# Patient Record
Sex: FEMALE | Race: WHITE | NOT HISPANIC OR LATINO | Employment: OTHER | URBAN - METROPOLITAN AREA
[De-identification: names, ages, dates, MRNs, and addresses within clinical notes are randomized per-mention and may not be internally consistent; named-entity substitution may affect disease eponyms.]

---

## 2018-01-31 ENCOUNTER — OFFICE VISIT CONVERTED (OUTPATIENT)
Dept: FAMILY MEDICINE CLINIC | Facility: CLINIC | Age: 83
End: 2018-01-31
Attending: NURSE PRACTITIONER

## 2018-02-20 ENCOUNTER — OFFICE VISIT CONVERTED (OUTPATIENT)
Dept: FAMILY MEDICINE CLINIC | Facility: CLINIC | Age: 83
End: 2018-02-20
Attending: FAMILY MEDICINE

## 2018-03-16 ENCOUNTER — OFFICE VISIT CONVERTED (OUTPATIENT)
Dept: ORTHOPEDIC SURGERY | Facility: CLINIC | Age: 83
End: 2018-03-16
Attending: ORTHOPAEDIC SURGERY

## 2018-04-23 ENCOUNTER — OFFICE VISIT CONVERTED (OUTPATIENT)
Dept: FAMILY MEDICINE CLINIC | Facility: CLINIC | Age: 83
End: 2018-04-23
Attending: FAMILY MEDICINE

## 2018-04-23 ENCOUNTER — CONVERSION ENCOUNTER (OUTPATIENT)
Dept: FAMILY MEDICINE CLINIC | Facility: CLINIC | Age: 83
End: 2018-04-23

## 2018-04-24 ENCOUNTER — OFFICE VISIT CONVERTED (OUTPATIENT)
Dept: ORTHOPEDIC SURGERY | Facility: CLINIC | Age: 83
End: 2018-04-24
Attending: PHYSICIAN ASSISTANT

## 2018-05-22 ENCOUNTER — CONVERSION ENCOUNTER (OUTPATIENT)
Dept: ORTHOPEDIC SURGERY | Facility: CLINIC | Age: 83
End: 2018-05-22

## 2018-05-22 ENCOUNTER — OFFICE VISIT CONVERTED (OUTPATIENT)
Dept: ORTHOPEDIC SURGERY | Facility: CLINIC | Age: 83
End: 2018-05-22
Attending: PHYSICIAN ASSISTANT

## 2018-05-24 ENCOUNTER — OFFICE VISIT CONVERTED (OUTPATIENT)
Dept: FAMILY MEDICINE CLINIC | Facility: CLINIC | Age: 83
End: 2018-05-24
Attending: FAMILY MEDICINE

## 2018-06-18 ENCOUNTER — OFFICE VISIT CONVERTED (OUTPATIENT)
Dept: FAMILY MEDICINE CLINIC | Facility: CLINIC | Age: 83
End: 2018-06-18
Attending: FAMILY MEDICINE

## 2018-08-31 ENCOUNTER — CONVERSION ENCOUNTER (OUTPATIENT)
Dept: FAMILY MEDICINE CLINIC | Facility: CLINIC | Age: 83
End: 2018-08-31

## 2018-08-31 ENCOUNTER — OFFICE VISIT CONVERTED (OUTPATIENT)
Dept: FAMILY MEDICINE CLINIC | Facility: CLINIC | Age: 83
End: 2018-08-31
Attending: FAMILY MEDICINE

## 2018-09-14 ENCOUNTER — OFFICE VISIT CONVERTED (OUTPATIENT)
Dept: FAMILY MEDICINE CLINIC | Facility: CLINIC | Age: 83
End: 2018-09-14
Attending: FAMILY MEDICINE

## 2018-11-12 ENCOUNTER — OFFICE VISIT CONVERTED (OUTPATIENT)
Dept: FAMILY MEDICINE CLINIC | Facility: CLINIC | Age: 83
End: 2018-11-12
Attending: FAMILY MEDICINE

## 2018-11-12 ENCOUNTER — CONVERSION ENCOUNTER (OUTPATIENT)
Dept: FAMILY MEDICINE CLINIC | Facility: CLINIC | Age: 83
End: 2018-11-12

## 2019-01-15 ENCOUNTER — OFFICE VISIT CONVERTED (OUTPATIENT)
Dept: FAMILY MEDICINE CLINIC | Facility: CLINIC | Age: 84
End: 2019-01-15
Attending: FAMILY MEDICINE

## 2019-02-12 ENCOUNTER — OFFICE VISIT CONVERTED (OUTPATIENT)
Dept: FAMILY MEDICINE CLINIC | Facility: CLINIC | Age: 84
End: 2019-02-12
Attending: FAMILY MEDICINE

## 2019-02-26 ENCOUNTER — HOSPITAL ENCOUNTER (OUTPATIENT)
Dept: GENERAL RADIOLOGY | Facility: HOSPITAL | Age: 84
Discharge: HOME | End: 2019-02-26
Attending: FAMILY MEDICINE

## 2019-04-12 ENCOUNTER — HOSPITAL ENCOUNTER (OUTPATIENT)
Dept: FAMILY MEDICINE CLINIC | Facility: CLINIC | Age: 84
Discharge: HOME | End: 2019-04-12
Attending: FAMILY MEDICINE

## 2019-04-12 ENCOUNTER — OFFICE VISIT CONVERTED (OUTPATIENT)
Dept: FAMILY MEDICINE CLINIC | Facility: CLINIC | Age: 84
End: 2019-04-12
Attending: FAMILY MEDICINE

## 2019-04-12 ENCOUNTER — CONVERSION ENCOUNTER (OUTPATIENT)
Dept: FAMILY MEDICINE CLINIC | Facility: CLINIC | Age: 84
End: 2019-04-12

## 2019-04-12 LAB
ALBUMIN SERPL-MCNC: 4.4 G/DL (ref 3.5–5)
ALBUMIN/GLOB SERPL: 1.5 {RATIO} (ref 1.4–2.6)
ALP SERPL-CCNC: 78 U/L (ref 43–160)
ALT SERPL-CCNC: 10 U/L (ref 10–40)
ANION GAP SERPL CALC-SCNC: 17 MMOL/L (ref 8–19)
AST SERPL-CCNC: 16 U/L (ref 15–50)
BILIRUB SERPL-MCNC: 0.45 MG/DL (ref 0.2–1.3)
BUN SERPL-MCNC: 15 MG/DL (ref 5–25)
BUN/CREAT SERPL: 12 {RATIO} (ref 6–20)
CALCIUM SERPL-MCNC: 9.5 MG/DL (ref 8.7–10.4)
CHLORIDE SERPL-SCNC: 94 MMOL/L (ref 99–111)
CHOLEST SERPL-MCNC: 240 MG/DL (ref 107–200)
CHOLEST/HDLC SERPL: 4.3 {RATIO} (ref 3–6)
CONV CO2: 26 MMOL/L (ref 22–32)
CONV TOTAL PROTEIN: 7.4 G/DL (ref 6.3–8.2)
CREAT UR-MCNC: 1.23 MG/DL (ref 0.5–0.9)
GFR SERPLBLD BASED ON 1.73 SQ M-ARVRAT: 40 ML/MIN/{1.73_M2}
GLOBULIN UR ELPH-MCNC: 3 G/DL (ref 2–3.5)
GLUCOSE SERPL-MCNC: 128 MG/DL (ref 65–99)
HDLC SERPL-MCNC: 56 MG/DL (ref 40–60)
LDLC SERPL CALC-MCNC: 144 MG/DL (ref 70–100)
OSMOLALITY SERPL CALC.SUM OF ELEC: 278 MOSM/KG (ref 273–304)
POTASSIUM SERPL-SCNC: 4 MMOL/L (ref 3.5–5.3)
SODIUM SERPL-SCNC: 133 MMOL/L (ref 135–147)
T4 FREE SERPL-MCNC: 1.6 NG/DL (ref 0.9–1.8)
TRIGL SERPL-MCNC: 199 MG/DL (ref 40–150)
TSH SERPL-ACNC: 1.17 M[IU]/L (ref 0.27–4.2)
VLDLC SERPL-MCNC: 40 MG/DL (ref 5–37)

## 2019-04-26 ENCOUNTER — OFFICE VISIT CONVERTED (OUTPATIENT)
Dept: FAMILY MEDICINE CLINIC | Facility: CLINIC | Age: 84
End: 2019-04-26
Attending: FAMILY MEDICINE

## 2019-04-26 ENCOUNTER — HOSPITAL ENCOUNTER (OUTPATIENT)
Dept: GENERAL RADIOLOGY | Facility: HOSPITAL | Age: 84
Discharge: HOME | End: 2019-04-26
Attending: FAMILY MEDICINE

## 2019-05-16 ENCOUNTER — OFFICE VISIT CONVERTED (OUTPATIENT)
Dept: FAMILY MEDICINE CLINIC | Facility: CLINIC | Age: 84
End: 2019-05-16
Attending: FAMILY MEDICINE

## 2019-05-31 ENCOUNTER — OFFICE VISIT CONVERTED (OUTPATIENT)
Dept: FAMILY MEDICINE CLINIC | Facility: CLINIC | Age: 84
End: 2019-05-31
Attending: FAMILY MEDICINE

## 2019-07-02 ENCOUNTER — HOSPITAL ENCOUNTER (OUTPATIENT)
Dept: FAMILY MEDICINE CLINIC | Facility: CLINIC | Age: 84
Discharge: HOME | End: 2019-07-02
Attending: FAMILY MEDICINE

## 2019-07-02 ENCOUNTER — OFFICE VISIT CONVERTED (OUTPATIENT)
Dept: FAMILY MEDICINE CLINIC | Facility: CLINIC | Age: 84
End: 2019-07-02
Attending: FAMILY MEDICINE

## 2019-07-02 LAB
ANION GAP SERPL CALC-SCNC: 22 MMOL/L (ref 8–19)
BNP SERPL-MCNC: 295 PG/ML (ref 0–1800)
BUN SERPL-MCNC: 13 MG/DL (ref 5–25)
BUN/CREAT SERPL: 11 {RATIO} (ref 6–20)
CALCIUM SERPL-MCNC: 9.1 MG/DL (ref 8.7–10.4)
CHLORIDE SERPL-SCNC: 98 MMOL/L (ref 99–111)
CONV CO2: 23 MMOL/L (ref 22–32)
CREAT UR-MCNC: 1.17 MG/DL (ref 0.5–0.9)
GFR SERPLBLD BASED ON 1.73 SQ M-ARVRAT: 43 ML/MIN/{1.73_M2}
GLUCOSE SERPL-MCNC: 113 MG/DL (ref 65–99)
OSMOLALITY SERPL CALC.SUM OF ELEC: 289 MOSM/KG (ref 273–304)
POTASSIUM SERPL-SCNC: 3.7 MMOL/L (ref 3.5–5.3)
SODIUM SERPL-SCNC: 139 MMOL/L (ref 135–147)

## 2019-07-11 ENCOUNTER — HOSPITAL ENCOUNTER (OUTPATIENT)
Dept: URGENT CARE | Facility: CLINIC | Age: 84
Discharge: HOME | End: 2019-07-11

## 2019-07-15 ENCOUNTER — HOSPITAL ENCOUNTER (OUTPATIENT)
Dept: OTHER | Facility: HOSPITAL | Age: 84
Discharge: HOME | End: 2019-07-15
Attending: FAMILY MEDICINE

## 2019-08-06 ENCOUNTER — CONVERSION ENCOUNTER (OUTPATIENT)
Dept: FAMILY MEDICINE CLINIC | Facility: CLINIC | Age: 84
End: 2019-08-06

## 2019-08-06 ENCOUNTER — OFFICE VISIT CONVERTED (OUTPATIENT)
Dept: FAMILY MEDICINE CLINIC | Facility: CLINIC | Age: 84
End: 2019-08-06
Attending: FAMILY MEDICINE

## 2019-09-14 ENCOUNTER — HOSPITAL ENCOUNTER (OUTPATIENT)
Dept: URGENT CARE | Facility: CLINIC | Age: 84
Discharge: HOME | End: 2019-09-14
Attending: FAMILY MEDICINE

## 2019-09-17 LAB — BACTERIA UR CULT: NORMAL

## 2019-10-14 ENCOUNTER — HOSPITAL ENCOUNTER (OUTPATIENT)
Dept: FAMILY MEDICINE CLINIC | Facility: CLINIC | Age: 84
Discharge: HOME | End: 2019-10-14
Attending: FAMILY MEDICINE

## 2019-10-14 ENCOUNTER — OFFICE VISIT CONVERTED (OUTPATIENT)
Dept: FAMILY MEDICINE CLINIC | Facility: CLINIC | Age: 84
End: 2019-10-14
Attending: FAMILY MEDICINE

## 2019-10-14 LAB
ALBUMIN SERPL-MCNC: 4.3 G/DL (ref 3.5–5)
ALBUMIN/GLOB SERPL: 1.4 {RATIO} (ref 1.4–2.6)
ALP SERPL-CCNC: 71 U/L (ref 43–160)
ALT SERPL-CCNC: 7 U/L (ref 10–40)
ANION GAP SERPL CALC-SCNC: 19 MMOL/L (ref 8–19)
AST SERPL-CCNC: 17 U/L (ref 15–50)
BASOPHILS # BLD AUTO: 0.06 10*3/UL (ref 0–0.2)
BASOPHILS NFR BLD AUTO: 1.1 % (ref 0–3)
BILIRUB SERPL-MCNC: 0.46 MG/DL (ref 0.2–1.3)
BUN SERPL-MCNC: 12 MG/DL (ref 5–25)
BUN/CREAT SERPL: 11 {RATIO} (ref 6–20)
CALCIUM SERPL-MCNC: 9.5 MG/DL (ref 8.7–10.4)
CHLORIDE SERPL-SCNC: 101 MMOL/L (ref 99–111)
CHOLEST SERPL-MCNC: 205 MG/DL (ref 107–200)
CHOLEST/HDLC SERPL: 4.6 {RATIO} (ref 3–6)
CONV ABS IMM GRAN: 0.02 10*3/UL (ref 0–0.2)
CONV CO2: 24 MMOL/L (ref 22–32)
CONV IMMATURE GRAN: 0.4 % (ref 0–1.8)
CONV TOTAL PROTEIN: 7.4 G/DL (ref 6.3–8.2)
CREAT UR-MCNC: 1.07 MG/DL (ref 0.5–0.9)
DEPRECATED RDW RBC AUTO: 50.2 FL (ref 36.4–46.3)
EOSINOPHIL # BLD AUTO: 0.36 10*3/UL (ref 0–0.7)
EOSINOPHIL # BLD AUTO: 6.3 % (ref 0–7)
ERYTHROCYTE [DISTWIDTH] IN BLOOD BY AUTOMATED COUNT: 15.9 % (ref 11.7–14.4)
GFR SERPLBLD BASED ON 1.73 SQ M-ARVRAT: 48 ML/MIN/{1.73_M2}
GLOBULIN UR ELPH-MCNC: 3.1 G/DL (ref 2–3.5)
GLUCOSE SERPL-MCNC: 187 MG/DL (ref 65–99)
HCT VFR BLD AUTO: 35.5 % (ref 37–47)
HDLC SERPL-MCNC: 45 MG/DL (ref 40–60)
HGB BLD-MCNC: 10.9 G/DL (ref 12–16)
LDLC SERPL CALC-MCNC: 130 MG/DL (ref 70–100)
LYMPHOCYTES # BLD AUTO: 1.87 10*3/UL (ref 1–5)
LYMPHOCYTES NFR BLD AUTO: 32.7 % (ref 20–45)
MCH RBC QN AUTO: 26.7 PG (ref 27–31)
MCHC RBC AUTO-ENTMCNC: 30.7 G/DL (ref 33–37)
MCV RBC AUTO: 87 FL (ref 81–99)
MONOCYTES # BLD AUTO: 0.46 10*3/UL (ref 0.2–1.2)
MONOCYTES NFR BLD AUTO: 8.1 % (ref 3–10)
NEUTROPHILS # BLD AUTO: 2.94 10*3/UL (ref 2–8)
NEUTROPHILS NFR BLD AUTO: 51.4 % (ref 30–85)
NRBC CBCN: 0 % (ref 0–0.7)
OSMOLALITY SERPL CALC.SUM OF ELEC: 295 MOSM/KG (ref 273–304)
PLATELET # BLD AUTO: 224 10*3/UL (ref 130–400)
PMV BLD AUTO: 10.2 FL (ref 9.4–12.3)
POTASSIUM SERPL-SCNC: 4.2 MMOL/L (ref 3.5–5.3)
RBC # BLD AUTO: 4.08 10*6/UL (ref 4.2–5.4)
SODIUM SERPL-SCNC: 140 MMOL/L (ref 135–147)
TRIGL SERPL-MCNC: 151 MG/DL (ref 40–150)
VLDLC SERPL-MCNC: 30 MG/DL (ref 5–37)
WBC # BLD AUTO: 5.71 10*3/UL (ref 4.8–10.8)

## 2019-10-15 LAB
25(OH)D3 SERPL-MCNC: 66 NG/ML (ref 30–100)
IRON SATN MFR SERPL: 11 % (ref 20–55)
IRON SERPL-MCNC: 23 UG/DL (ref 60–170)
TIBC SERPL-MCNC: 204 UG/DL (ref 245–450)
TRANSFERRIN SERPL-MCNC: 143 MG/DL (ref 250–380)

## 2019-10-28 ENCOUNTER — HOSPITAL ENCOUNTER (OUTPATIENT)
Dept: CARDIOLOGY | Facility: HOSPITAL | Age: 84
Discharge: HOME | End: 2019-10-28
Attending: FAMILY MEDICINE

## 2019-11-09 ENCOUNTER — HOSPITAL ENCOUNTER (OUTPATIENT)
Dept: URGENT CARE | Facility: CLINIC | Age: 84
Discharge: HOME | End: 2019-11-09

## 2019-11-18 ENCOUNTER — HOSPITAL ENCOUNTER (OUTPATIENT)
Dept: FAMILY MEDICINE CLINIC | Facility: CLINIC | Age: 84
Discharge: HOME | End: 2019-11-18
Attending: FAMILY MEDICINE

## 2019-11-18 ENCOUNTER — OFFICE VISIT CONVERTED (OUTPATIENT)
Dept: FAMILY MEDICINE CLINIC | Facility: CLINIC | Age: 84
End: 2019-11-18
Attending: FAMILY MEDICINE

## 2019-11-18 ENCOUNTER — HOSPITAL ENCOUNTER (OUTPATIENT)
Dept: GENERAL RADIOLOGY | Facility: HOSPITAL | Age: 84
Discharge: HOME | End: 2019-11-18
Attending: FAMILY MEDICINE

## 2019-11-18 LAB
BASOPHILS # BLD AUTO: 0.05 10*3/UL (ref 0–0.2)
BASOPHILS NFR BLD AUTO: 0.6 % (ref 0–3)
CONV ABS IMM GRAN: 0.06 10*3/UL (ref 0–0.2)
CONV IMMATURE GRAN: 0.7 % (ref 0–1.8)
DEPRECATED RDW RBC AUTO: 54.9 FL (ref 36.4–46.3)
EOSINOPHIL # BLD AUTO: 0.38 10*3/UL (ref 0–0.7)
EOSINOPHIL # BLD AUTO: 4.6 % (ref 0–7)
ERYTHROCYTE [DISTWIDTH] IN BLOOD BY AUTOMATED COUNT: 17.2 % (ref 11.7–14.4)
HCT VFR BLD AUTO: 35.6 % (ref 37–47)
HGB BLD-MCNC: 11.3 G/DL (ref 12–16)
LYMPHOCYTES # BLD AUTO: 2.25 10*3/UL (ref 1–5)
LYMPHOCYTES NFR BLD AUTO: 27.1 % (ref 20–45)
MCH RBC QN AUTO: 27.9 PG (ref 27–31)
MCHC RBC AUTO-ENTMCNC: 31.7 G/DL (ref 33–37)
MCV RBC AUTO: 87.9 FL (ref 81–99)
MONOCYTES # BLD AUTO: 0.6 10*3/UL (ref 0.2–1.2)
MONOCYTES NFR BLD AUTO: 7.2 % (ref 3–10)
NEUTROPHILS # BLD AUTO: 4.97 10*3/UL (ref 2–8)
NEUTROPHILS NFR BLD AUTO: 59.8 % (ref 30–85)
NRBC CBCN: 0 % (ref 0–0.7)
PLATELET # BLD AUTO: 263 10*3/UL (ref 130–400)
PMV BLD AUTO: 10.1 FL (ref 9.4–12.3)
RBC # BLD AUTO: 4.05 10*6/UL (ref 4.2–5.4)
WBC # BLD AUTO: 8.31 10*3/UL (ref 4.8–10.8)

## 2019-11-25 ENCOUNTER — HOSPITAL ENCOUNTER (OUTPATIENT)
Dept: URGENT CARE | Facility: CLINIC | Age: 84
Discharge: HOME | End: 2019-11-25
Attending: NURSE PRACTITIONER

## 2020-01-09 ENCOUNTER — OFFICE VISIT CONVERTED (OUTPATIENT)
Dept: FAMILY MEDICINE CLINIC | Facility: CLINIC | Age: 85
End: 2020-01-09
Attending: FAMILY MEDICINE

## 2020-01-09 ENCOUNTER — HOSPITAL ENCOUNTER (OUTPATIENT)
Dept: FAMILY MEDICINE CLINIC | Facility: CLINIC | Age: 85
Discharge: HOME | End: 2020-01-09
Attending: FAMILY MEDICINE

## 2020-01-09 LAB
ANION GAP SERPL CALC-SCNC: 17 MMOL/L (ref 8–19)
BNP SERPL-MCNC: 352 PG/ML (ref 0–1800)
BUN SERPL-MCNC: 11 MG/DL (ref 5–25)
BUN/CREAT SERPL: 10 {RATIO} (ref 6–20)
CALCIUM SERPL-MCNC: 9.4 MG/DL (ref 8.7–10.4)
CHLORIDE SERPL-SCNC: 101 MMOL/L (ref 99–111)
CONV CO2: 25 MMOL/L (ref 22–32)
CREAT UR-MCNC: 1.1 MG/DL (ref 0.5–0.9)
GFR SERPLBLD BASED ON 1.73 SQ M-ARVRAT: 46 ML/MIN/{1.73_M2}
GLUCOSE SERPL-MCNC: 146 MG/DL (ref 65–99)
IRON SATN MFR SERPL: 32 % (ref 20–55)
IRON SERPL-MCNC: 135 UG/DL (ref 60–170)
OSMOLALITY SERPL CALC.SUM OF ELEC: 290 MOSM/KG (ref 273–304)
POTASSIUM SERPL-SCNC: 3.9 MMOL/L (ref 3.5–5.3)
SODIUM SERPL-SCNC: 139 MMOL/L (ref 135–147)
TIBC SERPL-MCNC: 426 UG/DL (ref 245–450)
TRANSFERRIN SERPL-MCNC: 298 MG/DL (ref 250–380)

## 2020-02-20 ENCOUNTER — OFFICE VISIT CONVERTED (OUTPATIENT)
Dept: FAMILY MEDICINE CLINIC | Facility: CLINIC | Age: 85
End: 2020-02-20
Attending: FAMILY MEDICINE

## 2020-02-20 ENCOUNTER — CONVERSION ENCOUNTER (OUTPATIENT)
Dept: FAMILY MEDICINE CLINIC | Facility: CLINIC | Age: 85
End: 2020-02-20

## 2020-02-20 ENCOUNTER — HOSPITAL ENCOUNTER (OUTPATIENT)
Dept: GENERAL RADIOLOGY | Facility: HOSPITAL | Age: 85
Discharge: HOME | End: 2020-02-20
Attending: FAMILY MEDICINE

## 2020-02-20 ENCOUNTER — HOSPITAL ENCOUNTER (OUTPATIENT)
Dept: FAMILY MEDICINE CLINIC | Facility: CLINIC | Age: 85
Discharge: HOME | End: 2020-02-20
Attending: FAMILY MEDICINE

## 2020-02-20 LAB
ALBUMIN SERPL-MCNC: 4.6 G/DL (ref 3.5–5)
ALBUMIN/GLOB SERPL: 1.5 {RATIO} (ref 1.4–2.6)
ALP SERPL-CCNC: 70 U/L (ref 43–160)
ALT SERPL-CCNC: 10 U/L (ref 10–40)
ANION GAP SERPL CALC-SCNC: 24 MMOL/L (ref 8–19)
AST SERPL-CCNC: 19 U/L (ref 15–50)
BILIRUB SERPL-MCNC: 0.36 MG/DL (ref 0.2–1.3)
BUN SERPL-MCNC: 20 MG/DL (ref 5–25)
BUN/CREAT SERPL: 15 {RATIO} (ref 6–20)
CALCIUM SERPL-MCNC: 10.3 MG/DL (ref 8.7–10.4)
CHLORIDE SERPL-SCNC: 98 MMOL/L (ref 99–111)
CHOLEST SERPL-MCNC: 231 MG/DL (ref 107–200)
CHOLEST/HDLC SERPL: 4.4 {RATIO} (ref 3–6)
CONV CO2: 21 MMOL/L (ref 22–32)
CONV TOTAL PROTEIN: 7.6 G/DL (ref 6.3–8.2)
CREAT UR-MCNC: 1.31 MG/DL (ref 0.5–0.9)
GFR SERPLBLD BASED ON 1.73 SQ M-ARVRAT: 37 ML/MIN/{1.73_M2}
GLOBULIN UR ELPH-MCNC: 3 G/DL (ref 2–3.5)
GLUCOSE SERPL-MCNC: 159 MG/DL (ref 65–99)
HDLC SERPL-MCNC: 52 MG/DL (ref 40–60)
LDLC SERPL CALC-MCNC: 142 MG/DL (ref 70–100)
OSMOLALITY SERPL CALC.SUM OF ELEC: 294 MOSM/KG (ref 273–304)
POTASSIUM SERPL-SCNC: 4.1 MMOL/L (ref 3.5–5.3)
SODIUM SERPL-SCNC: 139 MMOL/L (ref 135–147)
TRIGL SERPL-MCNC: 186 MG/DL (ref 40–150)
VLDLC SERPL-MCNC: 37 MG/DL (ref 5–37)

## 2020-08-20 ENCOUNTER — TELEPHONE CONVERTED (OUTPATIENT)
Dept: FAMILY MEDICINE CLINIC | Facility: CLINIC | Age: 85
End: 2020-08-20
Attending: FAMILY MEDICINE

## 2020-09-01 ENCOUNTER — HOSPITAL ENCOUNTER (OUTPATIENT)
Dept: LAB | Facility: HOSPITAL | Age: 85
Discharge: HOME | End: 2020-09-01
Attending: FAMILY MEDICINE

## 2020-09-01 LAB
ANION GAP SERPL CALC-SCNC: 16 MMOL/L (ref 8–19)
BUN SERPL-MCNC: 19 MG/DL (ref 5–25)
BUN/CREAT SERPL: 14 {RATIO} (ref 6–20)
CALCIUM SERPL-MCNC: 9.7 MG/DL (ref 8.7–10.4)
CHLORIDE SERPL-SCNC: 101 MMOL/L (ref 99–111)
CONV CO2: 25 MMOL/L (ref 22–32)
CREAT UR-MCNC: 1.36 MG/DL (ref 0.5–0.9)
GFR SERPLBLD BASED ON 1.73 SQ M-ARVRAT: 35 ML/MIN/{1.73_M2}
GLUCOSE SERPL-MCNC: 136 MG/DL (ref 65–99)
OSMOLALITY SERPL CALC.SUM OF ELEC: 290 MOSM/KG (ref 273–304)
POTASSIUM SERPL-SCNC: 4.1 MMOL/L (ref 3.5–5.3)
SODIUM SERPL-SCNC: 138 MMOL/L (ref 135–147)

## 2020-09-02 LAB — 25(OH)D3 SERPL-MCNC: 64.2 NG/ML (ref 30–100)

## 2020-11-04 ENCOUNTER — HOSPITAL ENCOUNTER (OUTPATIENT)
Dept: URGENT CARE | Facility: CLINIC | Age: 85
Discharge: HOME | End: 2020-11-04
Attending: NURSE PRACTITIONER

## 2020-11-07 LAB — SARS-COV-2 RNA SPEC QL NAA+PROBE: NOT DETECTED

## 2021-01-19 ENCOUNTER — HOSPITAL ENCOUNTER (OUTPATIENT)
Dept: URGENT CARE | Facility: CLINIC | Age: 86
Discharge: HOME | End: 2021-01-19
Attending: FAMILY MEDICINE

## 2021-02-22 ENCOUNTER — OFFICE VISIT CONVERTED (OUTPATIENT)
Dept: FAMILY MEDICINE CLINIC | Facility: CLINIC | Age: 86
End: 2021-02-22
Attending: FAMILY MEDICINE

## 2021-02-22 ENCOUNTER — HOSPITAL ENCOUNTER (OUTPATIENT)
Dept: FAMILY MEDICINE CLINIC | Facility: CLINIC | Age: 86
Discharge: HOME | End: 2021-02-22
Attending: FAMILY MEDICINE

## 2021-05-06 ENCOUNTER — CONVERSION ENCOUNTER (OUTPATIENT)
Dept: FAMILY MEDICINE CLINIC | Facility: CLINIC | Age: 86
End: 2021-05-06

## 2021-05-06 ENCOUNTER — OFFICE VISIT CONVERTED (OUTPATIENT)
Dept: FAMILY MEDICINE CLINIC | Facility: CLINIC | Age: 86
End: 2021-05-06
Attending: FAMILY MEDICINE

## 2021-05-13 NOTE — PROGRESS NOTES
Progress Note      Patient Name: Shayy Wasserman   Patient ID: [id number]   Sex: Female   YOB: 1935    Primary Care Provider: Pee Mon DO    Visit Date: August 20, 2020    Provider: Pee Mon DO   Location: [facility]   Location Address: [address] [place] [address]  [id number]   Location Phone: [phone]          Chief Complaint  · 6 month follow up - HTN, HLD, CKD(stage), edema  · medication refills  · pt took vitals at home today for visit.       History Of Present Illness  TELEHEALTH TELEPHONE VISIT  Shayy Wasserman is a 84 year old /White female who is presenting for evaluation via telehealth telephone visit. Verbal consent obtained before beginning visit.   Provider spent 15 minutes with patient during telehealth visit.   The following staff were present during this visit: Any Rodriguez CMA   Past Medical History/Overview of Patient Symptoms     f/u HTN, HLD and CKD. She states that she as been checking her BP and it has been good. She is taking all her meds as prescribed. Her swelling is improved.       Past Medical History  Disease Name Date Onset Notes   Anxiety disorder 11/12/2018 --    Arthritis --  --    Bladder Disorder --  --    CKD (chronic kidney disease) stage 3, GFR 30-59 ml/min --  --    Dependent edema 11/28/2017 --    Gout --  --    High cholesterol --  --    Hypertension --  --    Limb Swelling --  --    Osteoporosis --  --    Primary osteoarthritis of left knee 05/24/2018 --    Primary osteoarthritis of right hip 12/12/2017 --          Past Surgical History  Procedure Name Date Notes   Artificial Joints/Limbs --  --    Bladder Repair --  --    Colonoscopy 2016 --    Eye Implant --  yes   Hysterectomy --  --    Joint Surgery --  --    Knee repair --  right knee   Knee replacement, right --  --          Medication List  Name Date Started Instructions   albuterol sulfate 2.5 mg /3 mL (0.083 %) inhalation solution for nebulization  10/14/2019 inhale 3 milliliters (2.5 mg) by nebulization route 3 times per day for 30 days   alendronate 70 mg oral tablet 09/10/2019 TAKE 1 TABLET BY MOUTH WEEKLY (DO NOT LIE DOWN, EAT, DRINK, OR TAKE OTHER MEDICATION FOR AT LEAST 30 MINUTES)   allopurinol 300 mg oral tablet 01/27/2020 TAKE 1 TABLET BY MOUTH EVERY DAY   amlodipine 10 mg oral tablet 10/14/2019 take 1 tablet (10 mg) by oral route once daily for 90 days   atenolol 50 mg oral tablet 08/03/2020 take 1 tablet (50 mg) by oral route 2 times per day for 90 days   DeVilbiss PulmoNeb LT Comp-Neb miscellaneous device 04/26/2019 use with Albuterol soln three times daily   pantoprazole 40 mg oral tablet,delayed release (DR/EC) 08/06/2019 take 1 tablet (40 mg) by oral route once daily for 90 days         Allergy List  Allergen Name Date Reaction Notes   tramadol --  --  --          Family Medical History  Disease Name Relative/Age Notes   Colon Cancer Father/   --          Social History  Finding Status Start/Stop Quantity Notes   Alcohol Never --/-- --  02/12/2019 -    Alcohol Use --  --/-- --  01/15/2019 - does not drink   lives with children --  --/-- --  --    Recreational Drug Use Never --/-- --  no   Retired. --  --/-- --  --    Tobacco Former 16/35 --  former smoker    --  --/-- --  --          Immunizations  NameDate Admin Mfg Trade Name Lot Number Route Inj VIS Given VIS Publication   Cdsoxugub64/14/2019 [facility] Fluzone Quadrivalent [id number]  LD 10/14/2019    Comments: patient tolerated well with no complaints   Hyufcwnuu91/28/2017 [facility] Fluzone Quadrivalent [id number] IM RD 11/28/2017 08/07/2015   Comments: pt tolerated well   Prevnar 1311/28/2017 [facility] PREVNAR 13 [id number] IM LD 11/28/2017 04/15/2015   Comments:          Review of Systems  · Constitutional  o Denies  o : fatigue  · Eyes  o Denies  o : blurred vision, changes in vision  · HENT  o Denies  o : headaches  · Cardiovascular  o Denies  o : chest pain, irregular heart beats, rapid heart  "rate  · Respiratory  o Denies  o : shortness of breath, wheezing, cough      Vitals  Date Time BP Position Site L\R Cuff Size HR RR TEMP (F) WT  HT  BMI kg/m2 BSA m2 O2 Sat HC       01/09/2020 03:41 /71 Sitting    81 - R   188lbs 0oz 5'  5\" 31.28 1.98 97 %    02/20/2020 03:51 /81 Sitting    88 - R   189lbs 2oz 5'  5\" 31.47 1.98 97 %    02/20/2020 04:36 /86 Sitting               08/20/2020 03:48 /77 Sitting    68 - R    5'  5\"                 Assessment  · Osteoporosis     733.00/M81.0  will check her Vit D  · Dependent edema     782.3/R60.9  · High cholesterol     272.0/E78.0  stable  · Hypertension     401.9/I10  good control  · CKD (chronic kidney disease) stage 3, GFR 30-59 ml/min     585.3/N18.3  will update her labs      Plan  · Orders  o BMP [facility] (70879) - 401.9/I10, 585.3/N18.3 - 08/20/2020  o Vitamin D (25-Hydroxy) Level (97052) - 733.00/M81.0 - 08/20/2020  o ACO-39: Current medications updated and reviewed () - - 08/20/2020  · Medications  o alendronate 70 mg oral tablet   SIG: take 1 tablet (70 mg) once weekly in the morning, at least 30 min before first food, beverage, or medication of day   DISP: (4) Tablet with 11 refills  Adjusted on 08/20/2020     o pantoprazole 40 mg oral tablet,delayed release (DR/EC)   SIG: take 1 tablet (40 mg) by oral route once daily for 90 days   DISP: (90) tablets with 3 refills  Adjusted on 08/20/2020     o Medications have been Reconciled  o Transition of Care or Provider Policy  · Instructions  o Plan Of Care:   o Chronic conditions reviewed and taken into consideration for today's treatment plan.  o Patient instructed to seek medical attention urgently for new or worsening symptoms.  o Patient was educated/instructed on their diagnosis, treatment and medications prior to discharge from the clinic today.  o Patient is taking medications as prescribed and doing well.   o Take all medications as prescribed/directed.  o Patient instructed/educated " on their diet and exercise program.  · Disposition  o f/u as needed or worsening symptoms  o Return Visit Request in/on 6 months +/- 2 days ([id number]).            Electronically Signed by: Pee Mon DO -Author on August 20, 2020 04:13:21 PM

## 2021-05-14 VITALS
HEART RATE: 63 BPM | BODY MASS INDEX: 28.51 KG/M2 | OXYGEN SATURATION: 96 % | DIASTOLIC BLOOD PRESSURE: 67 MMHG | WEIGHT: 171.12 LBS | SYSTOLIC BLOOD PRESSURE: 131 MMHG | HEIGHT: 65 IN | TEMPERATURE: 96.7 F | RESPIRATION RATE: 22 BRPM

## 2021-05-14 NOTE — PROGRESS NOTES
Progress Note      Patient Name: Shayy Wasserman   Patient ID: [id number]   Sex: Female   YOB: 1935    Primary Care Provider: Pee Mon DO    Visit Date: February 22, 2021    Provider: Pee Mon DO   Location: [facility]   Location Address: [address] [place] [address]  [id number]   Location Phone: [phone]          Chief Complaint  · f/u for osteoporosis, dependent edema, HLD, HTN, CKD stage 3  · refills      History Of Present Illness  Shayy Wasserman is a 85 year old /White female who presents for evaluation and treatment of: HTN, HLD, and CKD. She states that she checks her BP at home and it has been good. She takes her BP meds daily      HLD- she takes her med every evening        CKD- she states that she drinking of fluids. NO NSAIDs       Past Medical History  Disease Name Date Onset Notes   Anxiety disorder 11/12/2018 --    Arthritis --  --    Bladder Disorder --  --    CKD (chronic kidney disease) stage 3, GFR 30-59 ml/min --  --    Dependent edema 11/28/2017 --    Gout --  --    High cholesterol --  --    Hypertension --  --    Limb Swelling --  --    Osteoporosis --  --    Primary osteoarthritis of left knee 05/24/2018 --    Primary osteoarthritis of right hip 12/12/2017 --          Past Surgical History  Procedure Name Date Notes   Artificial Joints/Limbs --  --    Bladder Repair --  --    Colonoscopy 2016 --    Eye Implant --  yes   Hysterectomy --  --    Joint Surgery --  --    Knee repair --  right knee   Knee replacement, right --  --          Medication List  Name Date Started Instructions   alendronate 70 mg oral tablet 08/20/2020 take 1 tablet (70 mg) once weekly in the morning, at least 30 min before first food, beverage, or medication of day   ALLOPURINOL 300 MG TABS 300 Tablet 01/19/2021 TAKE 1 TABLET BY MOUTH EVERY DAY   AMLODIPINE BESYLATE 10 MG T 10 Tablet 10/26/2020 TAKE 1 TABLET BY MOUTH DAILY   atenolol 50  "mg oral tablet 08/03/2020 take 1 tablet (50 mg) by oral route 2 times per day for 90 days   DeVilbiss PulmoNeb LT Comp-Neb miscellaneous device 04/26/2019 use with Albuterol soln three times daily   pantoprazole 40 mg oral tablet,delayed release (DR/EC) 08/20/2020 take 1 tablet (40 mg) by oral route once daily for 90 days         Allergy List  Allergen Name Date Reaction Notes   tramadol --  --  --        Allergies Reconciled  Family Medical History  Disease Name Relative/Age Notes   Colon Cancer Father/   --          Social History  Finding Status Start/Stop Quantity Notes   Alcohol Never --/-- --  02/12/2019 -    Alcohol Use --  --/-- --  01/15/2019 - does not drink   lives with children --  --/-- --  --    Recreational Drug Use Never --/-- --  no   Retired. --  --/-- --  --    Tobacco Former 16/35 --  former smoker    --  --/-- --  --          Immunizations  NameDate Admin Mfg Trade Name Lot Number Route Inj VIS Given VIS Publication   Yczdxysja57/06/2020 [facility] Fluzone Quadrivalent [id number] IM RD 10/06/2020 08/15/2019   Comments: Injection given. Pt tolerated well. NDC 86213-523-71   Prevnar 1311/28/2017 [facility] PREVNAR 13 [id number] IM LD 11/28/2017 04/15/2015   Comments:          Review of Systems  · Constitutional  o Denies  o : fatigue  · Eyes  o Denies  o : changes in vision  · HENT  o Denies  o : headaches  · Cardiovascular  o Denies  o : chest pain, irregular heart beats, rapid heart rate, dyspnea on exertion  · Respiratory  o Denies  o : shortness of breath, wheezing, cough, dyspnea on exertion      Vitals  Date Time BP Position Site L\R Cuff Size HR RR TEMP (F) WT  HT  BMI kg/m2 BSA m2 O2 Sat FR L/min FiO2 HC       02/20/2020 03:51 /81 Sitting    88 - R   189lbs 2oz 5'  5\" 31.47 1.98 97 %  21%    08/20/2020 03:48 /77 Sitting    68 - R    5'  5\"         02/22/2021 03:33 /67 Sitting    63 - R 22 96.7 171lbs 2oz 5'  5\" 28.48 1.89 96 %  21%          Physical " Examination  · Constitutional  o Appearance  o : well-nourished, well developed, alert, in no acute distress, well-tended appearance  · Head and Face  o Head  o :   § Inspection  § : atraumatic, normocephalic  o Face  o :   § Inspection  § : no facial lesions  o HEENT  o : Unremarkable  · Eyes  o Conjunctivae  o : conjunctivae normal  o Sclerae  o : sclerae white  o Pupils and Irises  o : pupils equal and round, pupils reactive to light bilaterally  o Eyelids/Ocular Adnexae  o : eyelid appearance normal  · Ears, Nose, Mouth and Throat  o Ears  o :   § External Ears  § : appearance within normal limits, no lesions present  § Otoscopic Examination  § : tympanic membrane appearance within normal limits bilaterally without perforations, mobility normal  o Nose  o :   § External Nose  § : appearance normal  o Oral Cavity  o :   § Oral Mucosa  § : oral mucosa normal  § Lips  § : lip appearance normal  § Teeth  § : dentures in place  § Gums  § : gums pink, non-swollen, no bleeding present  § Tongue  § : tongue appearance normal  § Palate  § : hard palate normal, soft palate appearance normal  o Throat  o :   § Oropharynx  § : no inflammation or lesions present, tonsils within normal limits  · Neck  o Inspection/Palpation  o : normal appearance, no masses or tenderness, trachea midline  o Thyroid  o : gland size normal, nontender, no nodules or masses present on palpation  · Respiratory  o Respiratory Effort  o : breathing unlabored  o Auscultation of Lungs  o : normal breath sounds  · Cardiovascular  o Heart  o :   § Auscultation of Heart  § : regular rate, normal rhythm, no murmurs present  o Peripheral Vascular System  o :   § Extremities  § : no edema  · Lymphatic  o Neck  o : no lymphadenopathy           Assessment  · Screening for depression     V79.0/Z13.89  · Osteoporosis     733.00/M81.0  · High cholesterol     272.0/E78.0  will update   · Hypertension     401.9/I10  good control  · CKD (chronic kidney disease)  stage 3, GFR 30-59 ml/min     585.3/N18.30  will update       Plan  · Orders  o ACO-18: Negative screen for clinical depression using a standardized tool () - V79.0/Z13.89 - 02/22/2021  o Vitamin D Level (27201) - 733.00/M81.0 - 02/22/2021  o CMP [facility] (78908) - 272.0/E78.0, 401.9/I10, 585.3/N18.30 - 02/22/2021  o Lipid Panel [facility] (24597) - 272.0/E78.0, 401.9/I10 - 02/22/2021  o ACO-39: Current medications updated and reviewed (, 1159F) - - 02/22/2021  · Medications  o amlodipine 10 mg oral tablet   SIG: take 1 tablet (10 mg) by oral route once daily for 90 days   DISP: (90) Tablet with 1 refills  Prescribed on 02/22/2021     o AMLODIPINE BESYLATE 10 MG T 10 Tablet   SIG: TAKE 1 TABLET BY MOUTH DAILY   DISP: (90) Tablet with 0 refills  Discontinued on 02/22/2021     o Medications have been Reconciled  o Transition of Care or Provider Policy  · Instructions  o Depression Screen completed and scanned into the EMR under the designated folder within the patient's documents.  o Today's PHQ-9 result is 3.  o Patient is taking medications as prescribed and doing well.   o Take all medications as prescribed/directed.  o Patient instructed/educated on their diet and exercise program.  o Patient was educated/instructed on their diagnosis, treatment and medications prior to discharge from the clinic today.  o Patient instructed to seek medical attention urgently for new or worsening symptoms.  o Call the office with any concerns or questions.  o Bring all medicines with their bottles to each office visit.  · Disposition  o Call or Return if symptoms worsen or persist.  o Return Visit Request in/on 6 months +/- 2 days (68206).            Electronically Signed by: Pee Mon DO -Author on February 22, 2021 04:31:31 PM

## 2021-05-15 VITALS
BODY MASS INDEX: 31.84 KG/M2 | HEART RATE: 77 BPM | DIASTOLIC BLOOD PRESSURE: 79 MMHG | HEIGHT: 65 IN | WEIGHT: 191.12 LBS | TEMPERATURE: 97.8 F | OXYGEN SATURATION: 96 % | SYSTOLIC BLOOD PRESSURE: 129 MMHG

## 2021-05-15 VITALS
WEIGHT: 190.25 LBS | SYSTOLIC BLOOD PRESSURE: 107 MMHG | HEIGHT: 65 IN | DIASTOLIC BLOOD PRESSURE: 62 MMHG | OXYGEN SATURATION: 97 % | BODY MASS INDEX: 31.7 KG/M2 | HEART RATE: 72 BPM

## 2021-05-15 VITALS
BODY MASS INDEX: 31.82 KG/M2 | WEIGHT: 191 LBS | HEART RATE: 81 BPM | DIASTOLIC BLOOD PRESSURE: 69 MMHG | SYSTOLIC BLOOD PRESSURE: 142 MMHG | HEIGHT: 65 IN | TEMPERATURE: 98.2 F

## 2021-05-15 VITALS
SYSTOLIC BLOOD PRESSURE: 142 MMHG | HEART RATE: 85 BPM | HEIGHT: 65 IN | DIASTOLIC BLOOD PRESSURE: 73 MMHG | TEMPERATURE: 98 F | BODY MASS INDEX: 31.82 KG/M2 | WEIGHT: 191 LBS

## 2021-05-15 VITALS
HEART RATE: 74 BPM | TEMPERATURE: 97.4 F | BODY MASS INDEX: 32.55 KG/M2 | WEIGHT: 195.37 LBS | DIASTOLIC BLOOD PRESSURE: 64 MMHG | SYSTOLIC BLOOD PRESSURE: 133 MMHG | HEIGHT: 65 IN | OXYGEN SATURATION: 100 %

## 2021-05-15 VITALS
HEIGHT: 65 IN | DIASTOLIC BLOOD PRESSURE: 81 MMHG | SYSTOLIC BLOOD PRESSURE: 154 MMHG | BODY MASS INDEX: 32.32 KG/M2 | HEART RATE: 87 BPM | WEIGHT: 194 LBS

## 2021-05-15 VITALS
TEMPERATURE: 97.6 F | OXYGEN SATURATION: 95 % | HEART RATE: 69 BPM | HEIGHT: 65 IN | BODY MASS INDEX: 32.38 KG/M2 | WEIGHT: 194.37 LBS | SYSTOLIC BLOOD PRESSURE: 141 MMHG | DIASTOLIC BLOOD PRESSURE: 71 MMHG

## 2021-05-15 VITALS
TEMPERATURE: 97.8 F | OXYGEN SATURATION: 98 % | HEIGHT: 65 IN | DIASTOLIC BLOOD PRESSURE: 74 MMHG | HEART RATE: 79 BPM | BODY MASS INDEX: 32.36 KG/M2 | SYSTOLIC BLOOD PRESSURE: 148 MMHG | WEIGHT: 194.25 LBS

## 2021-05-15 VITALS
BODY MASS INDEX: 31.32 KG/M2 | DIASTOLIC BLOOD PRESSURE: 71 MMHG | SYSTOLIC BLOOD PRESSURE: 132 MMHG | HEART RATE: 81 BPM | WEIGHT: 188 LBS | OXYGEN SATURATION: 97 % | HEIGHT: 65 IN

## 2021-05-15 VITALS
SYSTOLIC BLOOD PRESSURE: 153 MMHG | HEART RATE: 88 BPM | DIASTOLIC BLOOD PRESSURE: 86 MMHG | DIASTOLIC BLOOD PRESSURE: 81 MMHG | HEIGHT: 65 IN | BODY MASS INDEX: 31.51 KG/M2 | OXYGEN SATURATION: 97 % | SYSTOLIC BLOOD PRESSURE: 136 MMHG | WEIGHT: 189.12 LBS

## 2021-05-15 VITALS — HEIGHT: 65 IN | DIASTOLIC BLOOD PRESSURE: 77 MMHG | HEART RATE: 68 BPM | SYSTOLIC BLOOD PRESSURE: 122 MMHG

## 2021-05-16 VITALS
HEIGHT: 65 IN | HEART RATE: 62 BPM | BODY MASS INDEX: 32.4 KG/M2 | WEIGHT: 194.5 LBS | DIASTOLIC BLOOD PRESSURE: 77 MMHG | OXYGEN SATURATION: 96 % | TEMPERATURE: 98.8 F | SYSTOLIC BLOOD PRESSURE: 161 MMHG

## 2021-05-16 VITALS — BODY MASS INDEX: 31.99 KG/M2 | HEART RATE: 77 BPM | WEIGHT: 192 LBS | HEIGHT: 65 IN | OXYGEN SATURATION: 98 %

## 2021-05-16 VITALS
WEIGHT: 192 LBS | HEIGHT: 65 IN | TEMPERATURE: 98.9 F | DIASTOLIC BLOOD PRESSURE: 67 MMHG | SYSTOLIC BLOOD PRESSURE: 149 MMHG | HEART RATE: 70 BPM | BODY MASS INDEX: 31.99 KG/M2

## 2021-05-16 VITALS
BODY MASS INDEX: 32.65 KG/M2 | HEART RATE: 62 BPM | WEIGHT: 196 LBS | OXYGEN SATURATION: 96 % | SYSTOLIC BLOOD PRESSURE: 153 MMHG | DIASTOLIC BLOOD PRESSURE: 59 MMHG | HEIGHT: 65 IN

## 2021-05-16 VITALS
BODY MASS INDEX: 34.82 KG/M2 | OXYGEN SATURATION: 95 % | WEIGHT: 209 LBS | DIASTOLIC BLOOD PRESSURE: 74 MMHG | HEIGHT: 65 IN | TEMPERATURE: 98.5 F | RESPIRATION RATE: 19 BRPM | HEART RATE: 73 BPM | SYSTOLIC BLOOD PRESSURE: 148 MMHG

## 2021-05-16 VITALS
TEMPERATURE: 98.3 F | WEIGHT: 192.5 LBS | BODY MASS INDEX: 32.07 KG/M2 | DIASTOLIC BLOOD PRESSURE: 57 MMHG | HEIGHT: 65 IN | HEART RATE: 62 BPM | OXYGEN SATURATION: 96 % | SYSTOLIC BLOOD PRESSURE: 109 MMHG

## 2021-05-16 VITALS
HEIGHT: 65 IN | BODY MASS INDEX: 33.32 KG/M2 | HEART RATE: 65 BPM | DIASTOLIC BLOOD PRESSURE: 46 MMHG | WEIGHT: 200 LBS | SYSTOLIC BLOOD PRESSURE: 124 MMHG

## 2021-05-16 VITALS
WEIGHT: 194.37 LBS | OXYGEN SATURATION: 98 % | TEMPERATURE: 97.2 F | DIASTOLIC BLOOD PRESSURE: 73 MMHG | HEART RATE: 73 BPM | BODY MASS INDEX: 32.38 KG/M2 | HEIGHT: 65 IN | SYSTOLIC BLOOD PRESSURE: 153 MMHG

## 2021-05-16 VITALS
BODY MASS INDEX: 33.66 KG/M2 | WEIGHT: 202 LBS | DIASTOLIC BLOOD PRESSURE: 58 MMHG | HEART RATE: 78 BPM | SYSTOLIC BLOOD PRESSURE: 166 MMHG | HEIGHT: 65 IN

## 2021-05-16 VITALS
HEIGHT: 65 IN | HEART RATE: 68 BPM | SYSTOLIC BLOOD PRESSURE: 146 MMHG | DIASTOLIC BLOOD PRESSURE: 73 MMHG | WEIGHT: 188 LBS | BODY MASS INDEX: 31.32 KG/M2

## 2021-05-16 VITALS
BODY MASS INDEX: 32.32 KG/M2 | HEART RATE: 78 BPM | DIASTOLIC BLOOD PRESSURE: 78 MMHG | SYSTOLIC BLOOD PRESSURE: 154 MMHG | HEIGHT: 65 IN | WEIGHT: 194 LBS

## 2021-05-16 VITALS — HEART RATE: 73 BPM | OXYGEN SATURATION: 96 % | HEIGHT: 65 IN | BODY MASS INDEX: 31.99 KG/M2 | WEIGHT: 192 LBS

## 2021-05-16 VITALS — OXYGEN SATURATION: 97 % | HEIGHT: 65 IN | HEART RATE: 66 BPM | BODY MASS INDEX: 33.99 KG/M2 | WEIGHT: 204 LBS

## 2021-06-04 ENCOUNTER — HOSPITAL ENCOUNTER (OUTPATIENT)
Dept: URGENT CARE | Facility: CLINIC | Age: 86
Discharge: HOME | End: 2021-06-04
Attending: FAMILY MEDICINE

## 2021-06-05 NOTE — PROGRESS NOTES
Progress Note      Patient Name: Shayy Wasserman   Patient ID: [id number]   Sex: Female   YOB: 1935    Primary Care Provider: Pee Mon DO    Visit Date: May 6, 2021    Provider: Pee Mon DO   Location: [facility]   Location Address: [address] [place] [address]  [id number]   Location Phone: [phone]          Chief Complaint  · Welcome to Medicare Visit      History Of Present Illness  The patient is a 85 year old /White female who has come to this office for her Welcome to Medicare Visit. Her Primary Care Provider is Pee Mon DO. Her comprehensive Care Team list, including suppliers, has been updated on the Facesheet. Her medical/surgical/family history, height, weight, BMI, and blood pressure have been reviewed and are in the chart.   Medications are listed in the medication list.   The active problem list includes: Anxiety disorder, CKD (chronic kidney disease) stage 3, GFR 30-59 ml/min, Dependent edema, Gout, High cholesterol, Hypertension, Osteoporosis, Primary osteoarthritis of left knee, and Primary osteoarthritis of right hip   The patient does not have a history of substance use.   Patient reports her diet is adequate.   Patient reports her physical activity is not adequate. The patient's physical activity is lacking due to pt does not exercise. Discussed daily physical activity guidelines, age appropriate.   A hearing loss screen was completed today and the result is negative.   Patient does not have any risk factors for depression. Patient completed the PHQ-9 today and it has been scanned in the chart. The total score is 1-4.   The Timed-Up-and-Go screen was administered today and the result is positive.   The [place] Index of [place] in ADLs indicated full function (score of 6).   A Falls Risk Assessment has been completed, including a review of home fall hazards and medication review.   Her level of safety  is noted to be within normal limits. Her balance/gait is abnormal. There have been no falls in the past year. Patient-specific home safety recommendations have been reviewed and a copy has been given to patient.   She denies issues with leaking urine.   There are no additional risk factors identified.   Living Will/Advanced Directive has not previously been completed.   Personalized health advice was given to the patient and a written health screening schedule was established; see Plan for details.       Past Medical History  Disease Name Date Onset Notes   Anxiety disorder 11/12/2018 --    Arthritis --  --    CKD (chronic kidney disease) stage 3, GFR 30-59 ml/min --  --    Dependent edema 11/28/2017 --    Gout --  --    High cholesterol --  --    Hypertension --  --    Osteoporosis --  --    Primary osteoarthritis of left knee 05/24/2018 --    Primary osteoarthritis of right hip 12/12/2017 --          Past Surgical History  Procedure Name Date Notes   Artificial Joints/Limbs --  --    Bladder Repair --  --    Colonoscopy 2016 --    Eye Implant --  yes   Hysterectomy --  --    Joint Surgery --  --    Knee repair --  right knee   Knee replacement, right --  --          Medication List  Name Date Started Instructions   albuterol sulfate 2.5 mg /3 mL (0.083 %) inhalation solution for nebulization  inhale 3 milliliters (2.5 mg) by nebulization route 3 times per day   alendronate 70 mg oral tablet 08/20/2020 take 1 tablet (70 mg) once weekly in the morning, at least 30 min before first food, beverage, or medication of day   ALLOPURINOL 300 MG TABS 300 Tablet 01/19/2021 TAKE 1 TABLET BY MOUTH EVERY DAY   amlodipine 10 mg oral tablet 02/22/2021 take 1 tablet (10 mg) by oral route once daily for 90 days   atenolol 50 mg oral tablet 08/03/2020 take 1 tablet (50 mg) by oral route 2 times per day for 90 days   Breo Ellipta 100-25 mcg/dose inhalation blister with device  inhale 1 puff by inhalation route once daily at the same  "time each day   DeVilbiss PulmoNeb LT Comp-Neb miscellaneous device 04/26/2019 use with Albuterol soln three times daily   pantoprazole 40 mg oral tablet,delayed release (/EC) 08/20/2020 take 1 tablet (40 mg) by oral route once daily for 90 days         Allergy List  Allergen Name Date Reaction Notes   tramadol --  --  --        Allergies Reconciled  Family Medical History  Disease Name Relative/Age Notes   Colon Cancer Father/   --          Social History  Finding Status Start/Stop Quantity Notes   Alcohol Never --/-- --  02/12/2019 -    Alcohol Use --  --/-- --  01/15/2019 - does not drink   lives with children --  --/-- --  --    Recreational Drug Use Never --/-- --  no   Retired. --  --/-- --  --    Tobacco Former 16/35 --  former smoker    --  --/-- --  --          Immunizations  NameDate Admin Mfg Trade Name Lot Number Route Inj VIS Given VIS Publication   Yfblgansm93/06/2020 [facility] Fluzone Quadrivalent [id number] IM RD 10/06/2020 08/15/2019   Comments: Injection given. Pt tolerated well. NDC 85589-880-68   Prevnar 1311/28/2017 [facility] PREVNAR 13 [id number] IM LD 11/28/2017 04/15/2015   Comments:          Review of Systems  · Constitutional  o Denies  o : fatigue  · Eyes  o Admits  o : decreasing vision  · HENT  o Admits  o : hearing loss  o Denies  o : headaches  · Cardiovascular  o Denies  o : chest pain, irregular heart beats, palpitations  · Respiratory  o Admits  o : cough  o Denies  o : shortness of breath, wheezing  · Gastrointestinal  o Denies  o : diarrhea, constipation  · Neurologic  o Admits  o : memory difficulties      Vitals  Date Time BP Position Site L\R Cuff Size HR RR TEMP (F) WT  HT  BMI kg/m2 BSA m2 O2 Sat FR L/min FiO2 HC       08/20/2020 03:48 /77 Sitting    68 - R    5'  5\"         02/22/2021 03:33 /67 Sitting    63 - R 22 96.7 171lbs 2oz 5'  5\" 28.48 1.89 96 %  21%    05/06/2021 03:07 /58 Sitting    64 - R  97.4 174lbs 16oz 5'  5\" 29.12 1.91 97 %  21%          Physical " Examination  · Head and Face  o Head  o :   § Inspection  § : atraumatic, normocephalic  o Face  o :   § Inspection  § : no facial lesions  o HEENT  o : Unremarkable  · Eyes  o Conjunctivae  o : conjunctivae normal  o Sclerae  o : sclerae white  o Pupils and Irises  o : pupils equal and round, pupils reactive to light bilaterally  o Eyelids/Ocular Adnexae  o : eyelid appearance normal  · Ears, Nose, Mouth and Throat  o Ears  o :   § External Ears  § : appearance within normal limits, no lesions present  § Otoscopic Examination  § : tympanic membrane appearance within normal limits bilaterally without perforations, mobility normal  o Nose  o :   § External Nose  § : appearance normal  o Oral Cavity  o :   § Oral Mucosa  § : oral mucosa normal  § Lips  § : lip appearance normal  § Teeth  § : dentures in place  § Gums  § : gums pink, non-swollen, no bleeding present  § Tongue  § : tongue appearance normal  § Palate  § : hard palate normal, soft palate appearance normal  o Throat  o :   § Oropharynx  § : no inflammation or lesions present, tonsils within normal limits  · Neck  o Inspection/Palpation  o : normal appearance, no masses or tenderness, trachea midline  o Thyroid  o : gland size normal, nontender, no nodules or masses present on palpation  · Respiratory  o Respiratory Effort  o : breathing unlabored  o Auscultation of Lungs  o : normal breath sounds  · Cardiovascular  o Heart  o :   § Auscultation of Heart  § : regular rate, normal rhythm, no murmurs present  o Peripheral Vascular System  o :   § Extremities  § : no edema  · Lymphatic  o Neck  o : no lymphadenopathy           Results  · In-Office Procedures  o Medical procedure  § IOP - Snellen Vision Test (04167)   § Wearing glasses or contacts?: No   § OS (Left): 20/70   § OD (Right): 20/70   § OU (Both): 20/70       Assessment  · Screening for alcoholism     V79.1/Z13.89  she is a non-drinker.   · Screening for  depression     V79.0/Z13.89  negative  · Welcome to Medicare preventive visit     V70.0/Z00.00  SHe is doing well. She probably has some mild dementia. She need routine eye exam and Advance directives      Plan  · Orders  o Negative alcohol screening () - V79.1/Z13.89 - 05/06/2021  o ACO-18: Negative screen for clinical depression using a standardized tool () - V79.0/Z13.89 - 05/06/2021  o Falls Risk Assessment Completed (3288F) - V70.0/Z00.00 - 05/06/2021  o Brief hearing screening (written) [facility] () - V70.0/Z00.00 - 05/06/2021  o Welcome to Medicare Exam () - V70.0/Z00.00 - 05/06/2021  o ACO-13: Fall Risk Screening with no falls in past year or only one fall without injury in the past year (1101F) - V70.0/Z00.00 - 05/06/2021  o ACO - Pt declines to or was not able to provide an Advance Care Plan or name a Surrogate Decision Maker (1124F) - - 05/06/2021   pt does not have advanced care plan   o ACO-39: Current medications updated and reviewed (, 1159F) - - 05/06/2021  o Medicare Initial - Annual Wellness Visit (AWV) inluding PPPS () - V70.0/Z00.00 - 05/06/2021  o OPTOMETRY CONSULTATION (OPTOM) - V70.0/Z00.00 - 05/06/2021   patients daughter will schedule  · Medications  o Medications have been Reconciled  o Transition of Care or Provider Policy  · Instructions  o Audit-C Questionnaire completed and scanned into the EMR under the designated folder within the patient's documents.  o Audit-C score of 0-4 - Negative Screen - Brief Discussion  o Depression Screen completed and scanned into the EMR under the designated folder within the patient's documents.  o Today's PHQ-9 result is 1  o Written health screening schedule for next 5-10 years was established with patient; information scanned in chart and given to patient.  o Fall prevention methods discussed and a copy of recommendations given to patient.  · Disposition  o Call or Return if symptoms worsen or persist.  o needs routine f/u  appointment for chronic health issues            Electronically Signed by: Pee oMn DO -Author on May 6, 2021 03:50:45 PM

## 2021-07-15 VITALS
WEIGHT: 175 LBS | BODY MASS INDEX: 29.16 KG/M2 | OXYGEN SATURATION: 97 % | TEMPERATURE: 97.4 F | HEIGHT: 65 IN | SYSTOLIC BLOOD PRESSURE: 119 MMHG | HEART RATE: 64 BPM | DIASTOLIC BLOOD PRESSURE: 58 MMHG

## 2021-07-19 DIAGNOSIS — I10 HYPERTENSION, UNSPECIFIED TYPE: Primary | ICD-10-CM

## 2021-07-19 RX ORDER — ATENOLOL 50 MG/1
50 TABLET ORAL 2 TIMES DAILY
COMMUNITY
End: 2021-07-19 | Stop reason: SDUPTHER

## 2021-07-19 RX ORDER — ATENOLOL 50 MG/1
TABLET ORAL
Qty: 180 TABLET | Refills: 3 | Status: SHIPPED | OUTPATIENT
Start: 2021-07-19 | End: 2022-07-11

## 2021-08-09 RX ORDER — ALENDRONATE SODIUM 70 MG/1
TABLET ORAL
Qty: 12 TABLET | Refills: 0 | Status: SHIPPED | OUTPATIENT
Start: 2021-08-09 | End: 2021-11-15

## 2021-08-27 RX ORDER — ALBUTEROL SULFATE 2.5 MG/3ML
SOLUTION RESPIRATORY (INHALATION)
Qty: 270 ML | Refills: 1 | Status: SHIPPED | OUTPATIENT
Start: 2021-08-27 | End: 2023-02-10 | Stop reason: SDUPTHER

## 2021-08-30 ENCOUNTER — OFFICE VISIT (OUTPATIENT)
Dept: FAMILY MEDICINE CLINIC | Facility: CLINIC | Age: 86
End: 2021-08-30

## 2021-08-30 VITALS
SYSTOLIC BLOOD PRESSURE: 125 MMHG | TEMPERATURE: 97.7 F | OXYGEN SATURATION: 96 % | DIASTOLIC BLOOD PRESSURE: 70 MMHG | HEART RATE: 77 BPM | HEIGHT: 65 IN | BODY MASS INDEX: 27.92 KG/M2 | WEIGHT: 167.6 LBS

## 2021-08-30 DIAGNOSIS — J40 BRONCHITIS: ICD-10-CM

## 2021-08-30 DIAGNOSIS — I10 HYPERTENSION, UNSPECIFIED TYPE: Primary | ICD-10-CM

## 2021-08-30 DIAGNOSIS — J43.8 OTHER EMPHYSEMA (HCC): Primary | ICD-10-CM

## 2021-08-30 PROBLEM — R82.998 DARK URINE: Status: ACTIVE | Noted: 2021-08-30

## 2021-08-30 LAB
BILIRUB BLD-MCNC: NEGATIVE MG/DL
CLARITY, POC: CLEAR
COLOR UR: ABNORMAL
GLUCOSE UR STRIP-MCNC: NEGATIVE MG/DL
KETONES UR QL: NEGATIVE
LEUKOCYTE EST, POC: ABNORMAL
NITRITE UR-MCNC: NEGATIVE MG/ML
PH UR: 7 [PH] (ref 5–8)
PROT UR STRIP-MCNC: ABNORMAL MG/DL
RBC # UR STRIP: NEGATIVE /UL
SP GR UR: 1.02 (ref 1–1.03)
UROBILINOGEN UR QL: NORMAL

## 2021-08-30 PROCEDURE — 81003 URINALYSIS AUTO W/O SCOPE: CPT | Performed by: FAMILY MEDICINE

## 2021-08-30 PROCEDURE — 99213 OFFICE O/P EST LOW 20 MIN: CPT | Performed by: FAMILY MEDICINE

## 2021-08-30 RX ORDER — AZITHROMYCIN 250 MG/1
TABLET, FILM COATED ORAL
Qty: 6 TABLET | Refills: 0 | OUTPATIENT
Start: 2021-08-30 | End: 2021-11-03

## 2021-08-30 RX ORDER — PREDNISONE 10 MG/1
TABLET ORAL
Qty: 30 TABLET | Refills: 0 | OUTPATIENT
Start: 2021-08-30 | End: 2021-11-03

## 2021-08-30 RX ORDER — AMLODIPINE BESYLATE 10 MG/1
10 TABLET ORAL DAILY
COMMUNITY
End: 2021-10-19

## 2021-08-30 RX ORDER — PANTOPRAZOLE SODIUM 40 MG/1
40 TABLET, DELAYED RELEASE ORAL DAILY
COMMUNITY
End: 2022-02-15 | Stop reason: SDUPTHER

## 2021-08-30 RX ORDER — ALLOPURINOL 300 MG/1
300 TABLET ORAL DAILY
COMMUNITY
End: 2022-01-13

## 2021-08-30 NOTE — PROGRESS NOTES
Chief Complaint   Patient presents with   • Follow-up     Cough, urine discoloration         Subjective     Shayy Wasserman  has a past medical history of Anxiety disorder (11/12/2018), Arthritis, CKD (chronic kidney disease) stage 3, GFR 30-59 ml/min (CMS/[facility]), Dependent edema (11/28/2017), Gout, High cholesterol, Hypertension, Osteoporosis, Primary osteoarthritis of left knee (05/24/2018), Primary osteoarthritis of one hip, right (12/12/2017), and Primary osteoarthritis of right hip (12/12/2017).    Cough-she has had a cough now for about 3 weeks.  The cough has become more productive with thicker yellow sputum.  She denies any shortness of breath fever chills but she does wheeze occasionally.  She has used her he has used her nebulizer with some benefit.      PHQ-2 Depression Screening  Little interest or pleasure in doing things?     Feeling down, depressed, or hopeless?     PHQ-2 Total Score     PHQ-9 Depression Screening  Little interest or pleasure in doing things?     Feeling down, depressed, or hopeless?     Trouble falling or staying asleep, or sleeping too much?     Feeling tired or having little energy?     Poor appetite or overeating?     Feeling bad about yourself - or that you are a failure or have let yourself or your family down?     Trouble concentrating on things, such as reading the newspaper or watching television?     Moving or speaking so slowly that other people could have noticed? Or the opposite - being so fidgety or restless that you have been moving around a lot more than usual?     Thoughts that you would be better off dead, or of hurting yourself in some way?     PHQ-9 Total Score     If you checked off any problems, how difficult have these problems made it for you to do your work, take care of things at home, or get along with other people?       Allergies   Allergen Reactions   • Bactrim [Sulfamethoxazole-Trimethoprim] Unknown - Low Severity   • Griseofulvin Ultramicrosize  [Griseofulvin] Unknown - Low Severity       Prior to Admission medications    Medication Sig Start Date End Date Taking? Authorizing Provider   albuterol (PROVENTIL) (2.5 MG/3ML) 0.083% nebulizer solution USE ONE VIAL IN NEBULIZER THREE TIMES A DAY FOR 30 DAYS 21  Yes Pee Mon DO   alendronate (FOSAMAX) 70 MG tablet TAKE 1 TABLET BY MOUTH IN THE MORNING ONCE A WEEK. TAKE ATLEAST 30 MINUTES BEFORE FIRST FOOD,BEVERAGE, OR MEDICATION FOR THE DAY 21  Yes Pee Mon DO   allopurinol (ZYLOPRIM) 300 MG tablet Take 300 mg by mouth Daily.   Yes Diane Driver MD   amLODIPine (NORVASC) 10 MG tablet Take 10 mg by mouth Daily.   Yes Diane Driver MD   atenolol (TENORMIN) 50 MG tablet TAKE 1 TABLET BY MOUTH TWO TIMES A DAY 21  Yes Pee Mon DO   brompheniramine-phenylephrine-DM 2.5-1-5 MG/5ML elixir elixir Take  by mouth.   Yes ProviderDiane MD   pantoprazole (PROTONIX) 40 MG EC tablet Take 40 mg by mouth Daily.   Yes Diane Driver MD        Patient Active Problem List   Diagnosis   • Other emphysema (CMS/[facility])   • Bronchitis        Past Surgical History:   Procedure Laterality Date   • BLADDER REPAIR     • COLONOSCOPY     • EYE SURGERY      implant- yes   • HYSTERECTOMY     • INTRAOCULAR LENS INSERTION      yes   • JOINT REPLACEMENT      Surgery   • KNEE SURGERY Right     knee repair   • KNEE SURGERY Right     repair   • OTHER SURGICAL HISTORY      Artificial joints/limbs   • OTHER SURGICAL HISTORY      Artificial Joints/Limbs   • OTHER SURGICAL HISTORY      Joint surgery   • TOTAL KNEE ARTHROPLASTY Right        Social History     Socioeconomic History   • Marital status:      Spouse name: Not on file   • Number of children: Not on file   • Years of education: Not on file   • Highest education level: Not on file   Tobacco Use   • Smoking status: Former Smoker     Start date:      Quit date: 1970     Years since quittin.6  "  • Smokeless tobacco: Never Used   • Tobacco comment: started at age 16- stopped at age 35   Substance and Sexual Activity   • Alcohol use: Never     Comment: 2/12/2019- 1/15/2019 does not drink    • Drug use: Never       Family History   Problem Relation Age of Onset   • Colon cancer Father        Family history, surgical history, past medical history, Allergies and med's reviewed with patient today and updated in [facility] EMR.     ROS:  Review of Systems   Constitutional: Negative for chills, fatigue and fever.   HENT: Negative for congestion, postnasal drip and rhinorrhea.    Respiratory: Positive for cough and wheezing. Negative for chest tightness and shortness of breath.        OBJECTIVE:  Vitals:    08/30/21 1609   BP: 125/70   BP Location: Right arm   Patient Position: Sitting   Cuff Size: Adult   Pulse: 77   Temp: 97.7 °F (36.5 °C)   TempSrc: Temporal   SpO2: 96%   Weight: 76 kg (167 lb 9.6 oz)   Height: 165.1 cm (65\")     No exam data present   Body mass index is 27.89 kg/m².  No LMP recorded.    Physical Exam  Vitals and nursing note reviewed.   Constitutional:       General: She is not in acute distress.     Appearance: Normal appearance. She is normal weight.   HENT:      Head: Normocephalic.      Right Ear: Tympanic membrane, ear canal and external ear normal.      Left Ear: Tympanic membrane, ear canal and external ear normal.      Nose: Nose normal.      Mouth/Throat:      Mouth: Mucous membranes are moist.      Pharynx: Oropharynx is clear.      Comments: Drainage in the posterior pharynx.  Eyes:      General: No scleral icterus.     Conjunctiva/sclera: Conjunctivae normal.      Pupils: Pupils are equal, round, and reactive to light.   Cardiovascular:      Rate and Rhythm: Normal rate and regular rhythm.      Pulses: Normal pulses.      Heart sounds: Normal heart sounds. No murmur heard.     Pulmonary:      Effort: Pulmonary effort is normal.      Breath sounds: Examination of the right-lower field " reveals rales. Examination of the left-lower field reveals rales. Rales present. No wheezing or rhonchi.   Musculoskeletal:      Cervical back: No rigidity or tenderness.   Lymphadenopathy:      Cervical: No cervical adenopathy.   Skin:     General: Skin is warm and dry.      Coloration: Skin is not jaundiced.      Findings: No rash.   Neurological:      General: No focal deficit present.      Mental Status: She is alert and oriented to person, place, and time.      Gait: Gait normal.   Psychiatric:         Mood and Affect: Mood normal.         Thought Content: Thought content normal.         Judgment: Judgment normal.         Procedures    No visits with results within 30 Day(s) from this visit.   Latest known visit with results is:   No results found for any previous visit.       ASSESSMENT/ PLAN:    Diagnoses and all orders for this visit:    1. Other emphysema (CMS/HCC) (Primary)  Assessment & Plan:    We will give her course of antibiotics steroids and encourage her to use her nebulizer 3 or 4 times daily.      2. Bronchitis  Assessment & Plan:  Give her a course of antibiotics steroids and use her nebulizer 3-4 times daily.      Other orders  -     azithromycin (Zithromax Z-Erick) 250 MG tablet; Take 2 tablets by mouth on day 1, then 1 tablet daily on days 2-5  Dispense: 6 tablet; Refill: 0  -     predniSONE (DELTASONE) 10 MG tablet; 4 tabs daily x3 days, 3 tabs daily x3 days, then 2 tabs daily x3 days then 1 tab daily x3 days, then stop  Dispense: 30 tablet; Refill: 0      Orders Placed Today:     New Medications Ordered This Visit   Medications   • azithromycin (Zithromax Z-Erick) 250 MG tablet     Sig: Take 2 tablets by mouth on day 1, then 1 tablet daily on days 2-5     Dispense:  6 tablet     Refill:  0   • predniSONE (DELTASONE) 10 MG tablet     Si tabs daily x3 days, 3 tabs daily x3 days, then 2 tabs daily x3 days then 1 tab daily x3 days, then stop     Dispense:  30 tablet     Refill:  0         Management Plan:     An After Visit Summary was printed and given to the patient at discharge.    Follow-up: Return if symptoms worsen or fail to improve.    Pee Mon DO 8/30/2021 16:48 EDT  This note was electronically signed.

## 2021-08-30 NOTE — ASSESSMENT & PLAN NOTE
We will give her course of antibiotics steroids and encourage her to use her nebulizer 3 or 4 times daily.

## 2021-10-19 RX ORDER — AMLODIPINE BESYLATE 10 MG/1
TABLET ORAL
Qty: 90 TABLET | Refills: 1 | Status: SHIPPED | OUTPATIENT
Start: 2021-10-19 | End: 2022-02-15 | Stop reason: SDUPTHER

## 2021-11-04 ENCOUNTER — TELEPHONE (OUTPATIENT)
Dept: FAMILY MEDICINE CLINIC | Facility: CLINIC | Age: 86
End: 2021-11-04

## 2021-11-04 NOTE — TELEPHONE ENCOUNTER
Caller: SARAI JOSUE    Relationship to patient: Emergency Contact    Best call back number: [phone]    Chief complaint: BRONCHIAL INFECTION       Requested date:  7 DAYS       Additional notes:    PATIENT'S DAUGHTER SAID PATIENT  WAS SEEN IN THE ACUTE CARE  11/3/21 AND THEY SAID SHE HAS A  BRONCHIAL INFECTION AND NEEDS TO BE SEEN BY PCP IN 7 DAY. PATIENT ONLY WANTS TO SEE DR NUNN. PATIENT PREFER AN AFTERNOON SCHEDULE.

## 2021-11-15 RX ORDER — ALENDRONATE SODIUM 70 MG/1
TABLET ORAL
Qty: 4 TABLET | Refills: 12 | Status: SHIPPED | OUTPATIENT
Start: 2021-11-15 | End: 2022-12-06

## 2021-12-07 ENCOUNTER — TELEPHONE (OUTPATIENT)
Dept: FAMILY MEDICINE CLINIC | Facility: CLINIC | Age: 86
End: 2021-12-07

## 2021-12-07 RX ORDER — CLOTRIMAZOLE 1 %
1 CREAM (GRAM) TOPICAL 2 TIMES DAILY
Qty: 60 G | Refills: 2 | Status: SHIPPED | OUTPATIENT
Start: 2021-12-07 | End: 2021-12-21

## 2021-12-09 ENCOUNTER — HOSPITAL ENCOUNTER (EMERGENCY)
Facility: HOSPITAL | Age: 86
Discharge: LEFT BEFORE DISCHARGE | End: 2021-12-09

## 2021-12-09 PROCEDURE — 99211 OFF/OP EST MAY X REQ PHY/QHP: CPT

## 2021-12-10 ENCOUNTER — OFFICE VISIT (OUTPATIENT)
Dept: FAMILY MEDICINE CLINIC | Facility: CLINIC | Age: 86
End: 2021-12-10

## 2021-12-10 VITALS
HEART RATE: 73 BPM | DIASTOLIC BLOOD PRESSURE: 56 MMHG | WEIGHT: 175.8 LBS | SYSTOLIC BLOOD PRESSURE: 131 MMHG | BODY MASS INDEX: 31.15 KG/M2 | RESPIRATION RATE: 24 BRPM | HEIGHT: 63 IN | OXYGEN SATURATION: 92 % | TEMPERATURE: 97 F

## 2021-12-10 DIAGNOSIS — J40 BRONCHITIS: ICD-10-CM

## 2021-12-10 DIAGNOSIS — J43.8 OTHER EMPHYSEMA (HCC): Primary | ICD-10-CM

## 2021-12-10 LAB
EXPIRATION DATE: NORMAL
FLUAV AG UPPER RESP QL IA.RAPID: NOT DETECTED
FLUBV AG UPPER RESP QL IA.RAPID: NOT DETECTED
INTERNAL CONTROL: NORMAL
Lab: NORMAL
SARS-COV-2 AG UPPER RESP QL IA.RAPID: NOT DETECTED

## 2021-12-10 PROCEDURE — 99213 OFFICE O/P EST LOW 20 MIN: CPT | Performed by: FAMILY MEDICINE

## 2021-12-10 PROCEDURE — 87428 SARSCOV & INF VIR A&B AG IA: CPT | Performed by: FAMILY MEDICINE

## 2021-12-10 RX ORDER — PREDNISONE 10 MG/1
TABLET ORAL
Qty: 30 TABLET | Refills: 0 | Status: SHIPPED | OUTPATIENT
Start: 2021-12-10 | End: 2022-02-15

## 2021-12-10 RX ORDER — AZITHROMYCIN 250 MG/1
TABLET, FILM COATED ORAL
Qty: 6 TABLET | Refills: 0 | Status: SHIPPED | OUTPATIENT
Start: 2021-12-10 | End: 2022-01-24

## 2021-12-10 NOTE — ASSESSMENT & PLAN NOTE
Her influenza and COVID-19 test are negative therefore we will treat her as an acute exacerbation of her COPD with bronchitis.

## 2021-12-10 NOTE — PROGRESS NOTES
Chief Complaint   Patient presents with   • COPD     with cough        Subjective     Shayy Wasserman  has a past medical history of Anxiety disorder (11/12/2018), Arthritis, CKD (chronic kidney disease) stage 3, GFR 30-59 ml/min ([facility]), COPD (chronic obstructive pulmonary disease) ([facility]), Dependent edema (11/28/2017), Gout, High cholesterol, Hypertension, Osteoporosis, Primary osteoarthritis of left knee (05/24/2018), Primary osteoarthritis of one hip, right (12/12/2017), and Primary osteoarthritis of right hip (12/12/2017).    Cough-she has had a cough with some clear sputum for the last 48 hours.  She denies any shortness of breath or wheezing.  No fever or chills.  She has been using her nebulizer with some benefit though.      PHQ-2 Depression Screening  Little interest or pleasure in doing things? 0   Feeling down, depressed, or hopeless? 0   PHQ-2 Total Score 0   PHQ-9 Depression Screening  Little interest or pleasure in doing things? 0   Feeling down, depressed, or hopeless? 0   Trouble falling or staying asleep, or sleeping too much?     Feeling tired or having little energy?     Poor appetite or overeating?     Feeling bad about yourself - or that you are a failure or have let yourself or your family down?     Trouble concentrating on things, such as reading the newspaper or watching television?     Moving or speaking so slowly that other people could have noticed? Or the opposite - being so fidgety or restless that you have been moving around a lot more than usual?     Thoughts that you would be better off dead, or of hurting yourself in some way?     PHQ-9 Total Score 0   If you checked off any problems, how difficult have these problems made it for you to do your work, take care of things at home, or get along with other people?       Allergies   Allergen Reactions   • Bactrim [Sulfamethoxazole-Trimethoprim] Unknown - Low Severity   • Griseofulvin Ultramicrosize [Griseofulvin] Unknown - Low Severity        Prior to Admission medications    Medication Sig Start Date End Date Taking? Authorizing Provider   albuterol (PROVENTIL) (2.5 MG/3ML) 0.083% nebulizer solution USE ONE VIAL IN NEBULIZER THREE TIMES A DAY FOR 30 DAYS 8/27/21  Yes Pee Mon DO   alendronate (FOSAMAX) 70 MG tablet TAKE 1 TABLET BY MOUTH IN THE MORNING ONCE A WEEK (TAKE AT LEAST 30 MINUTES BEFORE FIRST FOOD, BEVERAGE, OR MEDICATION FOR THE DAY) 11/15/21  Yes Pee Mon DO   allopurinol (ZYLOPRIM) 300 MG tablet Take 300 mg by mouth Daily.   Yes ProviderDiane MD   amLODIPine (NORVASC) 10 MG tablet TAKE 1 TABLET BY MOUTH EVERY DAY 10/19/21  Yes Pee Mon DO   atenolol (TENORMIN) 50 MG tablet TAKE 1 TABLET BY MOUTH TWO TIMES A DAY 7/19/21  Yes Pee Mon DO   pantoprazole (PROTONIX) 40 MG EC tablet Take 40 mg by mouth Daily.   Yes ProviderDiane MD   brompheniramine-phenylephrine-DM 2.5-1-5 MG/5ML elixir elixir Take  by mouth.    ProviderDiane MD   clotrimazole (LOTRIMIN) 1 % cream Apply 1 application topically to the appropriate area as directed 2 (Two) Times a Day for 14 days. 12/7/21 12/21/21  Pee Mon DO   azithromycin (Zithromax Z-Erick) 250 MG tablet Take 2 tablets by mouth on day 1, then 1 tablet daily on days 2-5 11/3/21 12/10/21  Alfredo Crooks MD        Patient Active Problem List   Diagnosis   • Other emphysema (HCC)   • Bronchitis   • Dark urine        Past Surgical History:   Procedure Laterality Date   • BLADDER REPAIR     • COLONOSCOPY  2016   • EYE SURGERY      implant- yes   • HYSTERECTOMY     • INTRAOCULAR LENS INSERTION      yes   • JOINT REPLACEMENT      Surgery   • KNEE SURGERY Right     knee repair   • KNEE SURGERY Right     repair   • OTHER SURGICAL HISTORY      Artificial joints/limbs   • OTHER SURGICAL HISTORY      Artificial Joints/Limbs   • OTHER SURGICAL HISTORY      Joint surgery   • TOTAL KNEE ARTHROPLASTY Right   "      Social History     Socioeconomic History   • Marital status:    Tobacco Use   • Smoking status: Former Smoker     Start date:      Quit date: 1970     Years since quittin.9   • Smokeless tobacco: Never Used   • Tobacco comment: started at age 16- stopped at age 35   Substance and Sexual Activity   • Alcohol use: Never     Comment: 2019- 1/15/2019 does not drink    • Drug use: Never       Family History   Problem Relation Age of Onset   • Colon cancer Father        Family history, surgical history, past medical history, Allergies and med's reviewed with patient today and updated in [facility] EMR.     ROS:  Review of Systems   Constitutional: Negative for chills and fever.   HENT: Negative for congestion, postnasal drip and rhinorrhea.    Respiratory: Positive for cough. Negative for chest tightness, shortness of breath and wheezing.    Gastrointestinal: Negative for diarrhea, nausea and vomiting.       OBJECTIVE:  Vitals:    12/10/21 1145   BP: 131/56   BP Location: Right arm   Patient Position: Sitting   Pulse: 73   Resp: 24   Temp: 97 °F (36.1 °C)   SpO2: 92%   Weight: 79.7 kg (175 lb 12.8 oz)   Height: 160 cm (63\")     No exam data present   Body mass index is 31.14 kg/m².  No LMP recorded. Patient is postmenopausal.    Physical Exam  Vitals and nursing note reviewed.   Constitutional:       General: She is not in acute distress.     Appearance: Normal appearance. She is obese. She is not ill-appearing.   HENT:      Head: Normocephalic.      Right Ear: Tympanic membrane, ear canal and external ear normal.      Left Ear: Tympanic membrane, ear canal and external ear normal.      Nose: Nose normal.      Mouth/Throat:      Mouth: Mucous membranes are moist.      Dentition: Has dentures.      Pharynx: Oropharynx is clear.   Eyes:      General: No scleral icterus.     Conjunctiva/sclera: Conjunctivae normal.      Pupils: Pupils are equal, round, and reactive to light.   Cardiovascular:      Rate " and Rhythm: Normal rate and regular rhythm.      Pulses: Normal pulses.      Heart sounds: Normal heart sounds. No murmur heard.      Pulmonary:      Effort: Pulmonary effort is normal.      Breath sounds: Wheezing and rhonchi present. No rales.   Musculoskeletal:      Cervical back: No rigidity or tenderness.   Lymphadenopathy:      Cervical: No cervical adenopathy.   Skin:     General: Skin is warm and dry.      Coloration: Skin is not jaundiced.      Findings: No rash.   Neurological:      General: No focal deficit present.      Mental Status: She is alert and oriented to person, place, and time.      Gait: Gait normal.   Psychiatric:         Mood and Affect: Mood normal.         Thought Content: Thought content normal.         Judgment: Judgment normal.         Procedures    Office Visit on 12/10/2021   Component Date Value Ref Range Status   • SARS Antigen 12/10/2021 Not Detected  Not Detected Final   • Influenza A Antigen BHARATI 12/10/2021 Not Detected   Final   • Influenza B Antigen BHARATI 12/10/2021 Not Detected   Final   • Internal Control 12/10/2021 Passed  Passed Final   • Lot Number 12/10/2021 [id number]   Final    [id number]   • Expiration Date 12/10/2021 02/14/2023   Final    03/02/2022       ASSESSMENT/ PLAN:    Diagnoses and all orders for this visit:    1. Other emphysema (HCC) (Primary)    2. Bronchitis  Assessment & Plan:  Her influenza and COVID-19 test are negative therefore we will treat her as an acute exacerbation of her COPD with bronchitis.    Orders:  -     POCT SARS-CoV-2 Antigen BHARATI + Flu    Other orders  -     predniSONE (DELTASONE) 10 MG tablet; 4 tabs daily for 3 days, 3 tabs daily for 3 days, 2 tabs daily for 3 days, 1 tab daily for 3 days, then stop.  Dispense: 30 tablet; Refill: 0  -     azithromycin (Zithromax Z-Erick) 250 MG tablet; Take 2 tablets by mouth on day 1, then 1 tablet daily on days 2-5  Dispense: 6 tablet; Refill: 0      Orders Placed Today:     New Medications Ordered This Visit    Medications   • predniSONE (DELTASONE) 10 MG tablet     Si tabs daily for 3 days, 3 tabs daily for 3 days, 2 tabs daily for 3 days, 1 tab daily for 3 days, then stop.     Dispense:  30 tablet     Refill:  0   • azithromycin (Zithromax Z-Erick) 250 MG tablet     Sig: Take 2 tablets by mouth on day 1, then 1 tablet daily on days 2-5     Dispense:  6 tablet     Refill:  0        Management Plan:     An After Visit Summary was printed and given to the patient at discharge.    Follow-up: Return if symptoms worsen or fail to improve.    Pee Mon,  12/10/2021 12:35 EST  This note was electronically signed.

## 2022-01-13 RX ORDER — ALLOPURINOL 300 MG/1
TABLET ORAL
Qty: 90 TABLET | Refills: 3 | Status: SHIPPED | OUTPATIENT
Start: 2022-01-13 | End: 2023-01-12

## 2022-01-24 ENCOUNTER — TELEPHONE (OUTPATIENT)
Dept: FAMILY MEDICINE CLINIC | Facility: CLINIC | Age: 87
End: 2022-01-24

## 2022-01-24 RX ORDER — BENZONATATE 100 MG/1
100 CAPSULE ORAL 3 TIMES DAILY PRN
Qty: 30 CAPSULE | Refills: 1 | Status: SHIPPED | OUTPATIENT
Start: 2022-01-24 | End: 2022-02-15

## 2022-01-24 RX ORDER — AZITHROMYCIN 250 MG/1
TABLET, FILM COATED ORAL
Qty: 6 TABLET | Refills: 0 | Status: SHIPPED | OUTPATIENT
Start: 2022-01-24 | End: 2022-02-15

## 2022-01-24 NOTE — TELEPHONE ENCOUNTER
Caller: SARAI JOSUE    Relationship: Emergency Contact    Best call back number: [phone] OKAY TO LEAVE MESSAGE ON PHONE    What medication are you requesting: ANTIBIOTICS AND SOMETHING FOR COUGH    What are your current symptoms: COUGH, FEVER, CONGESTION    How long have you been experiencing symptoms: A WEEK OR SO ON THE COUGH    Have you had these symptoms before:    [x] Yes  [] No    Have you been treated for these symptoms before:   [x] Yes  [] No    If a prescription is needed, what is your preferred pharmacy and phone number: [facility] - [place] - [address] - [phone]  - [phone]      Additional notes:DOES NOT WANT TO BRING MOTHER OUT TO OFFICE DUE TO COVID. SAID HER MOTHER GETS THIS FROM TIME TO TIME.

## 2022-01-24 NOTE — TELEPHONE ENCOUNTER
I did send something in for her cold.  If they are able I would like him to be able to do a at [facility] Covid test.  If she does not get better or seems to worsen by the end of the week she must be seen.

## 2022-02-15 ENCOUNTER — OFFICE VISIT (OUTPATIENT)
Dept: FAMILY MEDICINE CLINIC | Facility: CLINIC | Age: 87
End: 2022-02-15

## 2022-02-15 ENCOUNTER — HOSPITAL ENCOUNTER (OUTPATIENT)
Dept: GENERAL RADIOLOGY | Facility: HOSPITAL | Age: 87
Discharge: HOME | End: 2022-02-15
Admitting: FAMILY MEDICINE

## 2022-02-15 VITALS
DIASTOLIC BLOOD PRESSURE: 74 MMHG | HEIGHT: 63 IN | SYSTOLIC BLOOD PRESSURE: 153 MMHG | BODY MASS INDEX: 30.69 KG/M2 | TEMPERATURE: 97.4 F | OXYGEN SATURATION: 97 % | HEART RATE: 66 BPM | WEIGHT: 173.2 LBS

## 2022-02-15 DIAGNOSIS — I10 ESSENTIAL (PRIMARY) HYPERTENSION: Primary | ICD-10-CM

## 2022-02-15 DIAGNOSIS — M81.0 AGE-RELATED OSTEOPOROSIS WITHOUT CURRENT PATHOLOGICAL FRACTURE: ICD-10-CM

## 2022-02-15 DIAGNOSIS — J43.8 OTHER EMPHYSEMA: ICD-10-CM

## 2022-02-15 DIAGNOSIS — N18.31 STAGE 3A CHRONIC KIDNEY DISEASE: ICD-10-CM

## 2022-02-15 DIAGNOSIS — E78.5 HYPERLIPIDEMIA, UNSPECIFIED HYPERLIPIDEMIA TYPE: ICD-10-CM

## 2022-02-15 LAB
25(OH)D3 SERPL-MCNC: 38.7 NG/ML
ALBUMIN SERPL-MCNC: 4.4 G/DL (ref 3.5–5.2)
ALBUMIN/GLOB SERPL: 1.3 G/DL
ALP SERPL-CCNC: 70 U/L (ref 39–117)
ALT SERPL W P-5'-P-CCNC: 7 U/L (ref 1–33)
ANION GAP SERPL CALCULATED.3IONS-SCNC: 11.4 MMOL/L (ref 5–15)
AST SERPL-CCNC: 18 U/L (ref 1–32)
BILIRUB SERPL-MCNC: 0.5 MG/DL (ref 0–1.2)
BUN SERPL-MCNC: 16 MG/DL (ref 8–23)
BUN/CREAT SERPL: 15.7 (ref 7–25)
CALCIUM SPEC-SCNC: 10 MG/DL (ref 8.6–10.5)
CHLORIDE SERPL-SCNC: 97 MMOL/L (ref 98–107)
CHOLEST SERPL-MCNC: 211 MG/DL (ref 0–200)
CO2 SERPL-SCNC: 27.6 MMOL/L (ref 22–29)
CREAT SERPL-MCNC: 1.02 MG/DL (ref 0.57–1)
GFR SERPL CREATININE-BSD FRML MDRD: 51 ML/MIN/1.73
GLOBULIN UR ELPH-MCNC: 3.4 GM/DL
GLUCOSE SERPL-MCNC: 106 MG/DL (ref 65–99)
HDLC SERPL-MCNC: 50 MG/DL (ref 40–60)
LDLC SERPL CALC-MCNC: 142 MG/DL (ref 0–100)
LDLC/HDLC SERPL: 2.8 {RATIO}
POTASSIUM SERPL-SCNC: 4.4 MMOL/L (ref 3.5–5.2)
PROT SERPL-MCNC: 7.8 G/DL (ref 6–8.5)
SODIUM SERPL-SCNC: 136 MMOL/L (ref 136–145)
TRIGL SERPL-MCNC: 106 MG/DL (ref 0–150)
VLDLC SERPL-MCNC: 19 MG/DL (ref 5–40)

## 2022-02-15 PROCEDURE — 99214 OFFICE O/P EST MOD 30 MIN: CPT | Performed by: FAMILY MEDICINE

## 2022-02-15 PROCEDURE — 71046 X-RAY EXAM CHEST 2 VIEWS: CPT

## 2022-02-15 PROCEDURE — 82306 VITAMIN D 25 HYDROXY: CPT | Performed by: FAMILY MEDICINE

## 2022-02-15 PROCEDURE — 80053 COMPREHEN METABOLIC PANEL: CPT | Performed by: FAMILY MEDICINE

## 2022-02-15 PROCEDURE — 36415 COLL VENOUS BLD VENIPUNCTURE: CPT | Performed by: FAMILY MEDICINE

## 2022-02-15 PROCEDURE — 80061 LIPID PANEL: CPT | Performed by: FAMILY MEDICINE

## 2022-02-15 RX ORDER — BUDESONIDE, GLYCOPYRROLATE, AND FORMOTEROL FUMARATE 160; 9; 4.8 UG/1; UG/1; UG/1
2 AEROSOL, METERED RESPIRATORY (INHALATION) 2 TIMES DAILY
Qty: 10.7 G | Refills: 5 | Status: SHIPPED | OUTPATIENT
Start: 2022-02-15 | End: 2022-03-17

## 2022-02-15 RX ORDER — PANTOPRAZOLE SODIUM 40 MG/1
40 TABLET, DELAYED RELEASE ORAL DAILY
Qty: 90 TABLET | Refills: 3 | Status: SHIPPED | OUTPATIENT
Start: 2022-02-15 | End: 2022-05-16

## 2022-02-15 RX ORDER — AMLODIPINE BESYLATE 10 MG/1
10 TABLET ORAL DAILY
Qty: 90 TABLET | Refills: 3 | Status: SHIPPED | OUTPATIENT
Start: 2022-02-15 | End: 2022-11-08 | Stop reason: SDUPTHER

## 2022-02-15 NOTE — PROGRESS NOTES
Chief Complaint   Patient presents with   • Follow-up     6MO F/U, cough         Subjective     Shayy Wasserman  has a past medical history of Anxiety disorder (11/12/2018), Arthritis, Dependent edema (11/28/2017), Gout, Osteoporosis, Primary osteoarthritis of left knee (05/24/2018), Primary osteoarthritis of one hip, right (12/12/2017), and Primary osteoarthritis of right hip (12/12/2017).    Hypertension-her daughter states that she does check her blood pressure at home.  They do not recall her numbers though.  She is taking her medication on a daily basis.    Hyperlipidemia-currently she is not taking anything for her cholesterols.    Osteoporosis-she is taking her alendronate on a weekly basis as directed.  Her daughter states she is taking her calcium and vitamin D supplement on a regular basis.    Emphysema-she does have a chronic cough and some intermittent wheezing.  Her daughter states she has a cold about every other month.  Currently she is not on any inhalers.      PHQ-2 Depression Screening  Little interest or pleasure in doing things?     Feeling down, depressed, or hopeless?     PHQ-2 Total Score     PHQ-9 Depression Screening  Little interest or pleasure in doing things?     Feeling down, depressed, or hopeless?     Trouble falling or staying asleep, or sleeping too much?     Feeling tired or having little energy?     Poor appetite or overeating?     Feeling bad about yourself - or that you are a failure or have let yourself or your family down?     Trouble concentrating on things, such as reading the newspaper or watching television?     Moving or speaking so slowly that other people could have noticed? Or the opposite - being so fidgety or restless that you have been moving around a lot more than usual?     Thoughts that you would be better off dead, or of hurting yourself in some way?     PHQ-9 Total Score     If you checked off any problems, how difficult have these problems made it for you to  do your work, take care of things at home, or get along with other people?       Allergies   Allergen Reactions   • Bactrim [Sulfamethoxazole-Trimethoprim] Unknown - Low Severity   • Griseofulvin Ultramicrosize [Griseofulvin] Unknown - Low Severity       Prior to Admission medications    Medication Sig Start Date End Date Taking? Authorizing Provider   albuterol (PROVENTIL) (2.5 MG/3ML) 0.083% nebulizer solution USE ONE VIAL IN NEBULIZER THREE TIMES A DAY FOR 30 DAYS 8/27/21  Yes Pee Mon DO   alendronate (FOSAMAX) 70 MG tablet TAKE 1 TABLET BY MOUTH IN THE MORNING ONCE A WEEK (TAKE AT LEAST 30 MINUTES BEFORE FIRST FOOD, BEVERAGE, OR MEDICATION FOR THE DAY) 11/15/21  Yes Pee Mon DO   allopurinol (ZYLOPRIM) 300 MG tablet TAKE 1 TABLET BY MOUTH EVERY DAY 1/13/22  Yes Pee Mon DO   amLODIPine (NORVASC) 10 MG tablet TAKE 1 TABLET BY MOUTH EVERY DAY 10/19/21  Yes Pee Mon DO   atenolol (TENORMIN) 50 MG tablet TAKE 1 TABLET BY MOUTH TWO TIMES A DAY 7/19/21  Yes Pee Mon DO   Fluticasone Furoate-Vilanterol (Breo Ellipta) 100-25 MCG/INH inhaler Inhale 1 puff Daily.   Yes Diane Driver MD   pantoprazole (PROTONIX) 40 MG EC tablet Take 40 mg by mouth Daily.   Yes Diane Driver MD   azithromycin (Zithromax Z-Erick) 250 MG tablet Take 2 tablets by mouth on day 1, then 1 tablet daily on days 2-5 1/24/22 2/15/22  Pee Mon DO   benzonatate (Tessalon Perles) 100 MG capsule Take 1 capsule by mouth 3 (Three) Times a Day As Needed for Cough. 1/24/22 2/15/22  Pee Mon DO   predniSONE (DELTASONE) 10 MG tablet 4 tabs daily for 3 days, 3 tabs daily for 3 days, 2 tabs daily for 3 days, 1 tab daily for 3 days, then stop. 12/10/21 2/15/22  Pee Mon DO        Patient Active Problem List   Diagnosis   • Other emphysema (HCC)   • Bronchitis   • Essential (primary) hypertension   • Gout   • Hyperlipidemia    • Osteoarthritis   • Stage 3 chronic kidney disease (HCC)   • Osteoporosis        Past Surgical History:   Procedure Laterality Date   • BLADDER REPAIR     • COLONOSCOPY  2016   • EYE SURGERY      implant- yes   • HYSTERECTOMY     • INTRAOCULAR LENS INSERTION      yes   • JOINT REPLACEMENT      Surgery   • KNEE SURGERY Right     knee repair   • KNEE SURGERY Right     repair   • OTHER SURGICAL HISTORY      Artificial joints/limbs   • OTHER SURGICAL HISTORY      Artificial Joints/Limbs   • OTHER SURGICAL HISTORY      Joint surgery   • TOTAL KNEE ARTHROPLASTY Right        Social History     Socioeconomic History   • Marital status:    Tobacco Use   • Smoking status: Former Smoker     Start date:      Quit date: 1970     Years since quittin.1   • Smokeless tobacco: Never Used   • Tobacco comment: started at age 16- stopped at age 35   Substance and Sexual Activity   • Alcohol use: Never     Comment: 2019- 1/15/2019 does not drink    • Drug use: Never       Family History   Problem Relation Age of Onset   • Colon cancer Father        Family history, surgical history, past medical history, Allergies and med's reviewed with patient today and updated in [facility] EMR.     ROS:  Review of Systems   Constitutional: Negative for fatigue.   HENT: Negative for congestion, postnasal drip and rhinorrhea.    Eyes: Negative for blurred vision and visual disturbance.   Respiratory: Positive for cough and wheezing. Negative for chest tightness and shortness of breath.    Cardiovascular: Negative for chest pain and palpitations.   Gastrointestinal: Negative for abdominal pain, constipation and diarrhea.   Neurological: Negative for headache.   Psychiatric/Behavioral: Negative for depressed mood. The patient is not nervous/anxious.        OBJECTIVE:  Vitals:    02/15/22 1343   BP: 153/74   BP Location: Right arm   Patient Position: Sitting   Cuff Size: Adult   Pulse: 66   Temp: 97.4 °F (36.3 °C)   TempSrc: Temporal  "  SpO2: 97%   Weight: 78.6 kg (173 lb 3.2 oz)   Height: 160 cm (63\")     No exam data present   Body mass index is 30.68 kg/m².  No LMP recorded. Patient is postmenopausal.    Physical Exam  Vitals and nursing note reviewed.   Constitutional:       General: She is not in acute distress.     Appearance: Normal appearance. She is obese.   HENT:      Head: Normocephalic.      Right Ear: Tympanic membrane, ear canal and external ear normal.      Left Ear: Tympanic membrane, ear canal and external ear normal.      Nose: Nose normal.      Mouth/Throat:      Mouth: Mucous membranes are moist.      Dentition: Has dentures.      Pharynx: Oropharynx is clear.   Eyes:      General: No scleral icterus.     Conjunctiva/sclera: Conjunctivae normal.      Pupils: Pupils are equal, round, and reactive to light.   Cardiovascular:      Rate and Rhythm: Normal rate and regular rhythm.      Pulses: Normal pulses.      Heart sounds: Normal heart sounds. No murmur heard.      Pulmonary:      Effort: Pulmonary effort is normal.      Breath sounds: Examination of the right-lower field reveals rhonchi. Wheezing and rhonchi present. No rales.   Musculoskeletal:      Cervical back: No rigidity or tenderness.   Lymphadenopathy:      Cervical: No cervical adenopathy.   Skin:     General: Skin is warm and dry.      Coloration: Skin is not jaundiced.      Findings: No rash.   Neurological:      General: No focal deficit present.      Mental Status: She is alert and oriented to person, place, and time.   Psychiatric:         Mood and Affect: Mood normal.         Thought Content: Thought content normal.         Judgment: Judgment normal.         Procedures    No visits with results within 30 Day(s) from this visit.   Latest known visit with results is:   Office Visit on 12/10/2021   Component Date Value Ref Range Status   • SARS Antigen 12/10/2021 Not Detected  Not Detected Final   • Influenza A Antigen BHARATI 12/10/2021 Not Detected   Final   • " Influenza B Antigen BHARATI 12/10/2021 Not Detected   Final   • Internal Control 12/10/2021 Passed  Passed Final   • Lot Number 12/10/2021 [id number]   Final    [id number]   • Expiration Date 12/10/2021 02/14/2023   Final    03/02/2022       ASSESSMENT/ PLAN:    Diagnoses and all orders for this visit:    1. Essential (primary) hypertension (Primary)  Assessment & Plan:  Her blood pressure is little bit high here today but her daughter states her blood pressure at home has been consistently normal.  We will have him keep a log so they can bring it to each visit.    Orders:  -     Comprehensive Metabolic Panel  -     Lipid Panel    2. Hyperlipidemia, unspecified hyperlipidemia type  Assessment & Plan:  We will update her lipid profile with today's labs.    Orders:  -     Comprehensive Metabolic Panel  -     Lipid Panel    3. Stage 3a chronic kidney disease (HCC)  -     Comprehensive Metabolic Panel    4. Age-related osteoporosis without current pathological fracture  Assessment & Plan:  Is been a while since she has had a bone density test get an updated DEXA calcium and vitamin D levels.    Orders:  -     DEXA Bone Density Axial; Future  -     Vitamin D 25 Hydroxy    5. Other emphysema (HCC)  Assessment & Plan:  We will update her chest x-ray is add on maintenance inhaler to see if this will help with her frequent infections cough wheezing and shortness of breath      Orders:  -     XR Chest PA & Lateral; Future    Other orders  -     Budeson-Glycopyrrol-Formoterol (Breztri Aerosphere) 160-9-4.8 MCG/ACT aerosol inhaler; Inhale 2 puffs 2 (Two) Times a Day for 30 days.  Dispense: 10.7 g; Refill: 5  -     pantoprazole (PROTONIX) 40 MG EC tablet; Take 1 tablet by mouth Daily for 90 days.  Dispense: 90 tablet; Refill: 3  -     amLODIPine (NORVASC) 10 MG tablet; Take 1 tablet by mouth Daily.  Dispense: 90 tablet; Refill: 3      Orders Placed Today:     New Medications Ordered This Visit   Medications   •  Budeson-Glycopyrrol-Formoterol (Breztri Aerosphere) 160-9-4.8 MCG/ACT aerosol inhaler     Sig: Inhale 2 puffs 2 (Two) Times a Day for 30 days.     Dispense:  10.7 g     Refill:  5   • pantoprazole (PROTONIX) 40 MG EC tablet     Sig: Take 1 tablet by mouth Daily for 90 days.     Dispense:  90 tablet     Refill:  3   • amLODIPine (NORVASC) 10 MG tablet     Sig: Take 1 tablet by mouth Daily.     Dispense:  90 tablet     Refill:  3        Management Plan:     An After Visit Summary was printed and given to the patient at discharge.    Follow-up: Return in about 6 months (around 8/15/2022).    Pee Mon DO 2/15/2022 14:08 EST  This note was electronically signed.

## 2022-02-15 NOTE — ASSESSMENT & PLAN NOTE
Her blood pressure is little bit high here today but her daughter states her blood pressure at home has been consistently normal.  We will have him keep a log so they can bring it to each visit.

## 2022-02-15 NOTE — ASSESSMENT & PLAN NOTE
We will update her chest x-ray is add on maintenance inhaler to see if this will help with her frequent infections cough wheezing and shortness of breath

## 2022-02-15 NOTE — ASSESSMENT & PLAN NOTE
Is been a while since she has had a bone density test get an updated DEXA calcium and vitamin D levels.

## 2022-04-20 ENCOUNTER — APPOINTMENT (OUTPATIENT)
Dept: BONE DENSITY | Facility: HOSPITAL | Age: 87
End: 2022-04-20

## 2022-04-20 ENCOUNTER — HOSPITAL ENCOUNTER (OUTPATIENT)
Dept: BONE DENSITY | Facility: HOSPITAL | Age: 87
Discharge: HOME | End: 2022-04-20
Admitting: FAMILY MEDICINE

## 2022-04-20 DIAGNOSIS — M81.0 AGE-RELATED OSTEOPOROSIS WITHOUT CURRENT PATHOLOGICAL FRACTURE: ICD-10-CM

## 2022-04-20 PROCEDURE — 77080 DXA BONE DENSITY AXIAL: CPT

## 2022-04-21 ENCOUNTER — TELEPHONE (OUTPATIENT)
Dept: FAMILY MEDICINE CLINIC | Facility: CLINIC | Age: 87
End: 2022-04-21

## 2022-04-21 DIAGNOSIS — M81.0 AGE-RELATED OSTEOPOROSIS WITHOUT CURRENT PATHOLOGICAL FRACTURE: Primary | ICD-10-CM

## 2022-04-21 NOTE — TELEPHONE ENCOUNTER
Caller: SARAI JOSUE    Relationship to patient: Emergency Contact    Best call back number:[phone]    Patient is needing: PATIENT'S DAUGHTER CALLED IN AND WOULD LIKE TO HAVE A CALL BACK REGARDING HER MOTHER'S BONE DENSITY TEST RESULTS PLEASE.

## 2022-05-12 ENCOUNTER — HOSPITAL ENCOUNTER (OUTPATIENT)
Dept: INFUSION THERAPY | Facility: HOSPITAL | Age: 87
Discharge: HOME | End: 2022-05-12
Admitting: FAMILY MEDICINE

## 2022-05-12 VITALS
RESPIRATION RATE: 20 BRPM | SYSTOLIC BLOOD PRESSURE: 141 MMHG | HEIGHT: 65 IN | DIASTOLIC BLOOD PRESSURE: 70 MMHG | TEMPERATURE: 98.3 F | WEIGHT: 176.81 LBS | HEART RATE: 71 BPM | OXYGEN SATURATION: 96 % | BODY MASS INDEX: 29.46 KG/M2

## 2022-05-12 DIAGNOSIS — M81.0 AGE-RELATED OSTEOPOROSIS WITHOUT CURRENT PATHOLOGICAL FRACTURE: Primary | ICD-10-CM

## 2022-05-12 LAB
25(OH)D3 SERPL-MCNC: 36.5 NG/ML (ref 30–100)
ALBUMIN SERPL-MCNC: 4.6 G/DL (ref 3.5–5.2)
ALBUMIN/GLOB SERPL: 1.2 G/DL
ALP SERPL-CCNC: 82 U/L (ref 39–117)
ALT SERPL W P-5'-P-CCNC: 9 U/L (ref 1–33)
ANION GAP SERPL CALCULATED.3IONS-SCNC: 12.7 MMOL/L (ref 5–15)
AST SERPL-CCNC: 21 U/L (ref 1–32)
BILIRUB SERPL-MCNC: 0.5 MG/DL (ref 0–1.2)
BUN SERPL-MCNC: 18 MG/DL (ref 8–23)
BUN/CREAT SERPL: 17.5 (ref 7–25)
CALCIUM SPEC-SCNC: 10.1 MG/DL (ref 8.6–10.5)
CHLORIDE SERPL-SCNC: 100 MMOL/L (ref 98–107)
CO2 SERPL-SCNC: 26.3 MMOL/L (ref 22–29)
CREAT SERPL-MCNC: 1.03 MG/DL (ref 0.57–1)
EGFRCR SERPLBLD CKD-EPI 2021: 53.1 ML/MIN/1.73
GLOBULIN UR ELPH-MCNC: 3.8 GM/DL
GLUCOSE SERPL-MCNC: 129 MG/DL (ref 65–99)
POTASSIUM SERPL-SCNC: 4.2 MMOL/L (ref 3.5–5.2)
PROT SERPL-MCNC: 8.4 G/DL (ref 6–8.5)
SODIUM SERPL-SCNC: 139 MMOL/L (ref 136–145)

## 2022-05-12 PROCEDURE — 80053 COMPREHEN METABOLIC PANEL: CPT | Performed by: FAMILY MEDICINE

## 2022-05-12 PROCEDURE — 82306 VITAMIN D 25 HYDROXY: CPT | Performed by: FAMILY MEDICINE

## 2022-05-12 PROCEDURE — 25010000002 DENOSUMAB 60 MG/ML SOLUTION PREFILLED SYRINGE: Performed by: FAMILY MEDICINE

## 2022-05-12 PROCEDURE — 96372 THER/PROPH/DIAG INJ SC/IM: CPT

## 2022-05-12 RX ADMIN — DENOSUMAB 60 MG: 60 INJECTION SUBCUTANEOUS at 12:59

## 2022-05-12 RX ADMIN — DENOSUMAB 60 MG: 60 INJECTION SUBCUTANEOUS at 12:55

## 2022-07-11 ENCOUNTER — OFFICE VISIT (OUTPATIENT)
Dept: FAMILY MEDICINE CLINIC | Facility: CLINIC | Age: 87
End: 2022-07-11

## 2022-07-11 ENCOUNTER — HOSPITAL ENCOUNTER (OUTPATIENT)
Dept: GENERAL RADIOLOGY | Facility: HOSPITAL | Age: 87
Discharge: HOME | End: 2022-07-11
Admitting: FAMILY MEDICINE

## 2022-07-11 VITALS
HEIGHT: 65 IN | SYSTOLIC BLOOD PRESSURE: 126 MMHG | WEIGHT: 176.8 LBS | OXYGEN SATURATION: 95 % | HEART RATE: 75 BPM | BODY MASS INDEX: 29.46 KG/M2 | DIASTOLIC BLOOD PRESSURE: 57 MMHG | TEMPERATURE: 97.9 F

## 2022-07-11 DIAGNOSIS — J40 BRONCHITIS: ICD-10-CM

## 2022-07-11 DIAGNOSIS — J40 BRONCHITIS: Primary | ICD-10-CM

## 2022-07-11 DIAGNOSIS — I10 HYPERTENSION, UNSPECIFIED TYPE: ICD-10-CM

## 2022-07-11 LAB
EXPIRATION DATE: NORMAL
EXPIRATION DATE: NORMAL
FLUAV AG NPH QL: NEGATIVE
FLUBV AG NPH QL: NEGATIVE
INTERNAL CONTROL: NORMAL
INTERNAL CONTROL: NORMAL
Lab: NORMAL
Lab: NORMAL
SARS-COV-2 AG UPPER RESP QL IA.RAPID: NOT DETECTED

## 2022-07-11 PROCEDURE — 99213 OFFICE O/P EST LOW 20 MIN: CPT | Performed by: FAMILY MEDICINE

## 2022-07-11 PROCEDURE — 71046 X-RAY EXAM CHEST 2 VIEWS: CPT

## 2022-07-11 PROCEDURE — 87426 SARSCOV CORONAVIRUS AG IA: CPT | Performed by: FAMILY MEDICINE

## 2022-07-11 PROCEDURE — 87804 INFLUENZA ASSAY W/OPTIC: CPT | Performed by: FAMILY MEDICINE

## 2022-07-11 RX ORDER — ATENOLOL 50 MG/1
TABLET ORAL
Qty: 180 TABLET | Refills: 3 | Status: SHIPPED | OUTPATIENT
Start: 2022-07-11 | End: 2023-02-10 | Stop reason: SDUPTHER

## 2022-07-11 RX ORDER — DOXYCYCLINE HYCLATE 100 MG/1
100 CAPSULE ORAL 2 TIMES DAILY
Qty: 20 CAPSULE | Refills: 0 | Status: SHIPPED | OUTPATIENT
Start: 2022-07-11 | End: 2022-07-21

## 2022-07-11 RX ORDER — PREDNISONE 10 MG/1
TABLET ORAL
Qty: 30 TABLET | Refills: 0 | Status: SHIPPED | OUTPATIENT
Start: 2022-07-11 | End: 2022-08-18

## 2022-07-11 NOTE — ASSESSMENT & PLAN NOTE
Since it has been 7 weeks since her last evaluation.  We will repeat her COVID influenza and chest x-ray.  Her COVID and influenza test was negative.  We will retreat her with a course of antibiotics a burst and taper prednisone and an inhaler.

## 2022-07-11 NOTE — PROGRESS NOTES
Chief Complaint   Patient presents with   • Cough     Pt c/o cough as well as coughing up phlegm, pt also c/o being cold constantly        Subjective     Shayy Wasserman  has a past medical history of Anxiety disorder (11/12/2018), Arthritis, Dependent edema (11/28/2017), Gout, Osteoporosis, Primary osteoarthritis of left knee (05/24/2018), Primary osteoarthritis of one hip, right (12/12/2017), and Primary osteoarthritis of right hip (12/12/2017).    Cough- she was seen in the urgent care about 7 weeks ago.  Than her COVID and influenza was negative and her chest x-ray was clear.  She had a a course of a Medrol Dosepak and a Z-Erick and some Tessalon Perles.  She transiently was improved but then the cough came back.  Her cough continues to be productive.  She denies any shortness of breath but does have some occasional wheezing.  She denies any fevers or chills.      PHQ-2 Depression Screening  Little interest or pleasure in doing things?     Feeling down, depressed, or hopeless?     PHQ-2 Total Score     PHQ-9 Depression Screening  Little interest or pleasure in doing things?     Feeling down, depressed, or hopeless?     Trouble falling or staying asleep, or sleeping too much?     Feeling tired or having little energy?     Poor appetite or overeating?     Feeling bad about yourself - or that you are a failure or have let yourself or your family down?     Trouble concentrating on things, such as reading the newspaper or watching television?     Moving or speaking so slowly that other people could have noticed? Or the opposite - being so fidgety or restless that you have been moving around a lot more than usual?     Thoughts that you would be better off dead, or of hurting yourself in some way?     PHQ-9 Total Score     If you checked off any problems, how difficult have these problems made it for you to do your work, take care of things at home, or get along with other people?       Allergies   Allergen Reactions    • Bactrim [Sulfamethoxazole-Trimethoprim] Unknown - Low Severity   • Griseofulvin Ultramicrosize [Griseofulvin] Unknown - Low Severity       Prior to Admission medications    Medication Sig Start Date End Date Taking? Authorizing Provider   albuterol (PROVENTIL) (2.5 MG/3ML) 0.083% nebulizer solution USE ONE VIAL IN NEBULIZER THREE TIMES A DAY FOR 30 DAYS 8/27/21  Yes Pee Mon DO   alendronate (FOSAMAX) 70 MG tablet TAKE 1 TABLET BY MOUTH IN THE MORNING ONCE A WEEK (TAKE AT LEAST 30 MINUTES BEFORE FIRST FOOD, BEVERAGE, OR MEDICATION FOR THE DAY) 11/15/21  Yes Pee Mon DO   allopurinol (ZYLOPRIM) 300 MG tablet TAKE 1 TABLET BY MOUTH EVERY DAY 1/13/22  Yes Pee Mon DO   amLODIPine (NORVASC) 10 MG tablet Take 1 tablet by mouth Daily. 2/15/22  Yes Pee Mon DO   atenolol (TENORMIN) 50 MG tablet TAKE 1 TABLET BY MOUTH TWO TIMES A DAY 7/11/22  Yes Pee Mon DO   azithromycin (Zithromax Z-Erick) 250 MG tablet Take 2 tablets the first day, then 1 tablet daily for 4 days. 5/17/22 7/11/22 Yes Romario Trujillo PA-C   benzonatate (TESSALON) 100 MG capsule Take 1 capsule by mouth 3 (Three) Times a Day As Needed for Cough. 5/17/22 7/11/22 Yes Romario Trujillo PA-C   methylPREDNISolone (MEDROL) 4 MG dose pack Take as directed on package instructions. 5/17/22 7/11/22 Yes Romario Trujillo PA-C   atenolol (TENORMIN) 50 MG tablet TAKE 1 TABLET BY MOUTH TWO TIMES A DAY 7/19/21 7/11/22  Pee Mon DO        Patient Active Problem List   Diagnosis   • Other emphysema (HCC)   • Bronchitis   • Essential (primary) hypertension   • Gout   • Hyperlipidemia   • Osteoarthritis   • Stage 3 chronic kidney disease (HCC)   • Osteoporosis        Past Surgical History:   Procedure Laterality Date   • BLADDER REPAIR     • COLONOSCOPY  2016   • EYE SURGERY      implant- yes   • HYSTERECTOMY     • INTRAOCULAR LENS INSERTION      yes   • JOINT  "REPLACEMENT      Surgery   • KNEE SURGERY Right     knee repair   • KNEE SURGERY Right     repair   • OTHER SURGICAL HISTORY      Artificial joints/limbs   • OTHER SURGICAL HISTORY      Artificial Joints/Limbs   • OTHER SURGICAL HISTORY      Joint surgery   • TOTAL KNEE ARTHROPLASTY Right        Social History     Socioeconomic History   • Marital status:    Tobacco Use   • Smoking status: Former Smoker     Start date:      Quit date: 1970     Years since quittin.5   • Smokeless tobacco: Never Used   • Tobacco comment: started at age 16- stopped at age 35   Vaping Use   • Vaping Use: Never used   Substance and Sexual Activity   • Alcohol use: Never     Comment: 2019- 1/15/2019 does not drink    • Drug use: Never       Family History   Problem Relation Age of Onset   • Colon cancer Father        Family history, surgical history, past medical history, Allergies and meds reviewed with patient today and updated in [email or web address] EMR.     ROS:  Review of Systems   Constitutional: Negative for chills and fever.   HENT: Negative for congestion, postnasal drip and rhinorrhea.    Respiratory: Positive for cough and wheezing. Negative for chest tightness and shortness of breath.    Cardiovascular: Negative for chest pain.       OBJECTIVE:  Vitals:    22 1502   BP: 126/57   BP Location: Right arm   Patient Position: Sitting   Cuff Size: Large Adult   Pulse: 75   Temp: 97.9 °F (36.6 °C)   TempSrc: Temporal   SpO2: 95%   Weight: 80.2 kg (176 lb 12.8 oz)   Height: 165.1 cm (65\")     No exam data present   Body mass index is 29.42 kg/m².  No LMP recorded. Patient is postmenopausal.    Physical Exam  Vitals and nursing note reviewed.   Constitutional:       General: She is not in acute distress.     Appearance: Normal appearance. She is obese.   HENT:      Head: Normocephalic.      Right Ear: Ear canal and external ear normal. There is impacted cerumen.      Left Ear: Ear canal and external ear normal. There is " impacted cerumen.      Nose: Nose normal.      Mouth/Throat:      Mouth: Mucous membranes are moist.      Dentition: Has dentures.      Pharynx: Oropharynx is clear.   Eyes:      General: No scleral icterus.     Conjunctiva/sclera: Conjunctivae normal.      Pupils: Pupils are equal, round, and reactive to light.   Cardiovascular:      Rate and Rhythm: Normal rate and regular rhythm.      Pulses: Normal pulses.      Heart sounds: Normal heart sounds. No murmur heard.  Pulmonary:      Effort: Pulmonary effort is normal.      Breath sounds: Normal breath sounds. No wheezing, rhonchi or rales.   Musculoskeletal:      Cervical back: Neck supple. No rigidity or tenderness.   Lymphadenopathy:      Cervical: No cervical adenopathy.   Skin:     General: Skin is warm and dry.      Coloration: Skin is not jaundiced.      Findings: No rash.   Neurological:      General: No focal deficit present.      Mental Status: She is alert and oriented to person, place, and time.   Psychiatric:         Mood and Affect: Mood normal.         Thought Content: Thought content normal.         Judgment: Judgment normal.         Procedures    Office Visit on 07/11/2022   Component Date Value Ref Range Status   • SARS Antigen 07/11/2022 Not Detected  Not Detected, Presumptive Negative Final   • Internal Control 07/11/2022 Passed  Passed Final   • Lot Number 07/11/2022 [id number]   Final   • Expiration Date 07/11/2022 10/10/2023   Final   • Rapid Influenza A Ag 07/11/2022 Negative  Negative Final-Edited   • Rapid Influenza B Ag 07/11/2022 Negative  Negative Final-Edited   • Internal Control 07/11/2022 Passed  Passed Final-Edited   • Lot Number 07/11/2022 [id number]   Corrected   • Expiration Date 07/11/2022 05/08/2023   Corrected       ASSESSMENT/ PLAN:    Diagnoses and all orders for this visit:    1. Bronchitis (Primary)  Assessment & Plan:  Since it has been 7 weeks since her last evaluation.  We will repeat her COVID influenza and chest x-ray.  Her  COVID and influenza test was negative.  We will retreat her with a course of antibiotics a burst and taper prednisone and an inhaler.    Orders:  -     POCT SARS-CoV-2 Antigen BHARATI + Flu  -     XR Chest PA & Lateral; Future  -     POCT SHARLENE SARS-CoV-2 Antigen BHARATI  -     POC Influenza A / B    Other orders  -     doxycycline (VIBRAMYCIN) 100 MG capsule; Take 1 capsule by mouth 2 (Two) Times a Day for 10 days.  Dispense: 20 capsule; Refill: 0  -     predniSONE (DELTASONE) 10 MG tablet; 4 tabs daily for 3 days, then 3 tabs daily for 3 days, then 2 tabs daily for 3 days, then 1 tab daily for 3 days then STOP  Dispense: 30 tablet; Refill: 0      Orders Placed Today:     New Medications Ordered This Visit   Medications   • doxycycline (VIBRAMYCIN) 100 MG capsule     Sig: Take 1 capsule by mouth 2 (Two) Times a Day for 10 days.     Dispense:  20 capsule     Refill:  0   • predniSONE (DELTASONE) 10 MG tablet     Si tabs daily for 3 days, then 3 tabs daily for 3 days, then 2 tabs daily for 3 days, then 1 tab daily for 3 days then STOP     Dispense:  30 tablet     Refill:  0        Management Plan:     An After Visit Summary was printed and given to the patient at discharge.    Follow-up: Return if symptoms worsen or fail to improve.    Pee Mon DO 2022 15:55 EDT  This note was electronically signed.

## 2022-08-18 ENCOUNTER — OFFICE VISIT (OUTPATIENT)
Dept: FAMILY MEDICINE CLINIC | Facility: CLINIC | Age: 87
End: 2022-08-18

## 2022-08-18 VITALS
RESPIRATION RATE: 19 BRPM | DIASTOLIC BLOOD PRESSURE: 72 MMHG | SYSTOLIC BLOOD PRESSURE: 138 MMHG | HEIGHT: 65 IN | HEART RATE: 84 BPM | OXYGEN SATURATION: 92 % | BODY MASS INDEX: 31.06 KG/M2 | WEIGHT: 186.4 LBS | TEMPERATURE: 97.7 F

## 2022-08-18 DIAGNOSIS — N18.31 STAGE 3A CHRONIC KIDNEY DISEASE: ICD-10-CM

## 2022-08-18 DIAGNOSIS — I10 ESSENTIAL (PRIMARY) HYPERTENSION: Primary | ICD-10-CM

## 2022-08-18 DIAGNOSIS — I11.0 HYPERTENSIVE HEART DISEASE WITH HEART FAILURE: ICD-10-CM

## 2022-08-18 DIAGNOSIS — M81.0 AGE-RELATED OSTEOPOROSIS WITHOUT CURRENT PATHOLOGICAL FRACTURE: ICD-10-CM

## 2022-08-18 DIAGNOSIS — R60.0 LOCALIZED EDEMA: ICD-10-CM

## 2022-08-18 LAB
ANION GAP SERPL CALCULATED.3IONS-SCNC: 12 MMOL/L (ref 5–15)
BUN SERPL-MCNC: 15 MG/DL (ref 8–23)
BUN/CREAT SERPL: 15.8 (ref 7–25)
CALCIUM SPEC-SCNC: 9.1 MG/DL (ref 8.6–10.5)
CHLORIDE SERPL-SCNC: 103 MMOL/L (ref 98–107)
CO2 SERPL-SCNC: 24 MMOL/L (ref 22–29)
CREAT SERPL-MCNC: 0.95 MG/DL (ref 0.57–1)
EGFRCR SERPLBLD CKD-EPI 2021: 58.5 ML/MIN/1.73
GLUCOSE SERPL-MCNC: 156 MG/DL (ref 65–99)
NT-PROBNP SERPL-MCNC: 278 PG/ML (ref 0–1800)
POTASSIUM SERPL-SCNC: 4.6 MMOL/L (ref 3.5–5.2)
SODIUM SERPL-SCNC: 139 MMOL/L (ref 136–145)

## 2022-08-18 PROCEDURE — 83880 ASSAY OF NATRIURETIC PEPTIDE: CPT | Performed by: FAMILY MEDICINE

## 2022-08-18 PROCEDURE — 80048 BASIC METABOLIC PNL TOTAL CA: CPT | Performed by: FAMILY MEDICINE

## 2022-08-18 PROCEDURE — 36415 COLL VENOUS BLD VENIPUNCTURE: CPT | Performed by: FAMILY MEDICINE

## 2022-08-18 PROCEDURE — 99213 OFFICE O/P EST LOW 20 MIN: CPT | Performed by: FAMILY MEDICINE

## 2022-08-18 RX ORDER — BUDESONIDE, GLYCOPYRROLATE, AND FORMOTEROL FUMARATE 160; 9; 4.8 UG/1; UG/1; UG/1
AEROSOL, METERED RESPIRATORY (INHALATION)
COMMUNITY
Start: 2022-05-31 | End: 2023-02-10 | Stop reason: SDUPTHER

## 2022-08-18 NOTE — ASSESSMENT & PLAN NOTE
She has some swelling of her lower extremities.  This may be related to prolonged sitting in addition to her amlodipine 10 mg daily.  Offered to reduce her amlodipine back to 5 mg and add in all another medication but at this time she chose to stay the course.  She will work on being more physically active and elevating her lower extremities.  She may also attempt to wear some compression hose.  We will update her BMP and a BMP to make sure she does not have any underlying heart failure.

## 2022-08-22 DIAGNOSIS — R73.09 ELEVATED RANDOM BLOOD GLUCOSE LEVEL: Primary | ICD-10-CM

## 2022-08-25 ENCOUNTER — CLINICAL SUPPORT (OUTPATIENT)
Dept: FAMILY MEDICINE CLINIC | Facility: CLINIC | Age: 87
End: 2022-08-25

## 2022-08-25 DIAGNOSIS — R73.09 ELEVATED RANDOM BLOOD GLUCOSE LEVEL: ICD-10-CM

## 2022-08-25 LAB — HBA1C MFR BLD: 6.5 % (ref 4.8–5.6)

## 2022-08-25 PROCEDURE — 83036 HEMOGLOBIN GLYCOSYLATED A1C: CPT | Performed by: FAMILY MEDICINE

## 2022-08-25 PROCEDURE — 36415 COLL VENOUS BLD VENIPUNCTURE: CPT | Performed by: FAMILY MEDICINE

## 2022-11-08 ENCOUNTER — OFFICE VISIT (OUTPATIENT)
Dept: FAMILY MEDICINE CLINIC | Facility: CLINIC | Age: 87
End: 2022-11-08

## 2022-11-08 VITALS
OXYGEN SATURATION: 95 % | HEIGHT: 65 IN | TEMPERATURE: 97.5 F | DIASTOLIC BLOOD PRESSURE: 63 MMHG | HEART RATE: 76 BPM | WEIGHT: 181.6 LBS | BODY MASS INDEX: 30.26 KG/M2 | SYSTOLIC BLOOD PRESSURE: 132 MMHG

## 2022-11-08 DIAGNOSIS — I10 ESSENTIAL (PRIMARY) HYPERTENSION: Primary | ICD-10-CM

## 2022-11-08 DIAGNOSIS — E78.5 HYPERLIPIDEMIA, UNSPECIFIED HYPERLIPIDEMIA TYPE: ICD-10-CM

## 2022-11-08 DIAGNOSIS — R60.0 LOCALIZED EDEMA: ICD-10-CM

## 2022-11-08 DIAGNOSIS — E11.9 TYPE 2 DIABETES MELLITUS WITHOUT COMPLICATION, WITHOUT LONG-TERM CURRENT USE OF INSULIN: ICD-10-CM

## 2022-11-08 PROCEDURE — 99214 OFFICE O/P EST MOD 30 MIN: CPT | Performed by: FAMILY MEDICINE

## 2022-11-08 RX ORDER — AMLODIPINE BESYLATE 5 MG/1
5 TABLET ORAL DAILY
Qty: 90 TABLET | Refills: 1 | Status: SHIPPED | OUTPATIENT
Start: 2022-11-08 | End: 2023-02-10 | Stop reason: SDUPTHER

## 2022-11-08 NOTE — PROGRESS NOTES
Chief Complaint   Patient presents with   • Follow-up     1 month    • Hypertension   • Hyperlipidemia        Subjective     Shayy Wasserman  has a past medical history of Anxiety disorder (11/12/2018), Arthritis, Dependent edema (11/28/2017), Gout, Osteoporosis, Primary osteoarthritis of left knee (05/24/2018), Primary osteoarthritis of one hip, right (12/12/2017), and Primary osteoarthritis of right hip (12/12/2017).    Hypertension- she checks her blood pressure at home but does not recall her numbers.  Her blood pressure is good here today though at 132/63.  Last visit reduced her amlodipine from 10 to 5 mg daily due to increased edema.    Hyperlipidemia- currently she is not on any statin.    Edema- her swelling is much improved since reducing her amlodipine to 5 mg daily.      PHQ-2 Depression Screening  Little interest or pleasure in doing things?     Feeling down, depressed, or hopeless?     PHQ-2 Total Score     PHQ-9 Depression Screening  Little interest or pleasure in doing things?     Feeling down, depressed, or hopeless?     Trouble falling or staying asleep, or sleeping too much?     Feeling tired or having little energy?     Poor appetite or overeating?     Feeling bad about yourself - or that you are a failure or have let yourself or your family down?     Trouble concentrating on things, such as reading the newspaper or watching television?     Moving or speaking so slowly that other people could have noticed? Or the opposite - being so fidgety or restless that you have been moving around a lot more than usual?     Thoughts that you would be better off dead, or of hurting yourself in some way?     PHQ-9 Total Score     If you checked off any problems, how difficult have these problems made it for you to do your work, take care of things at home, or get along with other people?       Allergies   Allergen Reactions   • Bactrim [Sulfamethoxazole-Trimethoprim] Unknown - Low Severity   • Griseofulvin  Ultramicrosize [Griseofulvin] Unknown - Low Severity       Prior to Admission medications    Medication Sig Start Date End Date Taking? Authorizing Provider   albuterol (PROVENTIL) (2.5 MG/3ML) 0.083% nebulizer solution USE ONE VIAL IN NEBULIZER THREE TIMES A DAY FOR 30 DAYS 8/27/21  Yes Pee Mon DO   alendronate (FOSAMAX) 70 MG tablet TAKE 1 TABLET BY MOUTH IN THE MORNING ONCE A WEEK (TAKE AT LEAST 30 MINUTES BEFORE FIRST FOOD, BEVERAGE, OR MEDICATION FOR THE DAY) 11/15/21  Yes Pee Mon DO   allopurinol (ZYLOPRIM) 300 MG tablet TAKE 1 TABLET BY MOUTH EVERY DAY 1/13/22  Yes Pee Mon DO   amLODIPine (NORVASC) 10 MG tablet Take 1 tablet by mouth Daily. 2/15/22  Yes Pee Mon DO   atenolol (TENORMIN) 50 MG tablet TAKE 1 TABLET BY MOUTH TWO TIMES A DAY 7/11/22  Yes Pee Mon DO   Breztri Aerosphere 160-9-4.8 MCG/ACT aerosol inhaler INHALE 2 PUFFS TWO TIMES A DAY 5/31/22  Yes Provider, MD Diane        Patient Active Problem List   Diagnosis   • Other emphysema (HCC)   • Bronchitis   • Essential (primary) hypertension   • Gout   • Hyperlipidemia   • Osteoarthritis   • Stage 3 chronic kidney disease (HCC)   • Osteoporosis   • Localized edema   • Type 2 diabetes mellitus without complication (HCC)        Past Surgical History:   Procedure Laterality Date   • BLADDER REPAIR     • COLONOSCOPY  2016   • EYE SURGERY      implant- yes   • HYSTERECTOMY     • INTRAOCULAR LENS INSERTION      yes   • JOINT REPLACEMENT      Surgery   • KNEE SURGERY Right     knee repair   • KNEE SURGERY Right     repair   • OTHER SURGICAL HISTORY      Artificial joints/limbs   • OTHER SURGICAL HISTORY      Artificial Joints/Limbs   • OTHER SURGICAL HISTORY      Joint surgery   • TOTAL KNEE ARTHROPLASTY Right        Social History     Socioeconomic History   • Marital status:    Tobacco Use   • Smoking status: Former     Types: Cigarettes     Start date: 1951     " Quit date: 1970     Years since quittin.8   • Smokeless tobacco: Never   • Tobacco comments:     started at age 16- stopped at age 35   Vaping Use   • Vaping Use: Never used   Substance and Sexual Activity   • Alcohol use: Never     Comment: 2019- 1/15/2019 does not drink    • Drug use: Never       Family History   Problem Relation Age of Onset   • Colon cancer Father        Family history, surgical history, past medical history, Allergies and meds reviewed with patient today and updated in [facility] EMR.     ROS:  Review of Systems   Constitutional: Negative for fatigue.   Respiratory: Negative for cough, chest tightness, shortness of breath and wheezing.    Cardiovascular: Negative for chest pain, palpitations and leg swelling.   Neurological: Negative for headache.   Psychiatric/Behavioral: Negative for depressed mood. The patient is not nervous/anxious.        OBJECTIVE:  Vitals:    22 1602   BP: 132/63   BP Location: Left arm   Patient Position: Sitting   Pulse: 76   Temp: 97.5 °F (36.4 °C)   SpO2: 95%   Weight: 82.4 kg (181 lb 9.6 oz)   Height: 165.1 cm (65\")     No results found.   Body mass index is 30.22 kg/m².  No LMP recorded. Patient is postmenopausal.    Physical Exam  Vitals and nursing note reviewed.   Constitutional:       Appearance: Normal appearance. She is normal weight.   HENT:      Head: Normocephalic.   Cardiovascular:      Rate and Rhythm: Normal rate and regular rhythm.      Heart sounds: Normal heart sounds. No murmur heard.  Pulmonary:      Effort: Pulmonary effort is normal.      Breath sounds: Wheezing present. No rhonchi or rales.   Musculoskeletal:      Right lower leg: No edema.      Left lower leg: No edema.   Neurological:      Mental Status: She is alert.   Psychiatric:         Mood and Affect: Mood normal.         Behavior: Behavior normal.         Thought Content: Thought content normal.         Judgment: Judgment normal.         Procedures    No visits with results " within 30 Day(s) from this visit.   Latest known visit with results is:   Clinical Support on 08/25/2022   Component Date Value Ref Range Status   • Hemoglobin A1C 08/25/2022 6.50 (H)  4.80 - 5.60 % Final       ASSESSMENT/ PLAN:    Diagnoses and all orders for this visit:    1. Essential (primary) hypertension (Primary)  Assessment & Plan:  Her blood pressure remains good with the reduction of the amlodipine.      2. Hyperlipidemia, unspecified hyperlipidemia type  Assessment & Plan:  We will wait till her next lab visit to consider repeating her lipid profile.      3. Localized edema  Assessment & Plan:  Her edema is much improved with the lower dose of the amlodipine.      4. Type 2 diabetes mellitus without complication, without long-term current use of insulin ([facility])  Assessment & Plan:  Her A1c recently was 6.5.  Thus she is diabetic.  At her age I would just continue to monitor.  I would also encourage her to eat a carb moderated diet.      Other orders  -     amLODIPine (NORVASC) 5 MG tablet; Take 1 tablet by mouth Daily.  Dispense: 90 tablet; Refill: 1      Orders Placed Today:     New Medications Ordered This Visit   Medications   • amLODIPine (NORVASC) 5 MG tablet     Sig: Take 1 tablet by mouth Daily.     Dispense:  90 tablet     Refill:  1     New Dosage        Management Plan:     An After Visit Summary was printed and given to the patient at discharge.    Follow-up: Return in about 3 months (around 2/8/2023) for Recheck.    Pee Mon DO 11/8/2022 16:46 EST  This note was electronically signed.

## 2022-11-08 NOTE — ASSESSMENT & PLAN NOTE
Her A1c recently was 6.5.  Thus she is diabetic.  At her age I would just continue to monitor.  I would also encourage her to eat a carb moderated diet.

## 2022-11-11 ENCOUNTER — TELEPHONE (OUTPATIENT)
Dept: FAMILY MEDICINE CLINIC | Facility: CLINIC | Age: 87
End: 2022-11-11

## 2022-12-06 RX ORDER — ALENDRONATE SODIUM 70 MG/1
TABLET ORAL
Qty: 4 TABLET | Refills: 12 | Status: SHIPPED | OUTPATIENT
Start: 2022-12-06 | End: 2023-02-10 | Stop reason: SDUPTHER

## 2023-01-12 RX ORDER — ALLOPURINOL 300 MG/1
TABLET ORAL
Qty: 90 TABLET | Refills: 3 | Status: SHIPPED | OUTPATIENT
Start: 2023-01-12 | End: 2023-02-10 | Stop reason: SDUPTHER

## 2023-02-10 ENCOUNTER — OFFICE VISIT (OUTPATIENT)
Dept: FAMILY MEDICINE CLINIC | Facility: CLINIC | Age: 88
End: 2023-02-10
Payer: MEDICARE

## 2023-02-10 VITALS
BODY MASS INDEX: 29.59 KG/M2 | DIASTOLIC BLOOD PRESSURE: 62 MMHG | TEMPERATURE: 97.5 F | OXYGEN SATURATION: 96 % | HEIGHT: 65 IN | WEIGHT: 177.6 LBS | HEART RATE: 66 BPM | SYSTOLIC BLOOD PRESSURE: 129 MMHG

## 2023-02-10 DIAGNOSIS — Z13.29 THYROID DISORDER SCREENING: ICD-10-CM

## 2023-02-10 DIAGNOSIS — G89.29 CHRONIC RIGHT-SIDED THORACIC BACK PAIN: ICD-10-CM

## 2023-02-10 DIAGNOSIS — M1A.9XX0 CHRONIC GOUT INVOLVING TOE WITHOUT TOPHUS, UNSPECIFIED CAUSE, UNSPECIFIED LATERALITY: ICD-10-CM

## 2023-02-10 DIAGNOSIS — I10 HYPERTENSION, UNSPECIFIED TYPE: ICD-10-CM

## 2023-02-10 DIAGNOSIS — E11.9 TYPE 2 DIABETES MELLITUS WITHOUT COMPLICATION, WITHOUT LONG-TERM CURRENT USE OF INSULIN: ICD-10-CM

## 2023-02-10 DIAGNOSIS — E78.5 HYPERLIPIDEMIA, UNSPECIFIED HYPERLIPIDEMIA TYPE: ICD-10-CM

## 2023-02-10 DIAGNOSIS — J43.8 OTHER EMPHYSEMA: ICD-10-CM

## 2023-02-10 DIAGNOSIS — M81.0 AGE-RELATED OSTEOPOROSIS WITHOUT CURRENT PATHOLOGICAL FRACTURE: ICD-10-CM

## 2023-02-10 DIAGNOSIS — I10 ESSENTIAL (PRIMARY) HYPERTENSION: Primary | ICD-10-CM

## 2023-02-10 DIAGNOSIS — M54.6 CHRONIC RIGHT-SIDED THORACIC BACK PAIN: ICD-10-CM

## 2023-02-10 LAB
ALBUMIN SERPL-MCNC: 4.6 G/DL (ref 3.5–5.2)
ALBUMIN/GLOB SERPL: 1.3 G/DL
ALP SERPL-CCNC: 68 U/L (ref 39–117)
ALT SERPL W P-5'-P-CCNC: 10 U/L (ref 1–33)
ANION GAP SERPL CALCULATED.3IONS-SCNC: 9.1 MMOL/L (ref 5–15)
AST SERPL-CCNC: 16 U/L (ref 1–32)
BASOPHILS # BLD AUTO: 0.06 10*3/MM3 (ref 0–0.2)
BASOPHILS NFR BLD AUTO: 0.8 % (ref 0–1.5)
BILIRUB SERPL-MCNC: 0.5 MG/DL (ref 0–1.2)
BUN SERPL-MCNC: 18 MG/DL (ref 8–23)
BUN/CREAT SERPL: 14.3 (ref 7–25)
CALCIUM SPEC-SCNC: 10.3 MG/DL (ref 8.6–10.5)
CHLORIDE SERPL-SCNC: 98 MMOL/L (ref 98–107)
CHOLEST SERPL-MCNC: 222 MG/DL (ref 0–200)
CO2 SERPL-SCNC: 29.9 MMOL/L (ref 22–29)
CREAT SERPL-MCNC: 1.26 MG/DL (ref 0.57–1)
DEPRECATED RDW RBC AUTO: 43.7 FL (ref 37–54)
EGFRCR SERPLBLD CKD-EPI 2021: 41.4 ML/MIN/1.73
EOSINOPHIL # BLD AUTO: 0.41 10*3/MM3 (ref 0–0.4)
EOSINOPHIL NFR BLD AUTO: 5.4 % (ref 0.3–6.2)
ERYTHROCYTE [DISTWIDTH] IN BLOOD BY AUTOMATED COUNT: 13.4 % (ref 12.3–15.4)
GLOBULIN UR ELPH-MCNC: 3.6 GM/DL
GLUCOSE SERPL-MCNC: 116 MG/DL (ref 65–99)
HBA1C MFR BLD: 6 % (ref 4.8–5.6)
HCT VFR BLD AUTO: 40.2 % (ref 34–46.6)
HDLC SERPL QL: 4.35
HDLC SERPL-MCNC: 51 MG/DL (ref 40–60)
HGB BLD-MCNC: 13.8 G/DL (ref 12–15.9)
IMM GRANULOCYTES # BLD AUTO: 0.03 10*3/MM3 (ref 0–0.05)
IMM GRANULOCYTES NFR BLD AUTO: 0.4 % (ref 0–0.5)
LDLC SERPL CALC-MCNC: 150 MG/DL (ref 0–100)
LYMPHOCYTES # BLD AUTO: 2.46 10*3/MM3 (ref 0.7–3.1)
LYMPHOCYTES NFR BLD AUTO: 32.6 % (ref 19.6–45.3)
MCH RBC QN AUTO: 31.2 PG (ref 26.6–33)
MCHC RBC AUTO-ENTMCNC: 34.3 G/DL (ref 31.5–35.7)
MCV RBC AUTO: 91 FL (ref 79–97)
MONOCYTES # BLD AUTO: 0.52 10*3/MM3 (ref 0.1–0.9)
MONOCYTES NFR BLD AUTO: 6.9 % (ref 5–12)
NEUTROPHILS NFR BLD AUTO: 4.06 10*3/MM3 (ref 1.7–7)
NEUTROPHILS NFR BLD AUTO: 53.9 % (ref 42.7–76)
NRBC BLD AUTO-RTO: 0 /100 WBC (ref 0–0.2)
PHOSPHATE SERPL-MCNC: 4.1 MG/DL (ref 2.5–4.5)
PLATELET # BLD AUTO: 251 10*3/MM3 (ref 140–450)
PMV BLD AUTO: 9.9 FL (ref 6–12)
POTASSIUM SERPL-SCNC: 4.6 MMOL/L (ref 3.5–5.2)
PROT SERPL-MCNC: 8.2 G/DL (ref 6–8.5)
RBC # BLD AUTO: 4.42 10*6/MM3 (ref 3.77–5.28)
SODIUM SERPL-SCNC: 137 MMOL/L (ref 136–145)
T4 FREE SERPL-MCNC: 1.39 NG/DL (ref 0.93–1.7)
TRIGL SERPL-MCNC: 118 MG/DL (ref 0–150)
TSH SERPL DL<=0.05 MIU/L-ACNC: 1.37 UIU/ML (ref 0.27–4.2)
VLDLC SERPL-MCNC: 21 MG/DL (ref 5–40)
WBC NRBC COR # BLD: 7.54 10*3/MM3 (ref 3.4–10.8)

## 2023-02-10 PROCEDURE — 85025 COMPLETE CBC W/AUTO DIFF WBC: CPT | Performed by: NURSE PRACTITIONER

## 2023-02-10 PROCEDURE — 84100 ASSAY OF PHOSPHORUS: CPT | Performed by: FAMILY MEDICINE

## 2023-02-10 PROCEDURE — 84443 ASSAY THYROID STIM HORMONE: CPT | Performed by: NURSE PRACTITIONER

## 2023-02-10 PROCEDURE — 80053 COMPREHEN METABOLIC PANEL: CPT | Performed by: NURSE PRACTITIONER

## 2023-02-10 PROCEDURE — 99214 OFFICE O/P EST MOD 30 MIN: CPT | Performed by: NURSE PRACTITIONER

## 2023-02-10 PROCEDURE — 80061 LIPID PANEL: CPT | Performed by: NURSE PRACTITIONER

## 2023-02-10 PROCEDURE — 36415 COLL VENOUS BLD VENIPUNCTURE: CPT | Performed by: NURSE PRACTITIONER

## 2023-02-10 PROCEDURE — 83036 HEMOGLOBIN GLYCOSYLATED A1C: CPT | Performed by: NURSE PRACTITIONER

## 2023-02-10 PROCEDURE — 84439 ASSAY OF FREE THYROXINE: CPT | Performed by: NURSE PRACTITIONER

## 2023-02-10 RX ORDER — ALLOPURINOL 300 MG/1
300 TABLET ORAL DAILY
Qty: 90 TABLET | Refills: 3 | Status: SHIPPED | OUTPATIENT
Start: 2023-02-10

## 2023-02-10 RX ORDER — ATENOLOL 50 MG/1
50 TABLET ORAL 2 TIMES DAILY
Qty: 180 TABLET | Refills: 3 | Status: SHIPPED | OUTPATIENT
Start: 2023-02-10

## 2023-02-10 RX ORDER — BUDESONIDE, GLYCOPYRROLATE, AND FORMOTEROL FUMARATE 160; 9; 4.8 UG/1; UG/1; UG/1
2 AEROSOL, METERED RESPIRATORY (INHALATION) 2 TIMES DAILY
Qty: 10.7 G | Refills: 5 | Status: SHIPPED | OUTPATIENT
Start: 2023-02-10

## 2023-02-10 RX ORDER — AMLODIPINE BESYLATE 5 MG/1
5 TABLET ORAL DAILY
Qty: 90 TABLET | Refills: 1 | Status: SHIPPED | OUTPATIENT
Start: 2023-02-10

## 2023-02-10 RX ORDER — ALENDRONATE SODIUM 70 MG/1
70 TABLET ORAL
Qty: 4 TABLET | Refills: 12 | Status: SHIPPED | OUTPATIENT
Start: 2023-02-10

## 2023-02-10 RX ORDER — ALBUTEROL SULFATE 2.5 MG/3ML
2.5 SOLUTION RESPIRATORY (INHALATION) EVERY 4 HOURS PRN
Qty: 270 ML | Refills: 1 | Status: SHIPPED | OUTPATIENT
Start: 2023-02-10

## 2023-02-10 RX ORDER — AMLODIPINE BESYLATE 10 MG/1
TABLET ORAL
COMMUNITY
Start: 2023-01-12 | End: 2023-02-10 | Stop reason: ALTCHOICE

## 2023-02-10 NOTE — PROGRESS NOTES
Chief Complaint  Hypertension and Shoulder Pain (Right shoulder blade )    Subjective          Shayy Wasserman is a 87 y.o. female who presents to [facility]    History of Present Illness    HTN- managed well on Amlodipine and atenolol.  Complain of back pain at thoracic spine area.  Sits long time playing games on computer.    PHQ-2 Total Score:     PHQ-9 Total Score:          Review of Systems       Medical History: has a past medical history of Anxiety disorder (11/12/2018), Arthritis, Dependent edema (11/28/2017), Gout, Osteoporosis, Primary osteoarthritis of left knee (05/24/2018), Primary osteoarthritis of one hip, right (12/12/2017), and Primary osteoarthritis of right hip (12/12/2017).     Surgical History: has a past surgical history that includes Other surgical history; Eye surgery; Joint replacement; Knee surgery (Right); Other surgical history; Bladder repair; Colonoscopy (2016); Intraocular lens insertion; Hysterectomy; Other surgical history; Knee surgery (Right); and Total knee arthroplasty (Right).     Family History: family history includes Colon cancer in her father.     Social History: reports that she quit smoking about 53 years ago. Her smoking use included cigarettes. She started smoking about 72 years ago. She has never used smokeless tobacco. She reports that she does not drink alcohol and does not use drugs.    Allergies: Bactrim [sulfamethoxazole-trimethoprim] and Griseofulvin ultramicrosize [griseofulvin]      Health Maintenance Due   Topic Date Due   • URINE MICROALBUMIN  Never done   • COVID-19 Vaccine (1) Never done   • TDAP/TD VACCINES (1 - Tdap) Never done   • ZOSTER VACCINE (1 of 2) Never done   • Pneumococcal Vaccine 65+ (2 - PPSV23 if available, else PCV20) 11/28/2018   • ANNUAL WELLNESS VISIT  05/20/2021   • DIABETIC EYE EXAM  Never done            Current Outpatient Medications:   •  albuterol (PROVENTIL) (2.5 MG/3ML) 0.083% nebulizer  "solution, Take 2.5 mg by nebulization Every 4 (Four) Hours As Needed for Wheezing., Disp: 270 mL, Rfl: 1  •  alendronate (FOSAMAX) 70 MG tablet, Take 1 tablet by mouth Every 7 (Seven) Days., Disp: 4 tablet, Rfl: 12  •  allopurinol (ZYLOPRIM) 300 MG tablet, Take 1 tablet by mouth Daily., Disp: 90 tablet, Rfl: 3  •  amLODIPine (NORVASC) 5 MG tablet, Take 1 tablet by mouth Daily., Disp: 90 tablet, Rfl: 1  •  atenolol (TENORMIN) 50 MG tablet, Take 1 tablet by mouth 2 (Two) Times a Day., Disp: 180 tablet, Rfl: 3  •  Breztri Aerosphere 160-9-4.8 MCG/ACT aerosol inhaler, Inhale 2 puffs 2 (Two) Times a Day., Disp: 10.7 g, Rfl: 5  •  Diclofenac Sodium (VOLTAREN) 1 % gel gel, Apply 4 g topically to the appropriate area as directed 4 (Four) Times a Day As Needed (as needed)., Disp: 350 g, Rfl: 1      Immunization History   Administered Date(s) Administered   • Influenza Quad Vaccine (Inpatient) 12/12/2012, 11/19/2013, 10/22/2014, 09/14/2015   • Influenza, Unspecified 10/06/2020, 10/06/2020, 10/06/2020   • Pneumococcal Conjugate 13-Valent (PCV13) 11/28/2017, 11/28/2017, 11/28/2017         Objective       Vitals:    02/10/23 1057   BP: 129/62   BP Location: Left arm   Patient Position: Sitting   Cuff Size: Large Adult   Pulse: 66   Temp: 97.5 °F (36.4 °C)   TempSrc: Temporal   SpO2: 96%   Weight: 80.6 kg (177 lb 9.6 oz)   Height: 165.1 cm (65\")   PainSc:   6   PainLoc: Shoulder      Body mass index is 29.55 kg/m².   Wt Readings from Last 3 Encounters:   02/10/23 80.6 kg (177 lb 9.6 oz)   11/08/22 82.4 kg (181 lb 9.6 oz)   08/18/22 84.6 kg (186 lb 6.4 oz)      BP Readings from Last 3 Encounters:   02/10/23 129/62   11/08/22 132/63   08/18/22 138/72        Physical Exam  Vitals reviewed.   Constitutional:       Appearance: Normal appearance.   HENT:      Head: Normocephalic and atraumatic.   Eyes:      Conjunctiva/sclera: Conjunctivae normal.      Pupils: Pupils are equal, round, and reactive to light.   Cardiovascular:      Rate " and Rhythm: Normal rate and regular rhythm.      Pulses: Normal pulses.      Heart sounds: Normal heart sounds.   Pulmonary:      Effort: Pulmonary effort is normal.      Breath sounds: Normal breath sounds.   Musculoskeletal:      Cervical back: Normal.      Thoracic back: Spasms present.      Lumbar back: Normal.        Back:    Skin:     General: Skin is warm and dry.   Neurological:      Mental Status: She is alert and oriented to person, place, and time.   Psychiatric:         Mood and Affect: Mood normal.         Behavior: Behavior normal.         Thought Content: Thought content normal.         Judgment: Judgment normal.             Result Review :       Common labs    Common Labs 5/12/22 8/18/22 8/25/22   Glucose 129 (A) 156 (A)    BUN 18 15    Creatinine 1.03 (A) 0.95    Sodium 139 139    Potassium 4.2 4.6    Chloride 100 103    Calcium 10.1 9.1    Albumin 4.60     Total Bilirubin 0.5     Alkaline Phosphatase 82     AST (SGOT) 21     ALT (SGPT) 9     Hemoglobin A1C   6.50 (A)   (A) Abnormal value       Comments are available for some flowsheets but are not being displayed.                            Assessment and Plan        Diagnoses and all orders for this visit:    1. Essential (primary) hypertension (Primary)  -     CBC & Differential  -     amLODIPine (NORVASC) 5 MG tablet; Take 1 tablet by mouth Daily.  Dispense: 90 tablet; Refill: 1  -     atenolol (TENORMIN) 50 MG tablet; Take 1 tablet by mouth 2 (Two) Times a Day.  Dispense: 180 tablet; Refill: 3    2. Hyperlipidemia, unspecified hyperlipidemia type  -     Lipid Panel With / Chol / HDL Ratio    3. Type 2 diabetes mellitus without complication, without long-term current use of insulin (HCC)  -     Comprehensive Metabolic Panel  -     Hemoglobin A1c    4. Thyroid disorder screening  -     T4, Free  -     TSH    5. Hypertension, unspecified type  -     atenolol (TENORMIN) 50 MG tablet; Take 1 tablet by mouth 2 (Two) Times a Day.  Dispense: 180  tablet; Refill: 3    6. Chronic gout involving toe without tophus, unspecified cause, unspecified laterality  -     allopurinol (ZYLOPRIM) 300 MG tablet; Take 1 tablet by mouth Daily.  Dispense: 90 tablet; Refill: 3    7. Other emphysema (HCC)  -     albuterol (PROVENTIL) (2.5 MG/3ML) 0.083% nebulizer solution; Take 2.5 mg by nebulization Every 4 (Four) Hours As Needed for Wheezing.  Dispense: 270 mL; Refill: 1  -     Breztri Aerosphere 160-9-4.8 MCG/ACT aerosol inhaler; Inhale 2 puffs 2 (Two) Times a Day.  Dispense: 10.7 g; Refill: 5    8. Age-related osteoporosis without current pathological fracture  -     alendronate (FOSAMAX) 70 MG tablet; Take 1 tablet by mouth Every 7 (Seven) Days.  Dispense: 4 tablet; Refill: 12  -     Phosphorus    9. Chronic right-sided thoracic back pain  -     Diclofenac Sodium (VOLTAREN) 1 % gel gel; Apply 4 g topically to the appropriate area as directed 4 (Four) Times a Day As Needed (as needed).  Dispense: 350 g; Refill: 1          Follow Up     Return in about 6 months (around 8/10/2023).    Patient was given instructions and counseling regarding her condition or for health maintenance advice. Please see specific information pulled into the AVS if appropriate.     Teresa Maloney, APRN

## 2023-02-17 ENCOUNTER — HOSPITAL ENCOUNTER (EMERGENCY)
Facility: HOSPITAL | Age: 88
Discharge: HOME | End: 2023-02-17
Attending: STUDENT IN AN ORGANIZED HEALTH CARE EDUCATION/TRAINING PROGRAM | Admitting: STUDENT IN AN ORGANIZED HEALTH CARE EDUCATION/TRAINING PROGRAM
Payer: MEDICARE

## 2023-02-17 ENCOUNTER — APPOINTMENT (OUTPATIENT)
Dept: GENERAL RADIOLOGY | Facility: HOSPITAL | Age: 88
End: 2023-02-17
Payer: MEDICARE

## 2023-02-17 ENCOUNTER — APPOINTMENT (OUTPATIENT)
Dept: CT IMAGING | Facility: HOSPITAL | Age: 88
End: 2023-02-17
Payer: MEDICARE

## 2023-02-17 VITALS
HEIGHT: 62 IN | HEART RATE: 59 BPM | OXYGEN SATURATION: 92 % | WEIGHT: 177 LBS | BODY MASS INDEX: 32.57 KG/M2 | TEMPERATURE: 97.8 F | DIASTOLIC BLOOD PRESSURE: 65 MMHG | SYSTOLIC BLOOD PRESSURE: 124 MMHG | RESPIRATION RATE: 20 BRPM

## 2023-02-17 DIAGNOSIS — M54.9 ACUTE RIGHT-SIDED BACK PAIN, UNSPECIFIED BACK LOCATION: ICD-10-CM

## 2023-02-17 DIAGNOSIS — J18.9 PNEUMONIA OF BOTH LUNGS DUE TO INFECTIOUS ORGANISM, UNSPECIFIED PART OF LUNG: Primary | ICD-10-CM

## 2023-02-17 LAB
ALBUMIN SERPL-MCNC: 3.8 G/DL (ref 3.5–5.2)
ALBUMIN/GLOB SERPL: 1.2 G/DL
ALP SERPL-CCNC: 60 U/L (ref 39–117)
ALT SERPL W P-5'-P-CCNC: 5 U/L (ref 1–33)
ANION GAP SERPL CALCULATED.3IONS-SCNC: 11.1 MMOL/L (ref 5–15)
AST SERPL-CCNC: 14 U/L (ref 1–32)
BACTERIA UR QL AUTO: ABNORMAL /HPF
BASOPHILS # BLD AUTO: 0.07 10*3/MM3 (ref 0–0.2)
BASOPHILS NFR BLD AUTO: 1.4 % (ref 0–1.5)
BILIRUB SERPL-MCNC: 0.4 MG/DL (ref 0–1.2)
BILIRUB UR QL STRIP: NEGATIVE
BUN SERPL-MCNC: 17 MG/DL (ref 8–23)
BUN/CREAT SERPL: 16.5 (ref 7–25)
CALCIUM SPEC-SCNC: 9.2 MG/DL (ref 8.6–10.5)
CHLORIDE SERPL-SCNC: 105 MMOL/L (ref 98–107)
CLARITY UR: CLEAR
CO2 SERPL-SCNC: 24.9 MMOL/L (ref 22–29)
COLOR UR: YELLOW
CREAT SERPL-MCNC: 1.03 MG/DL (ref 0.57–1)
DEPRECATED RDW RBC AUTO: 46.6 FL (ref 37–54)
EGFRCR SERPLBLD CKD-EPI 2021: 52.7 ML/MIN/1.73
EOSINOPHIL # BLD AUTO: 0.3 10*3/MM3 (ref 0–0.4)
EOSINOPHIL NFR BLD AUTO: 6.2 % (ref 0.3–6.2)
ERYTHROCYTE [DISTWIDTH] IN BLOOD BY AUTOMATED COUNT: 13.9 % (ref 12.3–15.4)
GEN 5 2HR TROPONIN T REFLEX: 12 NG/L
GLOBULIN UR ELPH-MCNC: 3.1 GM/DL
GLUCOSE SERPL-MCNC: 130 MG/DL (ref 65–99)
GLUCOSE UR STRIP-MCNC: NEGATIVE MG/DL
HCT VFR BLD AUTO: 37.1 % (ref 34–46.6)
HGB BLD-MCNC: 12.5 G/DL (ref 12–15.9)
HGB UR QL STRIP.AUTO: NEGATIVE
HYALINE CASTS UR QL AUTO: ABNORMAL /LPF
IMM GRANULOCYTES # BLD AUTO: 0.01 10*3/MM3 (ref 0–0.05)
IMM GRANULOCYTES NFR BLD AUTO: 0.2 % (ref 0–0.5)
KETONES UR QL STRIP: NEGATIVE
LEUKOCYTE ESTERASE UR QL STRIP.AUTO: ABNORMAL
LYMPHOCYTES # BLD AUTO: 1.65 10*3/MM3 (ref 0.7–3.1)
LYMPHOCYTES NFR BLD AUTO: 34 % (ref 19.6–45.3)
MAGNESIUM SERPL-MCNC: 1.8 MG/DL (ref 1.6–2.4)
MCH RBC QN AUTO: 31.1 PG (ref 26.6–33)
MCHC RBC AUTO-ENTMCNC: 33.7 G/DL (ref 31.5–35.7)
MCV RBC AUTO: 92.3 FL (ref 79–97)
MONOCYTES # BLD AUTO: 0.28 10*3/MM3 (ref 0.1–0.9)
MONOCYTES NFR BLD AUTO: 5.8 % (ref 5–12)
NEUTROPHILS NFR BLD AUTO: 2.55 10*3/MM3 (ref 1.7–7)
NEUTROPHILS NFR BLD AUTO: 52.4 % (ref 42.7–76)
NITRITE UR QL STRIP: NEGATIVE
NRBC BLD AUTO-RTO: 0 /100 WBC (ref 0–0.2)
NT-PROBNP SERPL-MCNC: 357.2 PG/ML (ref 0–1800)
PH UR STRIP.AUTO: 7 [PH] (ref 5–8)
PLATELET # BLD AUTO: 189 10*3/MM3 (ref 140–450)
PMV BLD AUTO: 9.3 FL (ref 6–12)
POTASSIUM SERPL-SCNC: 4 MMOL/L (ref 3.5–5.2)
PROT SERPL-MCNC: 6.9 G/DL (ref 6–8.5)
PROT UR QL STRIP: NEGATIVE
RBC # BLD AUTO: 4.02 10*6/MM3 (ref 3.77–5.28)
RBC # UR STRIP: ABNORMAL /HPF
REF LAB TEST METHOD: ABNORMAL
SODIUM SERPL-SCNC: 141 MMOL/L (ref 136–145)
SP GR UR STRIP: 1.01 (ref 1–1.03)
SQUAMOUS #/AREA URNS HPF: ABNORMAL /HPF
TROPONIN T DELTA: 1 NG/L
TROPONIN T SERPL HS-MCNC: 11 NG/L
UROBILINOGEN UR QL STRIP: ABNORMAL
WBC # UR STRIP: ABNORMAL /HPF
WBC NRBC COR # BLD: 4.86 10*3/MM3 (ref 3.4–10.8)

## 2023-02-17 PROCEDURE — 36415 COLL VENOUS BLD VENIPUNCTURE: CPT

## 2023-02-17 PROCEDURE — 71045 X-RAY EXAM CHEST 1 VIEW: CPT

## 2023-02-17 PROCEDURE — 83735 ASSAY OF MAGNESIUM: CPT | Performed by: STUDENT IN AN ORGANIZED HEALTH CARE EDUCATION/TRAINING PROGRAM

## 2023-02-17 PROCEDURE — 74177 CT ABD & PELVIS W/CONTRAST: CPT

## 2023-02-17 PROCEDURE — 80053 COMPREHEN METABOLIC PANEL: CPT | Performed by: STUDENT IN AN ORGANIZED HEALTH CARE EDUCATION/TRAINING PROGRAM

## 2023-02-17 PROCEDURE — 83880 ASSAY OF NATRIURETIC PEPTIDE: CPT | Performed by: STUDENT IN AN ORGANIZED HEALTH CARE EDUCATION/TRAINING PROGRAM

## 2023-02-17 PROCEDURE — 99284 EMERGENCY DEPT VISIT MOD MDM: CPT

## 2023-02-17 PROCEDURE — 84484 ASSAY OF TROPONIN QUANT: CPT | Performed by: STUDENT IN AN ORGANIZED HEALTH CARE EDUCATION/TRAINING PROGRAM

## 2023-02-17 PROCEDURE — 93005 ELECTROCARDIOGRAM TRACING: CPT

## 2023-02-17 PROCEDURE — 85025 COMPLETE CBC W/AUTO DIFF WBC: CPT | Performed by: STUDENT IN AN ORGANIZED HEALTH CARE EDUCATION/TRAINING PROGRAM

## 2023-02-17 PROCEDURE — 0 IOPAMIDOL PER 1 ML: Performed by: STUDENT IN AN ORGANIZED HEALTH CARE EDUCATION/TRAINING PROGRAM

## 2023-02-17 PROCEDURE — 93005 ELECTROCARDIOGRAM TRACING: CPT | Performed by: STUDENT IN AN ORGANIZED HEALTH CARE EDUCATION/TRAINING PROGRAM

## 2023-02-17 PROCEDURE — 93010 ELECTROCARDIOGRAM REPORT: CPT | Performed by: SPECIALIST

## 2023-02-17 PROCEDURE — 81001 URINALYSIS AUTO W/SCOPE: CPT | Performed by: STUDENT IN AN ORGANIZED HEALTH CARE EDUCATION/TRAINING PROGRAM

## 2023-02-17 RX ORDER — LIDOCAINE HYDROCHLORIDE 20 MG/ML
JELLY TOPICAL ONCE
Status: DISCONTINUED | OUTPATIENT
Start: 2023-02-17 | End: 2023-02-17

## 2023-02-17 RX ORDER — DOXYCYCLINE 100 MG/1
100 CAPSULE ORAL 2 TIMES DAILY
Qty: 14 CAPSULE | Refills: 0 | Status: SHIPPED | OUTPATIENT
Start: 2023-02-17 | End: 2023-02-24

## 2023-02-17 RX ORDER — ACETAMINOPHEN 325 MG/1
975 TABLET ORAL ONCE
Status: COMPLETED | OUTPATIENT
Start: 2023-02-17 | End: 2023-02-17

## 2023-02-17 RX ADMIN — IOPAMIDOL 100 ML: 755 INJECTION, SOLUTION INTRAVENOUS at 10:32

## 2023-02-17 RX ADMIN — ACETAMINOPHEN 975 MG: 325 TABLET ORAL at 10:51

## 2023-02-17 NOTE — DISCHARGE INSTRUCTIONS
Follow-up with your primary care doctor.  Take antibiotics as prescribed.  Return to the emergency department if you have fever, vomiting, or worsening symptoms.

## 2023-02-17 NOTE — ED PROVIDER NOTES
Time: 9:24 AM EST  Date of encounter:  2/17/2023  Independent Historian/Clinical History and Information was obtained by:   Daughter and Patient  Chief Complaint: right flank pain    History is limited by: N/A    History of Present Illness:  Patient is a 87 y.o. year old female who presents to the emergency department for evaluation of right flank pain x2 weeks. Daughter states the pain was initially intermittent but now it's constant. Her PCP thought she may have pulled a muscle. Pt has COPD.  Patient also endorses a cough.        History provided by:  Patient and relative   used: No        Patient Care Team  Primary Care Provider: Pee Mon DO    Past Medical History:     Allergies   Allergen Reactions   • Bactrim [Sulfamethoxazole-Trimethoprim] Unknown - Low Severity   • Griseofulvin Ultramicrosize [Griseofulvin] Unknown - Low Severity     Past Medical History:   Diagnosis Date   • Anxiety disorder 11/12/2018   • Arthritis    • COPD (chronic obstructive pulmonary disease) (HCC)    • Dependent edema 11/28/2017   • Gout    • Osteoporosis    • Primary osteoarthritis of left knee 05/24/2018   • Primary osteoarthritis of one hip, right 12/12/2017   • Primary osteoarthritis of right hip 12/12/2017     Past Surgical History:   Procedure Laterality Date   • BLADDER REPAIR     • COLONOSCOPY  2016   • EYE SURGERY      implant- yes   • HYSTERECTOMY     • INTRAOCULAR LENS INSERTION      yes   • JOINT REPLACEMENT      Surgery   • KNEE SURGERY Right     knee repair   • KNEE SURGERY Right     repair   • OTHER SURGICAL HISTORY      Artificial joints/limbs   • OTHER SURGICAL HISTORY      Artificial Joints/Limbs   • OTHER SURGICAL HISTORY      Joint surgery   • TOTAL KNEE ARTHROPLASTY Right      Family History   Problem Relation Age of Onset   • Colon cancer Father        Home Medications:  Prior to Admission medications    Medication Sig Start Date End Date Taking? Authorizing Provider    albuterol (PROVENTIL) (2.5 MG/3ML) 0.083% nebulizer solution Take 2.5 mg by nebulization Every 4 (Four) Hours As Needed for Wheezing. 2/10/23   Teresa Maloney APRN   alendronate (FOSAMAX) 70 MG tablet Take 1 tablet by mouth Every 7 (Seven) Days. 2/10/23   Teresa Maloney APRN   allopurinol (ZYLOPRIM) 300 MG tablet Take 1 tablet by mouth Daily. 2/10/23   Teresa Maloney APRN   amLODIPine (NORVASC) 5 MG tablet Take 1 tablet by mouth Daily. 2/10/23   Teresa Maloney APRN   atenolol (TENORMIN) 50 MG tablet Take 1 tablet by mouth 2 (Two) Times a Day. 2/10/23   Teresa Maloney APRN   Breztri Aerosphere 160-9-4.8 MCG/ACT aerosol inhaler Inhale 2 puffs 2 (Two) Times a Day. 2/10/23   Teresa Maloney APRN   Diclofenac Sodium (VOLTAREN) 1 % gel gel Apply 4 g topically to the appropriate area as directed 4 (Four) Times a Day As Needed (as needed). 2/10/23   Teresa Maloney APRN        Social History:   Social History     Tobacco Use   • Smoking status: Former     Types: Cigarettes     Start date:      Quit date: 1970     Years since quittin.1   • Smokeless tobacco: Never   • Tobacco comments:     started at age 16- stopped at age 35   Vaping Use   • Vaping Use: Never used   Substance Use Topics   • Alcohol use: Never     Comment: 2019- 1/15/2019 does not drink    • Drug use: Never         Review of Systems:  Review of Systems   Constitutional: Negative for chills and fever.   HENT: Negative for congestion, rhinorrhea and sore throat.    Eyes: Negative for pain and visual disturbance.   Respiratory: Negative for apnea, cough, chest tightness and shortness of breath.    Cardiovascular: Negative for chest pain and palpitations.   Gastrointestinal: Negative for abdominal pain, diarrhea, nausea and vomiting.   Genitourinary: Positive for flank pain. Negative for difficulty urinating and dysuria.   Musculoskeletal: Negative for joint swelling and myalgias.   Skin: Negative for color  "change.   Neurological: Negative for seizures and headaches.   Psychiatric/Behavioral: Negative.    All other systems reviewed and are negative.       Physical Exam:  /65   Pulse 59   Temp 97.8 °F (36.6 °C) (Oral)   Resp 20   Ht 157.5 cm (62\")   Wt 80.3 kg (177 lb)   SpO2 92%   BMI 32.37 kg/m²     Physical Exam  Vitals and nursing note reviewed.   Constitutional:       General: She is not in acute distress.     Appearance: Normal appearance. She is not toxic-appearing.   HENT:      Head: Normocephalic and atraumatic.      Jaw: There is normal jaw occlusion.   Eyes:      General: Lids are normal.      Extraocular Movements: Extraocular movements intact.      Conjunctiva/sclera: Conjunctivae normal.      Pupils: Pupils are equal, round, and reactive to light.   Cardiovascular:      Rate and Rhythm: Normal rate and regular rhythm.      Pulses: Normal pulses.      Heart sounds: Normal heart sounds.   Pulmonary:      Effort: Pulmonary effort is normal. No respiratory distress.      Breath sounds: Normal breath sounds. No wheezing or rhonchi.   Abdominal:      General: Abdomen is flat.      Palpations: Abdomen is soft.      Tenderness: There is no abdominal tenderness. There is no right CVA tenderness, left CVA tenderness, guarding or rebound.   Musculoskeletal:         General: Normal range of motion.      Cervical back: Normal range of motion and neck supple.      Right lower leg: No edema.      Left lower leg: No edema.   Skin:     General: Skin is warm and dry.   Neurological:      Mental Status: She is alert and oriented to person, place, and time. Mental status is at baseline.   Psychiatric:         Mood and Affect: Mood normal.                  Procedures:  Procedures      Medical Decision Making:      Comorbidities that affect care:    COPD    External Notes reviewed:    None      The following orders were placed and all results were independently analyzed by me:  Orders Placed This Encounter "   Procedures   • XR Chest 1 View   • CT Abdomen Pelvis With Contrast   • BNP   • Magnesium   • High Sensitivity Troponin T   • Comprehensive Metabolic Panel   • CBC Auto Differential   • High Sensitivity Troponin T 2Hr   • Urinalysis With Microscopic If Indicated (No Culture) - Urine, Clean Catch   • Urinalysis, Microscopic Only - Urine, Clean Catch   • ECG 12 Lead Other; left arm pain   • CBC & Differential       Medications Given in the Emergency Department:  Medications   acetaminophen (TYLENOL) tablet 975 mg (975 mg Oral Given 2/17/23 1051)   iopamidol (ISOVUE-370) 76 % injection 100 mL (100 mL Intravenous Given 2/17/23 1032)        ED Course:         Labs:    Lab Results (last 24 hours)     Procedure Component Value Units Date/Time    BNP [[id number]]  (Normal) Collected: 02/17/23 0903    Specimen: Blood Updated: 02/17/23 0930     proBNP 357.2 pg/mL     Narrative:      Among patients with dyspnea, NT-proBNP is highly sensitive for the detection of acute congestive heart failure. In addition NT-proBNP of <300 pg/ml effectively rules out acute congestive heart failure with 99% negative predictive value.      Magnesium [[id number]]  (Normal) Collected: 02/17/23 0903    Specimen: Blood Updated: 02/17/23 0933     Magnesium 1.8 mg/dL     High Sensitivity Troponin T [[id number]]  (Abnormal) Collected: 02/17/23 0903    Specimen: Blood Updated: 02/17/23 0933     HS Troponin T 11 ng/L     Narrative:      High Sensitive Troponin T Reference Range:  <10.0 ng/L- Negative Female for AMI  <15.0 ng/L- Negative Male for AMI  >=10 - Abnormal Female indicating possible myocardial injury.  >=15 - Abnormal Male indicating possible myocardial injury.   Clinicians would have to utilize clinical acumen, EKG, Troponin, and serial changes to determine if it is an Acute Myocardial Infarction or myocardial injury due to an underlying chronic condition.         Comprehensive Metabolic Panel [[id number]]  (Abnormal) Collected: 02/17/23 0903     Specimen: Blood Updated: 02/17/23 0933     Glucose 130 mg/dL      BUN 17 mg/dL      Creatinine 1.03 mg/dL      Sodium 141 mmol/L      Potassium 4.0 mmol/L      Chloride 105 mmol/L      CO2 24.9 mmol/L      Calcium 9.2 mg/dL      Total Protein 6.9 g/dL      Albumin 3.8 g/dL      ALT (SGPT) 5 U/L      AST (SGOT) 14 U/L      Alkaline Phosphatase 60 U/L      Total Bilirubin 0.4 mg/dL      Globulin 3.1 gm/dL      A/G Ratio 1.2 g/dL      BUN/Creatinine Ratio 16.5     Anion Gap 11.1 mmol/L      eGFR 52.7 mL/min/1.73     Narrative:      GFR Normal >60  Chronic Kidney Disease <60  Kidney Failure <15    The GFR formula is only valid for adults with stable renal function between ages 18 and 70.    CBC & Differential [[id number]]  (Normal) Collected: 02/17/23 0903    Specimen: Blood Updated: 02/17/23 0911    Narrative:      The following orders were created for panel order CBC & Differential.  Procedure                               Abnormality         Status                     ---------                               -----------         ------                     CBC Auto Differential[[id number]]        Normal              Final result                 Please view results for these tests on the individual orders.    CBC Auto Differential [[id number]]  (Normal) Collected: 02/17/23 0903    Specimen: Blood Updated: 02/17/23 0911     WBC 4.86 10*3/mm3      RBC 4.02 10*6/mm3      Hemoglobin 12.5 g/dL      Hematocrit 37.1 %      MCV 92.3 fL      MCH 31.1 pg      MCHC 33.7 g/dL      RDW 13.9 %      RDW-SD 46.6 fl      MPV 9.3 fL      Platelets 189 10*3/mm3      Neutrophil % 52.4 %      Lymphocyte % 34.0 %      Monocyte % 5.8 %      Eosinophil % 6.2 %      Basophil % 1.4 %      Immature Grans % 0.2 %      Neutrophils, Absolute 2.55 10*3/mm3      Lymphocytes, Absolute 1.65 10*3/mm3      Monocytes, Absolute 0.28 10*3/mm3      Eosinophils, Absolute 0.30 10*3/mm3      Basophils, Absolute 0.07 10*3/mm3      Immature Grans, Absolute 0.01  10*3/mm3      nRBC 0.0 /100 WBC     Urinalysis With Microscopic If Indicated (No Culture) - Urine, Clean Catch [[id number]]  (Abnormal) Collected: 02/17/23 1053    Specimen: Urine, Clean Catch Updated: 02/17/23 1114     Color, UA Yellow     Appearance, UA Clear     pH, UA 7.0     Specific Gravity, UA 1.009     Glucose, UA Negative     Ketones, UA Negative     Bilirubin, UA Negative     Blood, UA Negative     Protein, UA Negative     Leuk Esterase, UA Large (3+)     Nitrite, UA Negative     Urobilinogen, UA 0.2 E.U./dL    Urinalysis, Microscopic Only - Urine, Clean Catch [[id number]]  (Abnormal) Collected: 02/17/23 1053    Specimen: Urine, Clean Catch Updated: 02/17/23 1122     RBC, UA None Seen /HPF      WBC, UA 3-5 /HPF      Bacteria, UA Trace /HPF      Squamous Epithelial Cells, UA 0-2 /HPF      Hyaline Casts, UA None Seen /LPF      Methodology Automated Microscopy    High Sensitivity Troponin T 2Hr [[id number]]  (Abnormal) Collected: 02/17/23 1217    Specimen: Blood Updated: 02/17/23 1245     HS Troponin T 12 ng/L      Troponin T Delta 1 ng/L     Narrative:      High Sensitive Troponin T Reference Range:  <10.0 ng/L- Negative Female for AMI  <15.0 ng/L- Negative Male for AMI  >=10 - Abnormal Female indicating possible myocardial injury.  >=15 - Abnormal Male indicating possible myocardial injury.   Clinicians would have to utilize clinical acumen, EKG, Troponin, and serial changes to determine if it is an Acute Myocardial Infarction or myocardial injury due to an underlying chronic condition.                Imaging:    CT Abdomen Pelvis With Contrast    Result Date: 2/17/2023  PROCEDURE: CT ABDOMEN PELVIS W CONTRAST  COMPARISON: [facility], CT, ABDOMEN/PELVIS WITH CONTRAST, 8/20/2018, 1:26.  INDICATIONS: right flank pain  TECHNIQUE: After obtaining the patient's consent, CT images were created with non-ionic intravenous contrast material.   PROTOCOL:   Standard imaging protocol performed    RADIATION:    DLP: 844.8 mGy*cm   Automated exposure control was utilized to minimize radiation dose. CONTRAST: 100 cc Isovue 370 I.V. LABS:   eGFR: <60 ml/min/1.73m2  FINDINGS:  Within the lung bases are chronic pulmonary changes and mild scattered atelectasis.  The liver, adrenal glands, kidneys, spleen, and pancreas are unremarkable.  There is a tiny stone in the gallbladder.  There is a large hiatal hernia with essentially the entire stomach in the lower mediastinum.  The small bowel appears normal in caliber and configuration.  There is sigmoid diverticulosis.  The appendix appears normal.  There is no ascites or loculated collection.  No abnormally enlarged lymph nodes are identified.  The pelvis is partially obscured by streak artifact from a right hip arthroplasty.  Within this constraint, the rectum and urinary bladder are unremarkable.  The uterus is surgically absent.  No aggressive osseous lesions are identified.        1. No acute process identified within abdomen/pelvis. 2. Cholelithiasis. 3. Large hiatal hernia. 4. Sigmoid diverticulosis.     JOSESITO MCALLISTER MD       Electronically Signed and Approved By: JOSESITO MCALLISTER MD on 2/17/2023 at 11:20             XR Chest 1 View    Result Date: 2/17/2023  PROCEDURE: XR CHEST 1 VW  COMPARISON: [facility], , XR CHEST PA AND LATERAL, 7/11/2022, 16:32.  INDICATIONS: chest pain  FINDINGS:  Study is limited by overlying support and monitoring apparatus.  Heart size is stable.  Moderate to large hiatal hernia noted posterior to the mediastinum.  Increased infrahilar opacity noted at the lung bases bilaterally.  Osseous structures demonstrate advanced degenerative changes at the shoulders bilaterally        1. Increased infrahilar opacities bilaterally which could represent atelectasis or pneumonia       INNA CARY MD       Electronically Signed and Approved By: INNA CARY MD on 2/17/2023 at 10:41                 Differential Diagnosis and  Discussion:    Back Pain: The patient presents with back pain. My differential diagnosis includes but is not limited to acute spinal epidural abscess, acute spinal epidural bleed, cauda equina syndrome, abdominal aortic aneurysm, aortic dissection, kidney stone, pyelonephritis, musculoskeletal back pain, spinal fracture, and osteoarthritis.     All labs were reviewed and interpreted by me.    MDM       Patient's family was concerned that the right-sided flank pain was from other etiology.  We obtained a CT which showed no concerning intra-abdominal pathology.  Patient has no leukocytosis.  No fever.  Only 3-5 whites in urine.  Only trace bacteria.  CT shows a hiatal hernia cholelithiasis no other etiology.  Troponins with a delta of 1 patient has no chest pain but did have intermittent left arm pain.  Chest x-ray was concerning for possible pneumonia patient started on antibiotics.  EKG sinus rhythm no sign of ST elevation.  Treated for pneumonia return precautions given.  Social Determinants of Health:    Patient has presented with family members who are responsible, reliable and will ensure follow up care.      Disposition and Care Coordination:    Discharged: I considered escalation of care by admitting this patient for observation, however the patient has improved and is suitable and  stable for discharge.    I have explained discharge medications and the need for follow up with the patient/caretakers. This was also printed in the discharge instructions. Patient was discharged with the following medications and follow up:      Medication List      New Prescriptions    doxycycline 100 MG capsule  Commonly known as: MONODOX  Take 1 capsule by mouth 2 (Two) Times a Day for 7 days.           Where to Get Your Medications      These medications were sent to [facility] - [place] - [address] - [phone]  - [phone]   [address], [place] [address]    Phone: [phone]    · doxycycline 100 MG capsule      Pee Mon, DO  [address] [place] [address]  [phone]             Final diagnoses:   Pneumonia of both lungs due to infectious organism, unspecified part of lung   Acute right-sided back pain, unspecified back location        ED Disposition     ED Disposition   Discharge    Condition   Stable    Comment   --             This medical record created using voice recognition software.    Documentation assistance provided by Karina Chambers acting as scribe for No att. providers found. Information recorded by the scribe was done at my direction and has been verified and validated by me.          Karina Chambers  02/17/23 0943       Phuong Nettles MD  02/17/23 1500

## 2023-02-18 LAB — QT INTERVAL: 400 MS

## 2023-06-12 DIAGNOSIS — M54.6 CHRONIC RIGHT-SIDED THORACIC BACK PAIN: ICD-10-CM

## 2023-06-12 DIAGNOSIS — G89.29 CHRONIC RIGHT-SIDED THORACIC BACK PAIN: ICD-10-CM

## 2023-08-10 ENCOUNTER — OFFICE VISIT (OUTPATIENT)
Dept: FAMILY MEDICINE CLINIC | Facility: CLINIC | Age: 88
End: 2023-08-10
Payer: MEDICARE

## 2023-08-10 VITALS
BODY MASS INDEX: 32.57 KG/M2 | TEMPERATURE: 97.2 F | HEIGHT: 62 IN | OXYGEN SATURATION: 92 % | HEART RATE: 70 BPM | WEIGHT: 177 LBS | DIASTOLIC BLOOD PRESSURE: 88 MMHG | SYSTOLIC BLOOD PRESSURE: 118 MMHG

## 2023-08-10 DIAGNOSIS — E78.5 HYPERLIPIDEMIA, UNSPECIFIED HYPERLIPIDEMIA TYPE: ICD-10-CM

## 2023-08-10 DIAGNOSIS — J43.8 OTHER EMPHYSEMA: ICD-10-CM

## 2023-08-10 DIAGNOSIS — I10 ESSENTIAL (PRIMARY) HYPERTENSION: Primary | ICD-10-CM

## 2023-08-10 DIAGNOSIS — R73.03 PREDIABETES: ICD-10-CM

## 2023-08-10 DIAGNOSIS — N18.31 STAGE 3A CHRONIC KIDNEY DISEASE: ICD-10-CM

## 2023-08-10 LAB
ALBUMIN SERPL-MCNC: 4.4 G/DL (ref 3.5–5.2)
ALBUMIN/GLOB SERPL: 1.4 G/DL
ALP SERPL-CCNC: 65 U/L (ref 39–117)
ALT SERPL W P-5'-P-CCNC: 9 U/L (ref 1–33)
ANION GAP SERPL CALCULATED.3IONS-SCNC: 10 MMOL/L (ref 5–15)
AST SERPL-CCNC: 20 U/L (ref 1–32)
BILIRUB SERPL-MCNC: 0.4 MG/DL (ref 0–1.2)
BUN SERPL-MCNC: 22 MG/DL (ref 8–23)
BUN/CREAT SERPL: 20.4 (ref 7–25)
CALCIUM SPEC-SCNC: 10.2 MG/DL (ref 8.6–10.5)
CHLORIDE SERPL-SCNC: 100 MMOL/L (ref 98–107)
CHOLEST SERPL-MCNC: 200 MG/DL (ref 0–200)
CO2 SERPL-SCNC: 27 MMOL/L (ref 22–29)
CREAT SERPL-MCNC: 1.08 MG/DL (ref 0.57–1)
DEPRECATED RDW RBC AUTO: 46.3 FL (ref 37–54)
EGFRCR SERPLBLD CKD-EPI 2021: 49.8 ML/MIN/1.73
ERYTHROCYTE [DISTWIDTH] IN BLOOD BY AUTOMATED COUNT: 13.6 % (ref 12.3–15.4)
GLOBULIN UR ELPH-MCNC: 3.2 GM/DL
GLUCOSE SERPL-MCNC: 127 MG/DL (ref 65–99)
HBA1C MFR BLD: 5.9 % (ref 4.8–5.6)
HCT VFR BLD AUTO: 38.6 % (ref 34–46.6)
HDLC SERPL-MCNC: 54 MG/DL (ref 40–60)
HGB BLD-MCNC: 13 G/DL (ref 12–15.9)
LDLC SERPL CALC-MCNC: 130 MG/DL (ref 0–100)
LDLC/HDLC SERPL: 2.37 {RATIO}
MCH RBC QN AUTO: 31.4 PG (ref 26.6–33)
MCHC RBC AUTO-ENTMCNC: 33.7 G/DL (ref 31.5–35.7)
MCV RBC AUTO: 93.2 FL (ref 79–97)
PLATELET # BLD AUTO: 219 10*3/MM3 (ref 140–450)
PMV BLD AUTO: 10 FL (ref 6–12)
POTASSIUM SERPL-SCNC: 5.1 MMOL/L (ref 3.5–5.2)
PROT SERPL-MCNC: 7.6 G/DL (ref 6–8.5)
RBC # BLD AUTO: 4.14 10*6/MM3 (ref 3.77–5.28)
SODIUM SERPL-SCNC: 137 MMOL/L (ref 136–145)
TRIGL SERPL-MCNC: 91 MG/DL (ref 0–150)
VLDLC SERPL-MCNC: 16 MG/DL (ref 5–40)
WBC NRBC COR # BLD: 7.2 10*3/MM3 (ref 3.4–10.8)

## 2023-08-10 PROCEDURE — 83036 HEMOGLOBIN GLYCOSYLATED A1C: CPT | Performed by: FAMILY MEDICINE

## 2023-08-10 PROCEDURE — 85027 COMPLETE CBC AUTOMATED: CPT | Performed by: FAMILY MEDICINE

## 2023-08-10 PROCEDURE — 80061 LIPID PANEL: CPT | Performed by: FAMILY MEDICINE

## 2023-08-10 PROCEDURE — 80053 COMPREHEN METABOLIC PANEL: CPT | Performed by: FAMILY MEDICINE

## 2023-08-10 RX ORDER — BLOOD-GLUCOSE METER
KIT MISCELLANEOUS
Qty: 1 EACH | Refills: 0 | Status: SHIPPED | OUTPATIENT
Start: 2023-08-10

## 2023-08-10 RX ORDER — LANCETS 33 GAUGE
33 EACH MISCELLANEOUS DAILY
Qty: 100 EACH | Refills: 3 | Status: SHIPPED | OUTPATIENT
Start: 2023-08-10 | End: 2023-11-18

## 2023-08-10 RX ORDER — BLOOD SUGAR DIAGNOSTIC
STRIP MISCELLANEOUS
Qty: 100 EACH | Refills: 3 | Status: SHIPPED | OUTPATIENT
Start: 2023-08-10

## 2023-08-10 NOTE — ASSESSMENT & PLAN NOTE
We will update her A1c.  Her last A1c 6 months ago was 6.0 still in the prediabetic range.  Will get her testing supplies so she can check a couple mornings a week.

## 2023-08-10 NOTE — PROGRESS NOTES
Chief Complaint   Patient presents with    Follow-up      6 month     Hypertension    Hyperlipidemia    Diabetes        Subjective     Shayy Wasserman  has a past medical history of Anxiety disorder (11/12/2018), Arthritis, COPD (chronic obstructive pulmonary disease), Dependent edema (11/28/2017), Gout, Osteoporosis, Primary osteoarthritis of left knee (05/24/2018), Primary osteoarthritis of one hip, right (12/12/2017), and Primary osteoarthritis of right hip (12/12/2017).    Prediabetes-she does not check her blood sugars at home as she does not have any testing device.  She does not have any blurred vision excessive thirst or excessive urination.  She does not eat a lot of carbs in her diet.    Hypertension-she does check her blood pressure outside the office.  Her daughter states it is good but does not know what her numbers have been.    Hyperlipidemia-currently she is not on any cholesterol medication.      PHQ-2 Depression Screening  Little interest or pleasure in doing things?     Feeling down, depressed, or hopeless?     PHQ-2 Total Score     PHQ-9 Depression Screening  Little interest or pleasure in doing things?     Feeling down, depressed, or hopeless?     Trouble falling or staying asleep, or sleeping too much?     Feeling tired or having little energy?     Poor appetite or overeating?     Feeling bad about yourself - or that you are a failure or have let yourself or your family down?     Trouble concentrating on things, such as reading the newspaper or watching television?     Moving or speaking so slowly that other people could have noticed? Or the opposite - being so fidgety or restless that you have been moving around a lot more than usual?     Thoughts that you would be better off dead, or of hurting yourself in some way?     PHQ-9 Total Score     If you checked off any problems, how difficult have these problems made it for you to do your work, take care of things at home, or get along with  other people?       Allergies   Allergen Reactions    Bactrim [Sulfamethoxazole-Trimethoprim] Unknown - Low Severity    Griseofulvin Ultramicrosize [Griseofulvin] Unknown - Low Severity       Prior to Admission medications    Medication Sig Start Date End Date Taking? Authorizing Provider   albuterol (PROVENTIL) (2.5 MG/3ML) 0.083% nebulizer solution Take 2.5 mg by nebulization Every 4 (Four) Hours As Needed for Wheezing. 6/26/23  Yes Camelia Gupta APRN   alendronate (FOSAMAX) 70 MG tablet Take 1 tablet by mouth Every 7 (Seven) Days. 2/10/23  Yes Teresa Maloney APRN   allopurinol (ZYLOPRIM) 300 MG tablet Take 1 tablet by mouth Daily. 2/10/23  Yes Teresa Maloney APRN   amLODIPine (NORVASC) 5 MG tablet Take 1 tablet by mouth Daily. 2/10/23  Yes Teresa Maloney APRN   atenolol (TENORMIN) 50 MG tablet Take 1 tablet by mouth 2 (Two) Times a Day. 2/10/23  Yes Teresa Maloney APRN   Breztri Aerosphere 160-9-4.8 MCG/ACT aerosol inhaler Inhale 2 puffs 2 (Two) Times a Day. 2/10/23  Yes Teresa Maloney APRN   Diclofenac Sodium (VOLTAREN) 1 % gel gel APPLY 4 GRAMS TO AFFECTED AREA FOUR TIMES A DAY AS NEEDED 6/12/23  Yes Teresa Maloney APRN   azithromycin (Zithromax) 250 MG tablet 2 tabs on day one and 1 tab day 2-5.  Patient not taking: Reported on 8/10/2023 6/26/23 8/10/23  Camelia Gupta APRN   Spacer/Aero-Holding Chambers (BreatheRite Luz Spacer Adult) misc 1 each As Needed (with albuterol).  Patient not taking: Reported on 8/10/2023 6/26/23 8/10/23  Camelia Gupta APRN        Patient Active Problem List   Diagnosis    Other emphysema    Bronchitis    Essential (primary) hypertension    Gout    Hyperlipidemia    Osteoarthritis    Stage 3 chronic kidney disease    Osteoporosis    Localized edema    Prediabetes        Past Surgical History:   Procedure Laterality Date    BLADDER REPAIR      COLONOSCOPY  2016    EYE SURGERY      implant- yes    HYSTERECTOMY      INTRAOCULAR LENS INSERTION       "yes    JOINT REPLACEMENT      Surgery    KNEE SURGERY Right     knee repair    KNEE SURGERY Right     repair    OTHER SURGICAL HISTORY      Artificial joints/limbs    OTHER SURGICAL HISTORY      Artificial Joints/Limbs    OTHER SURGICAL HISTORY      Joint surgery    TOTAL KNEE ARTHROPLASTY Right        Social History     Socioeconomic History    Marital status:    Tobacco Use    Smoking status: Former     Types: Cigarettes     Start date:      Quit date: 1970     Years since quittin.6    Smokeless tobacco: Never    Tobacco comments:     started at age 16- stopped at age 35   Vaping Use    Vaping Use: Never used   Substance and Sexual Activity    Alcohol use: Never     Comment: 2019- 1/15/2019 does not drink     Drug use: Never       Family History   Problem Relation Age of Onset    Colon cancer Father        Family history, surgical history, past medical history, Allergies and meds reviewed with patient today and updated in [facility] EMR.     ROS:  Review of Systems   Constitutional:  Negative for fatigue.   HENT:  Negative for congestion, postnasal drip and rhinorrhea.    Eyes:  Negative for blurred vision and visual disturbance.   Respiratory:  Negative for cough, chest tightness, shortness of breath and wheezing.    Cardiovascular:  Negative for chest pain and palpitations.   Gastrointestinal:  Negative for constipation and diarrhea.   Endocrine: Negative for polydipsia and polyuria.   Allergic/Immunologic: Negative for environmental allergies.   Neurological:  Negative for headache.   Psychiatric/Behavioral:  Negative for depressed mood. The patient is not nervous/anxious.      OBJECTIVE:  Vitals:    08/10/23 1430 08/10/23 1448   BP: 147/79 118/88   BP Location: Left arm Right arm   Patient Position: Sitting Sitting   Cuff Size:  Adult   Pulse: 70    Temp: 97.2 øF (36.2 øC)    SpO2: 92%    Weight: 80.3 kg (177 lb)    Height: 157.5 cm (62\")      No results found.   Body mass index is 32.37 " kg/mý.  No LMP recorded. Patient is postmenopausal.    Physical Exam  Vitals and nursing note reviewed.   Constitutional:       General: She is not in acute distress.     Appearance: Normal appearance. She is obese.   HENT:      Head: Normocephalic.      Right Ear: Tympanic membrane, ear canal and external ear normal.      Left Ear: Tympanic membrane, ear canal and external ear normal.      Nose: Nose normal.      Mouth/Throat:      Mouth: Mucous membranes are moist.      Dentition: Has dentures.      Pharynx: Oropharynx is clear.   Eyes:      General: No scleral icterus.     Conjunctiva/sclera: Conjunctivae normal.      Pupils: Pupils are equal, round, and reactive to light.   Cardiovascular:      Rate and Rhythm: Normal rate and regular rhythm.      Pulses: Normal pulses.      Heart sounds: Normal heart sounds. No murmur heard.  Pulmonary:      Effort: Pulmonary effort is normal.      Breath sounds: Normal breath sounds. No wheezing, rhonchi or rales.   Musculoskeletal:      Cervical back: Neck supple. No rigidity or tenderness.   Lymphadenopathy:      Cervical: No cervical adenopathy.   Skin:     General: Skin is warm and dry.      Coloration: Skin is not jaundiced.      Findings: No rash.   Neurological:      General: No focal deficit present.      Mental Status: She is alert and oriented to person, place, and time.   Psychiatric:         Mood and Affect: Mood normal.         Thought Content: Thought content normal.         Judgment: Judgment normal.       Procedures    No visits with results within 30 Day(s) from this visit.   Latest known visit with results is:   Admission on 02/17/2023, Discharged on 02/17/2023   Component Date Value Ref Range Status    QT Interval 02/17/2023 400  ms Final    proBNP 02/17/2023 357.2  0.0 - 1,800.0 pg/mL Final    Magnesium 02/17/2023 1.8  1.6 - 2.4 mg/dL Final    HS Troponin T 02/17/2023 11 (H)  <10 ng/L Final    Glucose 02/17/2023 130 (H)  65 - 99 mg/dL Final    BUN  02/17/2023 17  8 - 23 mg/dL Final    Creatinine 02/17/2023 1.03 (H)  0.57 - 1.00 mg/dL Final    Sodium 02/17/2023 141  136 - 145 mmol/L Final    Potassium 02/17/2023 4.0  3.5 - 5.2 mmol/L Final    Chloride 02/17/2023 105  98 - 107 mmol/L Final    CO2 02/17/2023 24.9  22.0 - 29.0 mmol/L Final    Calcium 02/17/2023 9.2  8.6 - 10.5 mg/dL Final    Total Protein 02/17/2023 6.9  6.0 - 8.5 g/dL Final    Albumin 02/17/2023 3.8  3.5 - 5.2 g/dL Final    ALT (SGPT) 02/17/2023 5  1 - 33 U/L Final    AST (SGOT) 02/17/2023 14  1 - 32 U/L Final    Alkaline Phosphatase 02/17/2023 60  39 - 117 U/L Final    Total Bilirubin 02/17/2023 0.4  0.0 - 1.2 mg/dL Final    Globulin 02/17/2023 3.1  gm/dL Final    A/G Ratio 02/17/2023 1.2  g/dL Final    BUN/Creatinine Ratio 02/17/2023 16.5  7.0 - 25.0 Final    Anion Gap 02/17/2023 11.1  5.0 - 15.0 mmol/L Final    eGFR 02/17/2023 52.7 (L)  >60.0 mL/min/1.73 Final    WBC 02/17/2023 4.86  3.40 - 10.80 10*3/mm3 Final    RBC 02/17/2023 4.02  3.77 - 5.28 10*6/mm3 Final    Hemoglobin 02/17/2023 12.5  12.0 - 15.9 g/dL Final    Hematocrit 02/17/2023 37.1  34.0 - 46.6 % Final    MCV 02/17/2023 92.3  79.0 - 97.0 fL Final    MCH 02/17/2023 31.1  26.6 - 33.0 pg Final    MCHC 02/17/2023 33.7  31.5 - 35.7 g/dL Final    RDW 02/17/2023 13.9  12.3 - 15.4 % Final    RDW-SD 02/17/2023 46.6  37.0 - 54.0 fl Final    MPV 02/17/2023 9.3  6.0 - 12.0 fL Final    Platelets 02/17/2023 189  140 - 450 10*3/mm3 Final    Neutrophil % 02/17/2023 52.4  42.7 - 76.0 % Final    Lymphocyte % 02/17/2023 34.0  19.6 - 45.3 % Final    Monocyte % 02/17/2023 5.8  5.0 - 12.0 % Final    Eosinophil % 02/17/2023 6.2  0.3 - 6.2 % Final    Basophil % 02/17/2023 1.4  0.0 - 1.5 % Final    Immature Grans % 02/17/2023 0.2  0.0 - 0.5 % Final    Neutrophils, Absolute 02/17/2023 2.55  1.70 - 7.00 10*3/mm3 Final    Lymphocytes, Absolute 02/17/2023 1.65  0.70 - 3.10 10*3/mm3 Final    Monocytes, Absolute 02/17/2023 0.28  0.10 - 0.90 10*3/mm3 Final     Eosinophils, Absolute 02/17/2023 0.30  0.00 - 0.40 10*3/mm3 Final    Basophils, Absolute 02/17/2023 0.07  0.00 - 0.20 10*3/mm3 Final    Immature Grans, Absolute 02/17/2023 0.01  0.00 - 0.05 10*3/mm3 Final    nRBC 02/17/2023 0.0  0.0 - 0.2 /100 WBC Final    HS Troponin T 02/17/2023 12 (H)  <10 ng/L Final    Troponin T Delta 02/17/2023 1  >=-4 - <+4 ng/L Final    Color, UA 02/17/2023 Yellow  Yellow, Straw Final    Appearance, UA 02/17/2023 Clear  Clear Final    pH, UA 02/17/2023 7.0  5.0 - 8.0 Final    Specific Gravity, UA 02/17/2023 1.009  1.005 - 1.030 Final    Glucose, UA 02/17/2023 Negative  Negative Final    Ketones, UA 02/17/2023 Negative  Negative Final    Bilirubin, UA 02/17/2023 Negative  Negative Final    Blood, UA 02/17/2023 Negative  Negative Final    Protein, UA 02/17/2023 Negative  Negative Final    Leuk Esterase, UA 02/17/2023 Large (3+) (A)  Negative Final    Nitrite, UA 02/17/2023 Negative  Negative Final    Urobilinogen, UA 02/17/2023 0.2 E.U./dL  0.2 - 1.0 E.U./dL Final    RBC, UA 02/17/2023 None Seen  None Seen /HPF Final    WBC, UA 02/17/2023 3-5 (A)  None Seen /HPF Final    Bacteria, UA 02/17/2023 Trace (A)  None Seen /HPF Final    Squamous Epithelial Cells, UA 02/17/2023 0-2  None Seen, 0-2 /HPF Final    Hyaline Casts, UA 02/17/2023 None Seen  None Seen /LPF Final    Methodology 02/17/2023 Automated Microscopy   Final       ASSESSMENT/ PLAN:    Diagnoses and all orders for this visit:    1. Essential (primary) hypertension (Primary)  Assessment & Plan:  Her blood pressure is mildly elevated here today at 147/79.  We will recheck it 1 more time.  Upon recheck her blood pressure was much improved.    Orders:  -     Comprehensive Metabolic Panel  -     Lipid Panel  -     CBC (No Diff)    2. Hyperlipidemia, unspecified hyperlipidemia type  Assessment & Plan:  We will update her lipid profile and then reconsider the need for medication    Orders:  -     Comprehensive Metabolic Panel  -     Lipid  Panel    3. Prediabetes  Assessment & Plan:  We will update her A1c.  Her last A1c 6 months ago was 6.0 still in the prediabetic range.  Will get her testing supplies so she can check a couple mornings a week.    Orders:  -     Hemoglobin A1c    4. Stage 3a chronic kidney disease  -     Comprehensive Metabolic Panel  -     CBC (No Diff)    5. Other emphysema  Assessment & Plan:  She is doing her Breztri inhaler on a daily basis.  She does state that her breathing is much improved.      Other orders  -     glucose monitor monitoring kit; check blood sugar every AM fasting and record. (Dx: E11.9)  Dispense: 1 each; Refill: 0  -     glucose blood (Glucose Meter Test) test strip; check blood sugar every AM fasting and record. (Dx: E11.9)  Dispense: 100 each; Refill: 3  -     Lancets 33G misc; 33 g Daily for 100 days.  Dispense: 100 each; Refill: 3        Orders Placed Today:     New Medications Ordered This Visit   Medications    glucose monitor monitoring kit     Sig: check blood sugar every AM fasting and record. (Dx: E11.9)     Dispense:  1 each     Refill:  0    glucose blood (Glucose Meter Test) test strip     Sig: check blood sugar every AM fasting and record. (Dx: E11.9)     Dispense:  100 each     Refill:  3    Lancets 33G misc     Si g Daily for 100 days.     Dispense:  100 each     Refill:  3        Management Plan:     An After Visit Summary was printed and given to the patient at discharge.    Follow-up: Return in about 6 months (around 2/10/2024) for Recheck.    Pee Mon,  8/10/2023 14:51 EDT  This note was electronically signed.

## 2023-08-10 NOTE — ASSESSMENT & PLAN NOTE
Her blood pressure is mildly elevated here today at 147/79.  We will recheck it 1 more time.  Upon recheck her blood pressure was much improved.

## 2023-08-10 NOTE — ASSESSMENT & PLAN NOTE
She is doing her Breztri inhaler on a daily basis.  She does state that her breathing is much improved.

## 2023-08-22 ENCOUNTER — APPOINTMENT (OUTPATIENT)
Dept: GENERAL RADIOLOGY | Facility: HOSPITAL | Age: 88
End: 2023-08-22
Payer: MEDICARE

## 2023-08-22 ENCOUNTER — HOSPITAL ENCOUNTER (EMERGENCY)
Facility: HOSPITAL | Age: 88
Discharge: HOME | End: 2023-08-22
Attending: EMERGENCY MEDICINE | Admitting: EMERGENCY MEDICINE
Payer: MEDICARE

## 2023-08-22 ENCOUNTER — APPOINTMENT (OUTPATIENT)
Dept: CT IMAGING | Facility: HOSPITAL | Age: 88
End: 2023-08-22
Payer: MEDICARE

## 2023-08-22 VITALS
BODY MASS INDEX: 32.21 KG/M2 | SYSTOLIC BLOOD PRESSURE: 137 MMHG | OXYGEN SATURATION: 94 % | TEMPERATURE: 98.1 F | WEIGHT: 175.04 LBS | HEART RATE: 68 BPM | DIASTOLIC BLOOD PRESSURE: 68 MMHG | RESPIRATION RATE: 16 BRPM | HEIGHT: 62 IN

## 2023-08-22 DIAGNOSIS — M54.6 ACUTE RIGHT-SIDED THORACIC BACK PAIN: ICD-10-CM

## 2023-08-22 DIAGNOSIS — M62.830 SPASM OF THORACIC BACK MUSCLE: Primary | ICD-10-CM

## 2023-08-22 LAB
ALBUMIN SERPL-MCNC: 4.5 G/DL (ref 3.5–5.2)
ALBUMIN/GLOB SERPL: 1.4 G/DL
ALP SERPL-CCNC: 64 U/L (ref 39–117)
ALT SERPL W P-5'-P-CCNC: 6 U/L (ref 1–33)
ANION GAP SERPL CALCULATED.3IONS-SCNC: 10.6 MMOL/L (ref 5–15)
AST SERPL-CCNC: 15 U/L (ref 1–32)
BACTERIA UR QL AUTO: ABNORMAL /HPF
BASOPHILS # BLD AUTO: 0.05 10*3/MM3 (ref 0–0.2)
BASOPHILS NFR BLD AUTO: 0.8 % (ref 0–1.5)
BILIRUB SERPL-MCNC: 0.5 MG/DL (ref 0–1.2)
BILIRUB UR QL STRIP: NEGATIVE
BUN SERPL-MCNC: 19 MG/DL (ref 8–23)
BUN/CREAT SERPL: 18.8 (ref 7–25)
CALCIUM SPEC-SCNC: 9.6 MG/DL (ref 8.6–10.5)
CHLORIDE SERPL-SCNC: 101 MMOL/L (ref 98–107)
CLARITY UR: CLEAR
CO2 SERPL-SCNC: 26.4 MMOL/L (ref 22–29)
COLOR UR: YELLOW
CREAT SERPL-MCNC: 1.01 MG/DL (ref 0.57–1)
DEPRECATED RDW RBC AUTO: 46.1 FL (ref 37–54)
EGFRCR SERPLBLD CKD-EPI 2021: 54 ML/MIN/1.73
EOSINOPHIL # BLD AUTO: 0.32 10*3/MM3 (ref 0–0.4)
EOSINOPHIL NFR BLD AUTO: 5.3 % (ref 0.3–6.2)
ERYTHROCYTE [DISTWIDTH] IN BLOOD BY AUTOMATED COUNT: 13.8 % (ref 12.3–15.4)
GLOBULIN UR ELPH-MCNC: 3.3 GM/DL
GLUCOSE SERPL-MCNC: 125 MG/DL (ref 65–99)
GLUCOSE UR STRIP-MCNC: NEGATIVE MG/DL
HCT VFR BLD AUTO: 39.5 % (ref 34–46.6)
HGB BLD-MCNC: 13.6 G/DL (ref 12–15.9)
HGB UR QL STRIP.AUTO: NEGATIVE
HOLD SPECIMEN: NORMAL
HOLD SPECIMEN: NORMAL
HYALINE CASTS UR QL AUTO: ABNORMAL /LPF
IMM GRANULOCYTES # BLD AUTO: 0.01 10*3/MM3 (ref 0–0.05)
IMM GRANULOCYTES NFR BLD AUTO: 0.2 % (ref 0–0.5)
KETONES UR QL STRIP: NEGATIVE
LEUKOCYTE ESTERASE UR QL STRIP.AUTO: ABNORMAL
LIPASE SERPL-CCNC: 35 U/L (ref 13–60)
LYMPHOCYTES # BLD AUTO: 1.6 10*3/MM3 (ref 0.7–3.1)
LYMPHOCYTES NFR BLD AUTO: 26.3 % (ref 19.6–45.3)
MCH RBC QN AUTO: 31.6 PG (ref 26.6–33)
MCHC RBC AUTO-ENTMCNC: 34.4 G/DL (ref 31.5–35.7)
MCV RBC AUTO: 91.6 FL (ref 79–97)
MONOCYTES # BLD AUTO: 0.3 10*3/MM3 (ref 0.1–0.9)
MONOCYTES NFR BLD AUTO: 4.9 % (ref 5–12)
NEUTROPHILS NFR BLD AUTO: 3.8 10*3/MM3 (ref 1.7–7)
NEUTROPHILS NFR BLD AUTO: 62.5 % (ref 42.7–76)
NITRITE UR QL STRIP: NEGATIVE
NRBC BLD AUTO-RTO: 0 /100 WBC (ref 0–0.2)
PH UR STRIP.AUTO: 7 [PH] (ref 5–8)
PLATELET # BLD AUTO: 188 10*3/MM3 (ref 140–450)
PMV BLD AUTO: 9.2 FL (ref 6–12)
POTASSIUM SERPL-SCNC: 4.1 MMOL/L (ref 3.5–5.2)
PROT SERPL-MCNC: 7.8 G/DL (ref 6–8.5)
PROT UR QL STRIP: NEGATIVE
RBC # BLD AUTO: 4.31 10*6/MM3 (ref 3.77–5.28)
RBC # UR STRIP: ABNORMAL /HPF
REF LAB TEST METHOD: ABNORMAL
SODIUM SERPL-SCNC: 138 MMOL/L (ref 136–145)
SP GR UR STRIP: 1.01 (ref 1–1.03)
SQUAMOUS #/AREA URNS HPF: ABNORMAL /HPF
UROBILINOGEN UR QL STRIP: ABNORMAL
WBC # UR STRIP: ABNORMAL /HPF
WBC NRBC COR # BLD: 6.08 10*3/MM3 (ref 3.4–10.8)
WHOLE BLOOD HOLD COAG: NORMAL
WHOLE BLOOD HOLD SPECIMEN: NORMAL

## 2023-08-22 PROCEDURE — 71046 X-RAY EXAM CHEST 2 VIEWS: CPT

## 2023-08-22 PROCEDURE — 83690 ASSAY OF LIPASE: CPT | Performed by: EMERGENCY MEDICINE

## 2023-08-22 PROCEDURE — 81001 URINALYSIS AUTO W/SCOPE: CPT | Performed by: EMERGENCY MEDICINE

## 2023-08-22 PROCEDURE — 99285 EMERGENCY DEPT VISIT HI MDM: CPT

## 2023-08-22 PROCEDURE — 96374 THER/PROPH/DIAG INJ IV PUSH: CPT

## 2023-08-22 PROCEDURE — 25010000002 KETOROLAC TROMETHAMINE PER 15 MG

## 2023-08-22 PROCEDURE — 80053 COMPREHEN METABOLIC PANEL: CPT | Performed by: EMERGENCY MEDICINE

## 2023-08-22 PROCEDURE — 74177 CT ABD & PELVIS W/CONTRAST: CPT

## 2023-08-22 PROCEDURE — 97110 THERAPEUTIC EXERCISES: CPT | Performed by: PHYSICAL THERAPIST

## 2023-08-22 PROCEDURE — 25510000001 IOPAMIDOL PER 1 ML: Performed by: EMERGENCY MEDICINE

## 2023-08-22 PROCEDURE — 97161 PT EVAL LOW COMPLEX 20 MIN: CPT | Performed by: PHYSICAL THERAPIST

## 2023-08-22 PROCEDURE — 85025 COMPLETE CBC W/AUTO DIFF WBC: CPT | Performed by: EMERGENCY MEDICINE

## 2023-08-22 PROCEDURE — 94640 AIRWAY INHALATION TREATMENT: CPT

## 2023-08-22 RX ORDER — KETOROLAC TROMETHAMINE 30 MG/ML
15 INJECTION, SOLUTION INTRAMUSCULAR; INTRAVENOUS ONCE
Status: COMPLETED | OUTPATIENT
Start: 2023-08-22 | End: 2023-08-22

## 2023-08-22 RX ORDER — SODIUM CHLORIDE 0.9 % (FLUSH) 0.9 %
10 SYRINGE (ML) INJECTION AS NEEDED
Status: DISCONTINUED | OUTPATIENT
Start: 2023-08-22 | End: 2023-08-22 | Stop reason: HOSPADM

## 2023-08-22 RX ORDER — ALBUTEROL SULFATE 2.5 MG/3ML
2.5 SOLUTION RESPIRATORY (INHALATION) ONCE
Status: COMPLETED | OUTPATIENT
Start: 2023-08-22 | End: 2023-08-22

## 2023-08-22 RX ORDER — HYDROCODONE BITARTRATE AND ACETAMINOPHEN 5; 325 MG/1; MG/1
1 TABLET ORAL EVERY 6 HOURS PRN
Status: DISCONTINUED | OUTPATIENT
Start: 2023-08-22 | End: 2023-08-22

## 2023-08-22 RX ORDER — HYDROCODONE BITARTRATE AND ACETAMINOPHEN 5; 325 MG/1; MG/1
1 TABLET ORAL ONCE
Status: COMPLETED | OUTPATIENT
Start: 2023-08-22 | End: 2023-08-22

## 2023-08-22 RX ORDER — LIDOCAINE 50 MG/G
1 PATCH TOPICAL EVERY 24 HOURS
Qty: 15 PATCH | Refills: 0 | Status: SHIPPED | OUTPATIENT
Start: 2023-08-22

## 2023-08-22 RX ADMIN — KETOROLAC TROMETHAMINE 15 MG: 30 INJECTION, SOLUTION INTRAMUSCULAR; INTRAVENOUS at 11:01

## 2023-08-22 RX ADMIN — HYDROCODONE BITARTRATE AND ACETAMINOPHEN 1 TABLET: 5; 325 TABLET ORAL at 14:19

## 2023-08-22 RX ADMIN — ALBUTEROL SULFATE 2.5 MG: 2.5 SOLUTION RESPIRATORY (INHALATION) at 13:37

## 2023-08-22 RX ADMIN — SODIUM CHLORIDE 1000 ML: 9 INJECTION, SOLUTION INTRAVENOUS at 11:43

## 2023-08-22 RX ADMIN — IOPAMIDOL 100 ML: 755 INJECTION, SOLUTION INTRAVENOUS at 12:28

## 2023-08-22 NOTE — DISCHARGE INSTRUCTIONS
All of your labs, chest x-ray, and CT completed in the last days 15 to 20 minutes at a time 4-5 times a day as directed by physical therapy.  He may also complete the stretches as directed by physical therapy.  Follow-up with your PCP if symptoms or not improving.

## 2023-08-22 NOTE — THERAPY EVALUATION
Patient Name: Shayy Wasserman  : 1935    MRN: [id number]                              Today's Date: 2023       Admit Date: 2023    Visit Dx:     ICD-10-CM ICD-9-CM   1. Spasm of thoracic back muscle  M62.830 724.8     Patient Active Problem List   Diagnosis    Other emphysema    Bronchitis    Essential (primary) hypertension    Gout    Hyperlipidemia    Osteoarthritis    Stage 3 chronic kidney disease    Osteoporosis    Localized edema    Prediabetes     Past Medical History:   Diagnosis Date    Anxiety disorder 2018    Arthritis     COPD (chronic obstructive pulmonary disease)     Dependent edema 2017    Gout     Osteoporosis     Primary osteoarthritis of left knee 2018    Primary osteoarthritis of one hip, right 2017    Primary osteoarthritis of right hip 2017     Past Surgical History:   Procedure Laterality Date    BLADDER REPAIR      COLONOSCOPY  2016    EYE SURGERY      implant- yes    HYSTERECTOMY      INTRAOCULAR LENS INSERTION      yes    JOINT REPLACEMENT      Surgery    KNEE SURGERY Right     knee repair    KNEE SURGERY Right     repair    OTHER SURGICAL HISTORY      Artificial joints/limbs    OTHER SURGICAL HISTORY      Artificial Joints/Limbs    OTHER SURGICAL HISTORY      Joint surgery    TOTAL KNEE ARTHROPLASTY Right       General Information       Row Name 23 1344          Physical Therapy Time and Intention    Document Type evaluation  -LR     Mode of Treatment individual therapy  -LR       Row Name 23 1344          General Information    Patient Profile Reviewed yes  -LR     Prior Level of Function independent:  -LR               User Key  (r) = Recorded By, (t) = Taken By, (c) = Cosigned By      Initials Name Provider Type    LR Phuong Malone, PT Physical Therapist                  History: Patient reports she started developing pain on the right side of her back late Saturday night.  Rashaad morning the pain was still there.  Patient  states family member put cream on her back and also gave her Tylenol which helped with the pain.  Patient reports the pain was radiating around her rib.  Patient's pain is a 10/10 currently.  Pain is increased with moving.  Patient's family member reports that she pulls and rocks a lot to get up out of a chair and to get out of the car and thinks this may be contributing to her pain.  She also has been coughing a lot lately.      Objective:  Posture: Rounded shoulders, forward head    Palpation: Tender to palpation at right thoracic paraspinals at T8, T9, and T10; tightness in this region    ROM:  B UE ROM WFL  B thoracic rotation: 50%     Strength:  L Shoulder MMT:   R Shoulder MMT:  Flexion: 4+   Flexion: 4+  Abduction: 4+   Abduction: 4+  External Rotation: 4+  External Rotation: 4+  Internal Rotation: 4+  Internal Rotation: 4+     Sensation: B UE sensation intact    Assessment/Plan:   Pt presents with a diagnosis of back pain and has tenderness/tightness in right thoracic paraspinals and decreased thoracic rotation ROM that are limiting her ability to move from supine to sitting and perform functional activities.  The patient and her family member were educated on performing soft tissue massage to thoracic paraspinals as well as applying heat and ice to the area.  Patient was also educated in multiple exercises to perform at home and was provided with a HEP handout.    Goals:   LTG 1: The patient will be independent in HEP in order to decrease pain and improve tolerance to functional activities.  STATUS: Met    Interventions:   Manual Therapy: Not performed    Therapeutic Exercises: HEP: Seated trunk rotation, side-lying trunk rotation stretch, scapular squeezes, seated chair rollouts to the left side    Modalities: Not performed     Outcome Measures       Row Name 08/22/23 1344          Optimal Instrument    Optimal Instrument Optimal - 3  -LR     Moving - lying to sitting 2  -LR     Sitting 2  -LR     Reaching  2  -LR     From the list, choose the 3 activities you would most like to be able to do without any difficulty Reaching;Sitting;Moving -lying to sitting  -LR     Total Score Optimal - 3 4  -LR       Row Name 08/22/23 1344          Functional Assessment    Outcome Measure Options Optimal Instrument  -LR               User Key  (r) = Recorded By, (t) = Taken By, (c) = Cosigned By      Initials Name Provider Type    LR Phuong Malone, PT Physical Therapist                     Time Calculation:   PT Evaluation Complexity  History, PT Evaluation Complexity: 3 or more personal factors and/or comorbidities  Examination of Body Systems (PT Eval Complexity): 1-2 elements  Clinical Presentation (PT Evaluation Complexity): stable  Clinical Decision Making (PT Evaluation Complexity): low complexity  Overall Complexity (PT Evaluation Complexity): low complexity     PT Charges       Row Name 08/22/23 1344             Time Calculation    PT Received On 08/22/23  -LR         Time Calculation- PT    Total Timed Code Minutes- PT 23 minute(s)  -LR         Timed Charges    84899 - PT Therapeutic Exercise Minutes 10  -LR         Untimed Charges    PT Eval/Re-eval Minutes 13  -LR         Total Minutes    Timed Charges Total Minutes 10  -LR      Untimed Charges Total Minutes 13  -LR       Total Minutes 23  -LR                User Key  (r) = Recorded By, (t) = Taken By, (c) = Cosigned By      Initials Name Provider Type    LR Phuong Malone, PT Physical Therapist                  Therapy Charges for Today       Code Description Service Date Service Provider Modifiers Qty    85537573781 HC PT THER PROC EA 15 MIN 8/22/2023 Phuong Malone, PT GP 1    55555038628 HC PT EVAL LOW COMPLEXITY 1 8/22/2023 Phuong Malone, PT GP 1            PT G-Codes  Outcome Measure Options: Optimal Instrument       Phuong aMlone, PT  8/22/2023

## 2023-09-05 ENCOUNTER — TELEPHONE (OUTPATIENT)
Dept: FAMILY MEDICINE CLINIC | Facility: CLINIC | Age: 88
End: 2023-09-05
Payer: MEDICARE

## 2023-09-05 NOTE — TELEPHONE ENCOUNTER
Patients daughter called stating Shayy has had hypertension in the AM- readings are 180/103, 154/79. Patient also complains of heart fluttering when her blood pressure gets high. Daughter is concerned about this, states she takes her to the ER for this multiple times and as soon as she gets there everything is back to normal. Daughter requested appt to be made for this, made one for Thursday. Daughter wanted to make you aware of the problem going on before appt. States she thinks this has more to do with panic attacks/anxiety.

## 2023-10-05 ENCOUNTER — OFFICE VISIT (OUTPATIENT)
Dept: FAMILY MEDICINE CLINIC | Facility: CLINIC | Age: 88
End: 2023-10-05
Payer: MEDICARE

## 2023-10-05 VITALS
DIASTOLIC BLOOD PRESSURE: 90 MMHG | HEIGHT: 62 IN | OXYGEN SATURATION: 95 % | WEIGHT: 173 LBS | SYSTOLIC BLOOD PRESSURE: 144 MMHG | TEMPERATURE: 97.8 F | BODY MASS INDEX: 31.83 KG/M2 | HEART RATE: 70 BPM

## 2023-10-05 DIAGNOSIS — J43.8 OTHER EMPHYSEMA: ICD-10-CM

## 2023-10-05 DIAGNOSIS — M81.0 AGE-RELATED OSTEOPOROSIS WITHOUT CURRENT PATHOLOGICAL FRACTURE: ICD-10-CM

## 2023-10-05 DIAGNOSIS — I10 ESSENTIAL (PRIMARY) HYPERTENSION: ICD-10-CM

## 2023-10-05 DIAGNOSIS — M1A.9XX0 CHRONIC GOUT INVOLVING TOE WITHOUT TOPHUS, UNSPECIFIED CAUSE, UNSPECIFIED LATERALITY: ICD-10-CM

## 2023-10-05 DIAGNOSIS — M79.602 PAIN OF LEFT UPPER EXTREMITY: Primary | ICD-10-CM

## 2023-10-05 DIAGNOSIS — I10 HYPERTENSION, UNSPECIFIED TYPE: ICD-10-CM

## 2023-10-05 DIAGNOSIS — M54.6 CHRONIC RIGHT-SIDED THORACIC BACK PAIN: ICD-10-CM

## 2023-10-05 DIAGNOSIS — G89.29 CHRONIC RIGHT-SIDED THORACIC BACK PAIN: ICD-10-CM

## 2023-10-05 DIAGNOSIS — R07.9 CHEST PAIN, UNSPECIFIED TYPE: ICD-10-CM

## 2023-10-05 PROCEDURE — 99214 OFFICE O/P EST MOD 30 MIN: CPT | Performed by: FAMILY MEDICINE

## 2023-10-05 RX ORDER — BUDESONIDE, GLYCOPYRROLATE, AND FORMOTEROL FUMARATE 160; 9; 4.8 UG/1; UG/1; UG/1
2 AEROSOL, METERED RESPIRATORY (INHALATION) 2 TIMES DAILY
Qty: 10.7 G | Refills: 5 | Status: SHIPPED | OUTPATIENT
Start: 2023-10-05

## 2023-10-05 RX ORDER — ATENOLOL 50 MG/1
50 TABLET ORAL 2 TIMES DAILY
Qty: 180 TABLET | Refills: 3 | Status: SHIPPED | OUTPATIENT
Start: 2023-10-05

## 2023-10-05 RX ORDER — AMLODIPINE BESYLATE 5 MG/1
5 TABLET ORAL DAILY
Qty: 90 TABLET | Refills: 1 | Status: SHIPPED | OUTPATIENT
Start: 2023-10-05

## 2023-10-05 RX ORDER — ALLOPURINOL 300 MG/1
300 TABLET ORAL DAILY
Qty: 90 TABLET | Refills: 3 | Status: SHIPPED | OUTPATIENT
Start: 2023-10-05

## 2023-10-05 RX ORDER — BLOOD-GLUCOSE METER
EACH MISCELLANEOUS
COMMUNITY
Start: 2023-08-10

## 2023-10-05 RX ORDER — ALENDRONATE SODIUM 70 MG/1
70 TABLET ORAL
Qty: 4 TABLET | Refills: 12 | Status: SHIPPED | OUTPATIENT
Start: 2023-10-05

## 2023-10-05 RX ORDER — ALBUTEROL SULFATE 2.5 MG/3ML
2.5 SOLUTION RESPIRATORY (INHALATION) EVERY 4 HOURS PRN
Qty: 270 ML | Refills: 1 | Status: SHIPPED | OUTPATIENT
Start: 2023-10-05

## 2023-10-05 NOTE — ASSESSMENT & PLAN NOTE
Her left arm pain typically occurs at rest and is associated with some shortness of breath and chest discomfort.  This would be concerning for angina.  I think she needs to be evaluated further.

## 2023-10-05 NOTE — PROGRESS NOTES
Chief Complaint   Patient presents with    Hypertension        Subjective     Shayy Wasserman  has a past medical history of Anxiety disorder (11/12/2018), Arthritis, COPD (chronic obstructive pulmonary disease), Dependent edema (11/28/2017), Gout, Osteoporosis, Primary osteoarthritis of left knee (05/24/2018), Primary osteoarthritis of one hip, right (12/12/2017), and Primary osteoarthritis of right hip (12/12/2017).    Hypertension-she does check her blood pressure at home on a regular basis.  Almost all of these have been good except for 1 that was elevated at 168/109.  She also had 1 that was elevated at 186/102 at that time she was having a lot of arm pain.  The pain is in her left arm at the time.  When she has this left arm pain she will has some chest discomfort and shortness of breath.      PHQ-2 Depression Screening  Little interest or pleasure in doing things?     Feeling down, depressed, or hopeless?     PHQ-2 Total Score     PHQ-9 Depression Screening  Little interest or pleasure in doing things?     Feeling down, depressed, or hopeless?     Trouble falling or staying asleep, or sleeping too much?     Feeling tired or having little energy?     Poor appetite or overeating?     Feeling bad about yourself - or that you are a failure or have let yourself or your family down?     Trouble concentrating on things, such as reading the newspaper or watching television?     Moving or speaking so slowly that other people could have noticed? Or the opposite - being so fidgety or restless that you have been moving around a lot more than usual?     Thoughts that you would be better off dead, or of hurting yourself in some way?     PHQ-9 Total Score     If you checked off any problems, how difficult have these problems made it for you to do your work, take care of things at home, or get along with other people?       Allergies   Allergen Reactions    Bactrim [Sulfamethoxazole-Trimethoprim] Unknown - Low Severity     Griseofulvin Ultramicrosize [Griseofulvin] Unknown - Low Severity       Prior to Admission medications    Medication Sig Start Date End Date Taking? Authorizing Provider   albuterol (PROVENTIL) (2.5 MG/3ML) 0.083% nebulizer solution Take 2.5 mg by nebulization Every 4 (Four) Hours As Needed for Wheezing. 6/26/23  Yes Camelia Gupta APRN   alendronate (FOSAMAX) 70 MG tablet Take 1 tablet by mouth Every 7 (Seven) Days. 2/10/23  Yes Teresa Maloney APRN   allopurinol (ZYLOPRIM) 300 MG tablet Take 1 tablet by mouth Daily. 2/10/23  Yes Teresa Maloney APRN   amLODIPine (NORVASC) 5 MG tablet Take 1 tablet by mouth Daily. 2/10/23  Yes Teresa Maloney APRN   atenolol (TENORMIN) 50 MG tablet Take 1 tablet by mouth 2 (Two) Times a Day. 2/10/23  Yes Teresa Maloney APRN   Blood Glucose Monitoring Suppl (ONE TOUCH ULTRA 2) w/Device kit USE AS DIRECTED TO TEST BLOOD SUGAR EVERY MORNING FASTING AND RECORD 8/10/23  Yes Provider, MD Wilber Contrerasi Aerosphere 160-9-4.8 MCG/ACT aerosol inhaler Inhale 2 puffs 2 (Two) Times a Day. 2/10/23  Yes Teresa Maloney APRN   Diclofenac Sodium (VOLTAREN) 1 % gel gel APPLY 4 GRAMS TO AFFECTED AREA FOUR TIMES A DAY AS NEEDED 6/12/23  Yes Teresa Maloney APRN   glucose blood (Glucose Meter Test) test strip check blood sugar every AM fasting and record. (Dx: E11.9) 8/10/23  Yes Pee Mon DO   glucose monitor monitoring kit check blood sugar every AM fasting and record. (Dx: E11.9) 8/10/23  Yes Pee Mon DO   Lancets 33G misc 33 g Daily for 100 days. 8/10/23 11/18/23 Yes Pee Mon DO   lidocaine (LIDODERM) 5 % Place 1 patch on the skin as directed by provider Daily. Remove & Discard patch within 12 hours or as directed by MD  Patient not taking: Reported on 10/5/2023 8/22/23 10/5/23  Antwon Luke PA-C        Patient Active Problem List   Diagnosis    Other emphysema    Bronchitis    Essential (primary) hypertension     Gout    Hyperlipidemia    Osteoarthritis    Stage 3 chronic kidney disease    Osteoporosis    Localized edema    Prediabetes    Pain of left upper extremity        Past Surgical History:   Procedure Laterality Date    BLADDER REPAIR      COLONOSCOPY  2016    EYE SURGERY      implant- yes    HYSTERECTOMY      INTRAOCULAR LENS INSERTION      yes    JOINT REPLACEMENT      Surgery    KNEE SURGERY Right     knee repair    KNEE SURGERY Right     repair    OTHER SURGICAL HISTORY      Artificial joints/limbs    OTHER SURGICAL HISTORY      Artificial Joints/Limbs    OTHER SURGICAL HISTORY      Joint surgery    TOTAL KNEE ARTHROPLASTY Right        Social History     Socioeconomic History    Marital status:    Tobacco Use    Smoking status: Former     Types: Cigarettes     Start date:      Quit date: 1970     Years since quittin.7    Smokeless tobacco: Never    Tobacco comments:     started at age 16- stopped at age 35   Vaping Use    Vaping Use: Never used   Substance and Sexual Activity    Alcohol use: Never     Comment: 2019- 1/15/2019 does not drink     Drug use: Never       Family History   Problem Relation Age of Onset    Colon cancer Father        Family history, surgical history, past medical history, Allergies and meds reviewed with patient today and updated in [facility] EMR.     ROS:  Review of Systems   Constitutional:  Positive for fatigue.   HENT:  Negative for congestion, postnasal drip and rhinorrhea.    Eyes:  Negative for blurred vision and visual disturbance.   Respiratory:  Positive for chest tightness and shortness of breath. Negative for cough and wheezing.    Cardiovascular:  Negative for chest pain and palpitations.   Gastrointestinal:  Positive for nausea. Negative for vomiting.   Allergic/Immunologic: Negative for environmental allergies.   Neurological:  Negative for headache.     OBJECTIVE:  Vitals:    10/05/23 1150 10/05/23 1208   BP: 152/83 144/90   BP Location: Right arm Right arm  "  Patient Position: Sitting Sitting   Cuff Size:  Adult   Pulse: 70    Temp: 97.8 °F (36.6 °C)    SpO2: 95%    Weight: 78.5 kg (173 lb)    Height: 157.5 cm (62.01\")      No results found.   Body mass index is 31.63 kg/m².  No LMP recorded. Patient is postmenopausal.    Physical Exam  Vitals and nursing note reviewed.   Constitutional:       General: She is not in acute distress.     Appearance: Normal appearance. She is obese.   HENT:      Head: Normocephalic.   Cardiovascular:      Rate and Rhythm: Normal rate and regular rhythm.      Heart sounds: Normal heart sounds. No murmur heard.  Pulmonary:      Effort: Pulmonary effort is normal.      Breath sounds: Normal breath sounds. No wheezing.   Neurological:      Mental Status: She is alert and oriented to person, place, and time.   Psychiatric:         Mood and Affect: Mood normal.         Thought Content: Thought content normal.         Judgment: Judgment normal.       Procedures    No visits with results within 30 Day(s) from this visit.   Latest known visit with results is:   Admission on 08/22/2023, Discharged on 08/22/2023   Component Date Value Ref Range Status    Glucose 08/22/2023 125 (H)  65 - 99 mg/dL Final    BUN 08/22/2023 19  8 - 23 mg/dL Final    Creatinine 08/22/2023 1.01 (H)  0.57 - 1.00 mg/dL Final    Sodium 08/22/2023 138  136 - 145 mmol/L Final    Potassium 08/22/2023 4.1  3.5 - 5.2 mmol/L Final    Chloride 08/22/2023 101  98 - 107 mmol/L Final    CO2 08/22/2023 26.4  22.0 - 29.0 mmol/L Final    Calcium 08/22/2023 9.6  8.6 - 10.5 mg/dL Final    Total Protein 08/22/2023 7.8  6.0 - 8.5 g/dL Final    Albumin 08/22/2023 4.5  3.5 - 5.2 g/dL Final    ALT (SGPT) 08/22/2023 6  1 - 33 U/L Final    AST (SGOT) 08/22/2023 15  1 - 32 U/L Final    Alkaline Phosphatase 08/22/2023 64  39 - 117 U/L Final    Total Bilirubin 08/22/2023 0.5  0.0 - 1.2 mg/dL Final    Globulin 08/22/2023 3.3  gm/dL Final    A/G Ratio 08/22/2023 1.4  g/dL Final    BUN/Creatinine Ratio " 08/22/2023 18.8  7.0 - 25.0 Final    Anion Gap 08/22/2023 10.6  5.0 - 15.0 mmol/L Final    eGFR 08/22/2023 54.0 (L)  >60.0 mL/min/1.73 Final    Lipase 08/22/2023 35  13 - 60 U/L Final    Color, UA 08/22/2023 Yellow  Yellow, Straw Final    Appearance, UA 08/22/2023 Clear  Clear Final    pH, UA 08/22/2023 7.0  5.0 - 8.0 Final    Specific Gravity, UA 08/22/2023 1.007  1.005 - 1.030 Final    Glucose, UA 08/22/2023 Negative  Negative Final    Ketones, UA 08/22/2023 Negative  Negative Final    Bilirubin, UA 08/22/2023 Negative  Negative Final    Blood, UA 08/22/2023 Negative  Negative Final    Protein, UA 08/22/2023 Negative  Negative Final    Leuk Esterase, UA 08/22/2023 Small (1+) (A)  Negative Final    Nitrite, UA 08/22/2023 Negative  Negative Final    Urobilinogen, UA 08/22/2023 0.2 E.U./dL  0.2 - 1.0 E.U./dL Final    Extra Tube 08/22/2023 Hold for add-ons.   Final    Auto resulted.    Extra Tube 08/22/2023 hold for add-on   Final    Auto resulted    Extra Tube 08/22/2023 Hold for add-ons.   Final    Auto resulted.    Extra Tube 08/22/2023 Hold for add-ons.   Final    Auto resulted    WBC 08/22/2023 6.08  3.40 - 10.80 10*3/mm3 Final    RBC 08/22/2023 4.31  3.77 - 5.28 10*6/mm3 Final    Hemoglobin 08/22/2023 13.6  12.0 - 15.9 g/dL Final    Hematocrit 08/22/2023 39.5  34.0 - 46.6 % Final    MCV 08/22/2023 91.6  79.0 - 97.0 fL Final    MCH 08/22/2023 31.6  26.6 - 33.0 pg Final    MCHC 08/22/2023 34.4  31.5 - 35.7 g/dL Final    RDW 08/22/2023 13.8  12.3 - 15.4 % Final    RDW-SD 08/22/2023 46.1  37.0 - 54.0 fl Final    MPV 08/22/2023 9.2  6.0 - 12.0 fL Final    Platelets 08/22/2023 188  140 - 450 10*3/mm3 Final    Neutrophil % 08/22/2023 62.5  42.7 - 76.0 % Final    Lymphocyte % 08/22/2023 26.3  19.6 - 45.3 % Final    Monocyte % 08/22/2023 4.9 (L)  5.0 - 12.0 % Final    Eosinophil % 08/22/2023 5.3  0.3 - 6.2 % Final    Basophil % 08/22/2023 0.8  0.0 - 1.5 % Final    Immature Grans % 08/22/2023 0.2  0.0 - 0.5 % Final     Neutrophils, Absolute 08/22/2023 3.80  1.70 - 7.00 10*3/mm3 Final    Lymphocytes, Absolute 08/22/2023 1.60  0.70 - 3.10 10*3/mm3 Final    Monocytes, Absolute 08/22/2023 0.30  0.10 - 0.90 10*3/mm3 Final    Eosinophils, Absolute 08/22/2023 0.32  0.00 - 0.40 10*3/mm3 Final    Basophils, Absolute 08/22/2023 0.05  0.00 - 0.20 10*3/mm3 Final    Immature Grans, Absolute 08/22/2023 0.01  0.00 - 0.05 10*3/mm3 Final    nRBC 08/22/2023 0.0  0.0 - 0.2 /100 WBC Final    RBC, UA 08/22/2023 0-2 (A)  None Seen /HPF Final    WBC, UA 08/22/2023 3-5 (A)  None Seen /HPF Final    Bacteria, UA 08/22/2023 None Seen  None Seen /HPF Final    Squamous Epithelial Cells, UA 08/22/2023 0-2  None Seen, 0-2 /HPF Final    Hyaline Casts, UA 08/22/2023 None Seen  None Seen /LPF Final    Methodology 08/22/2023 Automated Microscopy   Final       ASSESSMENT/ PLAN:    Diagnoses and all orders for this visit:    1. Pain of left upper extremity (Primary)  Assessment & Plan:  Her left arm pain typically occurs at rest and is associated with some shortness of breath and chest discomfort.  This would be concerning for angina.  I think she needs to be evaluated further.    Orders:  -     Stress test with myocardial perfusion; Future    2. Other emphysema  -     albuterol (PROVENTIL) (2.5 MG/3ML) 0.083% nebulizer solution; Take 2.5 mg by nebulization Every 4 (Four) Hours As Needed for Wheezing.  Dispense: 270 mL; Refill: 1  -     Breztri Aerosphere 160-9-4.8 MCG/ACT aerosol inhaler; Inhale 2 puffs 2 (Two) Times a Day.  Dispense: 10.7 g; Refill: 5    3. Age-related osteoporosis without current pathological fracture  -     alendronate (FOSAMAX) 70 MG tablet; Take 1 tablet by mouth Every 7 (Seven) Days.  Dispense: 4 tablet; Refill: 12    4. Chronic gout involving toe without tophus, unspecified cause, unspecified laterality  -     allopurinol (ZYLOPRIM) 300 MG tablet; Take 1 tablet by mouth Daily.  Dispense: 90 tablet; Refill: 3    5. Essential (primary)  hypertension  Assessment & Plan:  Her blood pressure at home is for the most part is pretty good.  Still elevated here today.  Recheck her blood pressure 1 more time.    Orders:  -     amLODIPine (NORVASC) 5 MG tablet; Take 1 tablet by mouth Daily.  Dispense: 90 tablet; Refill: 1  -     atenolol (TENORMIN) 50 MG tablet; Take 1 tablet by mouth 2 (Two) Times a Day.  Dispense: 180 tablet; Refill: 3    6. Hypertension, unspecified type  -     atenolol (TENORMIN) 50 MG tablet; Take 1 tablet by mouth 2 (Two) Times a Day.  Dispense: 180 tablet; Refill: 3    7. Chronic right-sided thoracic back pain  -     Diclofenac Sodium (VOLTAREN) 1 % gel gel; Apply  topically to the appropriate area as directed 4 (Four) Times a Day.  Dispense: 350 g; Refill: 1    8. Chest pain, unspecified type  -     Stress test with myocardial perfusion; Future    Other orders  -     Fluzone High-Dose 65+yrs (5222-8160)        BMI is >= 30 and <35. (Class 1 Obesity). The following options were offered after discussion;: weight loss educational material (shared in after visit summary), exercise counseling/recommendations, and nutrition counseling/recommendations      Orders Placed Today:     New Medications Ordered This Visit   Medications    albuterol (PROVENTIL) (2.5 MG/3ML) 0.083% nebulizer solution     Sig: Take 2.5 mg by nebulization Every 4 (Four) Hours As Needed for Wheezing.     Dispense:  270 mL     Refill:  1    alendronate (FOSAMAX) 70 MG tablet     Sig: Take 1 tablet by mouth Every 7 (Seven) Days.     Dispense:  4 tablet     Refill:  12    allopurinol (ZYLOPRIM) 300 MG tablet     Sig: Take 1 tablet by mouth Daily.     Dispense:  90 tablet     Refill:  3    amLODIPine (NORVASC) 5 MG tablet     Sig: Take 1 tablet by mouth Daily.     Dispense:  90 tablet     Refill:  1     New Dosage    atenolol (TENORMIN) 50 MG tablet     Sig: Take 1 tablet by mouth 2 (Two) Times a Day.     Dispense:  180 tablet     Refill:  3    Bretri Aerosphere 160-9-4.8  MCG/ACT aerosol inhaler     Sig: Inhale 2 puffs 2 (Two) Times a Day.     Dispense:  10.7 g     Refill:  5    Diclofenac Sodium (VOLTAREN) 1 % gel gel     Sig: Apply  topically to the appropriate area as directed 4 (Four) Times a Day.     Dispense:  350 g     Refill:  1        Management Plan:     An After Visit Summary was printed and given to the patient at discharge.    Follow-up: Return in about 4 weeks (around 11/2/2023) for Recheck.    Pee Mon DO 10/5/2023 12:09 EDT  This note was electronically signed.

## 2023-10-05 NOTE — ASSESSMENT & PLAN NOTE
Her blood pressure at home is for the most part is pretty good.  Still elevated here today.  Recheck her blood pressure 1 more time.

## 2023-11-06 ENCOUNTER — HOSPITAL ENCOUNTER (OUTPATIENT)
Dept: GENERAL RADIOLOGY | Facility: HOSPITAL | Age: 88
Discharge: HOME | End: 2023-11-06
Admitting: FAMILY MEDICINE
Payer: MEDICARE

## 2023-11-06 ENCOUNTER — OFFICE VISIT (OUTPATIENT)
Dept: FAMILY MEDICINE CLINIC | Facility: CLINIC | Age: 88
End: 2023-11-06
Payer: MEDICARE

## 2023-11-06 VITALS
HEIGHT: 62 IN | WEIGHT: 171 LBS | BODY MASS INDEX: 31.47 KG/M2 | TEMPERATURE: 98.9 F | DIASTOLIC BLOOD PRESSURE: 81 MMHG | HEART RATE: 76 BPM | SYSTOLIC BLOOD PRESSURE: 138 MMHG

## 2023-11-06 DIAGNOSIS — J40 BRONCHITIS: ICD-10-CM

## 2023-11-06 DIAGNOSIS — J43.8 OTHER EMPHYSEMA: Primary | ICD-10-CM

## 2023-11-06 DIAGNOSIS — J43.8 OTHER EMPHYSEMA: ICD-10-CM

## 2023-11-06 PROCEDURE — 99213 OFFICE O/P EST LOW 20 MIN: CPT | Performed by: FAMILY MEDICINE

## 2023-11-06 PROCEDURE — 71046 X-RAY EXAM CHEST 2 VIEWS: CPT

## 2023-11-06 RX ORDER — PREDNISONE 10 MG/1
TABLET ORAL
Qty: 30 TABLET | Refills: 0 | Status: SHIPPED | OUTPATIENT
Start: 2023-11-06

## 2023-11-06 RX ORDER — AZITHROMYCIN 250 MG/1
TABLET, FILM COATED ORAL
Qty: 6 TABLET | Refills: 0 | Status: SHIPPED | OUTPATIENT
Start: 2023-11-06

## 2023-11-06 NOTE — PROGRESS NOTES
Chief Complaint   Patient presents with    Follow-up     4 week  pain in left upper extremity   C/O  congestion , does not want covid /flu swab     Hypertension     Pain in left upper extremity         Subjective     Shayy Wasserman  has a past medical history of Anxiety disorder (11/12/2018), Arthritis, COPD (chronic obstructive pulmonary disease), Dependent edema (11/28/2017), Gout, Osteoporosis, Primary osteoarthritis of left knee (05/24/2018), Primary osteoarthritis of one hip, right (12/12/2017), and Primary osteoarthritis of right hip (12/12/2017).    URI-she states that she has had some head congestion postnasal drainage cough and wheezing now for about 3 days.  She denies any fever chills or shortness of breath.  She did use some over-the-counter Tylenol and Flonase nasal spray without any relief.        PHQ-2 Depression Screening  Little interest or pleasure in doing things?     Feeling down, depressed, or hopeless?     PHQ-2 Total Score     PHQ-9 Depression Screening  Little interest or pleasure in doing things?     Feeling down, depressed, or hopeless?     Trouble falling or staying asleep, or sleeping too much?     Feeling tired or having little energy?     Poor appetite or overeating?     Feeling bad about yourself - or that you are a failure or have let yourself or your family down?     Trouble concentrating on things, such as reading the newspaper or watching television?     Moving or speaking so slowly that other people could have noticed? Or the opposite - being so fidgety or restless that you have been moving around a lot more than usual?     Thoughts that you would be better off dead, or of hurting yourself in some way?     PHQ-9 Total Score     If you checked off any problems, how difficult have these problems made it for you to do your work, take care of things at home, or get along with other people?       Allergies   Allergen Reactions    Bactrim [Sulfamethoxazole-Trimethoprim] Unknown -  Low Severity    Griseofulvin Ultramicrosize [Griseofulvin] Unknown - Low Severity       Prior to Admission medications    Medication Sig Start Date End Date Taking? Authorizing Provider   albuterol (PROVENTIL) (2.5 MG/3ML) 0.083% nebulizer solution Take 2.5 mg by nebulization Every 4 (Four) Hours As Needed for Wheezing. 10/5/23  Yes Pee Mon DO   alendronate (FOSAMAX) 70 MG tablet Take 1 tablet by mouth Every 7 (Seven) Days. 10/5/23  Yes Pee Mon DO   allopurinol (ZYLOPRIM) 300 MG tablet Take 1 tablet by mouth Daily. 10/5/23  Yes Pee Mon DO   amLODIPine (NORVASC) 5 MG tablet Take 1 tablet by mouth Daily. 10/5/23  Yes Pee Mon DO   atenolol (TENORMIN) 50 MG tablet Take 1 tablet by mouth 2 (Two) Times a Day. 10/5/23  Yes Pee Mon DO   Blood Glucose Monitoring Suppl (ONE TOUCH ULTRA 2) w/Device kit USE AS DIRECTED TO TEST BLOOD SUGAR EVERY MORNING FASTING AND RECORD 8/10/23  Yes Provider, MD Wilber Contrerasi Aerosphere 160-9-4.8 MCG/ACT aerosol inhaler Inhale 2 puffs 2 (Two) Times a Day. 10/5/23  Yes Pee Mon DO   Diclofenac Sodium (VOLTAREN) 1 % gel gel Apply  topically to the appropriate area as directed 4 (Four) Times a Day. 10/5/23  Yes Pee Mon DO   glucose blood (Glucose Meter Test) test strip check blood sugar every AM fasting and record. (Dx: E11.9) 8/10/23  Yes Pee Mon DO   glucose monitor monitoring kit check blood sugar every AM fasting and record. (Dx: E11.9) 8/10/23  Yes Pee Mon DO   Lancets 33G misc 33 g Daily for 100 days. 8/10/23 11/18/23 Yes Pee Mon DO        Patient Active Problem List   Diagnosis    Other emphysema    Bronchitis    Essential (primary) hypertension    Gout    Hyperlipidemia    Osteoarthritis    Stage 3 chronic kidney disease    Osteoporosis    Localized edema    Prediabetes    Pain of left upper extremity        Past  "Surgical History:   Procedure Laterality Date    BLADDER REPAIR      COLONOSCOPY  2016    EYE SURGERY      implant- yes    HYSTERECTOMY      INTRAOCULAR LENS INSERTION      yes    JOINT REPLACEMENT      Surgery    KNEE SURGERY Right     knee repair    KNEE SURGERY Right     repair    OTHER SURGICAL HISTORY      Artificial joints/limbs    OTHER SURGICAL HISTORY      Artificial Joints/Limbs    OTHER SURGICAL HISTORY      Joint surgery    TOTAL KNEE ARTHROPLASTY Right        Social History     Socioeconomic History    Marital status:    Tobacco Use    Smoking status: Former     Types: Cigarettes     Start date:      Quit date: 1970     Years since quittin.8    Smokeless tobacco: Never    Tobacco comments:     started at age 16- stopped at age 35   Vaping Use    Vaping Use: Never used   Substance and Sexual Activity    Alcohol use: Never     Comment: 2019- 1/15/2019 does not drink     Drug use: Never       Family History   Problem Relation Age of Onset    Colon cancer Father        Family history, surgical history, past medical history, Allergies and meds reviewed with patient today and updated in [facility] EMR.     ROS:  Review of Systems   Constitutional:  Negative for chills and fever.   HENT:  Positive for congestion and postnasal drip. Negative for rhinorrhea.    Respiratory:  Positive for cough and wheezing. Negative for chest tightness and shortness of breath.        OBJECTIVE:  Vitals:    23 1444   BP: 138/81   BP Location: Left arm   Patient Position: Sitting   Pulse: 76   Temp: 98.9 °F (37.2 °C)   Weight: 77.6 kg (171 lb)   Height: 157.5 cm (62.01\")     No results found.   Body mass index is 31.27 kg/m².  No LMP recorded. Patient is postmenopausal.    Physical Exam  Vitals and nursing note reviewed.   Constitutional:       General: She is not in acute distress.     Appearance: Normal appearance. She is obese.   HENT:      Head: Normocephalic.      Right Ear: Tympanic membrane, ear canal " and external ear normal.      Left Ear: Tympanic membrane, ear canal and external ear normal.      Nose: Nose normal.      Mouth/Throat:      Mouth: Mucous membranes are moist.      Pharynx: Oropharynx is clear.   Eyes:      General: No scleral icterus.     Conjunctiva/sclera: Conjunctivae normal.      Pupils: Pupils are equal, round, and reactive to light.   Cardiovascular:      Rate and Rhythm: Regular rhythm. Tachycardia present.      Pulses: Normal pulses.      Heart sounds: Normal heart sounds. No murmur heard.  Pulmonary:      Effort: Pulmonary effort is normal.      Breath sounds: Examination of the right-lower field reveals rhonchi. Wheezing and rhonchi present. No rales.   Musculoskeletal:      Cervical back: Neck supple. No rigidity or tenderness.   Lymphadenopathy:      Cervical: No cervical adenopathy.   Skin:     General: Skin is warm and dry.      Coloration: Skin is not jaundiced.      Findings: No rash.   Neurological:      General: No focal deficit present.      Mental Status: She is alert and oriented to person, place, and time.   Psychiatric:         Mood and Affect: Mood normal.         Thought Content: Thought content normal.         Judgment: Judgment normal.         Procedures    No visits with results within 30 Day(s) from this visit.   Latest known visit with results is:   Admission on 08/22/2023, Discharged on 08/22/2023   Component Date Value Ref Range Status    Glucose 08/22/2023 125 (H)  65 - 99 mg/dL Final    BUN 08/22/2023 19  8 - 23 mg/dL Final    Creatinine 08/22/2023 1.01 (H)  0.57 - 1.00 mg/dL Final    Sodium 08/22/2023 138  136 - 145 mmol/L Final    Potassium 08/22/2023 4.1  3.5 - 5.2 mmol/L Final    Chloride 08/22/2023 101  98 - 107 mmol/L Final    CO2 08/22/2023 26.4  22.0 - 29.0 mmol/L Final    Calcium 08/22/2023 9.6  8.6 - 10.5 mg/dL Final    Total Protein 08/22/2023 7.8  6.0 - 8.5 g/dL Final    Albumin 08/22/2023 4.5  3.5 - 5.2 g/dL Final    ALT (SGPT) 08/22/2023 6  1 - 33  U/L Final    AST (SGOT) 08/22/2023 15  1 - 32 U/L Final    Alkaline Phosphatase 08/22/2023 64  39 - 117 U/L Final    Total Bilirubin 08/22/2023 0.5  0.0 - 1.2 mg/dL Final    Globulin 08/22/2023 3.3  gm/dL Final    A/G Ratio 08/22/2023 1.4  g/dL Final    BUN/Creatinine Ratio 08/22/2023 18.8  7.0 - 25.0 Final    Anion Gap 08/22/2023 10.6  5.0 - 15.0 mmol/L Final    eGFR 08/22/2023 54.0 (L)  >60.0 mL/min/1.73 Final    Lipase 08/22/2023 35  13 - 60 U/L Final    Color, UA 08/22/2023 Yellow  Yellow, Straw Final    Appearance, UA 08/22/2023 Clear  Clear Final    pH, UA 08/22/2023 7.0  5.0 - 8.0 Final    Specific Gravity, UA 08/22/2023 1.007  1.005 - 1.030 Final    Glucose, UA 08/22/2023 Negative  Negative Final    Ketones, UA 08/22/2023 Negative  Negative Final    Bilirubin, UA 08/22/2023 Negative  Negative Final    Blood, UA 08/22/2023 Negative  Negative Final    Protein, UA 08/22/2023 Negative  Negative Final    Leuk Esterase, UA 08/22/2023 Small (1+) (A)  Negative Final    Nitrite, UA 08/22/2023 Negative  Negative Final    Urobilinogen, UA 08/22/2023 0.2 E.U./dL  0.2 - 1.0 E.U./dL Final    Extra Tube 08/22/2023 Hold for add-ons.   Final    Auto resulted.    Extra Tube 08/22/2023 hold for add-on   Final    Auto resulted    Extra Tube 08/22/2023 Hold for add-ons.   Final    Auto resulted.    Extra Tube 08/22/2023 Hold for add-ons.   Final    Auto resulted    WBC 08/22/2023 6.08  3.40 - 10.80 10*3/mm3 Final    RBC 08/22/2023 4.31  3.77 - 5.28 10*6/mm3 Final    Hemoglobin 08/22/2023 13.6  12.0 - 15.9 g/dL Final    Hematocrit 08/22/2023 39.5  34.0 - 46.6 % Final    MCV 08/22/2023 91.6  79.0 - 97.0 fL Final    MCH 08/22/2023 31.6  26.6 - 33.0 pg Final    MCHC 08/22/2023 34.4  31.5 - 35.7 g/dL Final    RDW 08/22/2023 13.8  12.3 - 15.4 % Final    RDW-SD 08/22/2023 46.1  37.0 - 54.0 fl Final    MPV 08/22/2023 9.2  6.0 - 12.0 fL Final    Platelets 08/22/2023 188  140 - 450 10*3/mm3 Final    Neutrophil % 08/22/2023 62.5  42.7 -  76.0 % Final    Lymphocyte % 08/22/2023 26.3  19.6 - 45.3 % Final    Monocyte % 08/22/2023 4.9 (L)  5.0 - 12.0 % Final    Eosinophil % 08/22/2023 5.3  0.3 - 6.2 % Final    Basophil % 08/22/2023 0.8  0.0 - 1.5 % Final    Immature Grans % 08/22/2023 0.2  0.0 - 0.5 % Final    Neutrophils, Absolute 08/22/2023 3.80  1.70 - 7.00 10*3/mm3 Final    Lymphocytes, Absolute 08/22/2023 1.60  0.70 - 3.10 10*3/mm3 Final    Monocytes, Absolute 08/22/2023 0.30  0.10 - 0.90 10*3/mm3 Final    Eosinophils, Absolute 08/22/2023 0.32  0.00 - 0.40 10*3/mm3 Final    Basophils, Absolute 08/22/2023 0.05  0.00 - 0.20 10*3/mm3 Final    Immature Grans, Absolute 08/22/2023 0.01  0.00 - 0.05 10*3/mm3 Final    nRBC 08/22/2023 0.0  0.0 - 0.2 /100 WBC Final    RBC, UA 08/22/2023 0-2 (A)  None Seen /HPF Final    WBC, UA 08/22/2023 3-5 (A)  None Seen /HPF Final    Bacteria, UA 08/22/2023 None Seen  None Seen /HPF Final    Squamous Epithelial Cells, UA 08/22/2023 0-2  None Seen, 0-2 /HPF Final    Hyaline Casts, UA 08/22/2023 None Seen  None Seen /LPF Final    Methodology 08/22/2023 Automated Microscopy   Final       ASSESSMENT/ PLAN:    Diagnoses and all orders for this visit:    1. Other emphysema (Primary)  Assessment & Plan:  She appears to have more of an acute exacerbation of her COPD.  She does have a lot of rhonchi in her right base.  Therefore we will get a chest x-ray give her a burst and taper prednisone and add an antibiotic.      Orders:  -     XR Chest PA & Lateral; Future    2. Bronchitis  Assessment & Plan:  This may just be a upper respiratory/bronchitis.  We will get a chest x-ray to rule out pneumonia we will empirically treat her in the meantime.    Orders:  -     XR Chest PA & Lateral; Future    Other orders  -     predniSONE (DELTASONE) 10 MG tablet; 4 tabs daily for 3 days, then 3 tabs daily for 3 days, then 2 tabs daily for 3 days, then 1 tab daily for 3 days, then STOP.  Dispense: 30 tablet; Refill: 0  -     azithromycin  (Zithromax Z-Erick) 250 MG tablet; Take 2 tablets by mouth on day 1, then 1 tablet daily on days 2-5  Dispense: 6 tablet; Refill: 0               Orders Placed Today:     New Medications Ordered This Visit   Medications    predniSONE (DELTASONE) 10 MG tablet     Si tabs daily for 3 days, then 3 tabs daily for 3 days, then 2 tabs daily for 3 days, then 1 tab daily for 3 days, then STOP.     Dispense:  30 tablet     Refill:  0    azithromycin (Zithromax Z-Erick) 250 MG tablet     Sig: Take 2 tablets by mouth on day 1, then 1 tablet daily on days 2-5     Dispense:  6 tablet     Refill:  0        Management Plan:     An After Visit Summary was printed and given to the patient at discharge.    Follow-up: Return for Recheck.    Pee Mon DO 2023 14:58 EST  This note was electronically signed.

## 2023-11-06 NOTE — ASSESSMENT & PLAN NOTE
This may just be a upper respiratory/bronchitis.  We will get a chest x-ray to rule out pneumonia we will empirically treat her in the meantime.

## 2023-11-06 NOTE — ASSESSMENT & PLAN NOTE
She appears to have more of an acute exacerbation of her COPD.  She does have a lot of rhonchi in her right base.  Therefore we will get a chest x-ray give her a burst and taper prednisone and add an antibiotic.

## 2023-11-13 ENCOUNTER — TELEPHONE (OUTPATIENT)
Dept: FAMILY MEDICINE CLINIC | Facility: CLINIC | Age: 88
End: 2023-11-13
Payer: MEDICARE

## 2023-11-13 NOTE — TELEPHONE ENCOUNTER
Caller: LIATSARAI    Relationship: Emergency Contact    Best call back number: [phone]     What medication are you requesting: COUGH MEDICATION    What are your current symptoms: COUGH    How long have you been experiencing symptoms:     Have you had these symptoms before:    [x] Yes  [] No    Have you been treated for these symptoms before:   [] Yes  [] No    If a prescription is needed, what is your preferred pharmacy and phone number: [facility] [place], [place] - [address] - [phone] [facility] [phone]      Additional notes:PATIENTS DAUGHTER CALLED STATING THE PATIENT HAS A BAD COUGH. PATIENT STATES SOMETHING TO HELP WITH THE COUGH WOULD BE HELPFUL.

## 2023-11-13 NOTE — TELEPHONE ENCOUNTER
CALLER ON VERBAL    Caller: SARAI JOSUE    Relationship to patient: Emergency Contact    Best call back number: [phone]     Patient is needing: PATIENTS DAUGHTER STATES THAT THE PATIENT HAS MORE MEMORY LOSS LATELY.    PATIENTS DAUGHTER SAID A STRESS TEST IS COMING UP AND SHE WOULD LIKE TO KNOW WITH THE PATIENT BEING SICK SHOULD THEY CANCEL OR RESCHEDULE. PATIENTS DAUGHTER SAID THAT SHE HEARD THEY WOULD GIVE THE PATIENT A SHOT THAT WOULD INCREASE HER HEART RATE AND SHE IS NOT SURE THAT WOULD BE A GOOD IDEA FOR THE PATIENT. PATIENT'S DAUGHTER SAID THAT THE PATIENT WOULD FREAK OUT.    PATIENTS DAUGHTER IS ASKING FOR A CALL BACK TO ADVISE.

## 2023-11-14 NOTE — TELEPHONE ENCOUNTER
Called daughter and notified- states she is still concered about mother having it done and might cancel.

## 2024-01-04 RX ORDER — IRBESARTAN 75 MG/1
75 TABLET ORAL DAILY
Qty: 90 TABLET | Refills: 0 | Status: SHIPPED | OUTPATIENT
Start: 2024-01-04

## 2024-02-14 ENCOUNTER — TELEPHONE (OUTPATIENT)
Dept: FAMILY MEDICINE CLINIC | Facility: CLINIC | Age: 89
End: 2024-02-14
Payer: MEDICARE

## 2024-03-15 ENCOUNTER — OFFICE VISIT (OUTPATIENT)
Dept: FAMILY MEDICINE CLINIC | Facility: CLINIC | Age: 89
End: 2024-03-15
Payer: MEDICARE

## 2024-03-15 VITALS
HEIGHT: 63 IN | SYSTOLIC BLOOD PRESSURE: 108 MMHG | OXYGEN SATURATION: 95 % | HEART RATE: 69 BPM | TEMPERATURE: 96.6 F | DIASTOLIC BLOOD PRESSURE: 71 MMHG | WEIGHT: 167 LBS | BODY MASS INDEX: 29.59 KG/M2

## 2024-03-15 DIAGNOSIS — F45.0 ANXIETY WITH SOMATIZATION: Primary | ICD-10-CM

## 2024-03-15 DIAGNOSIS — E78.00 PURE HYPERCHOLESTEROLEMIA: ICD-10-CM

## 2024-03-15 DIAGNOSIS — F41.9 ANXIETY WITH SOMATIZATION: Primary | ICD-10-CM

## 2024-03-15 DIAGNOSIS — N18.31 STAGE 3A CHRONIC KIDNEY DISEASE: ICD-10-CM

## 2024-03-15 DIAGNOSIS — I10 ESSENTIAL (PRIMARY) HYPERTENSION: ICD-10-CM

## 2024-03-15 DIAGNOSIS — R73.03 PREDIABETES: ICD-10-CM

## 2024-03-15 NOTE — PROGRESS NOTES
Chief Complaint   Patient presents with    GI Problem        Subjective     Shayy Wasserman  has a past medical history of Anxiety disorder (11/12/2018), Arthritis, COPD (chronic obstructive pulmonary disease), Dependent edema (11/28/2017), Gout, Osteoporosis, Primary osteoarthritis of left knee (05/24/2018), Primary osteoarthritis of one hip, right (12/12/2017), and Primary osteoarthritis of right hip (12/12/2017).    GI distress-her daughter states that over the last couple months she has some GI distress.  She complains of some diffuse abdominal discomfort anytime she has to leave the house.  It does not matter where she is going shortly before hand she states her abdomen hurts.  This is not associated with any diarrhea constipation nor nausea or vomiting.  As soon as she realizes she does not have to leave the house and commits to that her symptoms improved.        PHQ-2 Depression Screening  Little interest or pleasure in doing things?     Feeling down, depressed, or hopeless?     PHQ-2 Total Score     PHQ-9 Depression Screening  Little interest or pleasure in doing things?     Feeling down, depressed, or hopeless?     Trouble falling or staying asleep, or sleeping too much?     Feeling tired or having little energy?     Poor appetite or overeating?     Feeling bad about yourself - or that you are a failure or have let yourself or your family down?     Trouble concentrating on things, such as reading the newspaper or watching television?     Moving or speaking so slowly that other people could have noticed? Or the opposite - being so fidgety or restless that you have been moving around a lot more than usual?     Thoughts that you would be better off dead, or of hurting yourself in some way?     PHQ-9 Total Score     If you checked off any problems, how difficult have these problems made it for you to do your work, take care of things at home, or get along with other people?       Allergies   Allergen  Reactions    Bactrim [Sulfamethoxazole-Trimethoprim] Unknown - Low Severity    Griseofulvin Ultramicrosize [Griseofulvin] Unknown - Low Severity       Prior to Admission medications    Medication Sig Start Date End Date Taking? Authorizing Provider   albuterol (PROVENTIL) (2.5 MG/3ML) 0.083% nebulizer solution Take 2.5 mg by nebulization Every 4 (Four) Hours As Needed for Wheezing. 10/5/23  Yes Pee Mon DO   alendronate (FOSAMAX) 70 MG tablet Take 1 tablet by mouth Every 7 (Seven) Days. 10/5/23  Yes Pee Mon DO   allopurinol (ZYLOPRIM) 300 MG tablet Take 1 tablet by mouth Daily. 10/5/23  Yes Pee Mon DO   amLODIPine (NORVASC) 5 MG tablet Take 1 tablet by mouth Daily. 10/5/23  Yes Pee Mon DO   atenolol (TENORMIN) 50 MG tablet Take 1 tablet by mouth 2 (Two) Times a Day. 10/5/23  Yes Pee Mon DO   Blood Glucose Monitoring Suppl (ONE TOUCH ULTRA 2) w/Device kit USE AS DIRECTED TO TEST BLOOD SUGAR EVERY MORNING FASTING AND RECORD 8/10/23  Yes Provider, MD Jeffery Contreras Aerosphere 160-9-4.8 MCG/ACT aerosol inhaler Inhale 2 puffs 2 (Two) Times a Day. 10/5/23  Yes Pee Mon DO   Diclofenac Sodium (VOLTAREN) 1 % gel gel Apply  topically to the appropriate area as directed 4 (Four) Times a Day. 10/5/23  Yes Pee Mon DO   glucose blood (Glucose Meter Test) test strip check blood sugar every AM fasting and record. (Dx: E11.9) 8/10/23  Yes Pee Mon DO   glucose monitor monitoring kit check blood sugar every AM fasting and record. (Dx: E11.9) 8/10/23  Yes Pee Mon DO   benzonatate (TESSALON) 200 MG capsule Take 1 capsule by mouth 3 (Three) Times a Day As Needed for Cough. 11/30/23 3/15/24  Viviana iVrgen APRN   irbesartan (AVAPRO) 75 MG tablet TAKE 1 TABLET BY MOUTH DAILY  Patient not taking: Reported on 3/15/2024 1/4/24 3/15/24  Pee Mon, DO    methylPREDNISolone (MEDROL) 4 MG dose pack Take as directed on package instructions. 11/30/23 3/15/24  Viviana Virgen APRN        Patient Active Problem List   Diagnosis    Other emphysema    Bronchitis    Essential (primary) hypertension    Gout    Hyperlipidemia    Osteoarthritis    Stage 3 chronic kidney disease    Osteoporosis    Localized edema    Prediabetes    Pain of left upper extremity    Anxiety with somatization        Past Surgical History:   Procedure Laterality Date    BLADDER REPAIR      COLONOSCOPY  2016    EYE SURGERY      implant- yes    HYSTERECTOMY      INTRAOCULAR LENS INSERTION      yes    JOINT REPLACEMENT      Surgery    KNEE SURGERY Right     knee repair    KNEE SURGERY Right     repair    OTHER SURGICAL HISTORY      Artificial joints/limbs    OTHER SURGICAL HISTORY      Artificial Joints/Limbs    OTHER SURGICAL HISTORY      Joint surgery    TOTAL KNEE ARTHROPLASTY Right        Social History     Socioeconomic History    Marital status:    Tobacco Use    Smoking status: Former     Current packs/day: 0.00     Types: Cigarettes     Start date:      Quit date:      Years since quittin.2    Smokeless tobacco: Never    Tobacco comments:     started at age 16- stopped at age 35   Vaping Use    Vaping status: Never Used   Substance and Sexual Activity    Alcohol use: Never     Comment: 2019- 1/15/2019 does not drink     Drug use: Never       Family History   Problem Relation Age of Onset    Colon cancer Father        Family history, surgical history, past medical history, Allergies and meds reviewed with patient today and updated in [facility] EMR.     ROS:  Review of Systems   Constitutional:  Negative for fatigue.   Gastrointestinal:  Positive for abdominal pain. Negative for blood in stool, constipation, diarrhea, nausea and vomiting.   Psychiatric/Behavioral:  The patient is nervous/anxious.        OBJECTIVE:  Vitals:    03/15/24 1031   BP: 108/71   BP Location:  "Left arm   Patient Position: Sitting   Cuff Size: Adult   Pulse: 69   Temp: 96.6 °F (35.9 °C)   TempSrc: Temporal   SpO2: 95%   Weight: 75.8 kg (167 lb)   Height: 160 cm (63\")     No results found.   Body mass index is 29.58 kg/m².  No LMP recorded. Patient is postmenopausal.    The ASCVD Risk score (Adam DK, et al., 2019) failed to calculate for the following reasons:    The 2019 ASCVD risk score is only valid for ages 40 to 79     Physical Exam  Vitals and nursing note reviewed.   Constitutional:       General: She is not in acute distress.     Appearance: Normal appearance. She is normal weight.   Cardiovascular:      Rate and Rhythm: Normal rate and regular rhythm.      Heart sounds: Normal heart sounds. No murmur heard.  Pulmonary:      Effort: Pulmonary effort is normal.      Breath sounds: Normal breath sounds. No wheezing or rhonchi.   Abdominal:      General: Abdomen is flat. Bowel sounds are normal.      Palpations: Abdomen is soft. There is no mass.      Tenderness: There is no abdominal tenderness.   Neurological:      Mental Status: She is alert.         Procedures    No visits with results within 30 Day(s) from this visit.   Latest known visit with results is:   Admission on 11/30/2023, Discharged on 11/30/2023   Component Date Value Ref Range Status    SARS Antigen 11/30/2023 Detected (A)  Not Detected, Presumptive Negative Final    Internal Control 11/30/2023 Passed  Passed Final    Lot Number 11/30/2023 [id number]   Final    Expiration Date 11/30/2023 110,124   Final    Rapid Influenza A Ag 11/30/2023 Negative  Negative Final    Rapid Influenza B Ag 11/30/2023 Negative  Negative Final    Internal Control 11/30/2023 Passed  Passed Final    Lot Number 11/30/2023 [id number]   Final    Expiration Date 11/30/2023 110,124   Final       ASSESSMENT/ PLAN:    Diagnoses and all orders for this visit:    1. Anxiety with somatization (Primary)  Assessment & Plan:  Her anxiety has to do with leaving her house.  " When she has committed to not leaving her symptoms resolved completely.  She has no other concerning signs or symptoms related to this.  Typically does not matter where she is going even to visit friends or family members or to the store if she has the symptoms.  Encouraged her to break this pattern she needs to go and do it realizing that this is more somatotype station related to her anxiety.  I am apprehensive about giving her any anxiety lytics   such as lorazepam alprazolam or diazepam due to her age and excessive sedation causing increased confusion or unsteadiness.        2. Pure hypercholesterolemia  -     Comprehensive Metabolic Panel  -     Lipid Panel    3. Essential (primary) hypertension  -     Comprehensive Metabolic Panel  -     Lipid Panel    4. Prediabetes  -     Hemoglobin A1c  -     Microalbumin / Creatinine Urine Ratio - Urine, Clean Catch    5. Stage 3a chronic kidney disease  -     Microalbumin / Creatinine Urine Ratio - Urine, Clean Catch  -     CBC (No Diff)               Orders Placed Today:     No orders of the defined types were placed in this encounter.       Management Plan:     An After Visit Summary was printed and given to the patient at discharge.    Follow-up: Return in about 6 weeks (around 4/26/2024) for Recheck.    Pee Mon DO 3/15/2024 11:07 EDT  This note was electronically signed.

## 2024-03-15 NOTE — ASSESSMENT & PLAN NOTE
Her anxiety has to do with leaving her house.  When she has committed to not leaving her symptoms resolved completely.  She has no other concerning signs or symptoms related to this.  Typically does not matter where she is going even to visit friends or family members or to the store if she has the symptoms.  Encouraged her to break this pattern she needs to go and do it realizing that this is more somatotype station related to her anxiety.  I am apprehensive about giving her any anxiety lytics   such as lorazepam alprazolam or diazepam due to her age and excessive sedation causing increased confusion or unsteadiness.

## 2024-04-26 ENCOUNTER — LAB (OUTPATIENT)
Dept: LAB | Facility: HOSPITAL | Age: 89
End: 2024-04-26
Payer: MEDICARE

## 2024-04-26 LAB
ALBUMIN SERPL-MCNC: 4.3 G/DL (ref 3.5–5.2)
ALBUMIN UR-MCNC: <1.2 MG/DL
ALBUMIN/GLOB SERPL: 1.5 G/DL
ALP SERPL-CCNC: 63 U/L (ref 39–117)
ALT SERPL W P-5'-P-CCNC: 6 U/L (ref 1–33)
ANION GAP SERPL CALCULATED.3IONS-SCNC: 14 MMOL/L (ref 5–15)
AST SERPL-CCNC: 14 U/L (ref 1–32)
BILIRUB SERPL-MCNC: 0.6 MG/DL (ref 0–1.2)
BUN SERPL-MCNC: 18 MG/DL (ref 8–23)
BUN/CREAT SERPL: 14.5 (ref 7–25)
CALCIUM SPEC-SCNC: 9.4 MG/DL (ref 8.6–10.5)
CHLORIDE SERPL-SCNC: 101 MMOL/L (ref 98–107)
CHOLEST SERPL-MCNC: 200 MG/DL (ref 0–200)
CO2 SERPL-SCNC: 24 MMOL/L (ref 22–29)
CREAT SERPL-MCNC: 1.24 MG/DL (ref 0.57–1)
CREAT UR-MCNC: 70.1 MG/DL
DEPRECATED RDW RBC AUTO: 48 FL (ref 37–54)
EGFRCR SERPLBLD CKD-EPI 2021: 41.9 ML/MIN/1.73
ERYTHROCYTE [DISTWIDTH] IN BLOOD BY AUTOMATED COUNT: 14 % (ref 12.3–15.4)
GLOBULIN UR ELPH-MCNC: 2.9 GM/DL
GLUCOSE SERPL-MCNC: 110 MG/DL (ref 65–99)
HBA1C MFR BLD: 5.6 % (ref 4.8–5.6)
HCT VFR BLD AUTO: 40.2 % (ref 34–46.6)
HDLC SERPL-MCNC: 47 MG/DL (ref 40–60)
HGB BLD-MCNC: 13.3 G/DL (ref 12–15.9)
LDLC SERPL CALC-MCNC: 132 MG/DL (ref 0–100)
LDLC/HDLC SERPL: 2.76 {RATIO}
MCH RBC QN AUTO: 31.1 PG (ref 26.6–33)
MCHC RBC AUTO-ENTMCNC: 33.1 G/DL (ref 31.5–35.7)
MCV RBC AUTO: 94.1 FL (ref 79–97)
MICROALBUMIN/CREAT UR: NORMAL MG/G{CREAT}
PLATELET # BLD AUTO: 215 10*3/MM3 (ref 140–450)
PMV BLD AUTO: 10.1 FL (ref 6–12)
POTASSIUM SERPL-SCNC: 4.1 MMOL/L (ref 3.5–5.2)
PROT SERPL-MCNC: 7.2 G/DL (ref 6–8.5)
RBC # BLD AUTO: 4.27 10*6/MM3 (ref 3.77–5.28)
SODIUM SERPL-SCNC: 139 MMOL/L (ref 136–145)
TRIGL SERPL-MCNC: 116 MG/DL (ref 0–150)
VLDLC SERPL-MCNC: 21 MG/DL (ref 5–40)
WBC NRBC COR # BLD AUTO: 6.41 10*3/MM3 (ref 3.4–10.8)

## 2024-04-26 PROCEDURE — 80061 LIPID PANEL: CPT | Performed by: FAMILY MEDICINE

## 2024-04-26 PROCEDURE — 82570 ASSAY OF URINE CREATININE: CPT | Performed by: FAMILY MEDICINE

## 2024-04-26 PROCEDURE — 80053 COMPREHEN METABOLIC PANEL: CPT | Performed by: FAMILY MEDICINE

## 2024-04-26 PROCEDURE — 83036 HEMOGLOBIN GLYCOSYLATED A1C: CPT | Performed by: FAMILY MEDICINE

## 2024-04-26 PROCEDURE — 82043 UR ALBUMIN QUANTITATIVE: CPT | Performed by: FAMILY MEDICINE

## 2024-04-26 PROCEDURE — 85027 COMPLETE CBC AUTOMATED: CPT | Performed by: FAMILY MEDICINE

## 2024-04-30 ENCOUNTER — OFFICE VISIT (OUTPATIENT)
Dept: FAMILY MEDICINE CLINIC | Facility: CLINIC | Age: 89
End: 2024-04-30
Payer: MEDICARE

## 2024-04-30 VITALS
SYSTOLIC BLOOD PRESSURE: 120 MMHG | TEMPERATURE: 98 F | HEART RATE: 68 BPM | DIASTOLIC BLOOD PRESSURE: 74 MMHG | WEIGHT: 162 LBS | OXYGEN SATURATION: 93 % | BODY MASS INDEX: 28.7 KG/M2 | HEIGHT: 63 IN

## 2024-04-30 DIAGNOSIS — F41.9 ANXIETY WITH SOMATIZATION: ICD-10-CM

## 2024-04-30 DIAGNOSIS — E78.00 PURE HYPERCHOLESTEROLEMIA: Primary | ICD-10-CM

## 2024-04-30 DIAGNOSIS — I10 ESSENTIAL (PRIMARY) HYPERTENSION: ICD-10-CM

## 2024-04-30 DIAGNOSIS — F45.0 ANXIETY WITH SOMATIZATION: ICD-10-CM

## 2024-04-30 PROCEDURE — 99213 OFFICE O/P EST LOW 20 MIN: CPT | Performed by: FAMILY MEDICINE

## 2024-04-30 RX ORDER — LORATADINE 10 MG/1
10 TABLET ORAL DAILY
Qty: 90 TABLET | Refills: 1 | Status: SHIPPED | OUTPATIENT
Start: 2024-04-30

## 2024-04-30 NOTE — ASSESSMENT & PLAN NOTE
Her most recent A1c was in the normal range.  She will continue to work on moderating her carbohydrates.

## 2024-04-30 NOTE — ASSESSMENT & PLAN NOTE
Her blood pressure was good here today and her recent labs were good except for her kidney function.  I think this is more related to inadequate hydration.

## 2024-04-30 NOTE — PROGRESS NOTES
Chief Complaint   Patient presents with    Follow-up     6 week Anxiety with somatization        Subjective     Shayy Wasserman  has a past medical history of Anxiety disorder (11/12/2018), Arthritis, COPD (chronic obstructive pulmonary disease), Dependent edema (11/28/2017), Gout, Osteoporosis, Primary osteoarthritis of left knee (05/24/2018), Primary osteoarthritis of one hip, right (12/12/2017), and Primary osteoarthritis of right hip (12/12/2017).    Anxiety-since her last visit she has done much better.  She has been able to leave the house and go places and visit friends and family without any recurrent abdominal pain or distress.        PHQ-2 Depression Screening  Little interest or pleasure in doing things?     Feeling down, depressed, or hopeless?     PHQ-2 Total Score     PHQ-9 Depression Screening  Little interest or pleasure in doing things?     Feeling down, depressed, or hopeless?     Trouble falling or staying asleep, or sleeping too much?     Feeling tired or having little energy?     Poor appetite or overeating?     Feeling bad about yourself - or that you are a failure or have let yourself or your family down?     Trouble concentrating on things, such as reading the newspaper or watching television?     Moving or speaking so slowly that other people could have noticed? Or the opposite - being so fidgety or restless that you have been moving around a lot more than usual?     Thoughts that you would be better off dead, or of hurting yourself in some way?     PHQ-9 Total Score     If you checked off any problems, how difficult have these problems made it for you to do your work, take care of things at home, or get along with other people?       Allergies   Allergen Reactions    Bactrim [Sulfamethoxazole-Trimethoprim] Unknown - Low Severity    Griseofulvin Ultramicrosize [Griseofulvin] Unknown - Low Severity       Prior to Admission medications    Medication Sig Start Date End Date Taking?  Authorizing Provider   albuterol (PROVENTIL) (2.5 MG/3ML) 0.083% nebulizer solution Take 2.5 mg by nebulization Every 4 (Four) Hours As Needed for Wheezing. 10/5/23  Yes Pee Mon DO   alendronate (FOSAMAX) 70 MG tablet Take 1 tablet by mouth Every 7 (Seven) Days. 10/5/23  Yes Pee Mon DO   allopurinol (ZYLOPRIM) 300 MG tablet Take 1 tablet by mouth Daily. 10/5/23  Yes Pee Mon DO   amLODIPine (NORVASC) 5 MG tablet Take 1 tablet by mouth Daily. 10/5/23  Yes Pee Mon DO   atenolol (TENORMIN) 50 MG tablet Take 1 tablet by mouth 2 (Two) Times a Day. 10/5/23  Yes Pee Mon DO   Blood Glucose Monitoring Suppl (ONE TOUCH ULTRA 2) w/Device kit USE AS DIRECTED TO TEST BLOOD SUGAR EVERY MORNING FASTING AND RECORD 8/10/23  Yes Provider, MD Wilber Contrerasi Aerosphere 160-9-4.8 MCG/ACT aerosol inhaler Inhale 2 puffs 2 (Two) Times a Day. 10/5/23  Yes Pee Mon DO   Diclofenac Sodium (VOLTAREN) 1 % gel gel Apply  topically to the appropriate area as directed 4 (Four) Times a Day. 10/5/23  Yes Pee Mon DO   glucose blood (Glucose Meter Test) test strip check blood sugar every AM fasting and record. (Dx: E11.9) 8/10/23  Yes Pee Mon DO   glucose monitor monitoring kit check blood sugar every AM fasting and record. (Dx: E11.9) 8/10/23  Yes Pee Mon DO        Patient Active Problem List   Diagnosis    Other emphysema    Bronchitis    Essential (primary) hypertension    Gout    Hyperlipidemia    Osteoarthritis    Stage 3 chronic kidney disease    Osteoporosis    Localized edema    Prediabetes    Pain of left upper extremity    Anxiety with somatization        Past Surgical History:   Procedure Laterality Date    BLADDER REPAIR      COLONOSCOPY  2016    EYE SURGERY      implant- yes    HYSTERECTOMY      INTRAOCULAR LENS INSERTION      yes    JOINT REPLACEMENT      Surgery    KNEE SURGERY  "Right     knee repair    KNEE SURGERY Right     repair    OTHER SURGICAL HISTORY      Artificial joints/limbs    OTHER SURGICAL HISTORY      Artificial Joints/Limbs    OTHER SURGICAL HISTORY      Joint surgery    TOTAL KNEE ARTHROPLASTY Right        Social History     Socioeconomic History    Marital status:    Tobacco Use    Smoking status: Former     Current packs/day: 0.00     Types: Cigarettes     Start date:      Quit date: 1970     Years since quittin.3    Smokeless tobacco: Never    Tobacco comments:     started at age 16- stopped at age 35   Vaping Use    Vaping status: Never Used   Substance and Sexual Activity    Alcohol use: Never     Comment: 2019- 1/15/2019 does not drink     Drug use: Never       Family History   Problem Relation Age of Onset    Colon cancer Father        Family history, surgical history, past medical history, Allergies and meds reviewed with patient today and updated in [employer] EMR.     ROS:  Review of Systems   Constitutional:  Negative for fatigue.   Psychiatric/Behavioral:  Negative for depressed mood. The patient is not nervous/anxious.        OBJECTIVE:  Vitals:    24 1426   BP: 120/74   BP Location: Right arm   Patient Position: Sitting   Pulse: 68   Temp: 98 °F (36.7 °C)   SpO2: 93%   Weight: 73.5 kg (162 lb)   Height: 160 cm (63\")     No results found.   Body mass index is 28.7 kg/m².  No LMP recorded. Patient is postmenopausal.    The ASCVD Risk score (Camden DK, et al., 2019) failed to calculate for the following reasons:    The 2019 ASCVD risk score is only valid for ages 40 to 79     Physical Exam  Vitals and nursing note reviewed.   Constitutional:       General: She is not in acute distress.     Appearance: Normal appearance. She is normal weight.   HENT:      Head: Normocephalic.   Cardiovascular:      Rate and Rhythm: Normal rate and regular rhythm.      Heart sounds: Normal heart sounds. No murmur heard.  Pulmonary:      Effort: Pulmonary " effort is normal.      Breath sounds: Normal breath sounds. No wheezing or rhonchi.   Neurological:      Mental Status: She is alert and oriented to person, place, and time.   Psychiatric:         Mood and Affect: Mood normal.         Thought Content: Thought content normal.         Judgment: Judgment normal.         Procedures    No visits with results within 30 Day(s) from this visit.   Latest known visit with results is:   Office Visit on 03/15/2024   Component Date Value Ref Range Status    Glucose 04/26/2024 110 (H)  65 - 99 mg/dL Final    BUN 04/26/2024 18  8 - 23 mg/dL Final    Creatinine 04/26/2024 1.24 (H)  0.57 - 1.00 mg/dL Final    Sodium 04/26/2024 139  136 - 145 mmol/L Final    Potassium 04/26/2024 4.1  3.5 - 5.2 mmol/L Final    Chloride 04/26/2024 101  98 - 107 mmol/L Final    CO2 04/26/2024 24.0  22.0 - 29.0 mmol/L Final    Calcium 04/26/2024 9.4  8.6 - 10.5 mg/dL Final    Total Protein 04/26/2024 7.2  6.0 - 8.5 g/dL Final    Albumin 04/26/2024 4.3  3.5 - 5.2 g/dL Final    ALT (SGPT) 04/26/2024 6  1 - 33 U/L Final    AST (SGOT) 04/26/2024 14  1 - 32 U/L Final    Alkaline Phosphatase 04/26/2024 63  39 - 117 U/L Final    Total Bilirubin 04/26/2024 0.6  0.0 - 1.2 mg/dL Final    Globulin 04/26/2024 2.9  gm/dL Final    A/G Ratio 04/26/2024 1.5  g/dL Final    BUN/Creatinine Ratio 04/26/2024 14.5  7.0 - 25.0 Final    Anion Gap 04/26/2024 14.0  5.0 - 15.0 mmol/L Final    eGFR 04/26/2024 41.9 (L)  >60.0 mL/min/1.73 Final    Total Cholesterol 04/26/2024 200  0 - 200 mg/dL Final    Triglycerides 04/26/2024 116  0 - 150 mg/dL Final    HDL Cholesterol 04/26/2024 47  40 - 60 mg/dL Final    LDL Cholesterol  04/26/2024 132 (H)  0 - 100 mg/dL Final    VLDL Cholesterol 04/26/2024 21  5 - 40 mg/dL Final    LDL/HDL Ratio 04/26/2024 2.76   Final    Hemoglobin A1C 04/26/2024 5.60  4.80 - 5.60 % Final    Microalbumin/Creatinine Ratio 04/26/2024    Final    Unable to calculate    Creatinine, Urine 04/26/2024 70.1  mg/dL  Final    Microalbumin, Urine 04/26/2024 <1.2  mg/dL Final    WBC 04/26/2024 6.41  3.40 - 10.80 10*3/mm3 Final    RBC 04/26/2024 4.27  3.77 - 5.28 10*6/mm3 Final    Hemoglobin 04/26/2024 13.3  12.0 - 15.9 g/dL Final    Hematocrit 04/26/2024 40.2  34.0 - 46.6 % Final    MCV 04/26/2024 94.1  79.0 - 97.0 fL Final    MCH 04/26/2024 31.1  26.6 - 33.0 pg Final    MCHC 04/26/2024 33.1  31.5 - 35.7 g/dL Final    RDW 04/26/2024 14.0  12.3 - 15.4 % Final    RDW-SD 04/26/2024 48.0  37.0 - 54.0 fl Final    MPV 04/26/2024 10.1  6.0 - 12.0 fL Final    Platelets 04/26/2024 215  140 - 450 10*3/mm3 Final       ASSESSMENT/ PLAN:    Diagnoses and all orders for this visit:    1. Pure hypercholesterolemia (Primary)  Assessment & Plan:   Her recent lipid profile was a little elevated but it 88 will not be overly aggressive.      2. Essential (primary) hypertension  Assessment & Plan:  Her blood pressure was good here today and her recent labs were good except for her kidney function.  I think this is more related to inadequate hydration.      3. Anxiety with somatization  Assessment & Plan:  She is not having any more abdominal discomfort with resolution of her anxiety.      Other orders  -     loratadine (CLARITIN) 10 MG tablet; Take 1 tablet by mouth Daily.  Dispense: 90 tablet; Refill: 1               Orders Placed Today:     New Medications Ordered This Visit   Medications    loratadine (CLARITIN) 10 MG tablet     Sig: Take 1 tablet by mouth Daily.     Dispense:  90 tablet     Refill:  1        Management Plan:     An After Visit Summary was printed and given to the patient at discharge.    Follow-up: Return in about 6 months (around 10/30/2024) for Recheck.    Pee Mon DO 4/30/2024 14:57 EDT  This note was electronically signed.

## 2024-05-11 ENCOUNTER — HOSPITAL ENCOUNTER (INPATIENT)
Facility: HOSPITAL | Age: 89
LOS: 4 days | Discharge: HOME HEALTH | End: 2024-05-15
Attending: EMERGENCY MEDICINE | Admitting: INTERNAL MEDICINE
Payer: MEDICARE

## 2024-05-11 ENCOUNTER — APPOINTMENT (OUTPATIENT)
Dept: GENERAL RADIOLOGY | Facility: HOSPITAL | Age: 89
End: 2024-05-11
Payer: MEDICARE

## 2024-05-11 DIAGNOSIS — J96.01 ACUTE HYPOXIC RESPIRATORY FAILURE: ICD-10-CM

## 2024-05-11 DIAGNOSIS — J44.1 COPD WITH ACUTE EXACERBATION: ICD-10-CM

## 2024-05-11 DIAGNOSIS — J18.9 PNEUMONIA OF RIGHT LOWER LOBE DUE TO INFECTIOUS ORGANISM: Primary | ICD-10-CM

## 2024-05-11 DIAGNOSIS — R26.2 DIFFICULTY WALKING: ICD-10-CM

## 2024-05-11 DIAGNOSIS — R09.02 HYPOXIA: ICD-10-CM

## 2024-05-11 DIAGNOSIS — Z87.09 HISTORY OF COPD: ICD-10-CM

## 2024-05-11 DIAGNOSIS — J13 PNEUMONIA OF RIGHT LOWER LOBE DUE TO STREPTOCOCCUS PNEUMONIAE: ICD-10-CM

## 2024-05-11 PROBLEM — J43.9 PULMONARY EMPHYSEMA: Status: ACTIVE | Noted: 2021-08-30

## 2024-05-11 LAB
ALBUMIN SERPL-MCNC: 4.1 G/DL (ref 3.5–5.2)
ALBUMIN/GLOB SERPL: 1.1 G/DL
ALP SERPL-CCNC: 85 U/L (ref 39–117)
ALT SERPL W P-5'-P-CCNC: 7 U/L (ref 1–33)
ANION GAP SERPL CALCULATED.3IONS-SCNC: 16.7 MMOL/L (ref 5–15)
AST SERPL-CCNC: 16 U/L (ref 1–32)
BASOPHILS # BLD AUTO: 0.07 10*3/MM3 (ref 0–0.2)
BASOPHILS NFR BLD AUTO: 0.6 % (ref 0–1.5)
BILIRUB SERPL-MCNC: 0.8 MG/DL (ref 0–1.2)
BUN SERPL-MCNC: 12 MG/DL (ref 8–23)
BUN/CREAT SERPL: 13.5 (ref 7–25)
CALCIUM SPEC-SCNC: 9.3 MG/DL (ref 8.6–10.5)
CHLORIDE SERPL-SCNC: 97 MMOL/L (ref 98–107)
CO2 SERPL-SCNC: 22.3 MMOL/L (ref 22–29)
CREAT SERPL-MCNC: 0.89 MG/DL (ref 0.57–1)
D-LACTATE SERPL-SCNC: 1 MMOL/L (ref 0.5–2)
DEPRECATED RDW RBC AUTO: 45.1 FL (ref 37–54)
EGFRCR SERPLBLD CKD-EPI 2021: 62.4 ML/MIN/1.73
EOSINOPHIL # BLD AUTO: 0.22 10*3/MM3 (ref 0–0.4)
EOSINOPHIL NFR BLD AUTO: 2 % (ref 0.3–6.2)
ERYTHROCYTE [DISTWIDTH] IN BLOOD BY AUTOMATED COUNT: 13.4 % (ref 12.3–15.4)
FLUAV SUBTYP SPEC NAA+PROBE: NOT DETECTED
FLUBV RNA ISLT QL NAA+PROBE: NOT DETECTED
GLOBULIN UR ELPH-MCNC: 3.8 GM/DL
GLUCOSE SERPL-MCNC: 118 MG/DL (ref 65–99)
HCT VFR BLD AUTO: 37.8 % (ref 34–46.6)
HGB BLD-MCNC: 12.9 G/DL (ref 12–15.9)
HOLD SPECIMEN: NORMAL
HOLD SPECIMEN: NORMAL
IMM GRANULOCYTES # BLD AUTO: 0.04 10*3/MM3 (ref 0–0.05)
IMM GRANULOCYTES NFR BLD AUTO: 0.4 % (ref 0–0.5)
LYMPHOCYTES # BLD AUTO: 2.22 10*3/MM3 (ref 0.7–3.1)
LYMPHOCYTES NFR BLD AUTO: 19.8 % (ref 19.6–45.3)
MAGNESIUM SERPL-MCNC: 1.5 MG/DL (ref 1.6–2.4)
MCH RBC QN AUTO: 31.1 PG (ref 26.6–33)
MCHC RBC AUTO-ENTMCNC: 34.1 G/DL (ref 31.5–35.7)
MCV RBC AUTO: 91.1 FL (ref 79–97)
MONOCYTES # BLD AUTO: 0.54 10*3/MM3 (ref 0.1–0.9)
MONOCYTES NFR BLD AUTO: 4.8 % (ref 5–12)
NEUTROPHILS NFR BLD AUTO: 72.4 % (ref 42.7–76)
NEUTROPHILS NFR BLD AUTO: 8.15 10*3/MM3 (ref 1.7–7)
NRBC BLD AUTO-RTO: 0 /100 WBC (ref 0–0.2)
PLATELET # BLD AUTO: 266 10*3/MM3 (ref 140–450)
PMV BLD AUTO: 9.1 FL (ref 6–12)
POTASSIUM SERPL-SCNC: 3.9 MMOL/L (ref 3.5–5.2)
PROCALCITONIN SERPL-MCNC: 0.08 NG/ML (ref 0–0.25)
PROT SERPL-MCNC: 7.9 G/DL (ref 6–8.5)
RBC # BLD AUTO: 4.15 10*6/MM3 (ref 3.77–5.28)
RSV RNA NPH QL NAA+NON-PROBE: NOT DETECTED
SARS-COV-2 RNA RESP QL NAA+PROBE: NOT DETECTED
SODIUM SERPL-SCNC: 136 MMOL/L (ref 136–145)
TROPONIN T SERPL HS-MCNC: 17 NG/L
WBC NRBC COR # BLD AUTO: 11.24 10*3/MM3 (ref 3.4–10.8)
WHOLE BLOOD HOLD COAG: NORMAL
WHOLE BLOOD HOLD SPECIMEN: NORMAL

## 2024-05-11 PROCEDURE — 87040 BLOOD CULTURE FOR BACTERIA: CPT | Performed by: EMERGENCY MEDICINE

## 2024-05-11 PROCEDURE — 87154 CUL TYP ID BLD PTHGN 6+ TRGT: CPT | Performed by: INTERNAL MEDICINE

## 2024-05-11 PROCEDURE — 99223 1ST HOSP IP/OBS HIGH 75: CPT | Performed by: INTERNAL MEDICINE

## 2024-05-11 PROCEDURE — 80053 COMPREHEN METABOLIC PANEL: CPT | Performed by: EMERGENCY MEDICINE

## 2024-05-11 PROCEDURE — 84484 ASSAY OF TROPONIN QUANT: CPT | Performed by: EMERGENCY MEDICINE

## 2024-05-11 PROCEDURE — 36415 COLL VENOUS BLD VENIPUNCTURE: CPT

## 2024-05-11 PROCEDURE — 93005 ELECTROCARDIOGRAM TRACING: CPT | Performed by: EMERGENCY MEDICINE

## 2024-05-11 PROCEDURE — 93010 ELECTROCARDIOGRAM REPORT: CPT | Performed by: INTERNAL MEDICINE

## 2024-05-11 PROCEDURE — 85025 COMPLETE CBC W/AUTO DIFF WBC: CPT | Performed by: EMERGENCY MEDICINE

## 2024-05-11 PROCEDURE — 99285 EMERGENCY DEPT VISIT HI MDM: CPT

## 2024-05-11 PROCEDURE — 25010000002 CEFTRIAXONE PER 250 MG: Performed by: EMERGENCY MEDICINE

## 2024-05-11 PROCEDURE — 87637 SARSCOV2&INF A&B&RSV AMP PRB: CPT | Performed by: EMERGENCY MEDICINE

## 2024-05-11 PROCEDURE — 25010000002 MAGNESIUM SULFATE 2 GM/50ML SOLUTION: Performed by: INTERNAL MEDICINE

## 2024-05-11 PROCEDURE — 83605 ASSAY OF LACTIC ACID: CPT | Performed by: EMERGENCY MEDICINE

## 2024-05-11 PROCEDURE — 25010000002 METHYLPREDNISOLONE PER 125 MG: Performed by: EMERGENCY MEDICINE

## 2024-05-11 PROCEDURE — 83735 ASSAY OF MAGNESIUM: CPT | Performed by: EMERGENCY MEDICINE

## 2024-05-11 PROCEDURE — 25010000002 AMPICILLIN-SULBACTAM PER 1.5 G: Performed by: INTERNAL MEDICINE

## 2024-05-11 PROCEDURE — 94799 UNLISTED PULMONARY SVC/PX: CPT

## 2024-05-11 PROCEDURE — 94640 AIRWAY INHALATION TREATMENT: CPT

## 2024-05-11 PROCEDURE — 71045 X-RAY EXAM CHEST 1 VIEW: CPT

## 2024-05-11 PROCEDURE — 63710000001 PREDNISONE PER 1 MG: Performed by: INTERNAL MEDICINE

## 2024-05-11 PROCEDURE — 87147 CULTURE TYPE IMMUNOLOGIC: CPT | Performed by: INTERNAL MEDICINE

## 2024-05-11 PROCEDURE — 84145 PROCALCITONIN (PCT): CPT | Performed by: INTERNAL MEDICINE

## 2024-05-11 PROCEDURE — 94761 N-INVAS EAR/PLS OXIMETRY MLT: CPT

## 2024-05-11 PROCEDURE — 25010000002 ENOXAPARIN PER 10 MG: Performed by: INTERNAL MEDICINE

## 2024-05-11 RX ORDER — SODIUM CHLORIDE 0.9 % (FLUSH) 0.9 %
10 SYRINGE (ML) INJECTION AS NEEDED
Status: DISCONTINUED | OUTPATIENT
Start: 2024-05-11 | End: 2024-05-11

## 2024-05-11 RX ORDER — HYDROCODONE POLISTIREX AND CHLORPHENIRAMINE POLISTIREX 10; 8 MG/5ML; MG/5ML
5 SUSPENSION, EXTENDED RELEASE ORAL 2 TIMES DAILY
Status: DISCONTINUED | OUTPATIENT
Start: 2024-05-11 | End: 2024-05-15 | Stop reason: HOSPADM

## 2024-05-11 RX ORDER — IPRATROPIUM BROMIDE AND ALBUTEROL SULFATE 2.5; .5 MG/3ML; MG/3ML
3 SOLUTION RESPIRATORY (INHALATION) EVERY 4 HOURS PRN
Status: DISCONTINUED | OUTPATIENT
Start: 2024-05-11 | End: 2024-05-15 | Stop reason: HOSPADM

## 2024-05-11 RX ORDER — ALPRAZOLAM 0.25 MG/1
0.25 TABLET ORAL EVERY 8 HOURS PRN
Status: DISCONTINUED | OUTPATIENT
Start: 2024-05-11 | End: 2024-05-15 | Stop reason: HOSPADM

## 2024-05-11 RX ORDER — KETOROLAC TROMETHAMINE 15 MG/ML
15 INJECTION, SOLUTION INTRAMUSCULAR; INTRAVENOUS EVERY 6 HOURS PRN
Status: DISCONTINUED | OUTPATIENT
Start: 2024-05-11 | End: 2024-05-15 | Stop reason: HOSPADM

## 2024-05-11 RX ORDER — IPRATROPIUM BROMIDE AND ALBUTEROL SULFATE 2.5; .5 MG/3ML; MG/3ML
3 SOLUTION RESPIRATORY (INHALATION) ONCE
Status: COMPLETED | OUTPATIENT
Start: 2024-05-11 | End: 2024-05-11

## 2024-05-11 RX ORDER — BENZONATATE 100 MG/1
100 CAPSULE ORAL 3 TIMES DAILY PRN
Status: DISCONTINUED | OUTPATIENT
Start: 2024-05-11 | End: 2024-05-15 | Stop reason: HOSPADM

## 2024-05-11 RX ORDER — ACETAMINOPHEN 325 MG/1
650 TABLET ORAL EVERY 4 HOURS PRN
Status: DISCONTINUED | OUTPATIENT
Start: 2024-05-11 | End: 2024-05-15 | Stop reason: HOSPADM

## 2024-05-11 RX ORDER — BISACODYL 5 MG/1
5 TABLET, DELAYED RELEASE ORAL DAILY PRN
Status: DISCONTINUED | OUTPATIENT
Start: 2024-05-11 | End: 2024-05-15 | Stop reason: HOSPADM

## 2024-05-11 RX ORDER — ONDANSETRON 2 MG/ML
4 INJECTION INTRAMUSCULAR; INTRAVENOUS EVERY 6 HOURS PRN
Status: DISCONTINUED | OUTPATIENT
Start: 2024-05-11 | End: 2024-05-15 | Stop reason: HOSPADM

## 2024-05-11 RX ORDER — BISACODYL 10 MG
10 SUPPOSITORY, RECTAL RECTAL DAILY PRN
Status: DISCONTINUED | OUTPATIENT
Start: 2024-05-11 | End: 2024-05-15 | Stop reason: HOSPADM

## 2024-05-11 RX ORDER — PREDNISONE 20 MG/1
40 TABLET ORAL
Qty: 10 TABLET | Refills: 0 | Status: COMPLETED | OUTPATIENT
Start: 2024-05-11 | End: 2024-05-15

## 2024-05-11 RX ORDER — SODIUM CHLORIDE 9 MG/ML
40 INJECTION, SOLUTION INTRAVENOUS AS NEEDED
Status: DISCONTINUED | OUTPATIENT
Start: 2024-05-11 | End: 2024-05-15 | Stop reason: HOSPADM

## 2024-05-11 RX ORDER — UREA 10 %
5 LOTION (ML) TOPICAL NIGHTLY PRN
Status: DISCONTINUED | OUTPATIENT
Start: 2024-05-11 | End: 2024-05-15 | Stop reason: HOSPADM

## 2024-05-11 RX ORDER — ACETAMINOPHEN 650 MG/1
650 SUPPOSITORY RECTAL EVERY 4 HOURS PRN
Status: DISCONTINUED | OUTPATIENT
Start: 2024-05-11 | End: 2024-05-15 | Stop reason: HOSPADM

## 2024-05-11 RX ORDER — ENOXAPARIN SODIUM 100 MG/ML
40 INJECTION SUBCUTANEOUS DAILY
Status: DISCONTINUED | OUTPATIENT
Start: 2024-05-11 | End: 2024-05-15 | Stop reason: HOSPADM

## 2024-05-11 RX ORDER — BUDESONIDE 0.5 MG/2ML
0.5 INHALANT ORAL
Status: DISCONTINUED | OUTPATIENT
Start: 2024-05-11 | End: 2024-05-15 | Stop reason: HOSPADM

## 2024-05-11 RX ORDER — FAMOTIDINE 20 MG/1
20 TABLET, FILM COATED ORAL DAILY
Status: DISCONTINUED | OUTPATIENT
Start: 2024-05-11 | End: 2024-05-15 | Stop reason: HOSPADM

## 2024-05-11 RX ORDER — CALCIUM CARBONATE 500 MG/1
2 TABLET, CHEWABLE ORAL 2 TIMES DAILY PRN
Status: DISCONTINUED | OUTPATIENT
Start: 2024-05-11 | End: 2024-05-15 | Stop reason: HOSPADM

## 2024-05-11 RX ORDER — NALOXONE HCL 0.4 MG/ML
0.4 VIAL (ML) INJECTION
Status: DISCONTINUED | OUTPATIENT
Start: 2024-05-11 | End: 2024-05-15 | Stop reason: HOSPADM

## 2024-05-11 RX ORDER — AZITHROMYCIN 250 MG/1
250 TABLET, FILM COATED ORAL
Qty: 2 TABLET | Refills: 0 | Status: COMPLETED | OUTPATIENT
Start: 2024-05-12 | End: 2024-05-15

## 2024-05-11 RX ORDER — ALUMINA, MAGNESIA, AND SIMETHICONE 2400; 2400; 240 MG/30ML; MG/30ML; MG/30ML
15 SUSPENSION ORAL EVERY 6 HOURS PRN
Status: DISCONTINUED | OUTPATIENT
Start: 2024-05-11 | End: 2024-05-15 | Stop reason: HOSPADM

## 2024-05-11 RX ORDER — SODIUM CHLORIDE 0.9 % (FLUSH) 0.9 %
10 SYRINGE (ML) INJECTION AS NEEDED
Status: DISCONTINUED | OUTPATIENT
Start: 2024-05-11 | End: 2024-05-15 | Stop reason: HOSPADM

## 2024-05-11 RX ORDER — MAGNESIUM SULFATE HEPTAHYDRATE 40 MG/ML
2 INJECTION, SOLUTION INTRAVENOUS ONCE
Status: COMPLETED | OUTPATIENT
Start: 2024-05-11 | End: 2024-05-11

## 2024-05-11 RX ORDER — METHYLPREDNISOLONE SODIUM SUCCINATE 125 MG/2ML
125 INJECTION, POWDER, LYOPHILIZED, FOR SOLUTION INTRAMUSCULAR; INTRAVENOUS ONCE
Status: COMPLETED | OUTPATIENT
Start: 2024-05-11 | End: 2024-05-11

## 2024-05-11 RX ORDER — POLYETHYLENE GLYCOL 3350 17 G/17G
17 POWDER, FOR SOLUTION ORAL DAILY PRN
Status: DISCONTINUED | OUTPATIENT
Start: 2024-05-11 | End: 2024-05-15 | Stop reason: HOSPADM

## 2024-05-11 RX ORDER — ACETAMINOPHEN 160 MG/5ML
650 SOLUTION ORAL EVERY 4 HOURS PRN
Status: DISCONTINUED | OUTPATIENT
Start: 2024-05-11 | End: 2024-05-15 | Stop reason: HOSPADM

## 2024-05-11 RX ORDER — AMOXICILLIN 250 MG
2 CAPSULE ORAL 2 TIMES DAILY PRN
Status: DISCONTINUED | OUTPATIENT
Start: 2024-05-11 | End: 2024-05-15 | Stop reason: HOSPADM

## 2024-05-11 RX ORDER — ARFORMOTEROL TARTRATE 15 UG/2ML
15 SOLUTION RESPIRATORY (INHALATION)
Status: DISCONTINUED | OUTPATIENT
Start: 2024-05-11 | End: 2024-05-15 | Stop reason: HOSPADM

## 2024-05-11 RX ORDER — TRAMADOL HYDROCHLORIDE 50 MG/1
25 TABLET ORAL EVERY 8 HOURS PRN
Status: DISCONTINUED | OUTPATIENT
Start: 2024-05-11 | End: 2024-05-15 | Stop reason: HOSPADM

## 2024-05-11 RX ORDER — SODIUM CHLORIDE 0.9 % (FLUSH) 0.9 %
10 SYRINGE (ML) INJECTION EVERY 12 HOURS SCHEDULED
Status: DISCONTINUED | OUTPATIENT
Start: 2024-05-11 | End: 2024-05-15 | Stop reason: HOSPADM

## 2024-05-11 RX ORDER — AZITHROMYCIN 250 MG/1
500 TABLET, FILM COATED ORAL ONCE
Status: COMPLETED | OUTPATIENT
Start: 2024-05-11 | End: 2024-05-11

## 2024-05-11 RX ADMIN — ARFORMOTEROL TARTRATE 15 MCG: 15 SOLUTION RESPIRATORY (INHALATION) at 19:54

## 2024-05-11 RX ADMIN — AZITHROMYCIN DIHYDRATE 500 MG: 250 TABLET ORAL at 15:32

## 2024-05-11 RX ADMIN — METHYLPREDNISOLONE SODIUM SUCCINATE 125 MG: 125 INJECTION INTRAMUSCULAR; INTRAVENOUS at 15:31

## 2024-05-11 RX ADMIN — FAMOTIDINE 20 MG: 20 TABLET ORAL at 20:43

## 2024-05-11 RX ADMIN — MAGNESIUM SULFATE HEPTAHYDRATE 2 G: 40 INJECTION, SOLUTION INTRAVENOUS at 20:44

## 2024-05-11 RX ADMIN — Medication 10 ML: at 20:44

## 2024-05-11 RX ADMIN — AMPICILLIN SODIUM, SULBACTAM SODIUM 3 G: 2; 1 INJECTION, POWDER, FOR SOLUTION INTRAMUSCULAR; INTRAVENOUS at 20:43

## 2024-05-11 RX ADMIN — IPRATROPIUM BROMIDE AND ALBUTEROL SULFATE 3 ML: .5; 3 SOLUTION RESPIRATORY (INHALATION) at 14:51

## 2024-05-11 RX ADMIN — PREDNISONE 40 MG: 20 TABLET ORAL at 20:43

## 2024-05-11 RX ADMIN — BUDESONIDE 0.5 MG: 0.5 SUSPENSION RESPIRATORY (INHALATION) at 19:54

## 2024-05-11 RX ADMIN — CEFTRIAXONE SODIUM 1000 MG: 1 INJECTION, POWDER, FOR SOLUTION INTRAMUSCULAR; INTRAVENOUS at 15:31

## 2024-05-11 RX ADMIN — HYDROCODONE POLISTIREX AND CHLORPHENIRAMINE POLISTIREX 5 ML: 10; 8 SUSPENSION, EXTENDED RELEASE ORAL at 21:21

## 2024-05-11 RX ADMIN — ENOXAPARIN SODIUM 40 MG: 100 INJECTION SUBCUTANEOUS at 20:43

## 2024-05-11 NOTE — ED PROVIDER NOTES
Time: 2:11 PM EDT  Date of encounter:  5/11/2024  Independent Historian/Clinical History and Information was obtained by:   Patient and Family    History is limited by: N/A    Chief Complaint: Productive cough x 2 weeks          History of Present Illness:  Patient is a 88 y.o. year old female with history of COPD not on home oxygen who presents to the emergency department for evaluation of productive cough for the past 2 weeks.    She was seen by her doctor this past week and started on seasonal allergy medication but it is not helping.    No fevers or chills reported.    The cough was initially productive of clear sputum, but now having some yellowish and green tinge to the sputum over the past week.    No chest pain or dyspnea or wheezing.    She does sound very hoarse and barky almost croup-like in the exam room with coughing episodes.  Getting occasional cough associated headaches.    HPI    Patient Care Team  Primary Care Provider: Pee Mon DO    Past Medical History:     Allergies   Allergen Reactions    Bactrim [Sulfamethoxazole-Trimethoprim] Unknown - Low Severity    Griseofulvin Ultramicrosize [Griseofulvin] Unknown - Low Severity     Past Medical History:   Diagnosis Date    Anxiety disorder 11/12/2018    Arthritis     COPD (chronic obstructive pulmonary disease)     Dependent edema 11/28/2017    Gout     Osteoporosis     Primary osteoarthritis of left knee 05/24/2018    Primary osteoarthritis of one hip, right 12/12/2017    Primary osteoarthritis of right hip 12/12/2017     Past Surgical History:   Procedure Laterality Date    BLADDER REPAIR      COLONOSCOPY  2016    EYE SURGERY      implant- yes    HYSTERECTOMY      INTRAOCULAR LENS INSERTION      yes    JOINT REPLACEMENT      Surgery    KNEE SURGERY Right     knee repair    KNEE SURGERY Right     repair    OTHER SURGICAL HISTORY      Artificial joints/limbs    OTHER SURGICAL HISTORY      Artificial Joints/Limbs    OTHER SURGICAL HISTORY       Joint surgery    TOTAL KNEE ARTHROPLASTY Right      Family History   Problem Relation Age of Onset    Colon cancer Father        Home Medications:  Prior to Admission medications    Medication Sig Start Date End Date Taking? Authorizing Provider   albuterol (PROVENTIL) (2.5 MG/3ML) 0.083% nebulizer solution Take 2.5 mg by nebulization Every 4 (Four) Hours As Needed for Wheezing. 10/5/23   Pee Mon DO   alendronate (FOSAMAX) 70 MG tablet Take 1 tablet by mouth Every 7 (Seven) Days. 10/5/23   Pee Mon DO   allopurinol (ZYLOPRIM) 300 MG tablet Take 1 tablet by mouth Daily. 10/5/23   Pee Mon DO   amLODIPine (NORVASC) 5 MG tablet Take 1 tablet by mouth Daily. 10/5/23   Pee Mon DO   atenolol (TENORMIN) 50 MG tablet Take 1 tablet by mouth 2 (Two) Times a Day. 10/5/23   Pee Mon DO   Blood Glucose Monitoring Suppl (ONE TOUCH ULTRA 2) w/Device kit USE AS DIRECTED TO TEST BLOOD SUGAR EVERY MORNING FASTING AND RECORD 8/10/23   Provider, MD Jeffery Contreras Aerosphere 160-9-4.8 MCG/ACT aerosol inhaler Inhale 2 puffs 2 (Two) Times a Day. 10/5/23   Pee Mon DO   Diclofenac Sodium (VOLTAREN) 1 % gel gel Apply  topically to the appropriate area as directed 4 (Four) Times a Day. 10/5/23   Pee Mon DO   glucose blood (Glucose Meter Test) test strip check blood sugar every AM fasting and record. (Dx: E11.9) 8/10/23   Pee Mon DO   glucose monitor monitoring kit check blood sugar every AM fasting and record. (Dx: E11.9) 8/10/23   Pee Mon DO   loratadine (CLARITIN) 10 MG tablet Take 1 tablet by mouth Daily. 24   Pee Mon DO        Social History:   Social History     Tobacco Use    Smoking status: Former     Current packs/day: 0.00     Types: Cigarettes     Start date:      Quit date: 1970     Years since quittin.3    Smokeless tobacco: Never    Tobacco  "comments:     started at age 16- stopped at age 35   Vaping Use    Vaping status: Never Used   Substance Use Topics    Alcohol use: Never     Comment: 2/12/2019- 1/15/2019 does not drink     Drug use: Never         Review of Systems:  Review of Systems   I performed a 10 point review of systems which was all negative, except for the positives found in the HPI above.  Physical Exam:  /76 (BP Location: Right arm, Patient Position: Sitting)   Pulse 83   Temp 98.3 °F (36.8 °C) (Oral)   Resp 16   Ht 160 cm (62.99\")   Wt 72.3 kg (159 lb 6.3 oz)   SpO2 93%   BMI 28.24 kg/m²       Physical Exam   General: Awake alert and in mild distress, with barking deep cough noted    HEENT: Head normocephalic atraumatic, eyes PERRLA EOMI, nose normal, oropharynx normal.    Neck: Supple full range of motion, no meningismus, no lymphadenopathy    Heart: Regular rate and rhythm, no murmurs or rubs, 2+ radial pulses bilaterally    Lungs: Mild wheezing or rhonchi present and coughing but no crackles, no respiratory distress    Abdomen: Soft, nontender, nondistended, no rebound or guarding    Skin: Warm, dry, no rash    Musculoskeletal: Normal range of motion, no lower extremity edema    Neurologic: Oriented x3, no motor deficits no sensory deficits    Psychiatric: Mood appears stable, no psychosis          Procedures:  Procedures      Medical Decision Making:      Comorbidities that affect care:    COPD    External Notes reviewed:    None      The following orders were placed and all results were independently analyzed by me:  Orders Placed This Encounter   Procedures    COVID-19, FLU A/B, RSV PCR 1 HR TAT - Swab, Nasopharynx    Blood Culture - Blood,    Blood Culture - Blood,    XR Chest 1 View    [place] Draw    Comprehensive Metabolic Panel    Single High Sensitivity Troponin T    Magnesium    Urinalysis With Microscopic If Indicated (No Culture) - Urine, Clean Catch    CBC Auto Differential    Lactic Acid, Plasma    NPO Diet " NPO Type: Strict NPO    Undress & Gown    Continuous Pulse Oximetry    Vital Signs    Orthostatic Blood Pressure    Code Status and Medical Interventions:    Hospitalist (on-call MD unless specified)    Oxygen Therapy- Nasal Cannula; Titrate 1-6 LPM Per SpO2; 90 - 95%    POC Glucose Once    ECG 12 Lead ED Triage Standing Order; Weak / Dizzy / AMS    Insert Peripheral IV    Inpatient Admission    Fall Precautions    CBC & Differential    Green Top (Gel)    Lavender Top    Gold Top - SST    Light Blue Top       Medications Given in the Emergency Department:  Medications   sodium chloride 0.9 % flush 10 mL (has no administration in time range)   methylPREDNISolone sodium succinate (SOLU-Medrol) injection 125 mg (125 mg Intravenous Given 5/11/24 1531)   ipratropium-albuterol (DUO-NEB) nebulizer solution 3 mL (3 mL Nebulization Given 5/11/24 1451)   cefTRIAXone (ROCEPHIN) 1,000 mg in sodium chloride 0.9 % 100 mL IVPB-VTB (1,000 mg Intravenous New Bag 5/11/24 1531)   azithromycin (ZITHROMAX) tablet 500 mg (500 mg Oral Given 5/11/24 1532)        ED Course:    ED Course as of 05/11/24 1654   Sat May 11, 2024   1414 EKG: I interpreted her twelve-lead EKG as normal sinus rhythm at 76 bpm, normal P waves, QRS normal but low voltage, there are some nonspecific T wave abnormalities in the anterior leads and inferior leads. [VS]      ED Course User Index  [VS] Melvin Stack MD       Labs:    Lab Results (last 24 hours)       Procedure Component Value Units Date/Time    CBC & Differential [[id number]]  (Abnormal) Collected: 05/11/24 1329    Specimen: Blood Updated: 05/11/24 1339    Narrative:      The following orders were created for panel order CBC & Differential.  Procedure                               Abnormality         Status                     ---------                               -----------         ------                     CBC Auto Differential[[id number]]        Abnormal            Final result                  Please view results for these tests on the individual orders.    Comprehensive Metabolic Panel [[id number]]  (Abnormal) Collected: 05/11/24 1329    Specimen: Blood Updated: 05/11/24 1405     Glucose 118 mg/dL      BUN 12 mg/dL      Creatinine 0.89 mg/dL      Sodium 136 mmol/L      Potassium 3.9 mmol/L      Chloride 97 mmol/L      CO2 22.3 mmol/L      Calcium 9.3 mg/dL      Total Protein 7.9 g/dL      Albumin 4.1 g/dL      ALT (SGPT) 7 U/L      AST (SGOT) 16 U/L      Alkaline Phosphatase 85 U/L      Total Bilirubin 0.8 mg/dL      Globulin 3.8 gm/dL      A/G Ratio 1.1 g/dL      BUN/Creatinine Ratio 13.5     Anion Gap 16.7 mmol/L      eGFR 62.4 mL/min/1.73     Narrative:      GFR Normal >60  Chronic Kidney Disease <60  Kidney Failure <15    The GFR formula is only valid for adults with stable renal function between ages 18 and 70.    Single High Sensitivity Troponin T [[id number]]  (Abnormal) Collected: 05/11/24 1329    Specimen: Blood Updated: 05/11/24 1405     HS Troponin T 17 ng/L     Narrative:      High Sensitive Troponin T Reference Range:  <14.0 ng/L- Negative Female for AMI  <22.0 ng/L- Negative Male for AMI  >=14 - Abnormal Female indicating possible myocardial injury.  >=22 - Abnormal Male indicating possible myocardial injury.   Clinicians would have to utilize clinical acumen, EKG, Troponin, and serial changes to determine if it is an Acute Myocardial Infarction or myocardial injury due to an underlying chronic condition.         Magnesium [[id number]]  (Abnormal) Collected: 05/11/24 1329    Specimen: Blood Updated: 05/11/24 1405     Magnesium 1.5 mg/dL     CBC Auto Differential [[id number]]  (Abnormal) Collected: 05/11/24 1329    Specimen: Blood Updated: 05/11/24 1339     WBC 11.24 10*3/mm3      RBC 4.15 10*6/mm3      Hemoglobin 12.9 g/dL      Hematocrit 37.8 %      MCV 91.1 fL      MCH 31.1 pg      MCHC 34.1 g/dL      RDW 13.4 %      RDW-SD 45.1 fl      MPV 9.1 fL      Platelets 266 10*3/mm3       Neutrophil % 72.4 %      Lymphocyte % 19.8 %      Monocyte % 4.8 %      Eosinophil % 2.0 %      Basophil % 0.6 %      Immature Grans % 0.4 %      Neutrophils, Absolute 8.15 10*3/mm3      Lymphocytes, Absolute 2.22 10*3/mm3      Monocytes, Absolute 0.54 10*3/mm3      Eosinophils, Absolute 0.22 10*3/mm3      Basophils, Absolute 0.07 10*3/mm3      Immature Grans, Absolute 0.04 10*3/mm3      nRBC 0.0 /100 WBC     COVID-19, FLU A/B, RSV PCR 1 HR TAT - Swab, Nasopharynx [[id number]]  (Normal) Collected: 05/11/24 1331    Specimen: Swab from Nasopharynx Updated: 05/11/24 1432     COVID19 Not Detected     Influenza A PCR Not Detected     Influenza B PCR Not Detected     RSV, PCR Not Detected    Narrative:      Fact sheet for providers: https://www.fda.gov/media/840922/download    Fact sheet for patients: https://www.fda.gov/media/837890/download    Test performed by PCR.    Blood Culture - Blood, Arm, Left [[id number]] Collected: 05/11/24 1526    Specimen: Blood from Arm, Left Updated: 05/11/24 1539    Blood Culture - Blood, Arm, Left [[id number]] Collected: 05/11/24 1531    Specimen: Blood from Arm, Left Updated: 05/11/24 1539    Lactic Acid, Plasma [[id number]]  (Normal) Collected: 05/11/24 1531    Specimen: Blood Updated: 05/11/24 1602     Lactate 1.0 mmol/L              Imaging:    XR Chest 1 View    Result Date: 5/11/2024  XR CHEST 1 VW-  Date of Exam: 5/11/2024 1:30 PM  Indication: Weak/Dizzy/AMS triage protocol  Comparison: 11/6/2023  Findings: Stable mild enlargement of the cardiac silhouette. There is a large hiatal hernia. There are chronic appearing changes in the lung fields. There is new patchy airspace opacity in the right lung base. There are advanced degenerative changes in the glenohumeral joints.      Impression: New patchy airspace opacity in the right lung base possibly atelectasis or pneumonia.   Electronically Signed By-KATIE CALDERÓN MD On:5/11/2024 1:48 PM         Differential Diagnosis and  Discussion:    Cough: Differential diagnosis includes but is not limited to pneumonia, acute bronchitis, upper respiratory infection, ACE inhibitor use, allergic reaction, epiglottitis, seasonal allergies, chemical irritants, exercise-induced asthma, viral syndrome.    All labs were reviewed and interpreted by me.  All X-rays impressions were independently interpreted by me.    MDM     Amount and/or Complexity of Data Reviewed  Clinical lab tests: reviewed  Tests in the radiology section of CPT®: reviewed  Tests in the medicine section of CPT®: reviewed  Decide to obtain previous medical records or to obtain history from someone other than the patient: yes             This patient is a pleasant 88-year-old female with history of COPD presenting with productive cough for over 2 weeks now.    It sounds like she has had a change in sputum character and has underlying lung disease so we will get a chest x-ray and lab work and plan to err on the side of caution and prescribe antibiotics and some steroids and give her a DuoNeb breathing treatment in the ED, as she is somewhat wheezing on exam.    Patient appears to be in no significant respiratory distress but noted to have some abnormal lung sounds and oxygenating 87 to 88% on room air.    We placed her on supplemental oxygen.    I reviewed her chest x-ray showing probable right lower lobe infiltrate or pneumonia.    I am sending off blood cultures and starting her on IV broad-spectrum antibiotics for community-acquired pneumonia including ceftriaxone and azithromycin.      I plan to admit her to the hospital for IV antibiotics and supplemental oxygen for her pneumonia and hypoxia.                Patient Care Considerations:          Consultants/Shared Management Plan:    Hospitalist: I have discussed the case with the admitting hospitalist who agrees to accept the patient for admission.    Social Determinants of Health:    Patient has presented with family members who  are responsible, reliable and will ensure follow up care.      Disposition and Care Coordination:    Admit:   Through independent evaluation of the patient's history, physical, and imperical data, the patient meets criteria for inpatient admission to the hospital.        Final diagnoses:   Pneumonia of right lower lobe due to infectious organism   History of COPD   Acute hypoxic respiratory failure        ED Disposition       ED Disposition   Decision to Admit    Condition   --    Comment   Level of Care: Med/Surg [1]   Diagnosis: Hypoxia [940157]   Admitting Physician: KWAME RIVERA [[id number]]   Attending Physician: KWAME RIVERA [[id number]]   Isolate for COVID?: No [0]   Certification: I Certify That Inpatient Hospital Services Are Medically Necessary For Greater Than 2 Midnights                 This medical record created using voice recognition software.             Melvin Stack MD  05/11/24 1564       Melvin Stack MD  05/11/24 9252

## 2024-05-11 NOTE — H&P
[facility]   HOSPITALIST HISTORY AND PHYSICAL  Date: 2024   Patient Name: Shayy Wasserman  : 1935  MRN: [id number]  Primary Care Physician:  Pee Mon DO  Date of admission: 2024    Subjective   Subjective     Chief Complaint: Cough for couple of weeks now productive of yellow-green mucus    HPI:    Shayy Wasserman is a 88 y.o. female with past ministry of COPD, DJD, gout, hypertension and anxiety disorder.  Patient had right lower tooth pulled out on  since then patient been having cough which has been gradually getting worse now productive of yellow mucus.  For past couple of days it has been worse and she has not able to sleep.  He has also developed wheezing.  Denies any fever, chills or shortness of air.  No chest pain.  At times cough makes her gag but no vomiting or diarrhea.  Patient has tried allergy medicine by her PCP and over the cough medicine without much relief.  For these symptoms she came to [facility] ED workup showed O2 sat of 87% on room air other vital signs stable.  White cell count is 11,000.  CMP is negative.  Troponin is 17.  Her COVID, flu and RSV swab is negative.  Chest x-ray shows right lower lobe infiltrate with large hiatal hernia.  Patient denies any choking or coughing with liquids or food intake.  Because of hypoxia hospitalist has been called to admit her for further management.  She is otherwise independent in her ADLs and lives with her daughter-in-law.    Personal History     Past Medical History:  Past Medical History:   Diagnosis Date    Anxiety disorder 2018    Arthritis     COPD (chronic obstructive pulmonary disease)     Dependent edema 2017    Gout     Osteoporosis     Primary osteoarthritis of left knee 2018    Primary osteoarthritis of one hip, right 2017    Primary osteoarthritis of right hip 2017       Past Surgical History:  Past Surgical History:   Procedure Laterality Date     BLADDER REPAIR      COLONOSCOPY  2016    EYE SURGERY      implant- yes    HYSTERECTOMY      INTRAOCULAR LENS INSERTION      yes    JOINT REPLACEMENT      Surgery    KNEE SURGERY Right     knee repair    KNEE SURGERY Right     repair    OTHER SURGICAL HISTORY      Artificial joints/limbs    OTHER SURGICAL HISTORY      Artificial Joints/Limbs    OTHER SURGICAL HISTORY      Joint surgery    TOTAL KNEE ARTHROPLASTY Right        Family History:   family history includes Colon cancer in her father.    Social History:    reports that she quit smoking about 54 years ago. Her smoking use included cigarettes. She started smoking about 73 years ago. She has never used smokeless tobacco. She reports that she does not drink alcohol and does not use drugs.    Home Medications:  Budeson-Glycopyrrol-Formoterol, Diclofenac Sodium, ONE TOUCH ULTRA 2, albuterol, alendronate, allopurinol, amLODIPine, atenolol, glucose blood, glucose monitor, and loratadine    Allergies:  Allergies   Allergen Reactions    Bactrim [Sulfamethoxazole-Trimethoprim] Unknown - Low Severity    Griseofulvin Ultramicrosize [Griseofulvin] Unknown - Low Severity       Review of Systems   All systems were reviewed and negative except for: H&P    Objective   Objective     Vitals:   Temp:  [98.3 °F (36.8 °C)] 98.3 °F (36.8 °C)  Heart Rate:  [70-83] 74  Resp:  [16] 16  BP: (112-142)/(53-76) 112/53  Flow (L/min):  [2] 2    Physical Exam    Constitutional: Awake, alert, no acute distress   Eyes: Pupils equal, sclerae anicteric, no conjunctival injection   HENT: NCAT, mucous membranes moist.  She has upper denture   Neck: Supple, no thyromegaly, no lymphadenopathy, trachea midline   Respiratory: Decreased to auscultation bilaterally, nonlabored respirations    Cardiovascular: Occasional ectopic beat, RRR, no murmurs, rubs, or gallops, palpable pedal pulses bilaterally   Gastrointestinal: Positive bowel sounds, soft, nontender, nondistended   Musculoskeletal: No bilateral  ankle edema, no clubbing or cyanosis to extremities   Psychiatric: Appropriate affect, cooperative   Neurologic: Oriented x 3, strength symmetric in all extremities, Cranial Nerves grossly intact to confrontation, speech clear   Skin: No rashes     Result Review    Result Review:  I have personally reviewed the results from the time of this admission to 5/11/2024 17:11 EDT and agree with these findings:  [x]  Laboratory  [x]  Microbiology  [x]  Radiology  [x]  EKG/Telemetry sinus rhythm 76, LAD, low voltage.  QT 0.42  []  Cardiology/Vascular   []  Pathology  [x]  Old records  [x]  Other: Medications      Assessment & Plan   Assessment / Plan     Assessment:  Hypoxemia.  No home oxygen use.  Community-acquired right lower lobe pneumonia.  Unspecified bacterial pathogen.  Acute COPD exacerbation.  Hypomagnesemia.  History of hypertension.  Anxiety disorder.  DJD.  Gout stable    Plan:   Continue supplemental oxygen keep sats more than 90%.  IV Unasyn and p.o. Zithromax.  Check respiratory culture.  Procalcitonin.  Strep pneumonia and Legionella urine antigen.  Oral prednisone.  DuoNeb, Pulmicort and brovana.  Bronchodilator and bronchopulmonary team protocol.  Incentive spirometry and flutter valve.  Tussionex scheduled.  As needed Tessalon.  Replace magnesium.  Resume appropriate home medication once the list is updated.  Pepcid  PT OT  Discussed with ED physician.  She will need walking oximetry prior to discharge home.       DVT prophylaxis:  Medical DVT prophylaxis orders are signed and held.  Lovenox         CODE STATUS:    Code Status (Patient has no pulse and is not breathing): CPR (Attempt to Resuscitate)  Medical Interventions (Patient has pulse or is breathing): Full Support      Admission Status:  I believe this patient meets inpatient status.    Part of this note may be an electronic transcription/translation of spoken language to printed text using the Dragon Dictation System.     Electronically signed by  Sesar Abarca MD, 05/11/24, 5:11 PM EDT.

## 2024-05-12 LAB
BACTERIA BLD CULT: ABNORMAL
BOTTLE TYPE: ABNORMAL
MAGNESIUM SERPL-MCNC: 2.5 MG/DL (ref 1.6–2.4)
QT INTERVAL: 376 MS
QTC INTERVAL: 424 MS

## 2024-05-12 PROCEDURE — 87070 CULTURE OTHR SPECIMN AEROBIC: CPT | Performed by: INTERNAL MEDICINE

## 2024-05-12 PROCEDURE — 94799 UNLISTED PULMONARY SVC/PX: CPT

## 2024-05-12 PROCEDURE — 83735 ASSAY OF MAGNESIUM: CPT | Performed by: INTERNAL MEDICINE

## 2024-05-12 PROCEDURE — 63710000001 PREDNISONE PER 1 MG: Performed by: INTERNAL MEDICINE

## 2024-05-12 PROCEDURE — 25010000002 AMPICILLIN-SULBACTAM PER 1.5 G: Performed by: INTERNAL MEDICINE

## 2024-05-12 PROCEDURE — 87205 SMEAR GRAM STAIN: CPT | Performed by: INTERNAL MEDICINE

## 2024-05-12 PROCEDURE — 94664 DEMO&/EVAL PT USE INHALER: CPT

## 2024-05-12 PROCEDURE — 99233 SBSQ HOSP IP/OBS HIGH 50: CPT | Performed by: INTERNAL MEDICINE

## 2024-05-12 PROCEDURE — 25010000002 ENOXAPARIN PER 10 MG: Performed by: INTERNAL MEDICINE

## 2024-05-12 RX ORDER — VANCOMYCIN/0.9 % SOD CHLORIDE 1.5G/250ML
20 PLASTIC BAG, INJECTION (ML) INTRAVENOUS ONCE
Status: COMPLETED | OUTPATIENT
Start: 2024-05-12 | End: 2024-05-13

## 2024-05-12 RX ORDER — ALLOPURINOL 100 MG/1
300 TABLET ORAL DAILY
Status: DISCONTINUED | OUTPATIENT
Start: 2024-05-12 | End: 2024-05-15 | Stop reason: HOSPADM

## 2024-05-12 RX ADMIN — ARFORMOTEROL TARTRATE 15 MCG: 15 SOLUTION RESPIRATORY (INHALATION) at 09:39

## 2024-05-12 RX ADMIN — Medication 10 ML: at 08:52

## 2024-05-12 RX ADMIN — AMPICILLIN SODIUM, SULBACTAM SODIUM 3 G: 2; 1 INJECTION, POWDER, FOR SOLUTION INTRAMUSCULAR; INTRAVENOUS at 08:51

## 2024-05-12 RX ADMIN — PREDNISONE 40 MG: 20 TABLET ORAL at 08:52

## 2024-05-12 RX ADMIN — AMPICILLIN SODIUM, SULBACTAM SODIUM 3 G: 2; 1 INJECTION, POWDER, FOR SOLUTION INTRAMUSCULAR; INTRAVENOUS at 21:22

## 2024-05-12 RX ADMIN — ALLOPURINOL 300 MG: 100 TABLET ORAL at 15:37

## 2024-05-12 RX ADMIN — AZITHROMYCIN DIHYDRATE 250 MG: 250 TABLET ORAL at 15:37

## 2024-05-12 RX ADMIN — Medication 1500 MG: at 22:30

## 2024-05-12 RX ADMIN — BUDESONIDE 0.5 MG: 0.5 SUSPENSION RESPIRATORY (INHALATION) at 09:39

## 2024-05-12 RX ADMIN — AMPICILLIN SODIUM, SULBACTAM SODIUM 3 G: 2; 1 INJECTION, POWDER, FOR SOLUTION INTRAMUSCULAR; INTRAVENOUS at 03:10

## 2024-05-12 RX ADMIN — AMPICILLIN SODIUM, SULBACTAM SODIUM 3 G: 2; 1 INJECTION, POWDER, FOR SOLUTION INTRAMUSCULAR; INTRAVENOUS at 15:37

## 2024-05-12 RX ADMIN — FAMOTIDINE 20 MG: 20 TABLET ORAL at 08:52

## 2024-05-12 RX ADMIN — ARFORMOTEROL TARTRATE 15 MCG: 15 SOLUTION RESPIRATORY (INHALATION) at 19:06

## 2024-05-12 RX ADMIN — BUDESONIDE 0.5 MG: 0.5 SUSPENSION RESPIRATORY (INHALATION) at 19:06

## 2024-05-12 RX ADMIN — HYDROCODONE POLISTIREX AND CHLORPHENIRAMINE POLISTIREX 5 ML: 10; 8 SUSPENSION, EXTENDED RELEASE ORAL at 08:52

## 2024-05-12 RX ADMIN — Medication 10 ML: at 21:22

## 2024-05-12 RX ADMIN — ENOXAPARIN SODIUM 40 MG: 100 INJECTION SUBCUTANEOUS at 08:52

## 2024-05-12 RX ADMIN — HYDROCODONE POLISTIREX AND CHLORPHENIRAMINE POLISTIREX 5 ML: 10; 8 SUSPENSION, EXTENDED RELEASE ORAL at 21:25

## 2024-05-12 NOTE — PLAN OF CARE
Goal Outcome Evaluation:  Plan of Care Reviewed With: patient        Progress: improving  Outcome Evaluation: patient resting in bed, daughter at bedside, patient on 2L NC. no shortness of air, patient does have productive cough, no c/o pain, patient alert and oriented, ambulates to bathroom, bed alert on, call light within reach.

## 2024-05-12 NOTE — PROGRESS NOTES
[facility]   Hospitalist Progress Note  Date: 2024  Patient Name: Shayy Wasserman  : 1935  MRN: [id number]  Date of admission: 2024      Subjective   Subjective     Chief Complaint: Cough for couple of weeks now productive of yellow-green mucus.    Summary:   Shayy Wasserman is a 88 y.o. female with past ministry of COPD, DJD, gout, hypertension and anxiety disorder.  Patient had right lower tooth pulled out on  since then patient been having cough which has been gradually getting worse now productive of yellow mucus.  For past couple of days it has been worse and she has not able to sleep.  He has also developed wheezing.  Denies any fever, chills or shortness of air.  No chest pain.  At times cough makes her gag but no vomiting or diarrhea.  Patient has tried allergy medicine by her PCP and over the cough medicine without much relief.  For these symptoms she came to [facility] ED workup showed O2 sat of 87% on room air other vital signs stable.  White cell count is 11,000.  CMP is negative.  Troponin is 17.  Her COVID, flu and RSV swab is negative.  Chest x-ray shows right lower lobe infiltrate with large hiatal hernia.  Patient denies any choking or coughing with liquids or food intake.  Because of hypoxia hospitalist has been called to admit her for further management.  She is otherwise independent in her ADLs and lives with her daughter-in-law.  Interval Followup:   Remains on 2 L with 90% saturation  Cough better was able to sleep last night.  Still bringing up yellow-green mucus been  No chest pain or shortness of therapy  With breathing treatment she gets more cough.    Review of Systems   All systems were reviewed and negative except for: Summary and interval follow-up    Objective   Objective     Vitals:   Temp:  [97.3 °F (36.3 °C)-98.1 °F (36.7 °C)] 97.7 °F (36.5 °C)  Heart Rate:  [71-79] 79  Resp:  [16-20] 20  BP: (112-143)/(53-75) 128/75  Flow (L/min):  [2]  2  Physical Exam      Constitutional: Awake, alert, no acute distress              Eyes: Pupils equal, sclerae anicteric, no conjunctival injection              HENT: NCAT, mucous membranes moist.  She has upper denture              Neck: Supple, no thyromegaly, no lymphadenopathy, trachea midline              Respiratory: Decreased to auscultation bilaterally, nonlabored respirations               Cardiovascular: Occasional ectopic beat, RRR, no murmurs, rubs, or gallops, palpable pedal pulses bilaterally              Gastrointestinal: Positive bowel sounds, soft, nontender, nondistended              Musculoskeletal: No bilateral ankle edema, no clubbing or cyanosis to extremities              Psychiatric: Appropriate affect, cooperative              Neurologic: Oriented x 3, strength symmetric in all extremities, Cranial Nerves grossly intact to confrontation, speech clear              Skin: No rashes     Result Review    Result Review:  I have personally reviewed the results for the past 24 hours and agree with these findings:  [x]  Laboratory  [x]  Microbiology  [x]  Radiology  []  EKG/Telemetry   []  Cardiology/Vascular   []  Pathology  [x]  Old records  [x]  Other: Medications    Assessment & Plan   Assessment / Plan     Assessment:  Hypoxemia.  No home oxygen use.  Community-acquired right lower lobe pneumonia.  Unspecified bacterial pathogen.  Acute COPD exacerbation.  Hypomagnesemia.  Supplemented  History of hypertension.  Anxiety disorder.  DJD.  Gout stable     Plan:   Continue supplemental oxygen keep sats more than 90%.  IV Unasyn and p.o. Zithromax.  Check respiratory culture.  Procalcitonin negative.  Strep pneumonia and Legionella urine antigen.  Oral prednisone.  DuoNeb, Pulmicort and brovana.  Bronchodilator and bronchopulmonary team protocol.  Incentive spirometry and flutter valve.  Tussionex scheduled.  As needed Tessalon.  Replace electrolytes as needed  Resume appropriate home medication once  the list is updated.  Pepcid  PT OT    Discussed plan with RN.  Discharge home when stable will need walking oximetry prior to discharge.    DVT prophylaxis:  Medical DVT prophylaxis orders are present.        CODE STATUS:   Code Status (Patient has no pulse and is not breathing): CPR (Attempt to Resuscitate)  Medical Interventions (Patient has pulse or is breathing): Full Support      Part of this note may be an electronic transcription/translation of spoken language to printed text using the Dragon Dictation System.     Electronically signed by Sesar Abarca MD, 05/12/24, 4:14 PM EDT.

## 2024-05-12 NOTE — PLAN OF CARE
Goal Outcome Evaluation:        VSS. Patient rested with family bedside. Continues on 2L nasal cannula. Ambulated to the bathroom with standby. Tolerated well. Short of air, but recovers quickly. No issues at this current time, continue plan of care.

## 2024-05-13 LAB
ALBUMIN SERPL-MCNC: 3.6 G/DL (ref 3.5–5.2)
ANION GAP SERPL CALCULATED.3IONS-SCNC: 11.9 MMOL/L (ref 5–15)
BUN SERPL-MCNC: 18 MG/DL (ref 8–23)
BUN/CREAT SERPL: 18.9 (ref 7–25)
CALCIUM SPEC-SCNC: 8 MG/DL (ref 8.6–10.5)
CHLORIDE SERPL-SCNC: 101 MMOL/L (ref 98–107)
CO2 SERPL-SCNC: 23.1 MMOL/L (ref 22–29)
CREAT SERPL-MCNC: 0.95 MG/DL (ref 0.57–1)
EGFRCR SERPLBLD CKD-EPI 2021: 57.7 ML/MIN/1.73
GLUCOSE SERPL-MCNC: 265 MG/DL (ref 65–99)
PHOSPHATE SERPL-MCNC: 2.4 MG/DL (ref 2.5–4.5)
POTASSIUM SERPL-SCNC: 3.4 MMOL/L (ref 3.5–5.2)
SODIUM SERPL-SCNC: 136 MMOL/L (ref 136–145)
VANCOMYCIN SERPL-MCNC: 21.85 MCG/ML (ref 5–40)

## 2024-05-13 PROCEDURE — 97161 PT EVAL LOW COMPLEX 20 MIN: CPT

## 2024-05-13 PROCEDURE — 25010000002 ENOXAPARIN PER 10 MG: Performed by: INTERNAL MEDICINE

## 2024-05-13 PROCEDURE — 25010000002 VANCOMYCIN 5 G RECONSTITUTED SOLUTION: Performed by: INTERNAL MEDICINE

## 2024-05-13 PROCEDURE — 94799 UNLISTED PULMONARY SVC/PX: CPT

## 2024-05-13 PROCEDURE — 25010000002 AMPICILLIN-SULBACTAM PER 1.5 G: Performed by: INTERNAL MEDICINE

## 2024-05-13 PROCEDURE — 80202 ASSAY OF VANCOMYCIN: CPT | Performed by: INTERNAL MEDICINE

## 2024-05-13 PROCEDURE — 63710000001 PREDNISONE PER 1 MG: Performed by: INTERNAL MEDICINE

## 2024-05-13 PROCEDURE — 94664 DEMO&/EVAL PT USE INHALER: CPT

## 2024-05-13 PROCEDURE — 99232 SBSQ HOSP IP/OBS MODERATE 35: CPT | Performed by: INTERNAL MEDICINE

## 2024-05-13 PROCEDURE — 25810000003 SODIUM CHLORIDE 0.9 % SOLUTION: Performed by: INTERNAL MEDICINE

## 2024-05-13 PROCEDURE — 80069 RENAL FUNCTION PANEL: CPT | Performed by: INTERNAL MEDICINE

## 2024-05-13 PROCEDURE — 97165 OT EVAL LOW COMPLEX 30 MIN: CPT

## 2024-05-13 RX ADMIN — PREDNISONE 40 MG: 20 TABLET ORAL at 08:37

## 2024-05-13 RX ADMIN — BUDESONIDE 0.5 MG: 0.5 SUSPENSION RESPIRATORY (INHALATION) at 08:00

## 2024-05-13 RX ADMIN — BENZONATATE 100 MG: 100 CAPSULE ORAL at 01:29

## 2024-05-13 RX ADMIN — ARFORMOTEROL TARTRATE 15 MCG: 15 SOLUTION RESPIRATORY (INHALATION) at 08:00

## 2024-05-13 RX ADMIN — AMPICILLIN SODIUM, SULBACTAM SODIUM 3 G: 2; 1 INJECTION, POWDER, FOR SOLUTION INTRAMUSCULAR; INTRAVENOUS at 16:18

## 2024-05-13 RX ADMIN — BUDESONIDE 0.5 MG: 0.5 SUSPENSION RESPIRATORY (INHALATION) at 18:47

## 2024-05-13 RX ADMIN — POTASSIUM PHOSPHATE, MONOBASIC 1000 MG: 500 TABLET, SOLUBLE ORAL at 11:45

## 2024-05-13 RX ADMIN — FAMOTIDINE 20 MG: 20 TABLET ORAL at 08:38

## 2024-05-13 RX ADMIN — HYDROCODONE POLISTIREX AND CHLORPHENIRAMINE POLISTIREX 5 ML: 10; 8 SUSPENSION, EXTENDED RELEASE ORAL at 08:38

## 2024-05-13 RX ADMIN — Medication 10 ML: at 22:27

## 2024-05-13 RX ADMIN — HYDROCODONE POLISTIREX AND CHLORPHENIRAMINE POLISTIREX 5 ML: 10; 8 SUSPENSION, EXTENDED RELEASE ORAL at 22:26

## 2024-05-13 RX ADMIN — AMPICILLIN SODIUM, SULBACTAM SODIUM 3 G: 2; 1 INJECTION, POWDER, FOR SOLUTION INTRAMUSCULAR; INTRAVENOUS at 22:26

## 2024-05-13 RX ADMIN — ALPRAZOLAM 0.25 MG: 0.25 TABLET ORAL at 01:29

## 2024-05-13 RX ADMIN — Medication 10 ML: at 11:46

## 2024-05-13 RX ADMIN — AMPICILLIN SODIUM, SULBACTAM SODIUM 3 G: 2; 1 INJECTION, POWDER, FOR SOLUTION INTRAMUSCULAR; INTRAVENOUS at 08:38

## 2024-05-13 RX ADMIN — AMPICILLIN SODIUM, SULBACTAM SODIUM 3 G: 2; 1 INJECTION, POWDER, FOR SOLUTION INTRAMUSCULAR; INTRAVENOUS at 04:54

## 2024-05-13 RX ADMIN — AZITHROMYCIN DIHYDRATE 250 MG: 250 TABLET ORAL at 08:38

## 2024-05-13 RX ADMIN — ALLOPURINOL 300 MG: 100 TABLET ORAL at 08:37

## 2024-05-13 RX ADMIN — ENOXAPARIN SODIUM 40 MG: 100 INJECTION SUBCUTANEOUS at 08:38

## 2024-05-13 RX ADMIN — ARFORMOTEROL TARTRATE 15 MCG: 15 SOLUTION RESPIRATORY (INHALATION) at 18:47

## 2024-05-13 NOTE — THERAPY EVALUATION
Patient Name: Shayy Wasserman  : 1935    MRN: [id number]                              Today's Date: 2024       Admit Date: 2024    Visit Dx:     ICD-10-CM ICD-9-CM   1. Pneumonia of right lower lobe due to infectious organism  J18.9 486   2. History of COPD  Z87.09 V12.69   3. Acute hypoxic respiratory failure  J96.01 518.81     Patient Active Problem List   Diagnosis    Pulmonary emphysema    Bronchitis    Essential (primary) hypertension    Gout    Hyperlipidemia    Osteoarthritis    Stage 3 chronic kidney disease    Osteoporosis    Localized edema    Prediabetes    Pain of left upper extremity    Anxiety with somatization    Hypoxia    Community acquired pneumonia of right lower lobe of lung    COPD with acute exacerbation    COPD exacerbation     Past Medical History:   Diagnosis Date    Anxiety disorder 2018    Arthritis     COPD (chronic obstructive pulmonary disease)     Dependent edema 2017    Gout     Osteoporosis     Primary osteoarthritis of left knee 2018    Primary osteoarthritis of one hip, right 2017    Primary osteoarthritis of right hip 2017     Past Surgical History:   Procedure Laterality Date    BLADDER REPAIR      COLONOSCOPY  2016    EYE SURGERY      implant- yes    HYSTERECTOMY      INTRAOCULAR LENS INSERTION      yes    JOINT REPLACEMENT      Surgery    KNEE SURGERY Right     knee repair    KNEE SURGERY Right     repair    OTHER SURGICAL HISTORY      Artificial joints/limbs    OTHER SURGICAL HISTORY      Artificial Joints/Limbs    OTHER SURGICAL HISTORY      Joint surgery    TOTAL KNEE ARTHROPLASTY Right       General Information       Row Name 24 1252          OT Time and Intention    Document Type evaluation  -PG     Mode of Treatment occupational therapy;individual therapy  -PG       Row Name 24 1252          General Information    Patient Profile Reviewed yes  Resides with son and daughter-in-law.  Uses rolling walker and  reports daughter-in-law assist her at home with ADLs.  Frequent admissions to the hospital  -PG     Prior Level of Function mod assist:;ADL's  -PG     Existing Precautions/Restrictions fall  -PG     Barriers to Rehab none identified  -PG       Row Name 05/13/24 1252          Occupational Profile    Reason for Services/Referral (Occupational Profile) Patient is a 87-year-old female admitted for multifocal pneumonia/sepsis.  Patient being evaluated by Occupational Therapy due to recent decline in ADL function.  No previous OT services identified  -PG       Row Name 05/13/24 1252          Living Environment    People in Home child(dewey), adult  -PG       Row Name 05/13/24 1252          Cognition    Orientation Status (Cognition) oriented x 3  -PG       Row Name 05/13/24 1252          Safety Issues, Functional Mobility    Impairments Affecting Function (Mobility) balance;endurance/activity tolerance;strength  -PG               User Key  (r) = Recorded By, (t) = Taken By, (c) = Cosigned By      Initials Name Provider Type    PG Jass Harley, OT Occupational Therapist                     Mobility/ADL's       Row Name 05/13/24 1254          Transfers    Transfers bed-chair transfer  -PG       Row Name 05/13/24 1254          Bed-Chair Transfer    Bed-Chair [place] (Transfers) minimum assist (75% patient effort);verbal cues  -PG     Assistive Device (Bed-Chair Transfers) walker, front-wheeled  -PG       Row Name 05/13/24 1254          Activities of Daily Living    BADL Assessment/Intervention bathing;upper body dressing;lower body dressing;grooming;toileting  -PG       Row Name 05/13/24 1254          Bathing Assessment/Intervention    [place] Level (Bathing) bathing skills;maximum assist (25% patient effort)  -PG       Row Name 05/13/24 1254          Upper Body Dressing Assessment/Training    [place] Level (Upper Body Dressing) upper body dressing skills;set up  -       Row Name 05/13/24 1254          Lower  Body Dressing Assessment/Training    [place] Level (Lower Body Dressing) lower body dressing skills;dependent (less than 25% patient effort)  -PG       Row Name 05/13/24 1254          Grooming Assessment/Training    [place] Level (Grooming) grooming skills;set up  -PG       [facility] Name 05/13/24 1254          Toileting Assessment/Training    [place] Level (Toileting) toileting skills;dependent (less than 25% patient effort)  -PG               User Key  (r) = Recorded By, (t) = Taken By, (c) = Cosigned By      Initials Name Provider Type    PG Jass Harley OT Occupational Therapist                   Obj/Interventions       Row Name 05/13/24 1255          Sensory Assessment (Somatosensory)    Sensory Assessment (Somatosensory) unable/difficult to assess  -PG       [facility] Name 05/13/24 1255          Vision Assessment/Intervention    Visual Impairment/Limitations unable/difficult to assess  -PG       [facility] Name 05/13/24 1255          Range of Motion Comprehensive    General Range of Motion no range of motion deficits identified  -PG       Row Name 05/13/24 1255          Strength Comprehensive (MMT)    Comment, General Manual Muscle Testing (MMT) Assessment 4 -/5 BUE  -PG       [facility] Name 05/13/24 1255          Motor Skills    Motor Skills coordination;functional endurance  -PG     Coordination WFL  -PG     Functional Endurance Poor plus  -PG               User Key  (r) = Recorded By, (t) = Taken By, (c) = Cosigned By      Initials Name Provider Type    Jass Flores OT Occupational Therapist                   Goals/Plan       Row Name 05/13/24 1256          Transfer Goal 1 (OT)    Activity/Assistive Device (Transfer Goal 1, OT) transfers, all  -PG     [place] Level/Cues Needed (Transfer Goal 1, OT) modified independence  -PG     Time Frame (Transfer Goal 1, OT) long term goal (LTG);10 days  -PG       [facility] Name 05/13/24 1256          Bathing Goal 1 (OT)    Activity/Device (Bathing Goal 1, OT) bathing  skills, all  -PG     [place] Level/Cues Needed (Bathing Goal 1, OT) modified independence  -PG     Time Frame (Bathing Goal 1, OT) long term goal (LTG);10 days  -PG       Row Name 05/13/24 1256          Dressing Goal 1 (OT)    Activity/Device (Dressing Goal 1, OT) dressing skills, all  -PG     [place]/Cues Needed (Dressing Goal 1, OT) modified independence  -PG     Time Frame (Dressing Goal 1, OT) long term goal (LTG);10 days  -PG       Row Name 05/13/24 1256          Toileting Goal 1 (OT)    Activity/Device (Toileting Goal 1, OT) toileting skills, all  -PG     [place] Level/Cues Needed (Toileting Goal 1, OT) modified independence  -PG     Time Frame (Toileting Goal 1, OT) long term goal (LTG);10 days  -PG       Row Name 05/13/24 1256          Grooming Goal 1 (OT)    Activity/Device (Grooming Goal 1, OT) grooming skills, all  -PG     [place] (Grooming Goal 1, OT) modified independence  -PG     Time Frame (Grooming Goal 1, OT) long term goal (LTG);10 days  -PG       Row Name 05/13/24 1256          Strength Goal 1 (OT)    Strength Goal 1 (OT) Patient will improve bilateral upper extremity strength to 4+/5 to support independence with self-care activity and functional transfers  -PG     Time Frame (Strength Goal 1, OT) long term goal (LTG);10 days  -PG       Row Name 05/13/24 1256          Problem Specific Goal 1 (OT)    Problem Specific Goal 1 (OT) Patient will improve activity tolerance to fair plus to support independence with self-care activities  -PG     Time Frame (Problem Specific Goal 1, OT) long term goal (LTG);10 days  -PG       Row Name 05/13/24 1256          Therapy Assessment/Plan (OT)    Planned Therapy Interventions (OT) activity tolerance training;BADL retraining;strengthening exercise;transfer/mobility retraining;patient/caregiver education/training;occupation/activity based interventions  -PG               User Key  (r) = Recorded By, (t) = Taken By, (c) = Cosigned By      Initials  Name Provider Type    PG Jass Harley OT Occupational Therapist                   Clinical Impression       Row Name 05/13/24 1255          Pain Assessment    Pretreatment Pain Rating 8/10  -PG     Posttreatment Pain Rating 8/10  -PG     Pain Location - back  -PG     Pain Intervention(s) Nursing Notified  -PG       Row Name 05/13/24 1255          Plan of Care Review    Plan of Care Reviewed With patient  -PG     Progress no change  -PG     Outcome Evaluation Patient presents with limitations affecting strength, activity tolerance, and balance impacting patient's ability to return home safely and independently.  The skills of a therapist will be required to safely and effectively implement the following treatment plan to restore maximal level of function  -PG       Row Name 05/13/24 1258          Therapy Assessment/Plan (OT)    Patient/Family Therapy Goal Statement (OT) Get stronger and return home independently  -PG     Rehab Potential (OT) good, to achieve stated therapy goals  -PG     Criteria for Skilled Therapeutic Interventions Met (OT) meets criteria;yes;skilled treatment is necessary  -PG     Therapy Frequency (OT) 5 times/wk  -PG       Row Name 05/13/24 1254          Therapy Plan Review/Discharge Plan (OT)    Anticipated Discharge Disposition (OT) skilled nursing facility;inpatient rehabilitation facility  -PG               User Key  (r) = Recorded By, (t) = Taken By, (c) = Cosigned By      Initials Name Provider Type    PG Jass Harley OT Occupational Therapist                   Outcome Measures       Row Name 05/13/24 1257          How much help from another is currently needed...    Putting on and taking off regular lower body clothing? 1  -PG     Bathing (including washing, rinsing, and drying) 2  -PG     Toileting (which includes using toilet bed pan or urinal) 1  -PG     Putting on and taking off regular upper body clothing 3  -PG     Taking care of personal grooming (such as brushing teeth) 3  -PG      Eating meals 3  -PG     AM-PAC 6 Clicks Score (OT) 13  -PG       Row Name 05/13/24 0837          How much help from another person do you currently need...    Turning from your back to your side while in flat bed without using bedrails? 4  -KG     Moving from lying on back to sitting on the side of a flat bed without bedrails? 4  -KG     Moving to and from a bed to a chair (including a wheelchair)? 4  -KG     Standing up from a chair using your arms (e.g., wheelchair, bedside chair)? 3  -KG     Climbing 3-5 steps with a railing? 3  -KG     To walk in hospital room? 3  -KG     AM-PAC 6 Clicks Score (PT) 21  -KG     Highest Level of Mobility Goal 6 --> Walk 10 steps or more  -KG       Row Name 05/13/24 1257          Functional Assessment    Outcome Measure Options AM-PAC 6 Clicks Daily Activity (OT);Optimal Instrument  -PG       Row Name 05/13/24 1257          Optimal Instrument    Optimal Instrument Optimal - 3  -PG     Bending/Stooping 3  -PG     Standing 2  -PG     Reaching 1  -PG     From the list, choose the 3 activities you would most like to be able to do without any difficulty Bending/stooping;Standing;Reaching  -PG     Total Score Optimal - 3 6  -PG               User Key  (r) = Recorded By, (t) = Taken By, (c) = Cosigned By      Initials Name Provider Type    PG Jass Harley, OT Occupational Therapist    KG Krissy Travis, RN Registered Nurse                    Occupational Therapy Education       Title: PT OT SLP Therapies (Done)       Topic: Occupational Therapy (Done)       Point: ADL training (Done)       Description:   Instruct learner(s) on proper safety adaptation and remediation techniques during self care or transfers.   Instruct in proper use of assistive devices.                  Learning Progress Summary             Patient Acceptance, E,D, DU by PG at 5/13/2024 4302                         Point: Home exercise program (Done)       Description:   Instruct learner(s) on appropriate  technique for monitoring, assisting and/or progressing therapeutic exercises/activities.                  Learning Progress Summary             Patient Acceptance, E,D, DU by PG at 5/13/2024 1258                         Point: Precautions (Done)       Description:   Instruct learner(s) on prescribed precautions during self-care and functional transfers.                  Learning Progress Summary             Patient Acceptance, E,D, DU by PG at 5/13/2024 1258                         Point: Body mechanics (Done)       Description:   Instruct learner(s) on proper positioning and spine alignment during self-care, functional mobility activities and/or exercises.                  Learning Progress Summary             Patient Acceptance, E,D, DU by PG at 5/13/2024 1258                                         User Key       Initials Effective Dates Name Provider Type Discipline    PG 06/16/21 -  Jass Harley, OT Occupational Therapist OT                  OT Recommendation and Plan  Planned Therapy Interventions (OT): activity tolerance training, BADL retraining, strengthening exercise, transfer/mobility retraining, patient/caregiver education/training, occupation/activity based interventions  Therapy Frequency (OT): 5 times/wk  Plan of Care Review  Plan of Care Reviewed With: patient  Progress: no change  Outcome Evaluation: Patient presents with limitations affecting strength, activity tolerance, and balance impacting patient's ability to return home safely and independently.  The skills of a therapist will be required to safely and effectively implement the following treatment plan to restore maximal level of function     Time Calculation:   Evaluation Complexity (OT)  Review Occupational Profile/Medical/Therapy History Complexity: brief/low complexity  Assessment, Occupational Performance/Identification of Deficit Complexity: 3-5 performance deficits  Clinical Decision Making Complexity (OT): problem focused  assessment/low complexity  Overall Complexity of Evaluation (OT): low complexity     Time Calculation- OT       Row Name 05/13/24 1259             Time Calculation- OT    OT Received On 05/13/24  -PG      OT Goal Re-Cert Due Date 05/22/24  -PG         Untimed Charges    OT Eval/Re-eval Minutes 35  -PG         Total Minutes    Untimed Charges Total Minutes 35  -PG       Total Minutes 35  -PG                User Key  (r) = Recorded By, (t) = Taken By, (c) = Cosigned By      Initials Name Provider Type    PG Jass Harley OT Occupational Therapist                  Therapy Charges for Today       Code Description Service Date Service Provider Modifiers Qty    06129851641 HC OT EVAL LOW COMPLEXITY 3 5/13/2024 Jass Harley OT GO 1                 Jass Harley OT  5/13/2024

## 2024-05-13 NOTE — PROGRESS NOTES
"[facility] Clinical Pharmacy Services: Vancomycin Pharmacokinetic Initial Consult Note    Shayy Wasserman is a 88 y.o. female who is on day 1 of pharmacy to dose vancomycin for Bacteremia.    Consult Information  Consulting Provider: Radha Abarca  Planned Duration of Therapy: 7 dyas  Was Patient Receiving Prior to Admission/Consult?: No  Loading Dose Ordered or Given: 1500 mg on 24at 2230  PK/PD Target: -600 mg/L.hr   Relevant ID History:     Staph spp, not aureus or lugdunensis. Identification by BCID2 PCR. Abnormal  on 24       Other Antimicrobials: Ampicillin/Sulbactam and Azithromycin    Imaging Reviewed?: Yes    Microbiology Data  MRSA PCR performed: No; Result: Not ordered due to excluded indication or presence of suspected abscess  Culture/Source:     Vitals/Labs  Ht: 160 cm (62.99\"); Wt: 72.3 kg (159 lb 6.3 oz)  Temp (24hrs), Av.6 °F (36.4 °C), Min:97.3 °F (36.3 °C), Max:98 °F (36.7 °C)   Estimated Creatinine Clearance: 39 mL/min (by C-G formula based on SCr of 0.95 mg/dL).       Results from last 7 days   Lab Units 24  0542 24  1329   VANCOMYCIN RM mcg/mL 21.85  --    CREATININE mg/dL 0.95 0.89   WBC 10*3/mm3  --  11.24*     Assessment/Plan:  Vancomycin Dose:  1000 mg IV every 24 hours; which provides the following predicted parameters:    Regimen: 1000 mg IV every 24 hours.  Start time: 12:00 on 2024  Exposure target: AUC24 (range)400-600 mg/L.hr   AUC24,ss: 544 mg/L.hr  PAUC*: 93 %  Ctrough,ss: 15.2 mg/L  Pconc*: 19 %  Tox.: 10 %    Vancomycin level today reported as 21.85- higher than expected by [facility].    Will provide vancomycin 1000 mg daily starting at 1200.    Pharmacy will follow patient's kidney function and will adjust doses and obtain levels as necessary. Thank you for involving pharmacy in this patient's care. Please contact pharmacy with any questions or concerns.                           Ramesh Hubbard  Clinical Pharmacist  "

## 2024-05-13 NOTE — PLAN OF CARE
Goal Outcome Evaluation:  Plan of Care Reviewed With: patient        Progress: no change  Outcome Evaluation: Patient presents with limitations affecting strength, activity tolerance, and balance impacting patient's ability to return home safely and independently.  The skills of a therapist will be required to safely and effectively implement the following treatment plan to restore maximal level of function      Anticipated Discharge Disposition (OT): skilled nursing facility, inpatient rehabilitation facility

## 2024-05-13 NOTE — PROGRESS NOTES
[facility]   Hospitalist Progress Note  Date: 2024  Patient Name: Shayy Wasserman  : 1935  MRN: [id number]  Date of admission: 2024      Subjective   Subjective     Chief Complaint: Cough for couple of weeks now productive of yellow-green mucus.    Summary:   Shayy Wasserman is a 88 y.o. female with past ministry of COPD, DJD, gout, hypertension and anxiety disorder.  Patient had right lower tooth pulled out on  since then patient been having cough which has been gradually getting worse now productive of yellow mucus.  For past couple of days it has been worse and she has not able to sleep.  He has also developed wheezing.  Denies any fever, chills or shortness of air.  No chest pain.  At times cough makes her gag but no vomiting or diarrhea.  Patient has tried allergy medicine by her PCP and over the cough medicine without much relief.  For these symptoms she came to [facility] ED workup showed O2 sat of 87% on room air other vital signs stable.  White cell count is 11,000.  CMP is negative.  Troponin is 17.  Her COVID, flu and RSV swab is negative.  Chest x-ray shows right lower lobe infiltrate with large hiatal hernia.  Patient denies any choking or coughing with liquids or food intake.  Because of hypoxia hospitalist has been called to admit her for further management.  She is otherwise independent in her ADLs and lives with her daughter-in-law.  Blood culture likely coagulase-negative staph and is contamination.    Interval Followup:   Remains on 2.5 L with 90% saturation.  No home oxygen use  Cough better was able to sleep last night.  Episode of confusion noted but easily redirected  Still bringing up yellow-green mucus been  No chest pain or shortness of therapy  With breathing treatment she gets more cough.    Review of Systems   All systems were reviewed and negative except for: Summary and interval follow-up    Objective   Objective     Vitals:   Temp:  [97.3  °F (36.3 °C)-97.9 °F (36.6 °C)] 97.9 °F (36.6 °C)  Heart Rate:  [71-88] 71  Resp:  [18-20] 18  BP: (122-137)/(61-74) 122/61  Flow (L/min):  [2.5] 2.5  Physical Exam      Constitutional: Awake, alert, no acute distress              Eyes: Pupils equal, sclerae anicteric, no conjunctival injection              HENT: NCAT, mucous membranes moist.  She has upper denture              Neck: Supple, no thyromegaly, no lymphadenopathy, trachea midline              Respiratory: Decreased to auscultation bilaterally, nonlabored respirations               Cardiovascular: Occasional ectopic beat, RRR, no murmurs, rubs, or gallops, palpable pedal pulses bilaterally              Gastrointestinal: Positive bowel sounds, soft, nontender, nondistended              Musculoskeletal: No bilateral ankle edema, no clubbing or cyanosis to extremities              Psychiatric: Appropriate affect, cooperative              Neurologic: Oriented x 3, strength symmetric in all extremities, Cranial Nerves grossly intact to confrontation, speech clear              Skin: No rashes     Result Review    Result Review:  I have personally reviewed the results for the past 24 hours and agree with these findings:  [x]  Laboratory  [x]  Microbiology  [x]  Radiology  []  EKG/Telemetry   []  Cardiology/Vascular   []  Pathology  [x]  Old records  [x]  Other: Medications    Assessment & Plan   Assessment / Plan     Assessment:  Hypoxemia.  No home oxygen use.  Community-acquired right lower lobe pneumonia.  Unspecified bacterial pathogen.  Acute COPD exacerbation.  Hypomagnesemia.  Supplemented  History of hypertension.  Anxiety disorder.  DJD.  Gout stable  Episode of delirium.  Staph bacteremia.  Appears to be contaminated     Plan:   Continue supplemental oxygen keep sats more than 90%.  IV Unasyn and p.o. Zithromax.  DC IV vancomycin.  Await final blood culture results  Check respiratory culture.  Procalcitonin negative.  Check strep pneumonia and  Legionella urine antigen.  Oral prednisone.  DuoNeb, Pulmicort and brovana.  Bronchodilator and bronchopulmonary team protocol.  Incentive spirometry and flutter valve.  Tussionex scheduled.  As needed Tessalon.  Replace electrolytes as needed  Resume appropriate home medication once the list is updated.  Pepcid  PT OT.  Recommending home with home health    Discussed plan with RN.  Discharge home when stable will need walking oximetry prior to discharge.    DVT prophylaxis:  Medical DVT prophylaxis orders are present.    CODE STATUS:   Code Status (Patient has no pulse and is not breathing): CPR (Attempt to Resuscitate)  Medical Interventions (Patient has pulse or is breathing): Full Support      Part of this note may be an electronic transcription/translation of spoken language to printed text using the Dragon Dictation System.       Electronically signed by Sesar Abarca MD, 05/13/24, 3:39 PM EDT.

## 2024-05-13 NOTE — PLAN OF CARE
Goal Outcome Evaluation:  Plan of Care Reviewed With: patient        Progress: no change  Outcome Evaluation: PT vss. Pt had no c/o pain. Continue plan of care.

## 2024-05-13 NOTE — PLAN OF CARE
Goal Outcome Evaluation:  Plan of Care Reviewed With: patient, daughter        Progress: no change  Outcome Evaluation: Patient presents with deficits in balance, endurance, transfers, and ambulation. Patient will benefit from skilled PT services to address these mobility deficits and decrease risk of falls.      Anticipated Discharge Disposition (PT): home with home health

## 2024-05-13 NOTE — THERAPY EVALUATION
Acute Care - Physical Therapy Initial Evaluation   [facility]     Patient Name: Shayy Wasserman  : 1935  MRN: [id number]  Today's Date: 2024      Visit Dx:     ICD-10-CM ICD-9-CM   1. Pneumonia of right lower lobe due to infectious organism  J18.9 486   2. History of COPD  Z87.09 V12.69   3. Acute hypoxic respiratory failure  J96.01 518.81   4. Difficulty walking  R26.2 719.7     Patient Active Problem List   Diagnosis    Pulmonary emphysema    Bronchitis    Essential (primary) hypertension    Gout    Hyperlipidemia    Osteoarthritis    Stage 3 chronic kidney disease    Osteoporosis    Localized edema    Prediabetes    Pain of left upper extremity    Anxiety with somatization    Hypoxia    Community acquired pneumonia of right lower lobe of lung    COPD with acute exacerbation    COPD exacerbation     Past Medical History:   Diagnosis Date    Anxiety disorder 2018    Arthritis     COPD (chronic obstructive pulmonary disease)     Dependent edema 2017    Gout     Osteoporosis     Primary osteoarthritis of left knee 2018    Primary osteoarthritis of one hip, right 2017    Primary osteoarthritis of right hip 2017     Past Surgical History:   Procedure Laterality Date    BLADDER REPAIR      COLONOSCOPY  2016    EYE SURGERY      implant- yes    HYSTERECTOMY      INTRAOCULAR LENS INSERTION      yes    JOINT REPLACEMENT      Surgery    KNEE SURGERY Right     knee repair    KNEE SURGERY Right     repair    OTHER SURGICAL HISTORY      Artificial joints/limbs    OTHER SURGICAL HISTORY      Artificial Joints/Limbs    OTHER SURGICAL HISTORY      Joint surgery    TOTAL KNEE ARTHROPLASTY Right      PT Assessment (Last 12 Hours)       PT Evaluation and Treatment       Row Name 24 1500          Physical Therapy Time and Intention    Subjective Information no complaints  -AV     Document Type evaluation  -AV     Mode of Treatment individual therapy;physical therapy  -AV       Row Name  05/13/24 1500          General Information    Patient Profile Reviewed yes  -AV     Patient Observations alert;cooperative;agree to therapy  -AV     Prior Level of Function independent:;all household mobility;gait;transfer;ADL's  Ambulated without an assistive device. No home O2.  -AV     Equipment Currently Used at Home none  -AV     Existing Precautions/Restrictions fall  -AV       Row Name 05/13/24 1500          Living Environment    Current Living Arrangements home  -AV     Home Accessibility stairs to enter home  -AV     People in Home child(dewey), adult  Daughter-in-law  -AV       Row Name 05/13/24 1500          Home Main Entrance    Number of Stairs, Main Entrance two  -AV     Stair Railings, Main Entrance --  assist from family  -AV       Row Name 05/13/24 1500          Cognition    Orientation Status (Cognition) oriented x 3  -AV       Row Name 05/13/24 1500          Range of Motion (ROM)    Range of Motion bilateral lower extremities;ROM is WFL  -AV       Row Name 05/13/24 1500          Strength (Manual Muscle Testing)    Strength (Manual Muscle Testing) bilateral lower extremities;strength is WFL  -AV       Row Name 05/13/24 1500          Bed Mobility    Bed Mobility supine-sit;sit-supine  -AV     Supine-Sit [place] (Bed Mobility) standby assist  -AV     Sit-Supine [place] (Bed Mobility) standby assist  -AV       Row Name 05/13/24 1500          Transfers    Transfers sit-stand transfer;stand-sit transfer  -AV       Row Name 05/13/24 1500          Sit-Stand Transfer    Sit-Stand [place] (Transfers) contact guard  -AV     Assistive Device (Sit-Stand Transfers) --  No AD  -AV       Row Name 05/13/24 1500          Stand-Sit Transfer    Stand-Sit [place] (Transfers) contact guard  -AV     Assistive Device (Stand-Sit Transfers) --  No AD  -AV       Row Name 05/13/24 1500          Gait/Stairs (Locomotion)    Gait/Stairs Locomotion gait/ambulation independence;gait/ambulation assistive  device;distance ambulated  -AV     [place] Level (Gait) contact guard  -AV     Assistive Device (Gait) --  No AD  -AV     Distance in Feet (Gait) 30  -AV     Pattern (Gait) step-through  -AV       Row Name 05/13/24 1500          Safety Issues, Functional Mobility    Impairments Affecting Function (Mobility) balance;endurance/activity tolerance  -AV       Row Name 05/13/24 1500          Balance    Balance Assessment standing dynamic balance  -AV     Dynamic Standing Balance contact guard  -AV     Position/Device Used, Standing Balance unsupported  -AV       Row Name 05/13/24 1500          Plan of Care Review    Plan of Care Reviewed With patient;daughter  -AV     Progress no change  -AV     Outcome Evaluation Patient presents with deficits in balance, endurance, transfers, and ambulation. Patient will benefit from skilled PT services to address these mobility deficits and decrease risk of falls.  -AV       Row Name 05/13/24 1500          Positioning and Restraints    Pre-Treatment Position in bed  -AV     Post Treatment Position bed  -AV     In Bed supine;call light within reach;encouraged to call for assist;exit alarm on;with family/caregiver  -AV       Row Name 05/13/24 1500          Therapy Assessment/Plan (PT)    Rehab Potential (PT) good, to achieve stated therapy goals  -AV     Criteria for Skilled Interventions Met (PT) yes;meets criteria  -AV     Therapy Frequency (PT) daily  -AV     Predicted Duration of Therapy Intervention (PT) 10 days  -AV     Problem List (PT) problems related to;balance;mobility  -AV     Activity Limitations Related to Problem List (PT) unable to transfer safely;unable to ambulate safely  -AV       Row Name 05/13/24 1500          PT Evaluation Complexity    History, PT Evaluation Complexity 1-2 personal factors and/or comorbidities  -AV     Examination of Body Systems (PT Eval Complexity) total of 4 or more elements  -AV     Clinical Presentation (PT Evaluation Complexity) stable   -AV     Clinical Decision Making (PT Evaluation Complexity) low complexity  -AV     Overall Complexity (PT Evaluation Complexity) low complexity  -AV       Row Name 05/13/24 1500          Therapy Plan Review/Discharge Plan (PT)    Therapy Plan Review (PT) evaluation/treatment results reviewed;patient  -AV       Row Name 05/13/24 1500          Physical Therapy Goals    Transfer Goal Selection (PT) transfer, PT goal 1  -AV     Gait Training Goal Selection (PT) gait training, PT goal 1  -AV       Row Name 05/13/24 1500          Transfer Goal 1 (PT)    Activity/Assistive Device (Transfer Goal 1, PT) sit-to-stand/stand-to-sit;bed-to-chair/chair-to-bed  -AV     [place] Level/Cues Needed (Transfer Goal 1, PT) independent  -AV     Time Frame (Transfer Goal 1, PT) 10 days  -AV       Row Name 05/13/24 1500          Gait Training Goal 1 (PT)    Activity/Assistive Device (Gait Training Goal 1, PT) gait (walking locomotion)  -AV     [place] Level (Gait Training Goal 1, PT) independent  -AV     Distance (Gait Training Goal 1, PT) 200  -AV     Time Frame (Gait Training Goal 1, PT) 10 days  -AV               User Key  (r) = Recorded By, (t) = Taken By, (c) = Cosigned By      Initials Name Provider Type    AV Paul Anthony, PT Physical Therapist                    Physical Therapy Education       Title: PT OT SLP Therapies (In Progress)       Topic: Physical Therapy (In Progress)       Point: Mobility training (Done)       Learning Progress Summary             Patient Acceptance, E,TB, VU by AV at 5/13/2024 1511                         Point: Home exercise program (Not Started)       Learner Progress:  Not documented in this visit.              Point: Body mechanics (Done)       Learning Progress Summary             Patient Acceptance, E,TB, VU by AV at 5/13/2024 1511                         Point: Precautions (Done)       Learning Progress Summary             Patient Acceptance, E,TB, VU by AV at 5/13/2024 1511                                          User Key       Initials Effective Dates Name Provider Type Discipline    AV 06/11/21 -  Paul Anthony, PT Physical Therapist PT                  PT Recommendation and Plan  Anticipated Discharge Disposition (PT): home with home health  Planned Therapy Interventions (PT): balance training, bed mobility training, gait training, home exercise program, neuromuscular re-education, strengthening, transfer training  Therapy Frequency (PT): daily  Plan of Care Reviewed With: patient, daughter  Progress: no change  Outcome Evaluation: Patient presents with deficits in balance, endurance, transfers, and ambulation. Patient will benefit from skilled PT services to address these mobility deficits and decrease risk of falls.   Outcome Measures       Row Name 05/13/24 1500             How much help from another person do you currently need...    Turning from your back to your side while in flat bed without using bedrails? 4  -AV      Moving from lying on back to sitting on the side of a flat bed without bedrails? 4  -AV      Moving to and from a bed to a chair (including a wheelchair)? 3  -AV      Standing up from a chair using your arms (e.g., wheelchair, bedside chair)? 3  -AV      Climbing 3-5 steps with a railing? 3  -AV      To walk in hospital room? 3  -AV      AM-PAC 6 Clicks Score (PT) 20  -AV      Highest Level of Mobility Goal 6 --> Walk 10 steps or more  -AV         Functional Assessment    Outcome Measure Options AM-PAC 6 Clicks Basic Mobility (PT)  -AV                User Key  (r) = Recorded By, (t) = Taken By, (c) = Cosigned By      Initials Name Provider Type    AV Paul Anthony, PT Physical Therapist                     Time Calculation:    PT Charges       Row Name 05/13/24 1509             Time Calculation    PT Received On 05/13/24  -AV      PT Goal Re-Cert Due Date 05/22/24  -AV         Untimed Charges    PT Eval/Re-eval Minutes 35  -AV         Total Minutes     Untimed Charges Total Minutes 35  -AV       Total Minutes 35  -AV                User Key  (r) = Recorded By, (t) = Taken By, (c) = Cosigned By      Initials Name Provider Type    AV Paul Anthony, PT Physical Therapist                  Therapy Charges for Today       Code Description Service Date Service Provider Modifiers Qty    75403513060 HC PT EVAL LOW COMPLEXITY 3 5/13/2024 Paul Anthony, PT GP 1            PT G-Codes  Outcome Measure Options: AM-PAC 6 Clicks Basic Mobility (PT)  AM-PAC 6 Clicks Score (PT): 20  AM-PAC 6 Clicks Score (OT): 13    Paul Anthony PT  5/13/2024

## 2024-05-13 NOTE — PROGRESS NOTES
"[facility] Clinical Pharmacy Services: Vancomycin Pharmacokinetic Initial Consult Note    Shayy Wasserman is a 88 y.o. female who is on day 1 of pharmacy to dose vancomycin for Bacteremia.    Consult Information  Consulting Provider: Radha Abarca  Planned Duration of Therapy: 7 dyas  Was Patient Receiving Prior to Admission/Consult?: No  Loading Dose Ordered or Given: 1500 mg on 24at 2230  PK/PD Target: -600 mg/L.hr   Relevant ID History:     Staph spp, not aureus or lugdunensis. Identification by BCID2 PCR. Abnormal  on 24       Other Antimicrobials: Ampicillin/Sulbactam and Azithromycin    Imaging Reviewed?: Yes    Microbiology Data  MRSA PCR performed: No; Result: Not ordered due to excluded indication or presence of suspected abscess  Culture/Source:     Vitals/Labs  Ht: 160 cm (62.99\"); Wt: 72.3 kg (159 lb 6.3 oz)  Temp (24hrs), Av.6 °F (36.4 °C), Min:97.3 °F (36.3 °C), Max:98.1 °F (36.7 °C)   Estimated Creatinine Clearance: 41.7 mL/min (by C-G formula based on SCr of 0.89 mg/dL).       Results from last 7 days   Lab Units 24  1329   CREATININE mg/dL 0.89   WBC 10*3/mm3 11.24*     Assessment/Plan:    Vancomycin Dose:  1250 mg IV every 24 hours; which provides the following predicted parameters:  Exposure target: AUC24 (range)400-600 mg/L.hr   AUC24,ss: 551 mg/L.hr  PAUC*: 83 %  Ctrough,ss: 16.8 mg/L  Pconc*: 35 %  Tox.: 12 %  Vanc Random ordered for 24 at 0600  Patient has order for Renal Function Panel    Pharmacy will follow patient's kidney function and will adjust doses and obtain levels as necessary. Thank you for involving pharmacy in this patient's care. Please contact pharmacy with any questions or concerns.                           Edy Bess [facility]  Clinical Pharmacist    "

## 2024-05-13 NOTE — PLAN OF CARE
Goal Outcome Evaluation:  Plan of Care Reviewed With: patient           Outcome Evaluation: Pt. had some increased confusion this shift. Medicated for anxiety and frequent prod. coarse cough. Cont. poc.

## 2024-05-14 LAB
ALBUMIN SERPL-MCNC: 3.4 G/DL (ref 3.5–5.2)
ANION GAP SERPL CALCULATED.3IONS-SCNC: 11.2 MMOL/L (ref 5–15)
BACTERIA SPEC AEROBE CULT: ABNORMAL
BACTERIA SPEC RESP CULT: NORMAL
BILIRUB UR QL STRIP: NEGATIVE
BUN SERPL-MCNC: 15 MG/DL (ref 8–23)
BUN/CREAT SERPL: 20.8 (ref 7–25)
CALCIUM SPEC-SCNC: 7.9 MG/DL (ref 8.6–10.5)
CHLORIDE SERPL-SCNC: 102 MMOL/L (ref 98–107)
CLARITY UR: CLEAR
CO2 SERPL-SCNC: 25.8 MMOL/L (ref 22–29)
COLOR UR: YELLOW
CREAT SERPL-MCNC: 0.72 MG/DL (ref 0.57–1)
EGFRCR SERPLBLD CKD-EPI 2021: 80.5 ML/MIN/1.73
GLUCOSE SERPL-MCNC: 221 MG/DL (ref 65–99)
GLUCOSE UR STRIP-MCNC: ABNORMAL MG/DL
GRAM STN SPEC: ABNORMAL
GRAM STN SPEC: NORMAL
HGB UR QL STRIP.AUTO: NEGATIVE
ISOLATED FROM: ABNORMAL
KETONES UR QL STRIP: NEGATIVE
L PNEUMO1 AG UR QL IA: NEGATIVE
LEUKOCYTE ESTERASE UR QL STRIP.AUTO: NEGATIVE
NITRITE UR QL STRIP: NEGATIVE
PH UR STRIP.AUTO: 7 [PH] (ref 5–8)
PHOSPHATE SERPL-MCNC: 1.9 MG/DL (ref 2.5–4.5)
POTASSIUM SERPL-SCNC: 3.5 MMOL/L (ref 3.5–5.2)
PROT UR QL STRIP: NEGATIVE
S PNEUM AG SPEC QL LA: POSITIVE
SODIUM SERPL-SCNC: 139 MMOL/L (ref 136–145)
SP GR UR STRIP: 1.01 (ref 1–1.03)
UROBILINOGEN UR QL STRIP: ABNORMAL
VANCOMYCIN SERPL-MCNC: 7.8 MCG/ML (ref 5–40)

## 2024-05-14 PROCEDURE — 97530 THERAPEUTIC ACTIVITIES: CPT

## 2024-05-14 PROCEDURE — 99232 SBSQ HOSP IP/OBS MODERATE 35: CPT | Performed by: INTERNAL MEDICINE

## 2024-05-14 PROCEDURE — 94799 UNLISTED PULMONARY SVC/PX: CPT

## 2024-05-14 PROCEDURE — 80202 ASSAY OF VANCOMYCIN: CPT | Performed by: INTERNAL MEDICINE

## 2024-05-14 PROCEDURE — 80069 RENAL FUNCTION PANEL: CPT | Performed by: INTERNAL MEDICINE

## 2024-05-14 PROCEDURE — 25810000003 SODIUM CHLORIDE 0.9 % SOLUTION: Performed by: FAMILY MEDICINE

## 2024-05-14 PROCEDURE — 63710000001 PREDNISONE PER 1 MG: Performed by: INTERNAL MEDICINE

## 2024-05-14 PROCEDURE — 87899 AGENT NOS ASSAY W/OPTIC: CPT | Performed by: INTERNAL MEDICINE

## 2024-05-14 PROCEDURE — 25010000002 AMPICILLIN-SULBACTAM PER 1.5 G: Performed by: INTERNAL MEDICINE

## 2024-05-14 PROCEDURE — 94664 DEMO&/EVAL PT USE INHALER: CPT

## 2024-05-14 PROCEDURE — 87449 NOS EACH ORGANISM AG IA: CPT | Performed by: INTERNAL MEDICINE

## 2024-05-14 PROCEDURE — 81003 URINALYSIS AUTO W/O SCOPE: CPT | Performed by: INTERNAL MEDICINE

## 2024-05-14 PROCEDURE — 25010000002 ENOXAPARIN PER 10 MG: Performed by: INTERNAL MEDICINE

## 2024-05-14 RX ORDER — POTASSIUM CHLORIDE 20 MEQ/1
40 TABLET, EXTENDED RELEASE ORAL EVERY 4 HOURS
Status: CANCELLED | OUTPATIENT
Start: 2024-05-14 | End: 2024-05-14

## 2024-05-14 RX ORDER — FENTANYL/ROPIVACAINE/NS/PF 2-625MCG/1
15 PLASTIC BAG, INJECTION (ML) EPIDURAL ONCE
Status: COMPLETED | OUTPATIENT
Start: 2024-05-14 | End: 2024-05-15

## 2024-05-14 RX ADMIN — AZITHROMYCIN DIHYDRATE 250 MG: 250 TABLET ORAL at 08:18

## 2024-05-14 RX ADMIN — AMPICILLIN SODIUM, SULBACTAM SODIUM 3 G: 2; 1 INJECTION, POWDER, FOR SOLUTION INTRAMUSCULAR; INTRAVENOUS at 21:23

## 2024-05-14 RX ADMIN — ENOXAPARIN SODIUM 40 MG: 100 INJECTION SUBCUTANEOUS at 08:18

## 2024-05-14 RX ADMIN — POTASSIUM PHOSPHATE, MONOBASIC POTASSIUM PHOSPHATE, DIBASIC 15 MMOL: 224; 236 INJECTION, SOLUTION, CONCENTRATE INTRAVENOUS at 10:07

## 2024-05-14 RX ADMIN — ALLOPURINOL 300 MG: 100 TABLET ORAL at 08:18

## 2024-05-14 RX ADMIN — AMPICILLIN SODIUM, SULBACTAM SODIUM 3 G: 2; 1 INJECTION, POWDER, FOR SOLUTION INTRAMUSCULAR; INTRAVENOUS at 14:50

## 2024-05-14 RX ADMIN — BUDESONIDE 0.5 MG: 0.5 SUSPENSION RESPIRATORY (INHALATION) at 18:38

## 2024-05-14 RX ADMIN — BUDESONIDE 0.5 MG: 0.5 SUSPENSION RESPIRATORY (INHALATION) at 06:41

## 2024-05-14 RX ADMIN — ARFORMOTEROL TARTRATE 15 MCG: 15 SOLUTION RESPIRATORY (INHALATION) at 18:38

## 2024-05-14 RX ADMIN — AMPICILLIN SODIUM, SULBACTAM SODIUM 3 G: 2; 1 INJECTION, POWDER, FOR SOLUTION INTRAMUSCULAR; INTRAVENOUS at 02:56

## 2024-05-14 RX ADMIN — PREDNISONE 40 MG: 20 TABLET ORAL at 08:18

## 2024-05-14 RX ADMIN — AMPICILLIN SODIUM, SULBACTAM SODIUM 3 G: 2; 1 INJECTION, POWDER, FOR SOLUTION INTRAMUSCULAR; INTRAVENOUS at 08:18

## 2024-05-14 RX ADMIN — Medication 10 ML: at 21:24

## 2024-05-14 RX ADMIN — Medication 10 ML: at 08:18

## 2024-05-14 RX ADMIN — FAMOTIDINE 20 MG: 20 TABLET ORAL at 08:18

## 2024-05-14 RX ADMIN — HYDROCODONE POLISTIREX AND CHLORPHENIRAMINE POLISTIREX 5 ML: 10; 8 SUSPENSION, EXTENDED RELEASE ORAL at 08:18

## 2024-05-14 RX ADMIN — HYDROCODONE POLISTIREX AND CHLORPHENIRAMINE POLISTIREX 5 ML: 10; 8 SUSPENSION, EXTENDED RELEASE ORAL at 21:24

## 2024-05-14 RX ADMIN — ARFORMOTEROL TARTRATE 15 MCG: 15 SOLUTION RESPIRATORY (INHALATION) at 06:41

## 2024-05-14 NOTE — THERAPY TREATMENT NOTE
Patient Name: Shayy Wasserman  : 1935    MRN: [id number]                              Today's Date: 2024       Admit Date: 2024    Visit Dx:     ICD-10-CM ICD-9-CM   1. Pneumonia of right lower lobe due to infectious organism  J18.9 486   2. History of COPD  Z87.09 V12.69   3. Acute hypoxic respiratory failure  J96.01 518.81   4. Difficulty walking  R26.2 719.7     Patient Active Problem List   Diagnosis    Pulmonary emphysema    Bronchitis    Essential (primary) hypertension    Gout    Hyperlipidemia    Osteoarthritis    Stage 3 chronic kidney disease    Osteoporosis    Localized edema    Prediabetes    Pain of left upper extremity    Anxiety with somatization    Hypoxia    Community acquired pneumonia of right lower lobe of lung    COPD with acute exacerbation    COPD exacerbation     Past Medical History:   Diagnosis Date    Anxiety disorder 2018    Arthritis     COPD (chronic obstructive pulmonary disease)     Dependent edema 2017    Gout     Osteoporosis     Primary osteoarthritis of left knee 2018    Primary osteoarthritis of one hip, right 2017    Primary osteoarthritis of right hip 2017     Past Surgical History:   Procedure Laterality Date    BLADDER REPAIR      COLONOSCOPY  2016    EYE SURGERY      implant- yes    HYSTERECTOMY      INTRAOCULAR LENS INSERTION      yes    JOINT REPLACEMENT      Surgery    KNEE SURGERY Right     knee repair    KNEE SURGERY Right     repair    OTHER SURGICAL HISTORY      Artificial joints/limbs    OTHER SURGICAL HISTORY      Artificial Joints/Limbs    OTHER SURGICAL HISTORY      Joint surgery    TOTAL KNEE ARTHROPLASTY Right       General Information       Row Name 24 0943          OT Time and Intention    Document Type therapy note (daily note)  -PG     Mode of Treatment occupational therapy;individual therapy  -PG               User Key  (r) = Recorded By, (t) = Taken By, (c) = Cosigned By      Initials Name Provider  Type    PG Jass Harley, OT Occupational Therapist                     Mobility/ADL's       Row Name 05/14/24 0943          Transfers    Transfers bed-chair transfer;toilet transfer  -PG     Comment, (Transfers) Patient walked approximately 200 feet in hallway with no assistive device and no oxygen with supervision.  Oxygen saturations mid 80s.  -PG       Row Name 05/14/24 0943          Bed-Chair Transfer    Bed-Chair [place] (Transfers) supervision  -PG       Row Name 05/14/24 0943          Toilet Transfer    Type (Toilet Transfer) sit-stand;stand-sit  -PG     [place] Level (Toilet Transfer) supervision  -PG               User Key  (r) = Recorded By, (t) = Taken By, (c) = Cosigned By      Initials Name Provider Type    PG Jass Harley, OT Occupational Therapist                   Obj/Interventions    No documentation.                  Goals/Plan    No documentation.                  Clinical Impression       Row Name 05/14/24 0945          Plan of Care Review    Progress improving  -PG     Outcome Evaluation Patient now performing all self-care and transfers with supervision using no assistive device.  Patient has been using oxygen but sats are dropping to 84 to 85% on room air after her walk in the hallway.  Continue OT services per plan of care  -PG               User Key  (r) = Recorded By, (t) = Taken By, (c) = Cosigned By      Initials Name Provider Type    PG Jass Harley, OT Occupational Therapist                   Outcome Measures       Row Name 05/14/24 0946          How much help from another is currently needed...    Putting on and taking off regular lower body clothing? 3  -PG     Bathing (including washing, rinsing, and drying) 3  -PG     Toileting (which includes using toilet bed pan or urinal) 3  -PG     Putting on and taking off regular upper body clothing 4  -PG     Taking care of personal grooming (such as brushing teeth) 4  -PG     Eating meals 4  -PG     AM-PAC 6 Clicks Score (OT) 21   -PG       Row Name 05/14/24 0946          Functional Assessment    Outcome Measure Options AM-PAC 6 Clicks Daily Activity (OT);Optimal Instrument  -PG       Row Name 05/14/24 0946          Optimal Instrument    Optimal Instrument Optimal - 3  -PG     Bending/Stooping 2  -PG     Standing 2  -PG     Reaching 1  -PG               User Key  (r) = Recorded By, (t) = Taken By, (c) = Cosigned By      Initials Name Provider Type    PG Jass Harley OT Occupational Therapist                    Occupational Therapy Education       Title: PT OT SLP Therapies (In Progress)       Topic: Occupational Therapy (Done)       Point: ADL training (Done)       Description:   Instruct learner(s) on proper safety adaptation and remediation techniques during self care or transfers.   Instruct in proper use of assistive devices.                  Learning Progress Summary             Patient Acceptance, E,D, DU by PG at 5/13/2024 1258                         Point: Home exercise program (Done)       Description:   Instruct learner(s) on appropriate technique for monitoring, assisting and/or progressing therapeutic exercises/activities.                  Learning Progress Summary             Patient Acceptance, E,D, DU by PG at 5/13/2024 1258                         Point: Precautions (Done)       Description:   Instruct learner(s) on prescribed precautions during self-care and functional transfers.                  Learning Progress Summary             Patient Acceptance, E,D, DU by PG at 5/13/2024 1258                         Point: Body mechanics (Done)       Description:   Instruct learner(s) on proper positioning and spine alignment during self-care, functional mobility activities and/or exercises.                  Learning Progress Summary             Patient Acceptance, E,D, DU by PG at 5/13/2024 1258                                         User Key       Initials Effective Dates Name Provider Type Discipline    PG 06/16/21 -   Jass Harley OT Occupational Therapist OT                  OT Recommendation and Plan  Planned Therapy Interventions (OT): activity tolerance training, BADL retraining, strengthening exercise, transfer/mobility retraining, patient/caregiver education/training, occupation/activity based interventions  Therapy Frequency (OT): 5 times/wk  Plan of Care Review  Plan of Care Reviewed With: patient  Progress: improving  Outcome Evaluation: Patient now performing all self-care and transfers with supervision using no assistive device.  Patient has been using oxygen but sats are dropping to 84 to 85% on room air after her walk in the hallway.  Continue OT services per plan of care     Time Calculation:   Evaluation Complexity (OT)  Review Occupational Profile/Medical/Therapy History Complexity: brief/low complexity  Assessment, Occupational Performance/Identification of Deficit Complexity: 3-5 performance deficits  Clinical Decision Making Complexity (OT): problem focused assessment/low complexity  Overall Complexity of Evaluation (OT): low complexity     Time Calculation- OT       Row Name 05/14/24 0947             Time Calculation- OT    OT Received On 05/14/24  -PG      OT Goal Re-Cert Due Date 05/22/24  -PG         Timed Charges    81509 - OT Therapeutic Activity Minutes 12  -PG         Total Minutes    Timed Charges Total Minutes 12  -PG       Total Minutes 12  -PG                User Key  (r) = Recorded By, (t) = Taken By, (c) = Cosigned By      Initials Name Provider Type    PG Jass Harley OT Occupational Therapist                  Therapy Charges for Today       Code Description Service Date Service Provider Modifiers Qty    97173603847  OT EVAL LOW COMPLEXITY 3 5/13/2024 Jass Harley OT GO 1    [id number]  OT THERAPEUTIC ACT EA 15 MIN 5/14/2024 Jass Harley OT GO 1                 Jass Harley OT  5/14/2024

## 2024-05-14 NOTE — PLAN OF CARE
Problem: Adult Inpatient Plan of Care  Goal: Plan of Care Review  Outcome: Ongoing, Progressing  Flowsheets (Taken 5/14/2024 1658)  Progress: improving  Plan of Care Reviewed With:   patient   family  Outcome Evaluation: VSS. pt rested well during shift. no new concerns at this time.  Goal: Patient-Specific Goal (Individualized)  Outcome: Ongoing, Progressing  Goal: Absence of Hospital-Acquired Illness or Injury  Outcome: Ongoing, Progressing  Intervention: Identify and Manage Fall Risk  Recent Flowsheet Documentation  Taken 5/14/2024 1551 by Krissy Hanna RN  Safety Promotion/Fall Prevention:   safety round/check completed   clutter free environment maintained   assistive device/personal items within reach   fall prevention program maintained  Taken 5/14/2024 1331 by Krissy Hanna RN  Safety Promotion/Fall Prevention:   safety round/check completed   assistive device/personal items within reach   clutter free environment maintained   fall prevention program maintained  Taken 5/14/2024 1107 by Krissy Hanna RN  Safety Promotion/Fall Prevention:   safety round/check completed   assistive device/personal items within reach   clutter free environment maintained   fall prevention program maintained  Taken 5/14/2024 0951 by Krissy Hanna RN  Safety Promotion/Fall Prevention:   safety round/check completed   clutter free environment maintained   assistive device/personal items within reach   fall prevention program maintained  Taken 5/14/2024 0745 by Krissy Hanna RN  Safety Promotion/Fall Prevention:   safety round/check completed   assistive device/personal items within reach   clutter free environment maintained   fall prevention program maintained  Intervention: Prevent and Manage VTE (Venous Thromboembolism) Risk  Recent Flowsheet Documentation  Taken 5/14/2024 0745 by Krissy Hanna RN  Range of Motion: active ROM (range of motion) encouraged  Intervention: Prevent  Infection  Recent Flowsheet Documentation  Taken 5/14/2024 1551 by Krissy Hanna RN  Infection Prevention:   rest/sleep promoted   personal protective equipment utilized   equipment surfaces disinfected   hand hygiene promoted  Taken 5/14/2024 1331 by Krissy Hanna RN  Infection Prevention:   rest/sleep promoted   personal protective equipment utilized   hand hygiene promoted   equipment surfaces disinfected  Taken 5/14/2024 1107 by Krissy Hanna RN  Infection Prevention:   rest/sleep promoted   personal protective equipment utilized   hand hygiene promoted   equipment surfaces disinfected  Taken 5/14/2024 0951 by Krissy Hanna RN  Infection Prevention:   rest/sleep promoted   personal protective equipment utilized   hand hygiene promoted   equipment surfaces disinfected  Taken 5/14/2024 0745 by Krissy Hanna RN  Infection Prevention:   rest/sleep promoted   personal protective equipment utilized   hand hygiene promoted   equipment surfaces disinfected  Goal: Optimal Comfort and Wellbeing  Outcome: Ongoing, Progressing  Goal: Readiness for Transition of Care  Outcome: Ongoing, Progressing     Problem: Gas Exchange Impaired  Goal: Optimal Gas Exchange  Outcome: Ongoing, Progressing     Problem: COPD (Chronic Obstructive Pulmonary Disease) Comorbidity  Goal: Maintenance of COPD Symptom Control  Outcome: Ongoing, Progressing     Problem: Hypertension Comorbidity  Goal: Blood Pressure in Desired Range  Outcome: Ongoing, Progressing     Problem: Osteoarthritis Comorbidity  Goal: Maintenance of Osteoarthritis Symptom Control  Outcome: Ongoing, Progressing     Problem: Pain Chronic (Persistent) (Comorbidity Management)  Goal: Acceptable Pain Control and Functional Ability  Outcome: Ongoing, Progressing     Problem: Skin Injury Risk Increased  Goal: Skin Health and Integrity  Outcome: Ongoing, Progressing     Problem: Fall Injury Risk  Goal: Absence of Fall and Fall-Related Injury  Outcome:  Ongoing, Progressing  Intervention: Promote Injury-Free Environment  Recent Flowsheet Documentation  Taken 5/14/2024 1551 by Krissy Hanna RN  Safety Promotion/Fall Prevention:   safety round/check completed   clutter free environment maintained   assistive device/personal items within reach   fall prevention program maintained  Taken 5/14/2024 1331 by Krissy Hanna RN  Safety Promotion/Fall Prevention:   safety round/check completed   assistive device/personal items within reach   clutter free environment maintained   fall prevention program maintained  Taken 5/14/2024 1107 by Krissy Hanna RN  Safety Promotion/Fall Prevention:   safety round/check completed   assistive device/personal items within reach   clutter free environment maintained   fall prevention program maintained  Taken 5/14/2024 0951 by Krissy Hanna RN  Safety Promotion/Fall Prevention:   safety round/check completed   clutter free environment maintained   assistive device/personal items within reach   fall prevention program maintained  Taken 5/14/2024 0745 by Krissy Hanna RN  Safety Promotion/Fall Prevention:   safety round/check completed   assistive device/personal items within reach   clutter free environment maintained   fall prevention program maintained   Goal Outcome Evaluation:  Plan of Care Reviewed With: patient, family        Progress: improving  Outcome Evaluation: VSS. pt rested well during shift. no new concerns at this time.

## 2024-05-14 NOTE — PROGRESS NOTES
[facility]   Hospitalist Progress Note  Date: 2024  Patient Name: Shayy Wasserman  : 1935  MRN: [id number]  Date of admission: 2024      Subjective   Subjective     Chief Complaint: Cough for couple of weeks now productive of yellow-green mucus.    Summary:   Shayy Wasserman is a 88 y.o. female with past ministry of COPD, DJD, gout, hypertension and anxiety disorder.  Patient had right lower tooth pulled out on  since then patient been having cough which has been gradually getting worse now productive of yellow mucus.  For past couple of days it has been worse and she has not able to sleep.  He has also developed wheezing.  Denies any fever, chills or shortness of air.  No chest pain.  At times cough makes her gag but no vomiting or diarrhea.  Patient has tried allergy medicine by her PCP and over the cough medicine without much relief.  For these symptoms she came to [facility] ED workup showed O2 sat of 87% on room air other vital signs stable.  White cell count is 11,000.  CMP is negative.  Troponin is 17.  Her COVID, flu and RSV swab is negative.  Chest x-ray shows right lower lobe infiltrate with large hiatal hernia.  Patient denies any choking or coughing with liquids or food intake.  Because of hypoxia hospitalist has been called to admit her for further management.  She is otherwise independent in her ADLs and lives with her daughter-in-law.  Blood culture likely coagulase-negative staph and is contamination.    Interval Followup:   Remains on 2.5 L with 90% saturation.  No home oxygen use  Cough better was able to sleep last night.  Episode of confusion noted but easily redirected  Mucus clearing up  No chest pain or shortness of therapy      Review of Systems   All systems were reviewed and negative except for: Summary and interval follow-up    Objective   Objective     Vitals:   Temp:  [97.5 °F (36.4 °C)-98.1 °F (36.7 °C)] 97.9 °F (36.6 °C)  Heart Rate:   [78-96] 84  Resp:  [18-20] 18  BP: (135-152)/(65-87) 140/87  Flow (L/min):  [2.5] 2.5  Physical Exam      Constitutional: Awake, alert, no acute distress              Eyes: Pupils equal, sclerae anicteric, no conjunctival injection              HENT: NCAT, mucous membranes moist.  She has upper denture              Neck: Supple, no thyromegaly, no lymphadenopathy, trachea midline              Respiratory: Decreased to auscultation bilaterally, nonlabored respirations               Cardiovascular: Occasional ectopic beat, RRR, no murmurs, rubs, or gallops, palpable pedal pulses bilaterally              Gastrointestinal: Positive bowel sounds, soft, nontender, nondistended              Musculoskeletal: No bilateral ankle edema, no clubbing or cyanosis to extremities              Psychiatric: Appropriate affect, cooperative              Neurologic: Oriented x 3, strength symmetric in all extremities, Cranial Nerves grossly intact to confrontation, speech clear              Skin: No rashes     Result Review    Result Review:  I have personally reviewed the results for the past 24 hours and agree with these findings:  [x]  Laboratory  [x]  Microbiology  [x]  Radiology  []  EKG/Telemetry   []  Cardiology/Vascular   []  Pathology  [x]  Old records  [x]  Other: Medications    Assessment & Plan   Assessment / Plan     Assessment:  Hypoxemia.  No home oxygen use.  Community-acquired right lower lobe pneumonia.  Unspecified bacterial pathogen.  Acute COPD exacerbation.  Hypomagnesemia.  Supplemented  History of hypertension.  Anxiety disorder.  DJD.  Gout stable  Episode of delirium.  Coagulase-negative Staph bacteremia.  Appears to be contaminated     Plan:   Continue supplemental oxygen keep sats more than 90%.  IV Unasyn and p.o. Zithromax.  respiratory culture.  Negative  Procalcitonin negative.  Check strep pneumonia and Legionella urine antigen.  Oral prednisone.  DuoNeb, Pulmicort and brovana.  Bronchodilator and  bronchopulmonary team protocol.  Incentive spirometry and flutter valve.  Tussionex scheduled.  As needed Tessalon.  Replace electrolytes as needed  Resume appropriate home medication once the list is updated.  Pepcid  PT OT.  Recommending home with home health    Discussed plan with RN.  Discharge home in a.m.  Will need walking oximetry prior to discharge.    DVT prophylaxis:  Medical DVT prophylaxis orders are present.    CODE STATUS:   Code Status (Patient has no pulse and is not breathing): CPR (Attempt to Resuscitate)  Medical Interventions (Patient has pulse or is breathing): Full Support      Part of this note may be an electronic transcription/translation of spoken language to printed text using the Dragon Dictation System.         Electronically signed by Sesar Abarca MD, 05/14/24, 5:10 PM EDT.

## 2024-05-15 ENCOUNTER — READMISSION MANAGEMENT (OUTPATIENT)
Dept: CALL CENTER | Facility: HOSPITAL | Age: 89
End: 2024-05-15
Payer: MEDICARE

## 2024-05-15 VITALS
DIASTOLIC BLOOD PRESSURE: 89 MMHG | RESPIRATION RATE: 18 BRPM | BODY MASS INDEX: 29.14 KG/M2 | OXYGEN SATURATION: 98 % | WEIGHT: 164.46 LBS | TEMPERATURE: 97.5 F | SYSTOLIC BLOOD PRESSURE: 148 MMHG | HEIGHT: 63 IN | HEART RATE: 92 BPM

## 2024-05-15 PROBLEM — J13 PNEUMONIA OF RIGHT LOWER LOBE DUE TO STREPTOCOCCUS PNEUMONIAE: Status: ACTIVE | Noted: 2024-05-15

## 2024-05-15 PROBLEM — J44.1 COPD EXACERBATION: Status: RESOLVED | Noted: 2024-05-11 | Resolved: 2024-05-15

## 2024-05-15 PROBLEM — J44.1 COPD WITH ACUTE EXACERBATION: Status: RESOLVED | Noted: 2024-05-11 | Resolved: 2024-05-15

## 2024-05-15 LAB
ALBUMIN SERPL-MCNC: 3.5 G/DL (ref 3.5–5.2)
ANION GAP SERPL CALCULATED.3IONS-SCNC: 10.1 MMOL/L (ref 5–15)
BUN SERPL-MCNC: 11 MG/DL (ref 8–23)
BUN/CREAT SERPL: 15.9 (ref 7–25)
CALCIUM SPEC-SCNC: 7.9 MG/DL (ref 8.6–10.5)
CHLORIDE SERPL-SCNC: 101 MMOL/L (ref 98–107)
CO2 SERPL-SCNC: 27.9 MMOL/L (ref 22–29)
CREAT SERPL-MCNC: 0.69 MG/DL (ref 0.57–1)
EGFRCR SERPLBLD CKD-EPI 2021: 83.6 ML/MIN/1.73
GLUCOSE SERPL-MCNC: 167 MG/DL (ref 65–99)
PHOSPHATE SERPL-MCNC: 2.1 MG/DL (ref 2.5–4.5)
POTASSIUM SERPL-SCNC: 3.1 MMOL/L (ref 3.5–5.2)
SODIUM SERPL-SCNC: 139 MMOL/L (ref 136–145)

## 2024-05-15 PROCEDURE — 25010000002 AMPICILLIN-SULBACTAM PER 1.5 G: Performed by: INTERNAL MEDICINE

## 2024-05-15 PROCEDURE — 94799 UNLISTED PULMONARY SVC/PX: CPT

## 2024-05-15 PROCEDURE — 63710000001 PREDNISONE PER 1 MG: Performed by: INTERNAL MEDICINE

## 2024-05-15 PROCEDURE — 94618 PULMONARY STRESS TESTING: CPT

## 2024-05-15 PROCEDURE — 25010000002 ENOXAPARIN PER 10 MG: Performed by: INTERNAL MEDICINE

## 2024-05-15 PROCEDURE — 99239 HOSP IP/OBS DSCHRG MGMT >30: CPT | Performed by: INTERNAL MEDICINE

## 2024-05-15 PROCEDURE — 80069 RENAL FUNCTION PANEL: CPT | Performed by: INTERNAL MEDICINE

## 2024-05-15 PROCEDURE — 94664 DEMO&/EVAL PT USE INHALER: CPT

## 2024-05-15 PROCEDURE — 25810000003 SODIUM CHLORIDE 0.9 % SOLUTION: Performed by: INTERNAL MEDICINE

## 2024-05-15 RX ORDER — BROMPHENIRAMINE MALEATE, PSEUDOEPHEDRINE HYDROCHLORIDE, AND DEXTROMETHORPHAN HYDROBROMIDE 2; 30; 10 MG/5ML; MG/5ML; MG/5ML
5 SYRUP ORAL 3 TIMES DAILY PRN
Qty: 473 ML | Refills: 0 | Status: SHIPPED | OUTPATIENT
Start: 2024-05-15 | End: 2024-05-20

## 2024-05-15 RX ORDER — AMOXICILLIN 500 MG/1
1000 CAPSULE ORAL 2 TIMES DAILY
Qty: 20 CAPSULE | Refills: 0 | Status: SHIPPED | OUTPATIENT
Start: 2024-05-15 | End: 2024-05-20

## 2024-05-15 RX ORDER — FENTANYL/ROPIVACAINE/NS/PF 2-625MCG/1
15 PLASTIC BAG, INJECTION (ML) EPIDURAL ONCE
Status: COMPLETED | OUTPATIENT
Start: 2024-05-15 | End: 2024-05-15

## 2024-05-15 RX ADMIN — AMPICILLIN SODIUM, SULBACTAM SODIUM 3 G: 2; 1 INJECTION, POWDER, FOR SOLUTION INTRAMUSCULAR; INTRAVENOUS at 08:17

## 2024-05-15 RX ADMIN — AMPICILLIN SODIUM, SULBACTAM SODIUM 3 G: 2; 1 INJECTION, POWDER, FOR SOLUTION INTRAMUSCULAR; INTRAVENOUS at 03:19

## 2024-05-15 RX ADMIN — Medication 10 ML: at 08:18

## 2024-05-15 RX ADMIN — AZITHROMYCIN DIHYDRATE 250 MG: 250 TABLET ORAL at 08:17

## 2024-05-15 RX ADMIN — POTASSIUM PHOSPHATES 15 MMOL: 236; 224 INJECTION, SOLUTION INTRAVENOUS at 12:44

## 2024-05-15 RX ADMIN — HYDROCODONE POLISTIREX AND CHLORPHENIRAMINE POLISTIREX 5 ML: 10; 8 SUSPENSION, EXTENDED RELEASE ORAL at 08:17

## 2024-05-15 RX ADMIN — BUDESONIDE 0.5 MG: 0.5 SUSPENSION RESPIRATORY (INHALATION) at 06:55

## 2024-05-15 RX ADMIN — PREDNISONE 40 MG: 20 TABLET ORAL at 08:17

## 2024-05-15 RX ADMIN — ALLOPURINOL 300 MG: 100 TABLET ORAL at 08:17

## 2024-05-15 RX ADMIN — FAMOTIDINE 20 MG: 20 TABLET ORAL at 08:17

## 2024-05-15 RX ADMIN — AMPICILLIN SODIUM, SULBACTAM SODIUM 3 G: 2; 1 INJECTION, POWDER, FOR SOLUTION INTRAMUSCULAR; INTRAVENOUS at 16:19

## 2024-05-15 RX ADMIN — ENOXAPARIN SODIUM 40 MG: 100 INJECTION SUBCUTANEOUS at 08:17

## 2024-05-15 RX ADMIN — ARFORMOTEROL TARTRATE 15 MCG: 15 SOLUTION RESPIRATORY (INHALATION) at 06:55

## 2024-05-15 NOTE — DISCHARGE SUMMARY
[facility]        HOSPITALIST  DISCHARGE SUMMARY    Patient Name: Shayy Wasserman  : 1935  MRN: [id number]    Date of Admission: 2024  Date of Discharge:  5/15/2024  Primary Care Physician: Pee Mon,     Consults       No orders found from 2024 to 2024.            Active and Resolved Hospital Problems:  Active Hospital Problems    Diagnosis POA    **Hypoxia [R09.02] Yes    Pneumonia of right lower lobe due to Streptococcus pneumoniae [J13] Yes    Community acquired pneumonia of right lower lobe of lung [J18.9] Yes    Pulmonary emphysema [J43.9] Yes      Resolved Hospital Problems    Diagnosis POA    COPD with acute exacerbation [J44.1] Yes    COPD exacerbation [J44.1] Yes     Hypoxemia.  No home oxygen use.  Failed walk test  Community-acquired right lower lobe pneumonia.  Due to strep pneumonia  Acute COPD exacerbation.  Improved  Hypomagnesemia.  Supplemented  History of hypertension.  Anxiety disorder.  DJD.  Gout stable  Episode of delirium.  Resolved  Coagulase-negative Staph bacteremia.  Appears to be contaminated  Hospital Course     Hospital Course:  Shayy Wasserman is a 88 y.o. female  with past medical history of COPD, DJD, gout, hypertension and anxiety disorder.  Patient had right lower tooth pulled out on  since then patient been having cough which has been gradually getting worse now productive of yellow mucus.  For past couple of days it has been worse and she has not able to sleep.  He has also developed wheezing.  Denies any fever, chills or shortness of air.  No chest pain.  At times cough makes her gag but no vomiting or diarrhea.  Patient has tried allergy medicine by her PCP and over the cough medicine without much relief.  For these symptoms she came to [facility] ED workup showed O2 sat of 87% on room air other vital signs stable.  White cell count is 11,000.  CMP is negative.  Troponin is 17.  Her COVID, flu  and RSV swab is negative.  Chest x-ray shows right lower lobe infiltrate with large hiatal hernia.  Patient denies any choking or coughing with liquids or food intake.  Because of hypoxia hospitalist has been called to admit her for further management.  She is otherwise independent in her ADLs and lives with her daughter-in-law.  Blood culture likely coagulase-negative staph and is contamination.     Interval Followup:   Remains on 2.5 L with 90% saturation.  No home oxygen use  Cough better was able to sleep last night.  Episode of confusion noted but easily redirected  Mucus clearing up  No chest pain or shortness of therapy  Patient failed walk test.  Otherwise feeling better and ready to go home  Addendum; patient needs to be set up with home oxygen up to 3 L nasal cannula.    DISCHARGE Follow Up Recommendations for labs and diagnostics: PCP.  Continue home oxygen until cleared by PCP      Day of Discharge     Vital Signs:  Temp:  [97.5 °F (36.4 °C)-98.2 °F (36.8 °C)] 97.5 °F (36.4 °C)  Heart Rate:  [] 92  Resp:  [16-18] 18  BP: (140-150)/(81-90) 148/89  Flow (L/min):  [2.5] 2.5    Physical Exam:   Constitutional: Awake, alert, no acute distress              Eyes: Pupils equal, sclerae anicteric, no conjunctival injection              HENT: NCAT, mucous membranes moist.  She has upper denture              Neck: Supple, no thyromegaly, no lymphadenopathy, trachea midline              Respiratory: Decreased to auscultation bilaterally, nonlabored respirations               Cardiovascular: Occasional ectopic beat, RRR, no murmurs, rubs, or gallops, palpable pedal pulses bilaterally              Gastrointestinal: Positive bowel sounds, soft, nontender, nondistended              Musculoskeletal: No bilateral ankle edema, no clubbing or cyanosis to extremities              Psychiatric: Appropriate affect, cooperative              Neurologic: Oriented x 3, strength symmetric in all extremities, Cranial Nerves  grossly intact to confrontation, speech clear              Skin: No rashes     Discharge Details        Discharge Medications        New Medications        Instructions Start Date   amoxicillin 500 MG capsule  Commonly known as: AMOXIL   1,000 mg, Oral, 2 Times Daily      brompheniramine-pseudoephedrine-DM 30-2-10 MG/5ML syrup   5 mL, Oral, 3 Times Daily PRN             Continue These Medications        Instructions Start Date   albuterol (2.5 MG/3ML) 0.083% nebulizer solution  Commonly known as: PROVENTIL   2.5 mg, Nebulization, Every 4 Hours PRN      alendronate 70 MG tablet  Commonly known as: FOSAMAX   70 mg, Oral, Every 7 Days      allopurinol 300 MG tablet  Commonly known as: ZYLOPRIM   300 mg, Oral, Daily      amLODIPine 5 MG tablet  Commonly known as: NORVASC   5 mg, Oral, Daily      atenolol 50 MG tablet  Commonly known as: TENORMIN   50 mg, Oral, 2 Times Daily      Breztri Aerosphere 160-9-4.8 MCG/ACT aerosol inhaler  Generic drug: Budeson-Glycopyrrol-Formoterol   2 puffs, Inhalation, 2 Times Daily      Glucose Meter Test test strip  Generic drug: glucose blood   check blood sugar every AM fasting and record. (Dx: E11.9)      glucose monitor monitoring kit   check blood sugar every AM fasting and record. (Dx: E11.9)      loratadine 10 MG tablet  Commonly known as: CLARITIN   10 mg, Oral, Daily      ONE TOUCH ULTRA 2 w/Device kit   USE AS DIRECTED TO TEST BLOOD SUGAR EVERY MORNING FASTING AND RECORD               Allergies   Allergen Reactions    Bactrim [Sulfamethoxazole-Trimethoprim] Unknown - Low Severity    Griseofulvin Ultramicrosize [Griseofulvin] Unknown - Low Severity       Discharge Disposition:  Home-Health Care [facility].  In private vehicle with daughter    Diet:  Diet Instructions       Diet: Diabetic Diets; Consistent Carbohydrate; Thin (IDDSI 0)      Discharge Diet: Diabetic Diets    Diabetic Diet: Consistent Carbohydrate    Fluid Consistency: Thin (IDDSI 0)            Discharge Activity:    Activity Instructions       Activity as Tolerated              CODE STATUS:  Code Status and Medical Interventions:   Ordered at: 05/11/24 1525     Code Status (Patient has no pulse and is not breathing):    CPR (Attempt to Resuscitate)     Medical Interventions (Patient has pulse or is breathing):    Full Support         Future Appointments   Date Time Provider Department Center   11/4/2024  2:00 PM Pee Mon DO [facility]       Additional Instructions for the Follow-ups that You Need to Schedule       Discharge Follow-up with PCP   As directed       Currently Documented PCP:    Pee Mon DO    PCP Phone Number:    [phone]     Follow Up Details: 1 week                Pertinent  and/or Most Recent Results     PROCEDURES:   * Cannot find OR case *     LAB RESULTS:      Lab 05/11/24  1531 05/11/24  1329   WBC  --  11.24*   HEMOGLOBIN  --  12.9   HEMATOCRIT  --  37.8   PLATELETS  --  266   NEUTROS ABS  --  8.15*   IMMATURE GRANS (ABS)  --  0.04   LYMPHS ABS  --  2.22   MONOS ABS  --  0.54   EOS ABS  --  0.22   MCV  --  91.1   PROCALCITONIN  --  0.08   LACTATE 1.0  --          Lab 05/15/24  0549 05/14/24  0543 05/13/24  0542 05/12/24  0551 05/11/24  1329   SODIUM 139 139 136  --  136   POTASSIUM 3.1* 3.5 3.4*  --  3.9   CHLORIDE 101 102 101  --  97*   CO2 27.9 25.8 23.1  --  22.3   ANION GAP 10.1 11.2 11.9  --  16.7*   BUN 11 15 18  --  12   CREATININE 0.69 0.72 0.95  --  0.89   EGFR 83.6 80.5 57.7*  --  62.4   GLUCOSE 167* 221* 265*  --  118*   CALCIUM 7.9* 7.9* 8.0*  --  9.3   MAGNESIUM  --   --   --  2.5* 1.5*   PHOSPHORUS 2.1* 1.9* 2.4*  --   --          Lab 05/15/24  0549 05/14/24  0543 05/13/24  0542 05/11/24  1329   TOTAL PROTEIN  --   --   --  7.9   ALBUMIN 3.5 3.4* 3.6 4.1   GLOBULIN  --   --   --  3.8   ALT (SGPT)  --   --   --  7   AST (SGOT)  --   --   --  16   BILIRUBIN  --   --   --  0.8   ALK PHOS  --   --   --  85         Lab 05/11/24  1329   HSTROP T 17*                  Brief Urine Lab Results  (Last result in the past 365 days)        Color   Clarity   Blood   Leuk Est   Nitrite   Protein   CREAT   Urine HCG        05/14/24 1620 Yellow   Clear   Negative   Negative   Negative   Negative                 Microbiology Results (last 10 days)       Procedure Component Value - Date/Time    Legionella Antigen, Urine - Urine, Urine, Clean Catch [[id number]]  (Normal) Collected: 05/14/24 1620    Lab Status: Final result Specimen: Urine, Clean Catch Updated: 05/14/24 1656     LEGIONELLA ANTIGEN, URINE Negative    S. Pneumo Ag Urine or CSF - Urine, Urine, Clean Catch [[id number]]  (Abnormal) Collected: 05/14/24 1620    Lab Status: Final result Specimen: Urine, Clean Catch Updated: 05/14/24 1656     Strep Pneumo Ag Positive    Respiratory Culture - Sputum, Cough [[id number]] Collected: 05/12/24 2306    Lab Status: Final result Specimen: Sputum from Cough Updated: 05/14/24 1022     Respiratory Culture Light growth (2+) Normal respiratory opal. No S. aureus or Pseudomonas aeruginosa detected. Final report.     Gram Stain Many (4+) WBCs seen      Few (2+) Epithelial cells seen      Rare (1+) Gram positive cocci    Blood Culture - Blood, Arm, Left [[id number]]  (Abnormal) Collected: 05/11/24 1531    Lab Status: Final result Specimen: Blood from Arm, Left Updated: 05/14/24 0741     Blood Culture Staphylococcus, coagulase negative     Isolated from Aerobic Bottle     Gram Stain Aerobic Bottle Gram positive cocci in clusters    Narrative:      Probable contaminant requires clinical correlation, susceptibility not performed unless requested by physician.      Blood Culture ID, PCR - Blood, Arm, Left [[id number]]  (Abnormal) Collected: 05/11/24 1531    Lab Status: Final result Specimen: Blood from Arm, Left Updated: 05/12/24 1833     BCID, PCR Staph spp, not aureus or lugdunensis. Identification by BCID2 PCR.     BOTTLE TYPE Aerobic Bottle    Blood Culture - Blood, Arm, Left [[id number]]  (Normal)  Collected: 05/11/24 1526    Lab Status: Preliminary result Specimen: Blood from Arm, Left Updated: 05/15/24 1545     Blood Culture No growth at 4 days    COVID-19, FLU A/B, RSV PCR 1 HR TAT - Swab, Nasopharynx [[id number]]  (Normal) Collected: 05/11/24 1331    Lab Status: Final result Specimen: Swab from Nasopharynx Updated: 05/11/24 1432     COVID19 Not Detected     Influenza A PCR Not Detected     Influenza B PCR Not Detected     RSV, PCR Not Detected    Narrative:      Fact sheet for providers: https://www.fda.gov/media/481007/download    Fact sheet for patients: https://www.fda.gov/media/327104/download    Test performed by PCR.            XR Chest 1 View    Result Date: 5/11/2024  Impression: Impression: New patchy airspace opacity in the right lung base possibly atelectasis or pneumonia.   Electronically Signed By-KATIE CALDERÓN MD On:5/11/2024 1:48 PM                   Imaging Results (All)       Procedure Component Value Units Date/Time    XR Chest 1 View [[id number]] Collected: 05/11/24 1347     Updated: 05/11/24 1350    Narrative:      XR CHEST 1 VW-     Date of Exam: 5/11/2024 1:30 PM     Indication: Weak/Dizzy/AMS triage protocol     Comparison: 11/6/2023     Findings:  Stable mild enlargement of the cardiac silhouette. There is a large  hiatal hernia. There are chronic appearing changes in the lung fields.  There is new patchy airspace opacity in the right lung base. There are  advanced degenerative changes in the glenohumeral joints.       Impression:      Impression:  New patchy airspace opacity in the right lung base possibly atelectasis  or pneumonia.        Electronically Signed By-KATIE CALDERÓN MD On:5/11/2024 1:48 PM                Labs Pending at Discharge:  Pending Labs       Order Current Status    Blood Culture - Blood, Arm, Left Preliminary result                Time spent on Discharge including face to face service: 31 minutes  Part of this note may be an electronic transcription/translation  of spoken language to printed text using the Dragon Dictation System.     TElectronically signed by Sesar Abarca MD, 05/15/24, 4:39 PM EDT.

## 2024-05-15 NOTE — OUTREACH NOTE
Prep Survey      Flowsheet Row Responses   [facility] patient discharged from? [facility]   Is LACE score < 7 ? No   Eligibility [facility]   Date of Admission 05/11/24   Date of Discharge 05/15/24   Discharge Disposition Home or Self Care   Discharge diagnosis hypoxia, PNA   Does the patient have one of the following disease processes/diagnoses(primary or secondary)? Pneumonia   Does the patient have Home health ordered? No   Is there a DME ordered? Yes   What DME was ordered? O2 ordered from [employer]   Prep survey completed? Yes            Jenny ERNST - Registered Nurse

## 2024-05-15 NOTE — PLAN OF CARE
Goal Outcome Evaluation:  Patient alert and oriented, able to make needs known.  Patient with no acute events thus far this shift.  Patient with no other needs voiced at this time.  Daughter at bedside.  Continue plan of care.

## 2024-05-15 NOTE — PROCEDURES
Walking Oximetry Progress Note      Patient Name:  Shayy Wasserman  YOB: 1935  Date of Procedure: 05/15/24              ROOM AIR BASELINE   SpO2% 87   Heart Rate 90     EXERCISE ON ROOM AIR SpO2% EXERCISE ON O2 LPM SpO2%   1 MINUTE  1 MINUTE 1 PD 92   2 MINUTES  2 MINUTES 1 PD 91   3 MINUTES  3 MINUTES 1 PD 88   4 MINUTES  4 MINUTES 2 PD 90   5 MINUTES  5 MINUTES 2 PD 89   6 MINUTES  6 MINUTES 2 PD 88              Time to Recovery  2 minutes   SpO2% Post Exercise  91 on 3L PD, 94% after 2 mins.    HR Post Exercise  141, 119 after 2 mins     Comments: Patient stated that she felt no shortness of air and had no trouble breathing through duration of walk test. She walked at a moderately fast pace, with no rest. RT recommends sending patient home on 2-3L.     made aware by floor RT, Jolene Longo.          Electronically signed by Patricia Reyes RRT, 05/15/24, 2:11 PM EDT.

## 2024-05-16 ENCOUNTER — TRANSITIONAL CARE MANAGEMENT TELEPHONE ENCOUNTER (OUTPATIENT)
Dept: CALL CENTER | Facility: HOSPITAL | Age: 89
End: 2024-05-16
Payer: MEDICARE

## 2024-05-16 LAB — BACTERIA SPEC AEROBE CULT: NORMAL

## 2024-05-16 NOTE — OUTREACH NOTE
Call Center TCM Note      Flowsheet Row Responses   [facility] patient discharged from? [facility]   Does the patient have one of the following disease processes/diagnoses(primary or secondary)? Pneumonia   TCM attempt successful? Yes   Call start time 1428   Call end time 1430   Discharge diagnosis hypoxia, PNA   Person spoke with today (if not patient) and relationship daughter Mable Parmar reviewed with patient/caregiver? Yes   Is the patient having any side effects they believe may be caused by any medication additions or changes? No   Does the patient have all medications ordered at discharge? Yes   Is the patient taking all medications as directed (includes completed medication regime)? Yes   Comments Patient has a hospital discharge followup appt on 5/23/2024 with Dr. Mon   Does the patient have an appointment with their PCP within 7-14 days of discharge? Yes   Has all DME been delivered? Yes   DME comments 02   Psychosocial issues? No   Did the patient receive a copy of their discharge instructions? Yes   Nursing interventions Reviewed instructions with patient   What is the patient's perception of their health status since discharge? Improving   Nursing Interventions Nurse provided patient education   If the patient is a current smoker, are they able to teach back resources for cessation? Not a smoker   Is the patient/caregiver able to teach back the hierarchy of who to call/visit for symptoms/problems? PCP, Specialist, Home health nurse, Urgent Care, ED, 911 Yes   Is the patient/caregiver able to teach back signs and symptoms of worsening condition: Fever/chills, Shortness of breath, Chest pain   Is the patient/caregiver able to teach back importance of completing antibiotic course of treatment? Yes   TCM call completed? Yes   Wrap up additional comments Doing better. No needs at this time.   Call end time 1430   Would this patient benefit from a Referral to [facility] Social Work? No   Is the patient  interested in additional calls from an ambulatory ? No            Stanford Talley RN    5/16/2024, 14:30 EDT

## 2024-05-20 ENCOUNTER — TELEPHONE (OUTPATIENT)
Dept: FAMILY MEDICINE CLINIC | Facility: CLINIC | Age: 89
End: 2024-05-20
Payer: MEDICARE

## 2024-05-20 RX ORDER — FLUCONAZOLE 100 MG/1
100 TABLET ORAL DAILY
Qty: 7 TABLET | Refills: 0 | Status: SHIPPED | OUTPATIENT
Start: 2024-05-20 | End: 2024-05-27

## 2024-05-20 NOTE — TELEPHONE ENCOUNTER
Patients daughter stated patient has been in the hospital and was on a few different antibiotics. Now Shayy has a yeast infection and thrush. Daughter is asking if something can be sent in for this. They do have hospital f/u on 05/23.

## 2024-05-23 ENCOUNTER — OFFICE VISIT (OUTPATIENT)
Dept: FAMILY MEDICINE CLINIC | Facility: CLINIC | Age: 89
End: 2024-05-23
Payer: MEDICARE

## 2024-05-23 VITALS
TEMPERATURE: 97.1 F | OXYGEN SATURATION: 96 % | HEART RATE: 72 BPM | SYSTOLIC BLOOD PRESSURE: 109 MMHG | HEIGHT: 63 IN | DIASTOLIC BLOOD PRESSURE: 71 MMHG | WEIGHT: 159 LBS | BODY MASS INDEX: 28.17 KG/M2

## 2024-05-23 DIAGNOSIS — Z09 HOSPITAL DISCHARGE FOLLOW-UP: ICD-10-CM

## 2024-05-23 DIAGNOSIS — J18.9 COMMUNITY ACQUIRED PNEUMONIA OF RIGHT LOWER LOBE OF LUNG: Primary | ICD-10-CM

## 2024-05-23 DIAGNOSIS — J13 PNEUMONIA OF RIGHT LOWER LOBE DUE TO STREPTOCOCCUS PNEUMONIAE: ICD-10-CM

## 2024-05-23 NOTE — ASSESSMENT & PLAN NOTE
She is doing well since her pneumococcal pneumonia.  She will continue to work on adequate hydration diet and activity as tolerated.  She will continue all of her routine pulmonary medicines for her underlying COPD.  Repeat her chest x-ray in 1 month.

## 2024-05-23 NOTE — PROGRESS NOTES
Chief Complaint   Patient presents with    Hospital Follow Up Visit        Subjective     Shayy Wasserman  has a past medical history of Anxiety disorder (11/12/2018), Arthritis, COPD (chronic obstructive pulmonary disease), Dependent edema (11/28/2017), Gout, Osteoporosis, Primary osteoarthritis of left knee (05/24/2018), Primary osteoarthritis of one hip, right (12/12/2017), and Primary osteoarthritis of right hip (12/12/2017).    Hospital [id number] was admitted at [facility] 5/11 through 5/15/2024.  She was diagnosed at that time with pneumococcal pneumonia.  She was discharged home on antibiotics.  Since that point in time she has finished them.  She still feels a little bit weak.  She denies any cough wheezing or shortness of breath.  She did develop oral and vaginal yeast infection.  We did give her a course of Diflucan.  They did get a call from the transitional care nurse to make sure she was doing fine had gotten her medication and had a follow-up appointment.        PHQ-2 Depression Screening  Little interest or pleasure in doing things?     Feeling down, depressed, or hopeless?     PHQ-2 Total Score     PHQ-9 Depression Screening  Little interest or pleasure in doing things?     Feeling down, depressed, or hopeless?     Trouble falling or staying asleep, or sleeping too much?     Feeling tired or having little energy?     Poor appetite or overeating?     Feeling bad about yourself - or that you are a failure or have let yourself or your family down?     Trouble concentrating on things, such as reading the newspaper or watching television?     Moving or speaking so slowly that other people could have noticed? Or the opposite - being so fidgety or restless that you have been moving around a lot more than usual?     Thoughts that you would be better off dead, or of hurting yourself in some way?     PHQ-9 Total Score     If you checked off any problems, how difficult have these problems made  it for you to do your work, take care of things at home, or get along with other people?       Allergies   Allergen Reactions    Bactrim [Sulfamethoxazole-Trimethoprim] Unknown - Low Severity    Griseofulvin Ultramicrosize [Griseofulvin] Unknown - Low Severity       Prior to Admission medications    Medication Sig Start Date End Date Taking? Authorizing Provider   albuterol (PROVENTIL) (2.5 MG/3ML) 0.083% nebulizer solution Take 2.5 mg by nebulization Every 4 (Four) Hours As Needed for Wheezing. 10/5/23  Yes Pee Mon DO   alendronate (FOSAMAX) 70 MG tablet Take 1 tablet by mouth Every 7 (Seven) Days. 10/5/23  Yes Pee Mon DO   allopurinol (ZYLOPRIM) 300 MG tablet Take 1 tablet by mouth Daily. 10/5/23  Yes Pee Mon DO   amLODIPine (NORVASC) 5 MG tablet Take 1 tablet by mouth Daily. 10/5/23  Yes Pee Mon DO   atenolol (TENORMIN) 50 MG tablet Take 1 tablet by mouth 2 (Two) Times a Day. 10/5/23  Yes Pee Mon DO   Blood Glucose Monitoring Suppl (ONE TOUCH ULTRA 2) w/Device kit USE AS DIRECTED TO TEST BLOOD SUGAR EVERY MORNING FASTING AND RECORD 8/10/23  Yes Provider, MD Jeffery Contreras Aerosphere 160-9-4.8 MCG/ACT aerosol inhaler Inhale 2 puffs 2 (Two) Times a Day. 10/5/23  Yes Pee Mon DO   fluconazole (Diflucan) 100 MG tablet Take 1 tablet by mouth Daily for 7 days. 5/20/24 5/27/24 Yes Pee Mon DO   glucose blood (Glucose Meter Test) test strip check blood sugar every AM fasting and record. (Dx: E11.9) 8/10/23  Yes Pee Mon DO   glucose monitor monitoring kit check blood sugar every AM fasting and record. (Dx: E11.9) 8/10/23  Yes Pee Mon DO   loratadine (CLARITIN) 10 MG tablet Take 1 tablet by mouth Daily. 4/30/24  Yes Pee Mon DO        Patient Active Problem List   Diagnosis    Pulmonary emphysema    Bronchitis    Essential (primary) hypertension     Gout    Hyperlipidemia    Osteoarthritis    Stage 3 chronic kidney disease    Osteoporosis    Localized edema    Prediabetes    Pain of left upper extremity    Anxiety with somatization    Hypoxia    Community acquired pneumonia of right lower lobe of lung    Pneumonia of right lower lobe due to Streptococcus pneumoniae    Hospital discharge follow-up        Past Surgical History:   Procedure Laterality Date    BLADDER REPAIR      COLONOSCOPY  2016    EYE SURGERY      implant- yes    HYSTERECTOMY      INTRAOCULAR LENS INSERTION      yes    JOINT REPLACEMENT      Surgery    KNEE SURGERY Right     knee repair    KNEE SURGERY Right     repair    OTHER SURGICAL HISTORY      Artificial joints/limbs    OTHER SURGICAL HISTORY      Artificial Joints/Limbs    OTHER SURGICAL HISTORY      Joint surgery    TOTAL KNEE ARTHROPLASTY Right        Social History     Socioeconomic History    Marital status:    Tobacco Use    Smoking status: Former     Current packs/day: 0.00     Types: Cigarettes     Start date:      Quit date:      Years since quittin.4    Smokeless tobacco: Never    Tobacco comments:     started at age 16- stopped at age 35   Vaping Use    Vaping status: Never Used   Substance and Sexual Activity    Alcohol use: Never     Comment: 2019- 1/15/2019 does not drink     Drug use: Never       Family History   Problem Relation Age of Onset    Colon cancer Father        Family history, surgical history, past medical history, Allergies and meds reviewed with patient today and updated in [facility] EMR.     ROS:  Review of Systems   Constitutional:  Positive for fatigue. Negative for appetite change, chills and fever.   HENT:  Negative for congestion and postnasal drip.    Respiratory:  Negative for cough, chest tightness, shortness of breath and wheezing.    Cardiovascular:  Negative for chest pain.   Neurological:  Negative for headache.       OBJECTIVE:  Vitals:    24 1103   BP: 109/71   BP  "Location: Left arm   Patient Position: Sitting   Pulse: 72   Temp: 97.1 °F (36.2 °C)   SpO2: 96%   Weight: 72.1 kg (159 lb)   Height: 160 cm (62.99\")     No results found.   Body mass index is 28.17 kg/m².  No LMP recorded. Patient is postmenopausal.    The ASCVD Risk score (Adam DK, et al., 2019) failed to calculate for the following reasons:    The 2019 ASCVD risk score is only valid for ages 40 to 79     Physical Exam  Vitals and nursing note reviewed.   Constitutional:       General: She is not in acute distress.     Appearance: Normal appearance. She is normal weight.   HENT:      Head: Normocephalic.      Right Ear: Tympanic membrane, ear canal and external ear normal.      Left Ear: Tympanic membrane, ear canal and external ear normal.      Nose: Nose normal.      Mouth/Throat:      Mouth: Mucous membranes are moist.      Dentition: Has dentures.      Pharynx: Oropharynx is clear.   Eyes:      General: No scleral icterus.     Conjunctiva/sclera: Conjunctivae normal.      Pupils: Pupils are equal, round, and reactive to light.   Cardiovascular:      Rate and Rhythm: Normal rate and regular rhythm.      Pulses: Normal pulses.      Heart sounds: Normal heart sounds. No murmur heard.  Pulmonary:      Effort: Pulmonary effort is normal.      Breath sounds: Examination of the right-lower field reveals rhonchi. Rhonchi present. No wheezing or rales.   Musculoskeletal:      Cervical back: Neck supple. No rigidity or tenderness.   Lymphadenopathy:      Cervical: No cervical adenopathy.   Skin:     General: Skin is warm and dry.      Coloration: Skin is not jaundiced.      Findings: No rash.   Neurological:      General: No focal deficit present.      Mental Status: She is alert and oriented to person, place, and time.   Psychiatric:         Mood and Affect: Mood normal.         Thought Content: Thought content normal.         Judgment: Judgment normal.         Procedures    Admission on 05/11/2024, Discharged on " 05/15/2024   Component Date Value Ref Range Status    QT Interval 05/11/2024 376  ms Final    QTC Interval 05/11/2024 424  ms Final    Glucose 05/11/2024 118 (H)  65 - 99 mg/dL Final    BUN 05/11/2024 12  8 - 23 mg/dL Final    Creatinine 05/11/2024 0.89  0.57 - 1.00 mg/dL Final    Sodium 05/11/2024 136  136 - 145 mmol/L Final    Potassium 05/11/2024 3.9  3.5 - 5.2 mmol/L Final    Chloride 05/11/2024 97 (L)  98 - 107 mmol/L Final    CO2 05/11/2024 22.3  22.0 - 29.0 mmol/L Final    Calcium 05/11/2024 9.3  8.6 - 10.5 mg/dL Final    Total Protein 05/11/2024 7.9  6.0 - 8.5 g/dL Final    Albumin 05/11/2024 4.1  3.5 - 5.2 g/dL Final    ALT (SGPT) 05/11/2024 7  1 - 33 U/L Final    AST (SGOT) 05/11/2024 16  1 - 32 U/L Final    Alkaline Phosphatase 05/11/2024 85  39 - 117 U/L Final    Total Bilirubin 05/11/2024 0.8  0.0 - 1.2 mg/dL Final    Globulin 05/11/2024 3.8  gm/dL Final    A/G Ratio 05/11/2024 1.1  g/dL Final    BUN/Creatinine Ratio 05/11/2024 13.5  7.0 - 25.0 Final    Anion Gap 05/11/2024 16.7 (H)  5.0 - 15.0 mmol/L Final    eGFR 05/11/2024 62.4  >60.0 mL/min/1.73 Final    HS Troponin T 05/11/2024 17 (H)  <14 ng/L Final    Magnesium 05/11/2024 1.5 (L)  1.6 - 2.4 mg/dL Final    Color, UA 05/14/2024 Yellow  Yellow, Straw Final    Appearance, UA 05/14/2024 Clear  Clear Final    pH, UA 05/14/2024 7.0  5.0 - 8.0 Final    Specific Gravity, UA 05/14/2024 1.008  1.005 - 1.030 Final    Glucose, UA 05/14/2024 100 mg/dL (Trace) (A)  Negative Final    Ketones, UA 05/14/2024 Negative  Negative Final    Bilirubin, UA 05/14/2024 Negative  Negative Final    Blood, UA 05/14/2024 Negative  Negative Final    Protein, UA 05/14/2024 Negative  Negative Final    Leuk Esterase, UA 05/14/2024 Negative  Negative Final    Nitrite, UA 05/14/2024 Negative  Negative Final    Urobilinogen, UA 05/14/2024 1.0 E.U./dL  0.2 - 1.0 E.U./dL Final    Extra Tube 05/11/2024 Hold for add-ons.   Final    Auto resulted.    Extra Tube 05/11/2024 hold for add-on    Final    Auto resulted    Extra Tube 05/11/2024 Hold for add-ons.   Final    Auto resulted.    Extra Tube 05/11/2024 Hold for add-ons.   Final    Auto resulted    WBC 05/11/2024 11.24 (H)  3.40 - 10.80 10*3/mm3 Final    RBC 05/11/2024 4.15  3.77 - 5.28 10*6/mm3 Final    Hemoglobin 05/11/2024 12.9  12.0 - 15.9 g/dL Final    Hematocrit 05/11/2024 37.8  34.0 - 46.6 % Final    MCV 05/11/2024 91.1  79.0 - 97.0 fL Final    MCH 05/11/2024 31.1  26.6 - 33.0 pg Final    MCHC 05/11/2024 34.1  31.5 - 35.7 g/dL Final    RDW 05/11/2024 13.4  12.3 - 15.4 % Final    RDW-SD 05/11/2024 45.1  37.0 - 54.0 fl Final    MPV 05/11/2024 9.1  6.0 - 12.0 fL Final    Platelets 05/11/2024 266  140 - 450 10*3/mm3 Final    Neutrophil % 05/11/2024 72.4  42.7 - 76.0 % Final    Lymphocyte % 05/11/2024 19.8  19.6 - 45.3 % Final    Monocyte % 05/11/2024 4.8 (L)  5.0 - 12.0 % Final    Eosinophil % 05/11/2024 2.0  0.3 - 6.2 % Final    Basophil % 05/11/2024 0.6  0.0 - 1.5 % Final    Immature Grans % 05/11/2024 0.4  0.0 - 0.5 % Final    Neutrophils, Absolute 05/11/2024 8.15 (H)  1.70 - 7.00 10*3/mm3 Final    Lymphocytes, Absolute 05/11/2024 2.22  0.70 - 3.10 10*3/mm3 Final    Monocytes, Absolute 05/11/2024 0.54  0.10 - 0.90 10*3/mm3 Final    Eosinophils, Absolute 05/11/2024 0.22  0.00 - 0.40 10*3/mm3 Final    Basophils, Absolute 05/11/2024 0.07  0.00 - 0.20 10*3/mm3 Final    Immature Grans, Absolute 05/11/2024 0.04  0.00 - 0.05 10*3/mm3 Final    nRBC 05/11/2024 0.0  0.0 - 0.2 /100 WBC Final    COVID19 05/11/2024 Not Detected  Not Detected - Ref. Range Final    Influenza A PCR 05/11/2024 Not Detected  Not Detected Final    Influenza B PCR 05/11/2024 Not Detected  Not Detected Final    RSV, PCR 05/11/2024 Not Detected  Not Detected Final    Blood Culture 05/11/2024 Staphylococcus, coagulase negative (C)   Final    Isolated from 05/11/2024 Aerobic Bottle   Final    Gram Stain 05/11/2024 Aerobic Bottle Gram positive cocci in clusters (C)   Final    Blood  Culture 05/11/2024 No growth at 5 days   Final    Lactate 05/11/2024 1.0  0.5 - 2.0 mmol/L Final    Procalcitonin 05/11/2024 0.08  0.00 - 0.25 ng/mL Final    Respiratory Culture 05/12/2024 Light growth (2+) Normal respiratory opal. No S. aureus or Pseudomonas aeruginosa detected. Final report.   Final    Gram Stain 05/12/2024 Many (4+) WBCs seen   Final    Gram Stain 05/12/2024 Few (2+) Epithelial cells seen   Final    Gram Stain 05/12/2024 Rare (1+) Gram positive cocci   Final    LEGIONELLA ANTIGEN, URINE 05/14/2024 Negative  Negative Final    Strep Pneumo Ag 05/14/2024 Positive (A)  Negative Final    Magnesium 05/12/2024 2.5 (H)  1.6 - 2.4 mg/dL Final    BCID, PCR 05/11/2024 Staph spp, not aureus or lugdunensis. Identification by BCID2 PCR. (A)  Negative by BCID PCR. Culture to Follow. Final    BOTTLE TYPE 05/11/2024 Aerobic Bottle   Final    Glucose 05/13/2024 265 (H)  65 - 99 mg/dL Final    BUN 05/13/2024 18  8 - 23 mg/dL Final    Creatinine 05/13/2024 0.95  0.57 - 1.00 mg/dL Final    Sodium 05/13/2024 136  136 - 145 mmol/L Final    Potassium 05/13/2024 3.4 (L)  3.5 - 5.2 mmol/L Final    Chloride 05/13/2024 101  98 - 107 mmol/L Final    CO2 05/13/2024 23.1  22.0 - 29.0 mmol/L Final    Calcium 05/13/2024 8.0 (L)  8.6 - 10.5 mg/dL Final    Albumin 05/13/2024 3.6  3.5 - 5.2 g/dL Final    Phosphorus 05/13/2024 2.4 (L)  2.5 - 4.5 mg/dL Final    Anion Gap 05/13/2024 11.9  5.0 - 15.0 mmol/L Final    BUN/Creatinine Ratio 05/13/2024 18.9  7.0 - 25.0 Final    eGFR 05/13/2024 57.7 (L)  >60.0 mL/min/1.73 Final    Vancomycin Random 05/13/2024 21.85  5.00 - 40.00 mcg/mL Final    Glucose 05/14/2024 221 (H)  65 - 99 mg/dL Final    BUN 05/14/2024 15  8 - 23 mg/dL Final    Creatinine 05/14/2024 0.72  0.57 - 1.00 mg/dL Final    Sodium 05/14/2024 139  136 - 145 mmol/L Final    Potassium 05/14/2024 3.5  3.5 - 5.2 mmol/L Final    Chloride 05/14/2024 102  98 - 107 mmol/L Final    CO2 05/14/2024 25.8  22.0 - 29.0 mmol/L Final     Calcium 05/14/2024 7.9 (L)  8.6 - 10.5 mg/dL Final    Albumin 05/14/2024 3.4 (L)  3.5 - 5.2 g/dL Final    Phosphorus 05/14/2024 1.9 (C)  2.5 - 4.5 mg/dL Final    Anion Gap 05/14/2024 11.2  5.0 - 15.0 mmol/L Final    BUN/Creatinine Ratio 05/14/2024 20.8  7.0 - 25.0 Final    eGFR 05/14/2024 80.5  >60.0 mL/min/1.73 Final    Vancomycin Random 05/14/2024 7.80  5.00 - 40.00 mcg/mL Final    Glucose 05/15/2024 167 (H)  65 - 99 mg/dL Final    BUN 05/15/2024 11  8 - 23 mg/dL Final    Creatinine 05/15/2024 0.69  0.57 - 1.00 mg/dL Final    Sodium 05/15/2024 139  136 - 145 mmol/L Final    Potassium 05/15/2024 3.1 (L)  3.5 - 5.2 mmol/L Final    Chloride 05/15/2024 101  98 - 107 mmol/L Final    CO2 05/15/2024 27.9  22.0 - 29.0 mmol/L Final    Calcium 05/15/2024 7.9 (L)  8.6 - 10.5 mg/dL Final    Albumin 05/15/2024 3.5  3.5 - 5.2 g/dL Final    Phosphorus 05/15/2024 2.1 (L)  2.5 - 4.5 mg/dL Final    Anion Gap 05/15/2024 10.1  5.0 - 15.0 mmol/L Final    BUN/Creatinine Ratio 05/15/2024 15.9  7.0 - 25.0 Final    eGFR 05/15/2024 83.6  >60.0 mL/min/1.73 Final       ASSESSMENT/ PLAN:    Diagnoses and all orders for this visit:    1. Community acquired pneumonia of right lower lobe of lung (Primary)  Assessment & Plan:  She is doing better since her acute pneumococcal pneumonia.  She still is a little bit weak.  Encouraged her to continue with adequate diet hydration and activity as tolerated.  Will repeat her chest x-ray in about a month.    Orders:  -     XR Chest PA & Lateral; Future    2. Pneumonia of right lower lobe due to Streptococcus pneumoniae  -     XR Chest PA & Lateral; Future    3. Hospital discharge follow-up  Assessment & Plan:  She is doing well since her pneumococcal pneumonia.  She will continue to work on adequate hydration diet and activity as tolerated.  She will continue all of her routine pulmonary medicines for her underlying COPD.  Repeat her chest x-ray in 1 month.                 Orders Placed Today:     No  orders of the defined types were placed in this encounter.       Management Plan:     An After Visit Summary was printed and given to the patient at discharge.    Follow-up: Return in about 4 weeks (around 6/20/2024) for Recheck.    Pee Mon DO 5/23/2024 11:33 EDT  This note was electronically signed.

## 2024-05-23 NOTE — ASSESSMENT & PLAN NOTE
She is doing better since her acute pneumococcal pneumonia.  She still is a little bit weak.  Encouraged her to continue with adequate diet hydration and activity as tolerated.  Will repeat her chest x-ray in about a month.

## 2024-06-06 ENCOUNTER — OFFICE VISIT (OUTPATIENT)
Dept: FAMILY MEDICINE CLINIC | Facility: CLINIC | Age: 89
End: 2024-06-06
Payer: MEDICARE

## 2024-06-06 VITALS
TEMPERATURE: 98 F | OXYGEN SATURATION: 93 % | BODY MASS INDEX: 28.14 KG/M2 | HEIGHT: 63 IN | WEIGHT: 158.8 LBS | DIASTOLIC BLOOD PRESSURE: 58 MMHG | HEART RATE: 76 BPM | SYSTOLIC BLOOD PRESSURE: 127 MMHG

## 2024-06-06 DIAGNOSIS — J44.1 COPD WITH EXACERBATION: Primary | ICD-10-CM

## 2024-06-06 PROCEDURE — 1125F AMNT PAIN NOTED PAIN PRSNT: CPT | Performed by: STUDENT IN AN ORGANIZED HEALTH CARE EDUCATION/TRAINING PROGRAM

## 2024-06-06 PROCEDURE — 99214 OFFICE O/P EST MOD 30 MIN: CPT | Performed by: STUDENT IN AN ORGANIZED HEALTH CARE EDUCATION/TRAINING PROGRAM

## 2024-06-06 RX ORDER — PREDNISONE 10 MG/1
10 TABLET ORAL DAILY
Qty: 5 TABLET | Refills: 0 | Status: SHIPPED | OUTPATIENT
Start: 2024-06-06 | End: 2024-06-11

## 2024-06-06 RX ORDER — AZITHROMYCIN 250 MG/1
TABLET, FILM COATED ORAL
Qty: 6 TABLET | Refills: 0 | Status: SHIPPED | OUTPATIENT
Start: 2024-06-06

## 2024-06-06 NOTE — PROGRESS NOTES
"Chief Complaint  Cough (Two days ), Fatigue (About two days ), and Generalized Body Aches (Feels wore out and don't want to do anything   )    Subjective      Shayy Wasserman is a 88 y.o. female who presents to [facility]     Same day visit:     COPD exacerbation with increased sputum. No SOB, no fever, no red flag signs.     Objective   Vital Signs:   Vitals:    06/06/24 1505   BP: 127/58   Pulse: 76   Temp: 98 °F (36.7 °C)   TempSrc: Temporal   SpO2: 93%   Weight: 72 kg (158 lb 12.8 oz)   Height: 160 cm (62.99\")     Body mass index is 28.14 kg/m².    Wt Readings from Last 3 Encounters:   06/06/24 72 kg (158 lb 12.8 oz)   05/23/24 72.1 kg (159 lb)   05/14/24 74.6 kg (164 lb 7.4 oz)     BP Readings from Last 3 Encounters:   06/06/24 127/58   05/23/24 109/71   05/15/24 148/89       Health Maintenance   Topic Date Due    DIABETIC EYE EXAM  Never done    ZOSTER VACCINE (1 of 2) Never done    ANNUAL WELLNESS VISIT  05/20/2021    DXA SCAN  04/20/2024    COVID-19 Vaccine (1 - 2023-24 season) 07/20/2024 (Originally 9/1/2023)    RSV Vaccine - Adults (1 - 1-dose 60+ series) 03/15/2025 (Originally 9/4/1995)    Pneumococcal Vaccine 65+ (2 of 2 - PPSV23 or PCV20) 03/15/2025 (Originally 1/23/2018)    TDAP/TD VACCINES (1 - Tdap) 03/15/2025 (Originally 9/4/1954)    INFLUENZA VACCINE  08/01/2024    BMI FOLLOWUP  10/05/2024    HEMOGLOBIN A1C  10/26/2024    LIPID PANEL  04/26/2025    URINE MICROALBUMIN  04/26/2025       Physical Exam  Vitals reviewed.   HENT:      Head: Normocephalic.      Mouth/Throat:      Mouth: Mucous membranes are moist.   Eyes:      Pupils: Pupils are equal, round, and reactive to light.   Cardiovascular:      Rate and Rhythm: Normal rate.   Abdominal:      General: Abdomen is flat.   Musculoskeletal:         General: Normal range of motion.      Cervical back: Normal range of motion.   Skin:     General: Skin is warm.      Capillary Refill: Capillary refill takes less than " 2 seconds.   Neurological:      Mental Status: She is alert.          Assessment & Plan  COPD with exacerbation  COPD is newly identified.    Plan:  Begin taking the following medication/s; prednisone and azithromycin.    Discussed medication dosage, use, side effects, and goals of treatment in detail.    Discussed monitoring symptoms and use of quick-relief medications and maintenance medication..    Patient Treatment Goals:   symptom prevention, minimizing limitation in activity, prevention of exacerbations and use of ER/inpatient care, maintenance of optimal pulmonary function, and minimization of adverse effects of treatment    Followup  with pcp  .       New Medications Ordered This Visit   Medications    predniSONE (DELTASONE) 10 MG tablet     Sig: Take 1 tablet by mouth Daily for 5 days.     Dispense:  5 tablet     Refill:  0    azithromycin (Zithromax Z-Erick) 250 MG tablet     Sig: Take 2 tablets by mouth on day 1, then 1 tablet daily on days 2-5     Dispense:  6 tablet     Refill:  0                  I spent 14 minutes caring for Shayy on this date of service. This time includes time spent by me in the following activities:preparing for the visit, reviewing tests, obtaining and/or reviewing a separately obtained history, performing a medically appropriate examination and/or evaluation , counseling and educating the patient/family/caregiver, ordering medications, tests, or procedures, referring and communicating with other health care professionals , documenting information in the medical record, independently interpreting results and communicating that information with the patient/family/caregiver, and care coordination  FOLLOW UP  Return if symptoms worsen or fail to improve.  Patient was given instructions and counseling regarding her condition or for health maintenance advice. Please see specific information pulled into the AVS if appropriate.       Primo Fontaine MD  06/06/24  15:59  EDT    CURRENT & DISCONTINUED MEDICATIONS  Current Outpatient Medications   Medication Instructions    albuterol (PROVENTIL) 2.5 mg, Nebulization, Every 4 Hours PRN    alendronate (FOSAMAX) 70 mg, Oral, Every 7 Days    allopurinol (ZYLOPRIM) 300 mg, Oral, Daily    amLODIPine (NORVASC) 5 mg, Oral, Daily    atenolol (TENORMIN) 50 mg, Oral, 2 Times Daily    azithromycin (Zithromax Z-Erick) 250 MG tablet Take 2 tablets by mouth on day 1, then 1 tablet daily on days 2-5    Blood Glucose Monitoring Suppl (ONE TOUCH ULTRA 2) w/Device kit USE AS DIRECTED TO TEST BLOOD SUGAR EVERY MORNING FASTING AND RECORD    Breztri Aerosphere 160-9-4.8 MCG/ACT aerosol inhaler 2 puffs, Inhalation, 2 Times Daily    glucose blood (Glucose Meter Test) test strip check blood sugar every AM fasting and record. (Dx: E11.9)    glucose monitor monitoring kit check blood sugar every AM fasting and record. (Dx: E11.9)    loratadine (CLARITIN) 10 mg, Oral, Daily    predniSONE (DELTASONE) 10 mg, Oral, Daily       There are no discontinued medications.

## 2024-06-24 ENCOUNTER — OFFICE VISIT (OUTPATIENT)
Dept: FAMILY MEDICINE CLINIC | Facility: CLINIC | Age: 89
End: 2024-06-24
Payer: MEDICARE

## 2024-06-24 VITALS
SYSTOLIC BLOOD PRESSURE: 117 MMHG | HEIGHT: 63 IN | HEART RATE: 67 BPM | WEIGHT: 159 LBS | OXYGEN SATURATION: 96 % | BODY MASS INDEX: 28.17 KG/M2 | DIASTOLIC BLOOD PRESSURE: 64 MMHG | TEMPERATURE: 96.1 F

## 2024-06-24 DIAGNOSIS — J13 PNEUMONIA OF RIGHT LOWER LOBE DUE TO STREPTOCOCCUS PNEUMONIAE: Primary | ICD-10-CM

## 2024-06-24 PROCEDURE — 99213 OFFICE O/P EST LOW 20 MIN: CPT | Performed by: FAMILY MEDICINE

## 2024-06-24 PROCEDURE — 1125F AMNT PAIN NOTED PAIN PRSNT: CPT | Performed by: FAMILY MEDICINE

## 2024-06-24 RX ORDER — SOFT LENS DISINFECTANT
1 SOLUTION, NON-ORAL MISCELLANEOUS EVERY 6 HOURS PRN
Qty: 1 EACH | Refills: 2 | Status: SHIPPED | OUTPATIENT
Start: 2024-06-24

## 2024-06-24 NOTE — PROGRESS NOTES
Chief Complaint   Patient presents with    Follow-up     4 week / Community acquired pneumonia of right lower lobe of lung        Subjective     Shayy Wasserman  has a past medical history of Anxiety disorder (11/12/2018), Arthritis, COPD (chronic obstructive pulmonary disease), Dependent edema (11/28/2017), Gout, Osteoporosis, Primary osteoarthritis of left knee (05/24/2018), Primary osteoarthritis of one hip, right (12/12/2017), and Primary osteoarthritis of right hip (12/12/2017).    Pneumonia-overall she is doing better.  She states she does has an occasional cough, but she denies any wheezing or shortness of breath.        PHQ-2 Depression Screening  Little interest or pleasure in doing things?     Feeling down, depressed, or hopeless?     PHQ-2 Total Score     PHQ-9 Depression Screening  Little interest or pleasure in doing things?     Feeling down, depressed, or hopeless?     Trouble falling or staying asleep, or sleeping too much?     Feeling tired or having little energy?     Poor appetite or overeating?     Feeling bad about yourself - or that you are a failure or have let yourself or your family down?     Trouble concentrating on things, such as reading the newspaper or watching television?     Moving or speaking so slowly that other people could have noticed? Or the opposite - being so fidgety or restless that you have been moving around a lot more than usual?     Thoughts that you would be better off dead, or of hurting yourself in some way?     PHQ-9 Total Score     If you checked off any problems, how difficult have these problems made it for you to do your work, take care of things at home, or get along with other people?       Allergies   Allergen Reactions    Bactrim [Sulfamethoxazole-Trimethoprim] Unknown - Low Severity    Griseofulvin Ultramicrosize [Griseofulvin] Unknown - Low Severity       Prior to Admission medications    Medication Sig Start Date End Date Taking? Authorizing Provider    albuterol (PROVENTIL) (2.5 MG/3ML) 0.083% nebulizer solution Take 2.5 mg by nebulization Every 4 (Four) Hours As Needed for Wheezing. 10/5/23  Yes Pee Mon DO   alendronate (FOSAMAX) 70 MG tablet Take 1 tablet by mouth Every 7 (Seven) Days. 10/5/23  Yes Pee Mon DO   allopurinol (ZYLOPRIM) 300 MG tablet Take 1 tablet by mouth Daily. 10/5/23  Yes Pee Mon DO   amLODIPine (NORVASC) 5 MG tablet Take 1 tablet by mouth Daily. 10/5/23  Yes Pee Mon DO   atenolol (TENORMIN) 50 MG tablet Take 1 tablet by mouth 2 (Two) Times a Day. 10/5/23  Yes Pee Mon DO   Blood Glucose Monitoring Suppl (ONE TOUCH ULTRA 2) w/Device kit USE AS DIRECTED TO TEST BLOOD SUGAR EVERY MORNING FASTING AND RECORD 8/10/23  Yes Provider, MD Brittany Contrerastri Aerosphere 160-9-4.8 MCG/ACT aerosol inhaler Inhale 2 puffs 2 (Two) Times a Day. 10/5/23  Yes Pee Mon DO   glucose blood (Glucose Meter Test) test strip check blood sugar every AM fasting and record. (Dx: E11.9) 8/10/23  Yes Pee Mon DO   glucose monitor monitoring kit check blood sugar every AM fasting and record. (Dx: E11.9) 8/10/23  Yes Pee Mon DO   loratadine (CLARITIN) 10 MG tablet Take 1 tablet by mouth Daily. 4/30/24  Yes Pee Mon DO   azithromycin (Zithromax Z-Erick) 250 MG tablet Take 2 tablets by mouth on day 1, then 1 tablet daily on days 2-5  Patient not taking: Reported on 6/24/2024 6/6/24 6/24/24  Primo Kapoor MD        Patient Active Problem List   Diagnosis    Pulmonary emphysema    Bronchitis    Essential (primary) hypertension    Gout    Hyperlipidemia    Osteoarthritis    Stage 3 chronic kidney disease    Osteoporosis    Localized edema    Prediabetes    Pain of left upper extremity    Anxiety with somatization    Hypoxia    Community acquired pneumonia of right lower lobe of lung    Pneumonia of right lower lobe due to  "Streptococcus pneumoniae    Hospital discharge follow-up        Past Surgical History:   Procedure Laterality Date    BLADDER REPAIR      COLONOSCOPY  2016    EYE SURGERY      implant- yes    HYSTERECTOMY      INTRAOCULAR LENS INSERTION      yes    JOINT REPLACEMENT      Surgery    KNEE SURGERY Right     knee repair    KNEE SURGERY Right     repair    OTHER SURGICAL HISTORY      Artificial joints/limbs    OTHER SURGICAL HISTORY      Artificial Joints/Limbs    OTHER SURGICAL HISTORY      Joint surgery    TOTAL KNEE ARTHROPLASTY Right        Social History     Socioeconomic History    Marital status:    Tobacco Use    Smoking status: Former     Current packs/day: 0.00     Types: Cigarettes     Start date:      Quit date: 1970     Years since quittin.5    Smokeless tobacco: Never    Tobacco comments:     started at age 16- stopped at age 35   Vaping Use    Vaping status: Never Used   Substance and Sexual Activity    Alcohol use: Never     Comment: 2019- 1/15/2019 does not drink     Drug use: Never       Family History   Problem Relation Age of Onset    Colon cancer Father        Family history, surgical history, past medical history, Allergies and meds reviewed with patient today and updated in [employer] EMR.     ROS:  Review of Systems   Constitutional:  Negative for chills, fatigue and fever.   Respiratory:  Positive for cough. Negative for chest tightness, shortness of breath and wheezing.    Cardiovascular:  Negative for chest pain and palpitations.       OBJECTIVE:  Vitals:    24 1407   BP: 117/64   BP Location: Left arm   Patient Position: Sitting   Pulse: 67   Temp: 96.1 °F (35.6 °C)   SpO2: 96%   Weight: 72.1 kg (159 lb)   Height: 160 cm (62.99\")     No results found.   Body mass index is 28.17 kg/m².  No LMP recorded. Patient is postmenopausal.    The ASCVD Risk score (Calhan DK, et al., 2019) failed to calculate.     Physical Exam  Vitals and nursing note reviewed.   Constitutional:      "  General: She is not in acute distress.     Appearance: Normal appearance. She is normal weight.   HENT:      Head: Normocephalic.      Right Ear: Tympanic membrane, ear canal and external ear normal.      Left Ear: Tympanic membrane, ear canal and external ear normal.      Nose: Nose normal.      Mouth/Throat:      Mouth: Mucous membranes are moist.      Dentition: Has dentures.      Pharynx: Oropharynx is clear.   Eyes:      General: No scleral icterus.     Conjunctiva/sclera: Conjunctivae normal.      Pupils: Pupils are equal, round, and reactive to light.   Cardiovascular:      Rate and Rhythm: Normal rate and regular rhythm.      Pulses: Normal pulses.      Heart sounds: Normal heart sounds. No murmur heard.  Pulmonary:      Effort: Pulmonary effort is normal.      Breath sounds: Examination of the right-lower field reveals rhonchi. Rhonchi present. No wheezing or rales.   Musculoskeletal:      Cervical back: Neck supple. No rigidity or tenderness.   Lymphadenopathy:      Cervical: No cervical adenopathy.   Skin:     General: Skin is warm and dry.      Coloration: Skin is not jaundiced.      Findings: No rash.   Neurological:      General: No focal deficit present.      Mental Status: She is alert and oriented to person, place, and time.   Psychiatric:         Mood and Affect: Mood normal.         Thought Content: Thought content normal.         Judgment: Judgment normal.         Procedures    No visits with results within 30 Day(s) from this visit.   Latest known visit with results is:   Admission on 05/11/2024, Discharged on 05/15/2024   Component Date Value Ref Range Status    QT Interval 05/11/2024 376  ms Final    QTC Interval 05/11/2024 424  ms Final    Glucose 05/11/2024 118 (H)  65 - 99 mg/dL Final    BUN 05/11/2024 12  8 - 23 mg/dL Final    Creatinine 05/11/2024 0.89  0.57 - 1.00 mg/dL Final    Sodium 05/11/2024 136  136 - 145 mmol/L Final    Potassium 05/11/2024 3.9  3.5 - 5.2 mmol/L Final    Chloride  05/11/2024 97 (L)  98 - 107 mmol/L Final    CO2 05/11/2024 22.3  22.0 - 29.0 mmol/L Final    Calcium 05/11/2024 9.3  8.6 - 10.5 mg/dL Final    Total Protein 05/11/2024 7.9  6.0 - 8.5 g/dL Final    Albumin 05/11/2024 4.1  3.5 - 5.2 g/dL Final    ALT (SGPT) 05/11/2024 7  1 - 33 U/L Final    AST (SGOT) 05/11/2024 16  1 - 32 U/L Final    Alkaline Phosphatase 05/11/2024 85  39 - 117 U/L Final    Total Bilirubin 05/11/2024 0.8  0.0 - 1.2 mg/dL Final    Globulin 05/11/2024 3.8  gm/dL Final    A/G Ratio 05/11/2024 1.1  g/dL Final    BUN/Creatinine Ratio 05/11/2024 13.5  7.0 - 25.0 Final    Anion Gap 05/11/2024 16.7 (H)  5.0 - 15.0 mmol/L Final    eGFR 05/11/2024 62.4  >60.0 mL/min/1.73 Final    HS Troponin T 05/11/2024 17 (H)  <14 ng/L Final    Magnesium 05/11/2024 1.5 (L)  1.6 - 2.4 mg/dL Final    Color, UA 05/14/2024 Yellow  Yellow, Straw Final    Appearance, UA 05/14/2024 Clear  Clear Final    pH, UA 05/14/2024 7.0  5.0 - 8.0 Final    Specific Gravity, UA 05/14/2024 1.008  1.005 - 1.030 Final    Glucose, UA 05/14/2024 100 mg/dL (Trace) (A)  Negative Final    Ketones, UA 05/14/2024 Negative  Negative Final    Bilirubin, UA 05/14/2024 Negative  Negative Final    Blood, UA 05/14/2024 Negative  Negative Final    Protein, UA 05/14/2024 Negative  Negative Final    Leuk Esterase, UA 05/14/2024 Negative  Negative Final    Nitrite, UA 05/14/2024 Negative  Negative Final    Urobilinogen, UA 05/14/2024 1.0 E.U./dL  0.2 - 1.0 E.U./dL Final    Extra Tube 05/11/2024 Hold for add-ons.   Final    Auto resulted.    Extra Tube 05/11/2024 hold for add-on   Final    Auto resulted    Extra Tube 05/11/2024 Hold for add-ons.   Final    Auto resulted.    Extra Tube 05/11/2024 Hold for add-ons.   Final    Auto resulted    WBC 05/11/2024 11.24 (H)  3.40 - 10.80 10*3/mm3 Final    RBC 05/11/2024 4.15  3.77 - 5.28 10*6/mm3 Final    Hemoglobin 05/11/2024 12.9  12.0 - 15.9 g/dL Final    Hematocrit 05/11/2024 37.8  34.0 - 46.6 % Final    MCV 05/11/2024  91.1  79.0 - 97.0 fL Final    MCH 05/11/2024 31.1  26.6 - 33.0 pg Final    MCHC 05/11/2024 34.1  31.5 - 35.7 g/dL Final    RDW 05/11/2024 13.4  12.3 - 15.4 % Final    RDW-SD 05/11/2024 45.1  37.0 - 54.0 fl Final    MPV 05/11/2024 9.1  6.0 - 12.0 fL Final    Platelets 05/11/2024 266  140 - 450 10*3/mm3 Final    Neutrophil % 05/11/2024 72.4  42.7 - 76.0 % Final    Lymphocyte % 05/11/2024 19.8  19.6 - 45.3 % Final    Monocyte % 05/11/2024 4.8 (L)  5.0 - 12.0 % Final    Eosinophil % 05/11/2024 2.0  0.3 - 6.2 % Final    Basophil % 05/11/2024 0.6  0.0 - 1.5 % Final    Immature Grans % 05/11/2024 0.4  0.0 - 0.5 % Final    Neutrophils, Absolute 05/11/2024 8.15 (H)  1.70 - 7.00 10*3/mm3 Final    Lymphocytes, Absolute 05/11/2024 2.22  0.70 - 3.10 10*3/mm3 Final    Monocytes, Absolute 05/11/2024 0.54  0.10 - 0.90 10*3/mm3 Final    Eosinophils, Absolute 05/11/2024 0.22  0.00 - 0.40 10*3/mm3 Final    Basophils, Absolute 05/11/2024 0.07  0.00 - 0.20 10*3/mm3 Final    Immature Grans, Absolute 05/11/2024 0.04  0.00 - 0.05 10*3/mm3 Final    nRBC 05/11/2024 0.0  0.0 - 0.2 /100 WBC Final    COVID19 05/11/2024 Not Detected  Not Detected - Ref. Range Final    Influenza A PCR 05/11/2024 Not Detected  Not Detected Final    Influenza B PCR 05/11/2024 Not Detected  Not Detected Final    RSV, PCR 05/11/2024 Not Detected  Not Detected Final    Blood Culture 05/11/2024 Staphylococcus, coagulase negative (C)   Final    Isolated from 05/11/2024 Aerobic Bottle   Final    Gram Stain 05/11/2024 Aerobic Bottle Gram positive cocci in clusters (C)   Final    Blood Culture 05/11/2024 No growth at 5 days   Final    Lactate 05/11/2024 1.0  0.5 - 2.0 mmol/L Final    Procalcitonin 05/11/2024 0.08  0.00 - 0.25 ng/mL Final    Respiratory Culture 05/12/2024 Light growth (2+) Normal respiratory opal. No S. aureus or Pseudomonas aeruginosa detected. Final report.   Final    Gram Stain 05/12/2024 Many (4+) WBCs seen   Final    Gram Stain 05/12/2024 Few (2+)  Epithelial cells seen   Final    Gram Stain 05/12/2024 Rare (1+) Gram positive cocci   Final    LEGIONELLA ANTIGEN, URINE 05/14/2024 Negative  Negative Final    Strep Pneumo Ag 05/14/2024 Positive (A)  Negative Final    Magnesium 05/12/2024 2.5 (H)  1.6 - 2.4 mg/dL Final    BCID, PCR 05/11/2024 Staph spp, not aureus or lugdunensis. Identification by BCID2 PCR. (A)  Negative by BCID PCR. Culture to Follow. Final    BOTTLE TYPE 05/11/2024 Aerobic Bottle   Final    Glucose 05/13/2024 265 (H)  65 - 99 mg/dL Final    BUN 05/13/2024 18  8 - 23 mg/dL Final    Creatinine 05/13/2024 0.95  0.57 - 1.00 mg/dL Final    Sodium 05/13/2024 136  136 - 145 mmol/L Final    Potassium 05/13/2024 3.4 (L)  3.5 - 5.2 mmol/L Final    Chloride 05/13/2024 101  98 - 107 mmol/L Final    CO2 05/13/2024 23.1  22.0 - 29.0 mmol/L Final    Calcium 05/13/2024 8.0 (L)  8.6 - 10.5 mg/dL Final    Albumin 05/13/2024 3.6  3.5 - 5.2 g/dL Final    Phosphorus 05/13/2024 2.4 (L)  2.5 - 4.5 mg/dL Final    Anion Gap 05/13/2024 11.9  5.0 - 15.0 mmol/L Final    BUN/Creatinine Ratio 05/13/2024 18.9  7.0 - 25.0 Final    eGFR 05/13/2024 57.7 (L)  >60.0 mL/min/1.73 Final    Vancomycin Random 05/13/2024 21.85  5.00 - 40.00 mcg/mL Final    Glucose 05/14/2024 221 (H)  65 - 99 mg/dL Final    BUN 05/14/2024 15  8 - 23 mg/dL Final    Creatinine 05/14/2024 0.72  0.57 - 1.00 mg/dL Final    Sodium 05/14/2024 139  136 - 145 mmol/L Final    Potassium 05/14/2024 3.5  3.5 - 5.2 mmol/L Final    Chloride 05/14/2024 102  98 - 107 mmol/L Final    CO2 05/14/2024 25.8  22.0 - 29.0 mmol/L Final    Calcium 05/14/2024 7.9 (L)  8.6 - 10.5 mg/dL Final    Albumin 05/14/2024 3.4 (L)  3.5 - 5.2 g/dL Final    Phosphorus 05/14/2024 1.9 (C)  2.5 - 4.5 mg/dL Final    Anion Gap 05/14/2024 11.2  5.0 - 15.0 mmol/L Final    BUN/Creatinine Ratio 05/14/2024 20.8  7.0 - 25.0 Final    eGFR 05/14/2024 80.5  >60.0 mL/min/1.73 Final    Vancomycin Random 05/14/2024 7.80  5.00 - 40.00 mcg/mL Final    Glucose  05/15/2024 167 (H)  65 - 99 mg/dL Final    BUN 05/15/2024 11  8 - 23 mg/dL Final    Creatinine 05/15/2024 0.69  0.57 - 1.00 mg/dL Final    Sodium 05/15/2024 139  136 - 145 mmol/L Final    Potassium 05/15/2024 3.1 (L)  3.5 - 5.2 mmol/L Final    Chloride 05/15/2024 101  98 - 107 mmol/L Final    CO2 05/15/2024 27.9  22.0 - 29.0 mmol/L Final    Calcium 05/15/2024 7.9 (L)  8.6 - 10.5 mg/dL Final    Albumin 05/15/2024 3.5  3.5 - 5.2 g/dL Final    Phosphorus 05/15/2024 2.1 (L)  2.5 - 4.5 mg/dL Final    Anion Gap 05/15/2024 10.1  5.0 - 15.0 mmol/L Final    BUN/Creatinine Ratio 05/15/2024 15.9  7.0 - 25.0 Final    eGFR 05/15/2024 83.6  >60.0 mL/min/1.73 Final       ASSESSMENT/ PLAN:    Diagnoses and all orders for this visit:    1. Pneumonia of right lower lobe due to Streptococcus pneumoniae (Primary)  Assessment & Plan:  Overall she feels that she is doing better.  She does have some rhonchi in her right base.  Will repeat her chest x-ray.                 Orders Placed Today:     No orders of the defined types were placed in this encounter.       Management Plan:     An After Visit Summary was printed and given to the patient at discharge.    Follow-up: Return in about 4 months (around 10/24/2024) for Next scheduled follow up.    Pee Mon,  6/24/2024 14:40 EDT  This note was electronically signed.

## 2024-07-08 DIAGNOSIS — I10 ESSENTIAL (PRIMARY) HYPERTENSION: ICD-10-CM

## 2024-07-08 RX ORDER — AMLODIPINE BESYLATE 5 MG/1
5 TABLET ORAL DAILY
Qty: 90 TABLET | Refills: 1 | Status: SHIPPED | OUTPATIENT
Start: 2024-07-08

## 2024-07-10 ENCOUNTER — TELEPHONE (OUTPATIENT)
Dept: FAMILY MEDICINE CLINIC | Facility: CLINIC | Age: 89
End: 2024-07-10

## 2024-07-10 NOTE — TELEPHONE ENCOUNTER
Caller: SARAI JOSUE    Relationship to patient: Emergency Contact    Best call back number: [phone]     Chief complaint: STOMACH PAIN    Type of visit: OFFICE VISIT    Requested date: ASAP

## 2024-07-11 ENCOUNTER — OFFICE VISIT (OUTPATIENT)
Dept: FAMILY MEDICINE CLINIC | Facility: CLINIC | Age: 89
End: 2024-07-11
Payer: MEDICARE

## 2024-07-11 VITALS
SYSTOLIC BLOOD PRESSURE: 100 MMHG | HEIGHT: 63 IN | BODY MASS INDEX: 28 KG/M2 | WEIGHT: 158 LBS | DIASTOLIC BLOOD PRESSURE: 65 MMHG | TEMPERATURE: 97 F | HEART RATE: 78 BPM | OXYGEN SATURATION: 90 %

## 2024-07-11 DIAGNOSIS — R10.12 LEFT UPPER QUADRANT ABDOMINAL PAIN: ICD-10-CM

## 2024-07-11 DIAGNOSIS — J44.1 COPD WITH ACUTE EXACERBATION: Primary | ICD-10-CM

## 2024-07-11 PROCEDURE — 99213 OFFICE O/P EST LOW 20 MIN: CPT | Performed by: FAMILY MEDICINE

## 2024-07-11 PROCEDURE — 1125F AMNT PAIN NOTED PAIN PRSNT: CPT | Performed by: FAMILY MEDICINE

## 2024-07-11 RX ORDER — PREDNISONE 10 MG/1
TABLET ORAL
Qty: 30 TABLET | Refills: 0 | Status: SHIPPED | OUTPATIENT
Start: 2024-07-11

## 2024-07-11 RX ORDER — OMEPRAZOLE 40 MG/1
40 CAPSULE, DELAYED RELEASE ORAL DAILY
Qty: 30 CAPSULE | Refills: 2 | Status: SHIPPED | OUTPATIENT
Start: 2024-07-11

## 2024-07-11 NOTE — PROGRESS NOTES
Chief Complaint   Patient presents with    Abdominal Pain        Subjective     Shayy Wasserman  has a past medical history of Anxiety disorder (11/12/2018), Arthritis, COPD (chronic obstructive pulmonary disease), Dependent edema (11/28/2017), Gout, Osteoporosis, Primary osteoarthritis of left knee (05/24/2018), Primary osteoarthritis of one hip, right (12/12/2017), and Primary osteoarthritis of right hip (12/12/2017).    Abdominal pain-she has been having some left upper quadrant abdominal pain for about a week now.  She states the pain was worse with eating.  Particularly what she ate.  She has not had any nausea vomiting diarrhea or constipation.  She denies any urinary frequency urgency or dysuria.  She has not been any more short of breath than usual.  Along with this she has had a cough.  With her cough it has been productive of clear sputum.        PHQ-2 Depression Screening  Little interest or pleasure in doing things?     Feeling down, depressed, or hopeless?     PHQ-2 Total Score     PHQ-9 Depression Screening  Little interest or pleasure in doing things?     Feeling down, depressed, or hopeless?     Trouble falling or staying asleep, or sleeping too much?     Feeling tired or having little energy?     Poor appetite or overeating?     Feeling bad about yourself - or that you are a failure or have let yourself or your family down?     Trouble concentrating on things, such as reading the newspaper or watching television?     Moving or speaking so slowly that other people could have noticed? Or the opposite - being so fidgety or restless that you have been moving around a lot more than usual?     Thoughts that you would be better off dead, or of hurting yourself in some way?     PHQ-9 Total Score     If you checked off any problems, how difficult have these problems made it for you to do your work, take care of things at home, or get along with other people?       Allergies   Allergen Reactions    Bactrim  [Sulfamethoxazole-Trimethoprim] Unknown - Low Severity    Griseofulvin Ultramicrosize [Griseofulvin] Unknown - Low Severity       Prior to Admission medications    Medication Sig Start Date End Date Taking? Authorizing Provider   albuterol (PROVENTIL) (2.5 MG/3ML) 0.083% nebulizer solution Take 2.5 mg by nebulization Every 4 (Four) Hours As Needed for Wheezing. 10/5/23  Yes Pee Mon DO   alendronate (FOSAMAX) 70 MG tablet Take 1 tablet by mouth Every 7 (Seven) Days. 10/5/23  Yes Pee Mon DO   allopurinol (ZYLOPRIM) 300 MG tablet Take 1 tablet by mouth Daily. 10/5/23  Yes Pee Mon DO   amLODIPine (NORVASC) 5 MG tablet TAKE 1 TABLET BY MOUTH DAILY 7/8/24  Yes Pee Mon DO   atenolol (TENORMIN) 50 MG tablet Take 1 tablet by mouth 2 (Two) Times a Day. 10/5/23  Yes Pee Mon DO   Blood Glucose Monitoring Suppl (ONE TOUCH ULTRA 2) w/Device kit USE AS DIRECTED TO TEST BLOOD SUGAR EVERY MORNING FASTING AND RECORD 8/10/23  Yes Provider, MD Wilber Contrerasi Aerosphere 160-9-4.8 MCG/ACT aerosol inhaler Inhale 2 puffs 2 (Two) Times a Day. 10/5/23  Yes Pee Mon DO   glucose blood (Glucose Meter Test) test strip check blood sugar every AM fasting and record. (Dx: E11.9) 8/10/23  Yes Pee Mon DO   glucose monitor monitoring kit check blood sugar every AM fasting and record. (Dx: E11.9) 8/10/23  Yes Pee Mon DO   loratadine (CLARITIN) 10 MG tablet Take 1 tablet by mouth Daily. 4/30/24  Yes Pee Mon DO   Respiratory Therapy Supplies (Nebulizer Cup/Tubing) device Use 1 Device Every 6 (Six) Hours As Needed (Shortness of breath). 6/24/24  Yes Pee Mon DO        Patient Active Problem List   Diagnosis    Pulmonary emphysema    Bronchitis    Essential (primary) hypertension    Gout    Hyperlipidemia    Osteoarthritis    Stage 3 chronic kidney disease    Osteoporosis     Localized edema    Prediabetes    Pain of left upper extremity    Anxiety with somatization    Hypoxia    Community acquired pneumonia of right lower lobe of lung    COPD with acute exacerbation    Pneumonia of right lower lobe due to Streptococcus pneumoniae    Hospital discharge follow-up    Left upper quadrant abdominal pain        Past Surgical History:   Procedure Laterality Date    BLADDER REPAIR      COLONOSCOPY  2016    EYE SURGERY      implant- yes    HYSTERECTOMY      INTRAOCULAR LENS INSERTION      yes    JOINT REPLACEMENT      Surgery    KNEE SURGERY Right     knee repair    KNEE SURGERY Right     repair    OTHER SURGICAL HISTORY      Artificial joints/limbs    OTHER SURGICAL HISTORY      Artificial Joints/Limbs    OTHER SURGICAL HISTORY      Joint surgery    TOTAL KNEE ARTHROPLASTY Right        Social History     Socioeconomic History    Marital status:    Tobacco Use    Smoking status: Former     Current packs/day: 0.00     Types: Cigarettes     Start date:      Quit date:      Years since quittin.5    Smokeless tobacco: Never    Tobacco comments:     started at age 16- stopped at age 35   Vaping Use    Vaping status: Never Used   Substance and Sexual Activity    Alcohol use: Never     Comment: 2019- 1/15/2019 does not drink     Drug use: Never       Family History   Problem Relation Age of Onset    Colon cancer Father        Family history, surgical history, past medical history, Allergies and meds reviewed with patient today and updated in [facility] EMR.     ROS:  Review of Systems   Constitutional:  Negative for chills and fever.   Respiratory:  Positive for cough. Negative for shortness of breath and wheezing.    Gastrointestinal:  Positive for abdominal pain. Negative for blood in stool, constipation, diarrhea, nausea and vomiting.   Genitourinary:  Negative for dysuria, frequency and hematuria.       OBJECTIVE:  Vitals:    24 1530   BP: 100/65   BP Location: Left arm  "  Patient Position: Sitting   Pulse: 78   Temp: 97 °F (36.1 °C)   SpO2: 90%   Weight: 71.7 kg (158 lb)   Height: 160 cm (62.99\")     No results found.   Body mass index is 28 kg/m².  No LMP recorded. Patient is postmenopausal.    The ASCVD Risk score (Adam DK, et al., 2019) failed to calculate for the following reasons:    The 2019 ASCVD risk score is only valid for ages 40 to 79     Physical Exam  Vitals and nursing note reviewed.   Constitutional:       General: She is not in acute distress.     Appearance: Normal appearance. She is normal weight.   HENT:      Head: Normocephalic.      Right Ear: Tympanic membrane, ear canal and external ear normal.      Left Ear: Tympanic membrane, ear canal and external ear normal.      Nose: Nose normal.      Mouth/Throat:      Mouth: Mucous membranes are moist.      Pharynx: Oropharynx is clear.   Eyes:      General: No scleral icterus.     Conjunctiva/sclera: Conjunctivae normal.      Pupils: Pupils are equal, round, and reactive to light.   Cardiovascular:      Rate and Rhythm: Normal rate and regular rhythm.      Pulses: Normal pulses.      Heart sounds: Normal heart sounds. No murmur heard.  Pulmonary:      Effort: Pulmonary effort is normal.      Breath sounds: Wheezing present. No rhonchi or rales.   Abdominal:      General: Abdomen is flat. Bowel sounds are normal.      Palpations: Abdomen is soft. There is no mass.      Tenderness: There is no abdominal tenderness.   Musculoskeletal:      Cervical back: Neck supple. No rigidity or tenderness.   Lymphadenopathy:      Cervical: No cervical adenopathy.   Skin:     General: Skin is warm and dry.      Coloration: Skin is not jaundiced.      Findings: No rash.   Neurological:      General: No focal deficit present.      Mental Status: She is alert and oriented to person, place, and time.   Psychiatric:         Mood and Affect: Mood normal.         Thought Content: Thought content normal.         Judgment: Judgment normal. "         Procedures    No visits with results within 30 Day(s) from this visit.   Latest known visit with results is:   Admission on 05/11/2024, Discharged on 05/15/2024   Component Date Value Ref Range Status    QT Interval 05/11/2024 376  ms Final    QTC Interval 05/11/2024 424  ms Final    Glucose 05/11/2024 118 (H)  65 - 99 mg/dL Final    BUN 05/11/2024 12  8 - 23 mg/dL Final    Creatinine 05/11/2024 0.89  0.57 - 1.00 mg/dL Final    Sodium 05/11/2024 136  136 - 145 mmol/L Final    Potassium 05/11/2024 3.9  3.5 - 5.2 mmol/L Final    Chloride 05/11/2024 97 (L)  98 - 107 mmol/L Final    CO2 05/11/2024 22.3  22.0 - 29.0 mmol/L Final    Calcium 05/11/2024 9.3  8.6 - 10.5 mg/dL Final    Total Protein 05/11/2024 7.9  6.0 - 8.5 g/dL Final    Albumin 05/11/2024 4.1  3.5 - 5.2 g/dL Final    ALT (SGPT) 05/11/2024 7  1 - 33 U/L Final    AST (SGOT) 05/11/2024 16  1 - 32 U/L Final    Alkaline Phosphatase 05/11/2024 85  39 - 117 U/L Final    Total Bilirubin 05/11/2024 0.8  0.0 - 1.2 mg/dL Final    Globulin 05/11/2024 3.8  gm/dL Final    A/G Ratio 05/11/2024 1.1  g/dL Final    BUN/Creatinine Ratio 05/11/2024 13.5  7.0 - 25.0 Final    Anion Gap 05/11/2024 16.7 (H)  5.0 - 15.0 mmol/L Final    eGFR 05/11/2024 62.4  >60.0 mL/min/1.73 Final    HS Troponin T 05/11/2024 17 (H)  <14 ng/L Final    Magnesium 05/11/2024 1.5 (L)  1.6 - 2.4 mg/dL Final    Color, UA 05/14/2024 Yellow  Yellow, Straw Final    Appearance, UA 05/14/2024 Clear  Clear Final    pH, UA 05/14/2024 7.0  5.0 - 8.0 Final    Specific Gravity, UA 05/14/2024 1.008  1.005 - 1.030 Final    Glucose, UA 05/14/2024 100 mg/dL (Trace) (A)  Negative Final    Ketones, UA 05/14/2024 Negative  Negative Final    Bilirubin, UA 05/14/2024 Negative  Negative Final    Blood, UA 05/14/2024 Negative  Negative Final    Protein, UA 05/14/2024 Negative  Negative Final    Leuk Esterase, UA 05/14/2024 Negative  Negative Final    Nitrite, UA 05/14/2024 Negative  Negative Final    Urobilinogen, UA  05/14/2024 1.0 E.U./dL  0.2 - 1.0 E.U./dL Final    Extra Tube 05/11/2024 Hold for add-ons.   Final    Auto resulted.    Extra Tube 05/11/2024 hold for add-on   Final    Auto resulted    Extra Tube 05/11/2024 Hold for add-ons.   Final    Auto resulted.    Extra Tube 05/11/2024 Hold for add-ons.   Final    Auto resulted    WBC 05/11/2024 11.24 (H)  3.40 - 10.80 10*3/mm3 Final    RBC 05/11/2024 4.15  3.77 - 5.28 10*6/mm3 Final    Hemoglobin 05/11/2024 12.9  12.0 - 15.9 g/dL Final    Hematocrit 05/11/2024 37.8  34.0 - 46.6 % Final    MCV 05/11/2024 91.1  79.0 - 97.0 fL Final    MCH 05/11/2024 31.1  26.6 - 33.0 pg Final    MCHC 05/11/2024 34.1  31.5 - 35.7 g/dL Final    RDW 05/11/2024 13.4  12.3 - 15.4 % Final    RDW-SD 05/11/2024 45.1  37.0 - 54.0 fl Final    MPV 05/11/2024 9.1  6.0 - 12.0 fL Final    Platelets 05/11/2024 266  140 - 450 10*3/mm3 Final    Neutrophil % 05/11/2024 72.4  42.7 - 76.0 % Final    Lymphocyte % 05/11/2024 19.8  19.6 - 45.3 % Final    Monocyte % 05/11/2024 4.8 (L)  5.0 - 12.0 % Final    Eosinophil % 05/11/2024 2.0  0.3 - 6.2 % Final    Basophil % 05/11/2024 0.6  0.0 - 1.5 % Final    Immature Grans % 05/11/2024 0.4  0.0 - 0.5 % Final    Neutrophils, Absolute 05/11/2024 8.15 (H)  1.70 - 7.00 10*3/mm3 Final    Lymphocytes, Absolute 05/11/2024 2.22  0.70 - 3.10 10*3/mm3 Final    Monocytes, Absolute 05/11/2024 0.54  0.10 - 0.90 10*3/mm3 Final    Eosinophils, Absolute 05/11/2024 0.22  0.00 - 0.40 10*3/mm3 Final    Basophils, Absolute 05/11/2024 0.07  0.00 - 0.20 10*3/mm3 Final    Immature Grans, Absolute 05/11/2024 0.04  0.00 - 0.05 10*3/mm3 Final    nRBC 05/11/2024 0.0  0.0 - 0.2 /100 WBC Final    COVID19 05/11/2024 Not Detected  Not Detected - Ref. Range Final    Influenza A PCR 05/11/2024 Not Detected  Not Detected Final    Influenza B PCR 05/11/2024 Not Detected  Not Detected Final    RSV, PCR 05/11/2024 Not Detected  Not Detected Final    Blood Culture 05/11/2024 Staphylococcus, coagulase negative  (C)   Final    Isolated from 05/11/2024 Aerobic Bottle   Final    Gram Stain 05/11/2024 Aerobic Bottle Gram positive cocci in clusters (C)   Final    Blood Culture 05/11/2024 No growth at 5 days   Final    Lactate 05/11/2024 1.0  0.5 - 2.0 mmol/L Final    Procalcitonin 05/11/2024 0.08  0.00 - 0.25 ng/mL Final    Respiratory Culture 05/12/2024 Light growth (2+) Normal respiratory opal. No S. aureus or Pseudomonas aeruginosa detected. Final report.   Final    Gram Stain 05/12/2024 Many (4+) WBCs seen   Final    Gram Stain 05/12/2024 Few (2+) Epithelial cells seen   Final    Gram Stain 05/12/2024 Rare (1+) Gram positive cocci   Final    LEGIONELLA ANTIGEN, URINE 05/14/2024 Negative  Negative Final    Strep Pneumo Ag 05/14/2024 Positive (A)  Negative Final    Magnesium 05/12/2024 2.5 (H)  1.6 - 2.4 mg/dL Final    BCID, PCR 05/11/2024 Staph spp, not aureus or lugdunensis. Identification by BCID2 PCR. (A)  Negative by BCID PCR. Culture to Follow. Final    BOTTLE TYPE 05/11/2024 Aerobic Bottle   Final    Glucose 05/13/2024 265 (H)  65 - 99 mg/dL Final    BUN 05/13/2024 18  8 - 23 mg/dL Final    Creatinine 05/13/2024 0.95  0.57 - 1.00 mg/dL Final    Sodium 05/13/2024 136  136 - 145 mmol/L Final    Potassium 05/13/2024 3.4 (L)  3.5 - 5.2 mmol/L Final    Chloride 05/13/2024 101  98 - 107 mmol/L Final    CO2 05/13/2024 23.1  22.0 - 29.0 mmol/L Final    Calcium 05/13/2024 8.0 (L)  8.6 - 10.5 mg/dL Final    Albumin 05/13/2024 3.6  3.5 - 5.2 g/dL Final    Phosphorus 05/13/2024 2.4 (L)  2.5 - 4.5 mg/dL Final    Anion Gap 05/13/2024 11.9  5.0 - 15.0 mmol/L Final    BUN/Creatinine Ratio 05/13/2024 18.9  7.0 - 25.0 Final    eGFR 05/13/2024 57.7 (L)  >60.0 mL/min/1.73 Final    Vancomycin Random 05/13/2024 21.85  5.00 - 40.00 mcg/mL Final    Glucose 05/14/2024 221 (H)  65 - 99 mg/dL Final    BUN 05/14/2024 15  8 - 23 mg/dL Final    Creatinine 05/14/2024 0.72  0.57 - 1.00 mg/dL Final    Sodium 05/14/2024 139  136 - 145 mmol/L Final     Potassium 05/14/2024 3.5  3.5 - 5.2 mmol/L Final    Chloride 05/14/2024 102  98 - 107 mmol/L Final    CO2 05/14/2024 25.8  22.0 - 29.0 mmol/L Final    Calcium 05/14/2024 7.9 (L)  8.6 - 10.5 mg/dL Final    Albumin 05/14/2024 3.4 (L)  3.5 - 5.2 g/dL Final    Phosphorus 05/14/2024 1.9 (C)  2.5 - 4.5 mg/dL Final    Anion Gap 05/14/2024 11.2  5.0 - 15.0 mmol/L Final    BUN/Creatinine Ratio 05/14/2024 20.8  7.0 - 25.0 Final    eGFR 05/14/2024 80.5  >60.0 mL/min/1.73 Final    Vancomycin Random 05/14/2024 7.80  5.00 - 40.00 mcg/mL Final    Glucose 05/15/2024 167 (H)  65 - 99 mg/dL Final    BUN 05/15/2024 11  8 - 23 mg/dL Final    Creatinine 05/15/2024 0.69  0.57 - 1.00 mg/dL Final    Sodium 05/15/2024 139  136 - 145 mmol/L Final    Potassium 05/15/2024 3.1 (L)  3.5 - 5.2 mmol/L Final    Chloride 05/15/2024 101  98 - 107 mmol/L Final    CO2 05/15/2024 27.9  22.0 - 29.0 mmol/L Final    Calcium 05/15/2024 7.9 (L)  8.6 - 10.5 mg/dL Final    Albumin 05/15/2024 3.5  3.5 - 5.2 g/dL Final    Phosphorus 05/15/2024 2.1 (L)  2.5 - 4.5 mg/dL Final    Anion Gap 05/15/2024 10.1  5.0 - 15.0 mmol/L Final    BUN/Creatinine Ratio 05/15/2024 15.9  7.0 - 25.0 Final    eGFR 05/15/2024 83.6  >60.0 mL/min/1.73 Final       ASSESSMENT/ PLAN:    Diagnoses and all orders for this visit:    1. COPD with acute exacerbation (Primary)  Assessment & Plan:  She is having an acute exacerbation of her lung disease.  She has a cough and wheezing.  Will try and hold off on any antibiotics but give her a burst of steroids and she needs to use her nebulizer on a more routine basis.        2. Left upper quadrant abdominal pain  Assessment & Plan:  Her exam today is rather benign.  I do not appreciate any abdominal discomfort.  Will check a CBC lipase amylase to make sure these are fine and add a PPI.    Orders:  -     CBC (No Diff)  -     Lipase; Future  -     Amylase; Future    Other orders  -     predniSONE (DELTASONE) 10 MG tablet; 4 tabs daily for 3 days,  then 3 tabs daily for 3 days, then 2 tabs daily for 3 days, then 1 tab daily for 3 days, then STOP.  Dispense: 30 tablet; Refill: 0  -     omeprazole (priLOSEC) 40 MG capsule; Take 1 capsule by mouth Daily.  Dispense: 30 capsule; Refill: 2               Orders Placed Today:     New Medications Ordered This Visit   Medications    predniSONE (DELTASONE) 10 MG tablet     Si tabs daily for 3 days, then 3 tabs daily for 3 days, then 2 tabs daily for 3 days, then 1 tab daily for 3 days, then STOP.     Dispense:  30 tablet     Refill:  0    omeprazole (priLOSEC) 40 MG capsule     Sig: Take 1 capsule by mouth Daily.     Dispense:  30 capsule     Refill:  2        Management Plan:     An After Visit Summary was printed and given to the patient at discharge.    Follow-up: Return if symptoms worsen or fail to improve, for Next scheduled follow up.    Pee Mon DO 2024 16:10 EDT  This note was electronically signed.

## 2024-07-11 NOTE — ASSESSMENT & PLAN NOTE
Her exam today is rather benign.  I do not appreciate any abdominal discomfort.  Will check a CBC lipase amylase to make sure these are fine and add a PPI.

## 2024-07-11 NOTE — ASSESSMENT & PLAN NOTE
She is having an acute exacerbation of her lung disease.  She has a cough and wheezing.  Will try and hold off on any antibiotics but give her a burst of steroids and she needs to use her nebulizer on a more routine basis.

## 2024-08-07 ENCOUNTER — OFFICE VISIT (OUTPATIENT)
Dept: FAMILY MEDICINE CLINIC | Facility: CLINIC | Age: 89
End: 2024-08-07
Payer: MEDICARE

## 2024-08-07 VITALS
WEIGHT: 159 LBS | DIASTOLIC BLOOD PRESSURE: 69 MMHG | SYSTOLIC BLOOD PRESSURE: 143 MMHG | BODY MASS INDEX: 28.17 KG/M2 | HEIGHT: 63 IN | OXYGEN SATURATION: 93 % | TEMPERATURE: 98.6 F | HEART RATE: 69 BPM

## 2024-08-07 DIAGNOSIS — J30.1 SEASONAL ALLERGIC RHINITIS DUE TO POLLEN: ICD-10-CM

## 2024-08-07 DIAGNOSIS — J01.00 ACUTE NON-RECURRENT MAXILLARY SINUSITIS: Primary | ICD-10-CM

## 2024-08-07 PROCEDURE — 1160F RVW MEDS BY RX/DR IN RCRD: CPT | Performed by: NURSE PRACTITIONER

## 2024-08-07 PROCEDURE — 1159F MED LIST DOCD IN RCRD: CPT | Performed by: NURSE PRACTITIONER

## 2024-08-07 PROCEDURE — 99213 OFFICE O/P EST LOW 20 MIN: CPT | Performed by: NURSE PRACTITIONER

## 2024-08-07 PROCEDURE — 1125F AMNT PAIN NOTED PAIN PRSNT: CPT | Performed by: NURSE PRACTITIONER

## 2024-08-07 RX ORDER — LEVOCETIRIZINE DIHYDROCHLORIDE 5 MG/1
5 TABLET, FILM COATED ORAL EVERY EVENING
Qty: 90 TABLET | Refills: 3 | Status: SHIPPED | OUTPATIENT
Start: 2024-08-07

## 2024-08-07 RX ORDER — AMOXICILLIN AND CLAVULANATE POTASSIUM 875; 125 MG/1; MG/1
1 TABLET, FILM COATED ORAL 2 TIMES DAILY
Qty: 20 TABLET | Refills: 0 | Status: SHIPPED | OUTPATIENT
Start: 2024-08-07 | End: 2024-08-17

## 2024-08-07 NOTE — PROGRESS NOTES
Chief Complaint  URI    Subjective          Shayy Wasserman is a 88 y.o. female who presents to [facility]    History of Present Illness    Complains of body aches, nasal congestion,  and cough. No sore throat or fever.    PHQ-2 Total Score:     PHQ-9 Total Score:          Review of Systems   Constitutional:  Positive for fatigue.   HENT:  Positive for postnasal drip and rhinorrhea.    Musculoskeletal:  Positive for arthralgias.          Medical History: has a past medical history of Anxiety disorder (11/12/2018), Arthritis, COPD (chronic obstructive pulmonary disease), Dependent edema (11/28/2017), Gout, Osteoporosis, Primary osteoarthritis of left knee (05/24/2018), Primary osteoarthritis of one hip, right (12/12/2017), and Primary osteoarthritis of right hip (12/12/2017).     Surgical History: has a past surgical history that includes Other surgical history; Eye surgery; Joint replacement; Knee surgery (Right); Other surgical history; Bladder repair; Colonoscopy (2016); Intraocular lens insertion; Hysterectomy; Other surgical history; Knee surgery (Right); and Total knee arthroplasty (Right).     Family History: family history includes Colon cancer in her father.     Social History: reports that she quit smoking about 54 years ago. Her smoking use included cigarettes. She started smoking about 73 years ago. She has never used smokeless tobacco. She reports that she does not drink alcohol and does not use drugs.    Allergies: Bactrim [sulfamethoxazole-trimethoprim] and Griseofulvin ultramicrosize [griseofulvin]      Health Maintenance Due   Topic Date Due    DIABETIC EYE EXAM  Never done    ZOSTER VACCINE (1 of 2) Never done    ANNUAL WELLNESS VISIT  05/20/2021    COVID-19 Vaccine (1 - 2023-24 season) Never done    DXA SCAN  04/20/2024    INFLUENZA VACCINE  08/01/2024    HEMOGLOBIN A1C  10/26/2024            Current Outpatient Medications:     albuterol (PROVENTIL) (2.5 MG/3ML)  0.083% nebulizer solution, Take 2.5 mg by nebulization Every 4 (Four) Hours As Needed for Wheezing., Disp: 270 mL, Rfl: 1    alendronate (FOSAMAX) 70 MG tablet, Take 1 tablet by mouth Every 7 (Seven) Days., Disp: 4 tablet, Rfl: 12    allopurinol (ZYLOPRIM) 300 MG tablet, Take 1 tablet by mouth Daily., Disp: 90 tablet, Rfl: 3    amLODIPine (NORVASC) 5 MG tablet, TAKE 1 TABLET BY MOUTH DAILY, Disp: 90 tablet, Rfl: 1    atenolol (TENORMIN) 50 MG tablet, Take 1 tablet by mouth 2 (Two) Times a Day., Disp: 180 tablet, Rfl: 3    Blood Glucose Monitoring Suppl (ONE TOUCH ULTRA 2) w/Device kit, USE AS DIRECTED TO TEST BLOOD SUGAR EVERY MORNING FASTING AND RECORD, Disp: , Rfl:     Breztri Aerosphere 160-9-4.8 MCG/ACT aerosol inhaler, Inhale 2 puffs 2 (Two) Times a Day., Disp: 10.7 g, Rfl: 5    glucose blood (Glucose Meter Test) test strip, check blood sugar every AM fasting and record. (Dx: E11.9), Disp: 100 each, Rfl: 3    glucose monitor monitoring kit, check blood sugar every AM fasting and record. (Dx: E11.9), Disp: 1 each, Rfl: 0    omeprazole (priLOSEC) 40 MG capsule, Take 1 capsule by mouth Daily., Disp: 30 capsule, Rfl: 2    predniSONE (DELTASONE) 10 MG tablet, 4 tabs daily for 3 days, then 3 tabs daily for 3 days, then 2 tabs daily for 3 days, then 1 tab daily for 3 days, then STOP., Disp: 30 tablet, Rfl: 0    Respiratory Therapy Supplies (Nebulizer Cup/Tubing) device, Use 1 Device Every 6 (Six) Hours As Needed (Shortness of breath)., Disp: 1 each, Rfl: 2    amoxicillin-clavulanate (AUGMENTIN) 875-125 MG per tablet, Take 1 tablet by mouth 2 (Two) Times a Day for 10 days., Disp: 20 tablet, Rfl: 0    levocetirizine (XYZAL) 5 MG tablet, Take 1 tablet by mouth Every Evening., Disp: 90 tablet, Rfl: 3      Immunization History   Administered Date(s) Administered    Influenza Quad Vaccine (Inpatient) 12/12/2012, 11/19/2013, 10/22/2014, 09/14/2015    Influenza, Unspecified 10/06/2020, 10/06/2020, 10/06/2020    Pneumococcal  "Conjugate 13-Valent (PCV13) 11/28/2017, 11/28/2017, 11/28/2017         Objective       Vitals:    08/07/24 1223   BP: 143/69   BP Location: Left arm   Patient Position: Sitting   Cuff Size: Adult   Pulse: 69   Temp: 98.6 °F (37 °C)   TempSrc: Temporal   SpO2: 93%   Weight: 72.1 kg (159 lb)   Height: 160 cm (62.99\")      Body mass index is 28.17 kg/m².   Wt Readings from Last 3 Encounters:   08/07/24 72.1 kg (159 lb)   07/11/24 71.7 kg (158 lb)   06/24/24 72.1 kg (159 lb)      BP Readings from Last 3 Encounters:   08/07/24 143/69   07/11/24 100/65   06/24/24 117/64                Physical Exam  Vitals reviewed.   Constitutional:       Appearance: Normal appearance.   HENT:      Right Ear: Tympanic membrane normal.      Left Ear: Tympanic membrane is erythematous.      Nose: Nasal tenderness, mucosal edema, congestion and rhinorrhea present.      Right Turbinates: Enlarged.      Left Turbinates: Not enlarged.      Right Sinus: Maxillary sinus tenderness present.      Left Sinus: Maxillary sinus tenderness present.      Mouth/Throat:      Pharynx: Oropharyngeal exudate and posterior oropharyngeal erythema present.   Cardiovascular:      Rate and Rhythm: Normal rate and regular rhythm.      Pulses: Normal pulses.      Heart sounds: Normal heart sounds.   Pulmonary:      Effort: Pulmonary effort is normal.      Breath sounds: Normal breath sounds.   Skin:     General: Skin is warm and dry.   Neurological:      Mental Status: She is alert and oriented to person, place, and time.   Psychiatric:         Mood and Affect: Mood normal.         Behavior: Behavior normal.         Thought Content: Thought content normal.         Judgment: Judgment normal.             Result Review :       Common labs          5/13/2024    05:42 5/14/2024    05:43 5/15/2024    05:49   Common Labs   Glucose 265  221  167    BUN 18  15  11    Creatinine 0.95  0.72  0.69    Sodium 136  139  139    Potassium 3.4  3.5  3.1    Chloride 101  102  101  "   Calcium 8.0  7.9  7.9    Albumin 3.6  3.4  3.5                       Assessment and Plan        Diagnoses and all orders for this visit:    1. Acute non-recurrent maxillary sinusitis (Primary)  -     amoxicillin-clavulanate (AUGMENTIN) 875-125 MG per tablet; Take 1 tablet by mouth 2 (Two) Times a Day for 10 days.  Dispense: 20 tablet; Refill: 0    2. Seasonal allergic rhinitis due to pollen  -     levocetirizine (XYZAL) 5 MG tablet; Take 1 tablet by mouth Every Evening.  Dispense: 90 tablet; Refill: 3          Follow Up     Return if symptoms worsen or fail to improve, for Follow up with Dr Mon.    Patient was given instructions and counseling regarding her condition or for health maintenance advice. Please see specific information pulled into the AVS if appropriate. Patient understands the importance of having any ordered tests to be performed in a timely fashion.      I spent 8 minutes caring for Shayy on this date of service. This time includes time spent by me in the following activities: preparing for the visit, reviewing tests, performing a medically appropriate examination and/or evaluation, counseling and educating the patient/family/caregiver, referring and communicating with other health care professionals, and ordering medications      ANDREI Lind

## 2024-08-18 ENCOUNTER — HOSPITAL ENCOUNTER (EMERGENCY)
Facility: HOSPITAL | Age: 89
Discharge: HOME | End: 2024-08-18
Attending: EMERGENCY MEDICINE | Admitting: EMERGENCY MEDICINE
Payer: MEDICARE

## 2024-08-18 VITALS
WEIGHT: 158.73 LBS | BODY MASS INDEX: 28.12 KG/M2 | SYSTOLIC BLOOD PRESSURE: 144 MMHG | TEMPERATURE: 98.1 F | HEIGHT: 63 IN | DIASTOLIC BLOOD PRESSURE: 68 MMHG | HEART RATE: 75 BPM | OXYGEN SATURATION: 94 % | RESPIRATION RATE: 20 BRPM

## 2024-08-18 DIAGNOSIS — T78.40XA ALLERGIC REACTION, INITIAL ENCOUNTER: Primary | ICD-10-CM

## 2024-08-18 PROCEDURE — 96372 THER/PROPH/DIAG INJ SC/IM: CPT

## 2024-08-18 PROCEDURE — 99283 EMERGENCY DEPT VISIT LOW MDM: CPT

## 2024-08-18 PROCEDURE — 25010000002 DEXAMETHASONE SODIUM PHOSPHATE 10 MG/ML SOLUTION

## 2024-08-18 PROCEDURE — 63710000001 DIPHENHYDRAMINE PER 50 MG

## 2024-08-18 RX ORDER — DEXAMETHASONE SODIUM PHOSPHATE 10 MG/ML
8 INJECTION, SOLUTION INTRAMUSCULAR; INTRAVENOUS ONCE
Status: COMPLETED | OUTPATIENT
Start: 2024-08-18 | End: 2024-08-18

## 2024-08-18 RX ORDER — METHYLPREDNISOLONE 4 MG
TABLET, DOSE PACK ORAL
Qty: 21 TABLET | Refills: 0 | Status: SHIPPED | OUTPATIENT
Start: 2024-08-18

## 2024-08-18 RX ORDER — DIPHENHYDRAMINE HCL 25 MG
50 CAPSULE ORAL ONCE
Status: COMPLETED | OUTPATIENT
Start: 2024-08-18 | End: 2024-08-18

## 2024-08-18 RX ADMIN — DIPHENHYDRAMINE HYDROCHLORIDE 50 MG: 25 CAPSULE ORAL at 16:01

## 2024-08-18 RX ADMIN — DEXAMETHASONE SODIUM PHOSPHATE 8 MG: 10 INJECTION INTRAMUSCULAR; INTRAVENOUS at 16:01

## 2024-08-18 NOTE — ED PROVIDER NOTES
Time: 4:03 PM EDT  Date of encounter:  8/18/2024  Independent Historian/Clinical History and Information was obtained by:   Patient    History is limited by: N/A    Chief Complaint: Rash      History of Present Illness:  Patient is a 88 y.o. year old female who presents to the emergency department for evaluation of pruritic rash that began on trunk and right forearm 3 days ago after beginning to take levocetirizine.  Patient denies chest pain shortness of breath.  Patient denies dysphagia.      Patient Care Team  Primary Care Provider: Pee Mon DO    Past Medical History:     Allergies   Allergen Reactions    Bactrim [Sulfamethoxazole-Trimethoprim] Unknown - Low Severity    Griseofulvin Ultramicrosize [Griseofulvin] Unknown - Low Severity    Levocetirizine Rash     hives     Past Medical History:   Diagnosis Date    Anxiety disorder 11/12/2018    Arthritis     COPD (chronic obstructive pulmonary disease)     Dependent edema 11/28/2017    Gout     Osteoporosis     Primary osteoarthritis of left knee 05/24/2018    Primary osteoarthritis of one hip, right 12/12/2017    Primary osteoarthritis of right hip 12/12/2017     Past Surgical History:   Procedure Laterality Date    BLADDER REPAIR      COLONOSCOPY  2016    EYE SURGERY      implant- yes    HYSTERECTOMY      INTRAOCULAR LENS INSERTION      yes    JOINT REPLACEMENT      Surgery    KNEE SURGERY Right     knee repair    KNEE SURGERY Right     repair    OTHER SURGICAL HISTORY      Artificial joints/limbs    OTHER SURGICAL HISTORY      Artificial Joints/Limbs    OTHER SURGICAL HISTORY      Joint surgery    TOTAL KNEE ARTHROPLASTY Right      Family History   Problem Relation Age of Onset    Colon cancer Father        Home Medications:  Prior to Admission medications    Medication Sig Start Date End Date Taking? Authorizing Provider   albuterol (PROVENTIL) (2.5 MG/3ML) 0.083% nebulizer solution Take 2.5 mg by nebulization Every 4 (Four) Hours As Needed for  Wheezing. 10/5/23   Pee Mon DO   alendronate (FOSAMAX) 70 MG tablet Take 1 tablet by mouth Every 7 (Seven) Days. 10/5/23   Pee Mon DO   allopurinol (ZYLOPRIM) 300 MG tablet Take 1 tablet by mouth Daily. 10/5/23   Pee Mon DO   amLODIPine (NORVASC) 5 MG tablet TAKE 1 TABLET BY MOUTH DAILY 7/8/24   Pee Mon DO   amoxicillin-clavulanate (AUGMENTIN) 875-125 MG per tablet Take 1 tablet by mouth 2 (Two) Times a Day for 10 days. 8/7/24 8/17/24  Teresa Maloney APRN   atenolol (TENORMIN) 50 MG tablet Take 1 tablet by mouth 2 (Two) Times a Day. 10/5/23   Pee Mon DO   Blood Glucose Monitoring Suppl (ONE TOUCH ULTRA 2) w/Device kit USE AS DIRECTED TO TEST BLOOD SUGAR EVERY MORNING FASTING AND RECORD 8/10/23   Provider, MD Jeffery Contreras Aerosphere 160-9-4.8 MCG/ACT aerosol inhaler Inhale 2 puffs 2 (Two) Times a Day. 10/5/23   Pee Mon DO   glucose blood (Glucose Meter Test) test strip check blood sugar every AM fasting and record. (Dx: E11.9) 8/10/23   Pee Mon DO   glucose monitor monitoring kit check blood sugar every AM fasting and record. (Dx: E11.9) 8/10/23   Pee Mon DO   omeprazole (priLOSEC) 40 MG capsule Take 1 capsule by mouth Daily. 7/11/24   Pee Mon DO   predniSONE (DELTASONE) 10 MG tablet 4 tabs daily for 3 days, then 3 tabs daily for 3 days, then 2 tabs daily for 3 days, then 1 tab daily for 3 days, then STOP. 7/11/24   Pee Mon DO   Respiratory Therapy Supplies (Nebulizer Cup/Tubing) device Use 1 Device Every 6 (Six) Hours As Needed (Shortness of breath). 6/24/24   Pee Mon DO   levocetirizine (XYZAL) 5 MG tablet Take 1 tablet by mouth Every Evening. 8/7/24 8/18/24  Teresa Maloney APRN        Social History:   Social History     Tobacco Use    Smoking status: Former     Current packs/day: 0.00     Types: Cigarettes      "Start date:      Quit date: 1970     Years since quittin.6    Smokeless tobacco: Never    Tobacco comments:     started at age 16- stopped at age 35   Vaping Use    Vaping status: Never Used   Substance Use Topics    Alcohol use: Never     Comment: 2019- 1/15/2019 does not drink     Drug use: Never         Review of Systems:  Review of Systems   Constitutional:  Negative for chills and fever.   HENT:  Negative for congestion, rhinorrhea and sore throat.    Eyes:  Negative for pain and visual disturbance.   Respiratory:  Negative for apnea, cough, chest tightness and shortness of breath.    Cardiovascular:  Negative for chest pain and palpitations.   Gastrointestinal:  Negative for abdominal pain, diarrhea, nausea and vomiting.   Genitourinary:  Negative for difficulty urinating and dysuria.   Musculoskeletal:  Negative for joint swelling and myalgias.   Skin:  Positive for rash. Negative for color change.   Neurological:  Negative for seizures and headaches.   Psychiatric/Behavioral: Negative.     All other systems reviewed and are negative.       Physical Exam:  /68 (BP Location: Right arm, Patient Position: Sitting)   Pulse 75   Temp 98.1 °F (36.7 °C) (Oral)   Resp 20   Ht 160 cm (63\")   Wt 72 kg (158 lb 11.7 oz)   SpO2 94%   BMI 28.12 kg/m²     Physical Exam  Vitals and nursing note reviewed.   Constitutional:       General: She is not in acute distress.     Appearance: Normal appearance. She is not toxic-appearing.   HENT:      Head: Normocephalic and atraumatic.      Jaw: There is normal jaw occlusion.   Eyes:      General: Lids are normal.      Extraocular Movements: Extraocular movements intact.      Conjunctiva/sclera: Conjunctivae normal.      Pupils: Pupils are equal, round, and reactive to light.   Cardiovascular:      Rate and Rhythm: Normal rate and regular rhythm.      Pulses: Normal pulses.      Heart sounds: Normal heart sounds.   Pulmonary:      Effort: Pulmonary effort is " normal. No respiratory distress.      Breath sounds: Normal breath sounds. No wheezing or rhonchi.   Abdominal:      General: Abdomen is flat.      Palpations: Abdomen is soft.      Tenderness: There is no abdominal tenderness. There is no guarding or rebound.   Musculoskeletal:         General: Normal range of motion.      Cervical back: Normal range of motion and neck supple.      Right lower leg: No edema.      Left lower leg: No edema.   Skin:     General: Skin is warm and dry.      Findings: Rash (Hive-like in nature consistent with allergic reaction.  Nikolsky sign negative.  Rash present to anterior trunk and right forearm.  No mucosal lesions.) present.   Neurological:      Mental Status: She is alert and oriented to person, place, and time. Mental status is at baseline.   Psychiatric:         Mood and Affect: Mood normal.                  Procedures:  Procedures      Medical Decision Making:      Comorbidities that affect care:    COPD    External Notes reviewed:          The following orders were placed and all results were independently analyzed by me:  No orders of the defined types were placed in this encounter.      Medications Given in the Emergency Department:  Medications   dexAMETHasone sodium phosphate injection 8 mg (8 mg Intramuscular Given 8/18/24 1601)   diphenhydrAMINE (BENADRYL) capsule 50 mg (50 mg Oral Given 8/18/24 1601)        ED Course:         Labs:    Lab Results (last 24 hours)       ** No results found for the last 24 hours. **             Imaging:    No Radiology Exams Resulted Within Past 24 Hours      Differential Diagnosis and Discussion:    Rash: Differential diagnosis includes but is not limited to sepsis, cellulitis, [place] Spotted Fever, meningitis, meningococcemia, Varicella, Strep infection, dermatitis, allergic reaction, Lyme disease, and toxic shock syndrome.        MDM     Give patient a steroid shot and Benadryl in the ED.  Will send patient home with steroids.   Instructed patient to discontinue taking the medication and call ordering provider to discuss medication alternative.  Instructed patient to return to ED she develops any new or worsening symptoms.  Lungs are clear to auscultation bilaterally at this time.  Patient states she understands and agrees with plan of care.          Patient Care Considerations:          Consultants/Shared Management Plan:    None    Social Determinants of Health:    Patient is independent, reliable, and has access to care.       Disposition and Care Coordination:    Discharged: The patient is suitable and stable for discharge with no need for consideration of admission.    I have explained the patient´s condition, diagnoses and treatment plan based on the information available to me at this time. I have answered questions and addressed any concerns. The patient has a good  understanding of the patient´s diagnosis, condition, and treatment plan as can be expected at this point. The vital signs have been stable. The patient´s condition is stable and appropriate for discharge from the emergency department.      The patient will pursue further outpatient evaluation with the primary care physician or other designated or consulting physician as outlined in the discharge instructions. They are agreeable to this plan of care and follow-up instructions have been explained in detail. The patient has received these instructions in written format and has expressed an understanding of the discharge instructions. The patient is aware that any significant change in condition or worsening of symptoms should prompt an immediate return to this or the closest emergency department or call to 911.  I have explained discharge medications and the need for follow up with the patient/caretakers. This was also printed in the discharge instructions. Patient was discharged with the following medications and follow up:      Medication List        New Prescriptions       methylPREDNISolone 4 MG dose pack  Commonly known as: MEDROL  Take as directed on package instructions.            Stop      amoxicillin-clavulanate 875-125 MG per tablet  Commonly known as: AUGMENTIN               Where to Get Your Medications        These medications were sent to [id number] - [place], [place] - [address] AT [facility] - [phone]  - [phone] FX  [address] [place] [address]      Phone: [phone]   methylPREDNISolone 4 MG dose pack      Pee Mon, DO  [address] [facility] [address] [facility] [id number] [phone]    Call in 1 day  To schedule follow-up       Final diagnoses:   Allergic reaction, initial encounter        ED Disposition       ED Disposition   Discharge    Condition   Stable    Comment   --               This medical record created using voice recognition software.             Mayank Ojeda PA-C  08/18/24 [id number]

## 2024-09-09 RX ORDER — OMEPRAZOLE 40 MG/1
40 CAPSULE, DELAYED RELEASE ORAL DAILY
Qty: 30 CAPSULE | Refills: 2 | Status: SHIPPED | OUTPATIENT
Start: 2024-09-09

## 2024-09-11 ENCOUNTER — OFFICE VISIT (OUTPATIENT)
Dept: FAMILY MEDICINE CLINIC | Facility: CLINIC | Age: 89
End: 2024-09-11
Payer: MEDICARE

## 2024-09-11 VITALS
HEART RATE: 77 BPM | OXYGEN SATURATION: 96 % | BODY MASS INDEX: 28 KG/M2 | SYSTOLIC BLOOD PRESSURE: 145 MMHG | HEIGHT: 63 IN | TEMPERATURE: 98.5 F | DIASTOLIC BLOOD PRESSURE: 67 MMHG | WEIGHT: 158 LBS

## 2024-09-11 DIAGNOSIS — J44.1 COPD WITH EXACERBATION: ICD-10-CM

## 2024-09-11 DIAGNOSIS — R05.9 COUGH, UNSPECIFIED TYPE: Primary | ICD-10-CM

## 2024-09-11 PROCEDURE — 99214 OFFICE O/P EST MOD 30 MIN: CPT | Performed by: STUDENT IN AN ORGANIZED HEALTH CARE EDUCATION/TRAINING PROGRAM

## 2024-09-11 PROCEDURE — 1125F AMNT PAIN NOTED PAIN PRSNT: CPT | Performed by: STUDENT IN AN ORGANIZED HEALTH CARE EDUCATION/TRAINING PROGRAM

## 2024-09-11 RX ORDER — AZITHROMYCIN 250 MG/1
TABLET, FILM COATED ORAL
Qty: 6 TABLET | Refills: 0 | Status: SHIPPED | OUTPATIENT
Start: 2024-09-11

## 2024-09-11 RX ORDER — PREDNISONE 10 MG/1
TABLET ORAL
Qty: 30 TABLET | Refills: 0 | Status: SHIPPED | OUTPATIENT
Start: 2024-09-11

## 2024-09-11 NOTE — PROGRESS NOTES
"Chief Complaint  Cough and Sore Throat    Subjective      Shayy Wasserman is a 89 y.o. female who presents to [facility]     Same day visit -     COPD exacerbation. Start on prednisone and antibiotics. Follow with pcp for annual wellness and labs. Go to the ED if symptoms worsen.     Objective   Vital Signs:   Vitals:    09/11/24 1105   BP: 145/67   Pulse: 77   Temp: 98.5 °F (36.9 °C)   TempSrc: Temporal   SpO2: 96%   Weight: 71.7 kg (158 lb)   Height: 160 cm (62.99\")     Body mass index is 28 kg/m².    Wt Readings from Last 3 Encounters:   09/11/24 71.7 kg (158 lb)   08/18/24 72 kg (158 lb 11.7 oz)   08/07/24 72.1 kg (159 lb)     BP Readings from Last 3 Encounters:   09/11/24 145/67   08/18/24 144/68   08/07/24 143/69       Health Maintenance   Topic Date Due    DIABETIC EYE EXAM  Never done    ZOSTER VACCINE (1 of 2) Never done    ANNUAL WELLNESS VISIT  05/20/2021    DXA SCAN  04/20/2024    INFLUENZA VACCINE  08/01/2024    HEMOGLOBIN A1C  10/26/2024    COVID-19 Vaccine (1 - 2023-24 season) 12/01/2024 (Originally 9/1/2024)    RSV Vaccine - Adults (1 - 1-dose 60+ series) 03/15/2025 (Originally 9/4/1995)    Pneumococcal Vaccine 65+ (2 of 2 - PPSV23 or PCV20) 03/15/2025 (Originally 1/23/2018)    TDAP/TD VACCINES (1 - Tdap) 03/15/2025 (Originally 9/4/1954)    BMI FOLLOWUP  10/05/2024    LIPID PANEL  04/26/2025    URINE MICROALBUMIN  04/26/2025       Physical Exam  Vitals reviewed.   Constitutional:       Comments: Wheezing generalized.    HENT:      Head: Normocephalic.      Mouth/Throat:      Mouth: Mucous membranes are moist.   Eyes:      Pupils: Pupils are equal, round, and reactive to light.   Cardiovascular:      Rate and Rhythm: Normal rate.   Abdominal:      General: Abdomen is flat.   Musculoskeletal:         General: Normal range of motion.      Cervical back: Normal range of motion.   Skin:     General: Skin is warm.      Capillary Refill: Capillary refill takes less than " 2 seconds.   Neurological:      Mental Status: She is alert.          Assessment & Plan  Cough, unspecified type    COPD with exacerbation  COPD is  worsening  .    Plan:  Continue same medication/s without change.   and prednisone + steroids.    Discussed medication dosage, use, side effects, and goals of treatment in detail.    Warning signs of respiratory distress were reviewed with the patient. .    Patient Treatment Goals:   symptom prevention, minimizing limitation in activity, prevention of exacerbations and use of ER/inpatient care, maintenance of optimal pulmonary function, and minimization of adverse effects of treatment    Followup  with PCP .       New Medications Ordered This Visit   Medications    azithromycin (Zithromax Z-Erick) 250 MG tablet     Sig: Take 2 tablets by mouth on day 1, then 1 tablet daily on days 2-5     Dispense:  6 tablet     Refill:  0    amoxicillin-clavulanate (AUGMENTIN) 875-125 MG per tablet     Sig: Take 1 tablet by mouth 2 (Two) Times a Day for 7 days.     Dispense:  14 tablet     Refill:  0    predniSONE (DELTASONE) 10 MG tablet     Si tabs daily for 3 days, then 3 tabs daily for 3 days, then 2 tabs daily for 3 days, then 1 tab daily for 3 days, then STOP.     Dispense:  30 tablet     Refill:  0                  I spent 30 minutes caring for Shayy on this date of service. This time includes time spent by me in the following activities:preparing for the visit, reviewing tests, obtaining and/or reviewing a separately obtained history, performing a medically appropriate examination and/or evaluation , counseling and educating the patient/family/caregiver, ordering medications, tests, or procedures, referring and communicating with other health care professionals , documenting information in the medical record, independently interpreting results and communicating that information with the patient/family/caregiver, and care coordination  FOLLOW UP  No follow-ups on file.  Patient  was given instructions and counseling regarding her condition or for health maintenance advice. Please see specific information pulled into the AVS if appropriate.       Primo Fontaine MD  09/11/24  11:42 EDT    CURRENT & DISCONTINUED MEDICATIONS  Current Outpatient Medications   Medication Instructions    albuterol (PROVENTIL) 2.5 mg, Nebulization, Every 4 Hours PRN    alendronate (FOSAMAX) 70 mg, Oral, Every 7 Days    allopurinol (ZYLOPRIM) 300 mg, Oral, Daily    amLODIPine (NORVASC) 5 mg, Oral, Daily    amoxicillin-clavulanate (AUGMENTIN) 875-125 MG per tablet 1 tablet, Oral, 2 Times Daily    atenolol (TENORMIN) 50 mg, Oral, 2 Times Daily    azithromycin (Zithromax Z-Erick) 250 MG tablet Take 2 tablets by mouth on day 1, then 1 tablet daily on days 2-5    Blood Glucose Monitoring Suppl (ONE TOUCH ULTRA 2) w/Device kit USE AS DIRECTED TO TEST BLOOD SUGAR EVERY MORNING FASTING AND RECORD    Breztri Aerosphere 160-9-4.8 MCG/ACT aerosol inhaler 2 puffs, Inhalation, 2 Times Daily    glucose blood (Glucose Meter Test) test strip check blood sugar every AM fasting and record. (Dx: E11.9)    glucose monitor monitoring kit check blood sugar every AM fasting and record. (Dx: E11.9)    omeprazole (PRILOSEC) 40 mg, Oral, Daily    predniSONE (DELTASONE) 10 MG tablet 4 tabs daily for 3 days, then 3 tabs daily for 3 days, then 2 tabs daily for 3 days, then 1 tab daily for 3 days, then STOP.    Respiratory Therapy Supplies (Nebulizer Cup/Tubing) device 1 Device, Does not apply, Every 6 Hours PRN       Medications Discontinued During This Encounter   Medication Reason    methylPREDNISolone (MEDROL) 4 MG dose pack *Therapy completed    predniSONE (DELTASONE) 10 MG tablet Reorder

## 2024-10-07 DIAGNOSIS — M81.0 AGE-RELATED OSTEOPOROSIS WITHOUT CURRENT PATHOLOGICAL FRACTURE: ICD-10-CM

## 2024-10-07 DIAGNOSIS — I10 ESSENTIAL (PRIMARY) HYPERTENSION: ICD-10-CM

## 2024-10-07 DIAGNOSIS — M1A.9XX0 CHRONIC GOUT INVOLVING TOE WITHOUT TOPHUS, UNSPECIFIED CAUSE, UNSPECIFIED LATERALITY: ICD-10-CM

## 2024-10-07 DIAGNOSIS — I10 HYPERTENSION, UNSPECIFIED TYPE: ICD-10-CM

## 2024-10-07 RX ORDER — ALENDRONATE SODIUM 70 MG/1
70 TABLET ORAL
Qty: 4 TABLET | Refills: 12 | Status: SHIPPED | OUTPATIENT
Start: 2024-10-07

## 2024-10-07 RX ORDER — AMLODIPINE BESYLATE 5 MG/1
5 TABLET ORAL DAILY
Qty: 90 TABLET | Refills: 1 | Status: SHIPPED | OUTPATIENT
Start: 2024-10-07

## 2024-10-07 RX ORDER — ALLOPURINOL 300 MG/1
300 TABLET ORAL DAILY
Qty: 90 TABLET | Refills: 3 | Status: SHIPPED | OUTPATIENT
Start: 2024-10-07

## 2024-10-07 RX ORDER — ATENOLOL 50 MG/1
50 TABLET ORAL 2 TIMES DAILY
Qty: 180 TABLET | Refills: 3 | Status: SHIPPED | OUTPATIENT
Start: 2024-10-07

## 2024-10-15 ENCOUNTER — OFFICE VISIT (OUTPATIENT)
Dept: FAMILY MEDICINE CLINIC | Facility: CLINIC | Age: 89
End: 2024-10-15
Payer: MEDICARE

## 2024-10-15 VITALS
SYSTOLIC BLOOD PRESSURE: 128 MMHG | HEIGHT: 63 IN | WEIGHT: 156 LBS | OXYGEN SATURATION: 92 % | BODY MASS INDEX: 27.64 KG/M2 | TEMPERATURE: 97.6 F | HEART RATE: 77 BPM | DIASTOLIC BLOOD PRESSURE: 82 MMHG

## 2024-10-15 DIAGNOSIS — I10 ESSENTIAL (PRIMARY) HYPERTENSION: ICD-10-CM

## 2024-10-15 DIAGNOSIS — J44.9 CHRONIC OBSTRUCTIVE PULMONARY DISEASE, UNSPECIFIED COPD TYPE: ICD-10-CM

## 2024-10-15 DIAGNOSIS — E78.00 PURE HYPERCHOLESTEROLEMIA: ICD-10-CM

## 2024-10-15 DIAGNOSIS — N18.31 STAGE 3A CHRONIC KIDNEY DISEASE: ICD-10-CM

## 2024-10-15 DIAGNOSIS — M81.0 AGE-RELATED OSTEOPOROSIS WITHOUT CURRENT PATHOLOGICAL FRACTURE: ICD-10-CM

## 2024-10-15 DIAGNOSIS — Z00.00 MEDICARE ANNUAL WELLNESS VISIT, SUBSEQUENT: ICD-10-CM

## 2024-10-15 DIAGNOSIS — R73.03 PREDIABETES: Primary | ICD-10-CM

## 2024-10-15 PROCEDURE — G0439 PPPS, SUBSEQ VISIT: HCPCS | Performed by: FAMILY MEDICINE

## 2024-10-15 PROCEDURE — 99214 OFFICE O/P EST MOD 30 MIN: CPT | Performed by: FAMILY MEDICINE

## 2024-10-15 PROCEDURE — 1170F FXNL STATUS ASSESSED: CPT | Performed by: FAMILY MEDICINE

## 2024-10-15 PROCEDURE — 1125F AMNT PAIN NOTED PAIN PRSNT: CPT | Performed by: FAMILY MEDICINE

## 2024-10-15 NOTE — ASSESSMENT & PLAN NOTE
She did not like or tolerate the Prolia injection.  She is doing the alendronate on a weekly basis.  And to the best of her knowledge her other daughter gives her a calcium vitamin D supplement daily.  Though make sure that when they get home that she is taking that.

## 2024-10-15 NOTE — ASSESSMENT & PLAN NOTE
Her blood pressure is good here today.  I gave them a log sheet so they can record it for our next visit.

## 2024-10-15 NOTE — ASSESSMENT & PLAN NOTE
Will update her lipid profile with her labs.  Depending on her results we will discuss the need for medication.

## 2024-10-15 NOTE — ASSESSMENT & PLAN NOTE
Overall she is doing well.  She does not have any specific new or different concerns or complaints today.  She is up-to-date on all of her routine immunizations.

## 2024-10-15 NOTE — PROGRESS NOTES
Subjective   The ABCs of the Annual Wellness Visit  Medicare Wellness Visit      Shayy Wasserman is a 89 y.o. patient who presents for a Medicare Wellness Visit.    The following portions of the patient's history were reviewed and   updated as appropriate: allergies, current medications, past family history, past medical history, past social history, past surgical history, and problem list.    Compared to one year ago, the patient's physical   health is the same.  Compared to one year ago, the patient's mental   health is the same.    Recent Hospitalizations:  This patient has had a [facility] admission record on file within the last 365 days.  Current Medical Providers:  Patient Care Team:  Pee Mon,  as PCP - General (Family Medicine)    Outpatient Medications Prior to Visit   Medication Sig Dispense Refill    albuterol (PROVENTIL) (2.5 MG/3ML) 0.083% nebulizer solution Take 2.5 mg by nebulization Every 4 (Four) Hours As Needed for Wheezing. 270 mL 1    alendronate (FOSAMAX) 70 MG tablet TAKE 1 TABLET BY MOUTH EVERY 7 DAYS 4 tablet 12    allopurinol (ZYLOPRIM) 300 MG tablet TAKE 1 TABLET BY MOUTH DAILY 90 tablet 3    amLODIPine (NORVASC) 5 MG tablet TAKE 1 TABLET BY MOUTH EVERY DAY 90 tablet 1    atenolol (TENORMIN) 50 MG tablet TAKE 1 TABLET BY MOUTH 2 TIMES A  tablet 3    Blood Glucose Monitoring Suppl (ONE TOUCH ULTRA 2) w/Device kit USE AS DIRECTED TO TEST BLOOD SUGAR EVERY MORNING FASTING AND RECORD      Krystenztri Aerosphere 160-9-4.8 MCG/ACT aerosol inhaler Inhale 2 puffs 2 (Two) Times a Day. 10.7 g 5    glucose blood (Glucose Meter Test) test strip check blood sugar every AM fasting and record. (Dx: E11.9) 100 each 3    glucose monitor monitoring kit check blood sugar every AM fasting and record. (Dx: E11.9) 1 each 0    omeprazole (priLOSEC) 40 MG capsule TAKE 1 CAPSULE BY MOUTH EVERY DAY 30 capsule 2    Respiratory Therapy Supplies (Nebulizer Cup/Tubing) device Use 1 Device  "Every 6 (Six) Hours As Needed (Shortness of breath). 1 each 2    azithromycin (Zithromax Z-Erick) 250 MG tablet Take 2 tablets by mouth on day 1, then 1 tablet daily on days 2-5 6 tablet 0    predniSONE (DELTASONE) 10 MG tablet 4 tabs daily for 3 days, then 3 tabs daily for 3 days, then 2 tabs daily for 3 days, then 1 tab daily for 3 days, then STOP. (Patient not taking: Reported on 10/15/2024) 30 tablet 0     No facility-administered medications prior to visit.     No opioid medication identified on active medication list. I have reviewed chart for other potential  high risk medication/s and harmful drug interactions in the elderly.      Aspirin is not on active medication list.  Aspirin use is not indicated based on review of current medical condition/s. Risk of harm outweighs potential benefits.  .    Patient Active Problem List   Diagnosis    Pulmonary emphysema    Bronchitis    Essential (primary) hypertension    Gout    Hyperlipidemia    Osteoarthritis    Stage 3 chronic kidney disease    Osteoporosis    Localized edema    Prediabetes    Pain of left upper extremity    Anxiety with somatization    Hypoxia    Community acquired pneumonia of right lower lobe of lung    COPD with acute exacerbation    Pneumonia of right lower lobe due to Streptococcus pneumoniae    Hospital discharge follow-up    Left upper quadrant abdominal pain    COPD (chronic obstructive pulmonary disease)    Medicare annual wellness visit, subsequent     Advance Care Planning Advance Directive is not on file.  ACP discussion was declined by the patient. Patient does not have an advance directive, declines further assistance.            Objective   Vitals:    10/15/24 1539   BP: 128/82   BP Location: Left arm   Patient Position: Sitting   Pulse: 77   Temp: 97.6 °F (36.4 °C)   SpO2: 92%   Weight: 70.8 kg (156 lb)   Height: 160 cm (62.99\")   PainSc:   4       Estimated body mass index is 27.64 kg/m² as calculated from the following:    Height as " "of this encounter: 160 cm (62.99\").    Weight as of this encounter: 70.8 kg (156 lb).            Does the patient have evidence of cognitive impairment? No                                                                                                Health  Risk Assessment    Smoking Status:  Social History     Tobacco Use   Smoking Status Former    Current packs/day: 0.00    Average packs/day: 0.3 packs/day for 19.0 years (4.8 ttl pk-yrs)    Types: Cigarettes    Start date:     Quit date: 1970    Years since quittin.8   Smokeless Tobacco Never   Tobacco Comments    started at age 16- stopped at age 35     Alcohol Consumption:  Social History     Substance and Sexual Activity   Alcohol Use Never    Comment: 2019- 1/15/2019 does not drink        Fall Risk Screen  STEADI Fall Risk Assessment was completed, and patient is at LOW risk for falls.Assessment completed on:10/15/2024    Depression Screening:      10/15/2024     3:43 PM   PHQ-2/PHQ-9 Depression Screening   Little interest or pleasure in doing things Not at all   Feeling down, depressed, or hopeless Not at all     Health Habits and Functional and Cognitive Screening:      10/15/2024     3:41 PM   Functional & Cognitive Status   Do you have difficulty preparing food and eating? No   Do you have difficulty bathing yourself, getting dressed or grooming yourself? No   Do you have difficulty using the toilet? No   Do you have difficulty moving around from place to place? No   Do you have trouble with steps or getting out of a bed or a chair? No   Current Diet Well Balanced Diet   Dental Exam Up to date   Eye Exam Up to date   Exercise (times per week) 0 times per week   Current Exercises Include No Regular Exercise   Do you need help using the phone?  No   Are you deaf or do you have serious difficulty hearing?  Yes   Do you need help to go to places out of walking distance? No   Do you need help shopping? No   Do you need help preparing meals?  No "   Do you need help with housework?  No   Do you need help with laundry? No   Do you need help taking your medications? No   Do you need help managing money? No   Do you ever drive or ride in a car without wearing a seat belt? No   Have you felt unusual stress, anger or loneliness in the last month? No   Who do you live with? Child   If you need help, do you have trouble finding someone available to you? No   Have you been bothered in the last four weeks by sexual problems? No   Do you have difficulty concentrating, remembering or making decisions? No           Age-appropriate Screening Schedule:  Refer to the list below for future screening recommendations based on patient's age, sex and/or medical conditions. Orders for these recommended tests are listed in the plan section. The patient has been provided with a written plan.    Health Maintenance List  Health Maintenance   Topic Date Due    DIABETIC EYE EXAM  Never done    HEMOGLOBIN A1C  10/26/2024    DXA SCAN  10/15/2024 (Originally 4/20/2024)    ZOSTER VACCINE (1 of 2) 10/15/2024 (Originally 9/4/1985)    COVID-19 Vaccine (1 - 2023-24 season) 12/01/2024 (Originally 9/1/2024)    RSV Vaccine - Adults (1 - 1-dose 75+ series) 03/15/2025 (Originally 9/4/2010)    Pneumococcal Vaccine 65+ (2 of 2 - PPSV23 or PCV20) 03/15/2025 (Originally 1/23/2018)    TDAP/TD VACCINES (1 - Tdap) 03/15/2025 (Originally 9/4/1954)    INFLUENZA VACCINE  03/31/2025 (Originally 8/1/2024)    LIPID PANEL  04/26/2025    URINE MICROALBUMIN  04/26/2025    ANNUAL WELLNESS VISIT  10/15/2025    BMI FOLLOWUP  10/15/2025                                                                                                                                                CMS Preventative Services Quick Reference  Risk Factors Identified During Encounter  None Identified    The above risks/problems have been discussed with the patient.  Pertinent information has been shared with the patient in the After Visit  Summary.  An After Visit Summary and PPPS were made available to the patient.    Follow Up:   Next Medicare Wellness visit to be scheduled in 1 year.          Additional E&M Note during same encounter follows:  Patient has multiple medical problems which are significant and separately identifiable that require additional work above and beyond the Medicare Wellness Visit.      Chief Complaint  Medicare Wellness-subsequent    Shayy Wasserman is a 89 y.o. female who presents to [facility]     Prediabetes-she does not check her blood sugars at home.  She does attempt to watch and moderate her carbohydrates.  She denies any blurred vision excessive thirst or excessive urination.    Hypertension-she does check her blood pressure at home.  Her other daughter checks her blood pressure but typically does not come to her visits with her.  To the best of their knowledge it has been good.  It is good here today at 128/82.    Hyperlipidemia-currently she is not on any medicine for her cholesterols.    COPD-she does use her Breztri inhaler twice daily every day as directed.  She denies any wheezing or shortness of breath.    Osteoporosis-she had 1 Prolia injection to but then refused to go back.  She is doing the alendronate weekly.  The best of their knowledge she is taking a calcium and vitamin D supplement daily.    Review of Systems   Constitutional:  Negative for fatigue.   HENT:  Negative for congestion, postnasal drip and rhinorrhea.    Eyes:  Negative for visual disturbance.   Respiratory:  Positive for cough. Negative for chest tightness, shortness of breath and wheezing.    Cardiovascular:  Negative for chest pain and palpitations.   Endocrine: Negative for polydipsia and polyuria.   Neurological:  Negative for headaches.   Psychiatric/Behavioral:  The patient is not nervous/anxious.        Objective   Vital Signs:   Vitals:    10/15/24 1539   BP: 128/82   BP Location: Left arm  "  Patient Position: Sitting   Pulse: 77   Temp: 97.6 °F (36.4 °C)   SpO2: 92%   Weight: 70.8 kg (156 lb)   Height: 160 cm (62.99\")   PainSc:   4       Wt Readings from Last 3 Encounters:   10/15/24 70.8 kg (156 lb)   09/11/24 71.7 kg (158 lb)   08/18/24 72 kg (158 lb 11.7 oz)     BP Readings from Last 3 Encounters:   10/15/24 128/82   09/11/24 145/67   08/18/24 144/68       Physical Exam  Vitals and nursing note reviewed.   Constitutional:       General: She is not in acute distress.     Appearance: Normal appearance. She is normal weight.   HENT:      Head: Normocephalic.      Right Ear: Tympanic membrane, ear canal and external ear normal.      Left Ear: Tympanic membrane, ear canal and external ear normal.      Nose: Nose normal.      Mouth/Throat:      Mouth: Mucous membranes are moist.      Dentition: Has dentures.      Pharynx: Oropharynx is clear.   Eyes:      General: No scleral icterus.     Conjunctiva/sclera: Conjunctivae normal.      Pupils: Pupils are equal, round, and reactive to light.   Cardiovascular:      Rate and Rhythm: Normal rate and regular rhythm.      Pulses: Normal pulses.      Heart sounds: Normal heart sounds. No murmur heard.  Pulmonary:      Effort: Pulmonary effort is normal.      Breath sounds: Normal breath sounds. No wheezing, rhonchi or rales.   Musculoskeletal:      Cervical back: Neck supple. No rigidity or tenderness.   Lymphadenopathy:      Cervical: No cervical adenopathy.   Skin:     General: Skin is warm and dry.      Coloration: Skin is not jaundiced.      Findings: No rash.   Neurological:      General: No focal deficit present.      Mental Status: She is alert and oriented to person, place, and time.   Psychiatric:         Mood and Affect: Mood normal.         Thought Content: Thought content normal.         Judgment: Judgment normal.         The following data was reviewed by Pee Mon DO on 10/15/2024  CMP   CMP          5/13/2024    05:42 5/14/2024    " 05:43 5/15/2024    05:49   CMP   Glucose 265  221  167    BUN 18  15  11    Creatinine 0.95  0.72  0.69    EGFR 57.7  80.5  83.6    Sodium 136  139  139    Potassium 3.4  3.5  3.1    Chloride 101  102  101    Calcium 8.0  7.9  7.9    Albumin 3.6  3.4  3.5    BUN/Creatinine Ratio 18.9  20.8  15.9    Anion Gap 11.9  11.2  10.1      LIPID   Lipid Panel          4/26/2024    14:52   Lipid Panel   Total Cholesterol 200    Triglycerides 116    HDL Cholesterol 47    VLDL Cholesterol 21    LDL Cholesterol  132    LDL/HDL Ratio 2.76      A1C   Most Recent A1C          4/26/2024    14:52   HGBA1C Most Recent   Hemoglobin A1C 5.60          Assessment & Plan   Diagnoses and all orders for this visit:    1. Prediabetes (Primary)  Assessment & Plan:  Will update her A1c with her routine labs here today.    Orders:  -     Hemoglobin A1c    2. Stage 3a chronic kidney disease  -     Comprehensive Metabolic Panel  -     Microalbumin / Creatinine Urine Ratio - Urine, Clean Catch    3. Pure hypercholesterolemia  Assessment & Plan:   Will update her lipid profile with her labs.  Depending on her results we will discuss the need for medication.    Orders:  -     Comprehensive Metabolic Panel  -     Lipid Panel  -     TSH Rfx On Abnormal To Free T4    4. Essential (primary) hypertension  Assessment & Plan:  Her blood pressure is good here today.  I gave them a log sheet so they can record it for our next visit.    Orders:  -     Comprehensive Metabolic Panel  -     Lipid Panel    5. Age-related osteoporosis without current pathological fracture  Assessment & Plan:  She did not like or tolerate the Prolia injection.  She is doing the alendronate on a weekly basis.  And to the best of her knowledge her other daughter gives her a calcium vitamin D supplement daily.  Though make sure that when they get home that she is taking that.    Orders:  -     Vitamin D,25-Hydroxy    6. Chronic obstructive pulmonary disease, unspecified COPD  type  Assessment & Plan:  Her breathing is stable at this time with her Breztri inhaler.        7. Medicare annual wellness visit, subsequent  Assessment & Plan:  Overall she is doing well.  She does not have any specific new or different concerns or complaints today.  She is up-to-date on all of her routine immunizations.                    FOLLOW UP  Return in about 6 months (around 4/15/2025) for Recheck.  Patient was given instructions and counseling regarding her condition or for health maintenance advice. Please see specific information pulled into the AVS if appropriate.     Pee Mon, DO  10/15/24  16:16 EDT

## 2024-11-08 ENCOUNTER — LAB (OUTPATIENT)
Dept: LAB | Facility: HOSPITAL | Age: 89
End: 2024-11-08
Payer: MEDICARE

## 2024-11-08 DIAGNOSIS — R10.12 LEFT UPPER QUADRANT ABDOMINAL PAIN: ICD-10-CM

## 2024-11-08 LAB
25(OH)D3 SERPL-MCNC: 21.6 NG/ML (ref 30–100)
ALBUMIN SERPL-MCNC: 4.1 G/DL (ref 3.5–5.2)
ALBUMIN UR-MCNC: 3.3 MG/DL
ALBUMIN/GLOB SERPL: 1.4 G/DL
ALP SERPL-CCNC: 63 U/L (ref 39–117)
ALT SERPL W P-5'-P-CCNC: <5 U/L (ref 1–33)
AMYLASE SERPL-CCNC: 68 U/L (ref 28–100)
ANION GAP SERPL CALCULATED.3IONS-SCNC: 12.7 MMOL/L (ref 5–15)
AST SERPL-CCNC: 10 U/L (ref 1–32)
BILIRUB SERPL-MCNC: 0.4 MG/DL (ref 0–1.2)
BUN SERPL-MCNC: 15 MG/DL (ref 8–23)
BUN/CREAT SERPL: 16 (ref 7–25)
CALCIUM SPEC-SCNC: 9.8 MG/DL (ref 8.6–10.5)
CHLORIDE SERPL-SCNC: 102 MMOL/L (ref 98–107)
CHOLEST SERPL-MCNC: 204 MG/DL (ref 0–200)
CO2 SERPL-SCNC: 24.3 MMOL/L (ref 22–29)
CREAT SERPL-MCNC: 0.94 MG/DL (ref 0.57–1)
CREAT UR-MCNC: 86.3 MG/DL
DEPRECATED RDW RBC AUTO: 46.5 FL (ref 37–54)
EGFRCR SERPLBLD CKD-EPI 2021: 58.1 ML/MIN/1.73
ERYTHROCYTE [DISTWIDTH] IN BLOOD BY AUTOMATED COUNT: 13.9 % (ref 12.3–15.4)
GLOBULIN UR ELPH-MCNC: 3 GM/DL
GLUCOSE SERPL-MCNC: 94 MG/DL (ref 65–99)
HBA1C MFR BLD: 5.7 % (ref 4.8–5.6)
HCT VFR BLD AUTO: 38.2 % (ref 34–46.6)
HDLC SERPL-MCNC: 52 MG/DL (ref 40–60)
HGB BLD-MCNC: 13 G/DL (ref 12–15.9)
LDLC SERPL CALC-MCNC: 136 MG/DL (ref 0–100)
LDLC/HDLC SERPL: 2.58 {RATIO}
LIPASE SERPL-CCNC: 37 U/L (ref 13–60)
MCH RBC QN AUTO: 31.5 PG (ref 26.6–33)
MCHC RBC AUTO-ENTMCNC: 34 G/DL (ref 31.5–35.7)
MCV RBC AUTO: 92.5 FL (ref 79–97)
MICROALBUMIN/CREAT UR: 38.2 MG/G (ref 0–29)
PLATELET # BLD AUTO: 302 10*3/MM3 (ref 140–450)
PMV BLD AUTO: 9.5 FL (ref 6–12)
POTASSIUM SERPL-SCNC: 4.4 MMOL/L (ref 3.5–5.2)
PROT SERPL-MCNC: 7.1 G/DL (ref 6–8.5)
RBC # BLD AUTO: 4.13 10*6/MM3 (ref 3.77–5.28)
SODIUM SERPL-SCNC: 139 MMOL/L (ref 136–145)
TRIGL SERPL-MCNC: 89 MG/DL (ref 0–150)
TSH SERPL DL<=0.05 MIU/L-ACNC: 1.01 UIU/ML (ref 0.27–4.2)
VLDLC SERPL-MCNC: 16 MG/DL (ref 5–40)
WBC NRBC COR # BLD AUTO: 6.82 10*3/MM3 (ref 3.4–10.8)

## 2024-11-08 PROCEDURE — 82043 UR ALBUMIN QUANTITATIVE: CPT | Performed by: FAMILY MEDICINE

## 2024-11-08 PROCEDURE — 83036 HEMOGLOBIN GLYCOSYLATED A1C: CPT | Performed by: FAMILY MEDICINE

## 2024-11-08 PROCEDURE — 84443 ASSAY THYROID STIM HORMONE: CPT | Performed by: FAMILY MEDICINE

## 2024-11-08 PROCEDURE — 85027 COMPLETE CBC AUTOMATED: CPT | Performed by: FAMILY MEDICINE

## 2024-11-08 PROCEDURE — 82150 ASSAY OF AMYLASE: CPT

## 2024-11-08 PROCEDURE — 36415 COLL VENOUS BLD VENIPUNCTURE: CPT

## 2024-11-08 PROCEDURE — 80061 LIPID PANEL: CPT | Performed by: FAMILY MEDICINE

## 2024-11-08 PROCEDURE — 83690 ASSAY OF LIPASE: CPT

## 2024-11-08 PROCEDURE — 82306 VITAMIN D 25 HYDROXY: CPT | Performed by: FAMILY MEDICINE

## 2024-11-08 PROCEDURE — 82570 ASSAY OF URINE CREATININE: CPT | Performed by: FAMILY MEDICINE

## 2024-11-08 PROCEDURE — 80053 COMPREHEN METABOLIC PANEL: CPT | Performed by: FAMILY MEDICINE

## 2024-11-11 ENCOUNTER — TELEPHONE (OUTPATIENT)
Dept: FAMILY MEDICINE CLINIC | Facility: CLINIC | Age: 89
End: 2024-11-11
Payer: MEDICARE

## 2024-11-11 RX ORDER — PRAVASTATIN SODIUM 20 MG
20 TABLET ORAL NIGHTLY
Qty: 90 TABLET | Refills: 1 | Status: SHIPPED | OUTPATIENT
Start: 2024-11-11

## 2024-11-11 NOTE — TELEPHONE ENCOUNTER
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 Left shoulder to forearm.  HARINDER negative. JUSTINE negative. Mild hypergammaglobulinemia. RF 19 (nl <14), CCP negative.  Started on prednisone and SQ methotrexate.  At 8 wk follow up much improved rash, arthritis improved but not gone.  Added naproxen. At 6/2016 worse polyarticular arthritis, added mycophenolate mofetil. Changed naproxen to meloxicam and subsequently stopped due to associated decreased appetite.  Increased MMF 12/2016.  Clinically inactive appearing disease 7/12/2017. Changed methotrexate from subcutaneous to PO 1/2018.  Slow methotrexate taper started 4/25/2018.                 Medications:     As of completion of this visit:  Current Outpatient Prescriptions   Medication Sig Dispense Refill     folic acid (FOLVITE) 1 MG tablet Take 1 tablet (1 mg) by mouth daily 90 tablet 3     methotrexate 2.5 MG tablet CHEMO Take 6 tablets (15 mg) by mouth once a week; currently on 4 tabs weekly. 72 tablet 1     mycophenolate (GENERIC EQUIVALENT) 250 MG capsule Take 500 mg (2 caps) by mouth in the morning and 750 mg (3 caps) by mouth in the evening. 150 capsule 3             Subjective:     I [facility] Diana [facility] on 08/01/2018 in followup for inflammatory arthritis associated with linear scleroderma as well as systemic therapy for linear scleroderma.  She was accompanied by her mother today.  I last saw her approximately 3 months ago on 04/25/2018.  At that point, she had no active arthritis and continued stable skin findings.  Thus, I started a slow  She will try the statin , please send to pharmacy    "methotrexate taper after discussion.  Of note, Diana has been in \"clinically inactive disease\" on medication since 07/12/2017.  Medications to date have included an NSAID, methotrexate (initially subcutaneous and then switched to oral in 01/2018), and mycophenolate mofetil.      Diana had an interval visit with her dermatologist on 06/04/2018 and the visit note was reviewed.  Her provider is DARLINE Santamaria.  He did not note any worsening rash and recommended using desonide as needed.  Plan is followup in 1 year or if any concerning changes to the rash.      Neither Diana nor her mother have noticed any changes in the rash.  She has had no vaginal itching or symptoms.  She has had no signs or symptoms of arthritis.  She is now down to 4 tablets of methotrexate weekly from 6 when I last saw her.  This weekend she will take 3.      Her last eye exam was in 11/2017 with Ralph Velasco in [facility].  Her last x-rays were at her last visit and although it was commented she had a possible right knee effusion on x-ray, this was not evident on exam and she is quite thin.  She does have a leg length discrepancy, left greater than right.      She had a short fever at one point since I last saw her, but otherwise has been well since then.      From a social history standpoint, Diana had been homeschooled, but she will go to a local elementary school this fall.     Comprehensive Review of Systems was performed and is negative except as noted in the HPI.    Information per our standardized questionnaire is as below:  Last Exam: 4/25/2018  (COIN) Last Eye Exam: 11/09/17 (Ralhp Velasco, OD)  Last Radiograph : 04/25/18  Self Report  (COIN) Patient Pain Status: 0  (COIN) Patient Global Assessment Of Disease Activity: 0  Score Reported By: Self, Mom/Stepmom  (COIN) Patient Highest Level Of Education: elementary/middle school  (COIN) Patient's Grade Level In School: 5th  Arthritis History  (COIN) Morning stiffness in the past week: no " "stiffness  Has your arthritis stopped from trying any athletic or rigorous activities, or interfaced with your ability to do these activities: No  Have you been limited your ability to do normal daily activities in the past week: No  Did you needed help from other people to do normal activities in the past week: No  Have you used any aids or devices to help you do normal daily activities in the past week: No  Important Medical Events  (COIN) Patient has experienced drug-related serious adverse events since last encounter?: No  Event and suspected cause:: no         Examination:     Blood pressure 113/78, pulse 72, temperature 98  F (36.7  C), temperature source Oral, resp. rate 21, height 4' 8.1\" (142.5 cm), weight 78 lb 4.2 oz (35.5 kg), SpO2 99 %. Blood pressure percentiles are 90.5 % systolic and 96.6 % diastolic based on the August 2017 AAP Clinical Practice Guideline. This reading is in the Stage 1 hypertension range (BP >= 95th percentile).  Growth charts reviewed and reassuring.  GEN:  Alert, awake and well-appearing.  HEENT:  Hair and scalp within normal limits.  Pupils equal and reactive to light.  Extraocular movements intact.  Conjunctiva clear.  External pinnae and tympanic membranes normal bilaterally. Nasal mucosa normal without lesions.  Oral mucosa moist and without lesions.  LYMPH:  No cervical, supraclavicular or inguinal lymphadenopathy.  CV:  Regular rate and rhythm.  No murmurs, rubs or gallops.  Radial and dorsalis pedal pulses full and symmetric.  RESP:  Clear to auscultation bilaterally with good aeration.   ABD:  Soft, non-tender, non-distended.  No hepatosplenomegaly or masses appreciated.  SKIN: A full skin exam is performed, except for the genital and buttocks area, and is normal except for:    Known linear scleroderma lesion of left upper extremity:  Starts on posterior left shoulder with 2 hyperpigmented, normal skin consistency patches; then a larger patch of mixed " hypo/hyperpigmentation on left upper arm, with some lipodystrophy but not bound down and no increased warmth or red/violaceous color, then small area of mild involvement on left proximal forearm.   ? One old spot on her dorsal left hand.      Interval resolution most of the molluscum lesions.    Nails and nailfold capillaries are normal.  NEURO:  Awake, alert and oriented.  Face symmetric.  MUSCULOSKELETAL: Joint exam including TMJ, cervical spine, acromioclavicular, sternoclavicular, shoulders, elbows, wrists, fingers, hips, knees, ankles, toes was performed and is normal except for left knee again does not lay flat on the table and there is a left > right leg length discrepancy. No evident active arthritis or enthesitis.  Back is flexible.  Strength is 5/5 in upper and lower extremities. Gait and run are normal.  LYDIA Exam Details:    Axial Skeleton  Temporomandibular:  (ID stable at 4.5 cm)    Upper Extremity   Normal    Lower Extremity  Knee: L Loss of Motion (left knee does not lay flat compared to right; left greater than right leg length discrepancy of ~0.2 cm)    Entheses  No enthesitis         Last Imaging Results:     X-rays 4/25/2018 of bilateral knees, leg length eval: small to moderate right knee effusion (of note, not noted on clinical exam), left leg is 0.2 cm longer than right.             Last Lab Results:   Laboratory investigations performed today are listed below.      Office Visit on 08/01/2018   Component Date Value Ref Range Status     Bilirubin Direct 08/01/2018 <0.1  0.0 - 0.2 mg/dL Final     Bilirubin Total 08/01/2018 0.1* 0.2 - 1.3 mg/dL Final     Albumin 08/01/2018 4.3  3.4 - 5.0 g/dL Final     Protein Total 08/01/2018 7.4  6.5 - 8.4 g/dL Final     Alkaline Phosphatase 08/01/2018 230  150 - 420 U/L Final     ALT 08/01/2018 38  0 - 50 U/L Final     AST 08/01/2018 26  0 - 50 U/L Final     WBC 08/01/2018 4.1* ~ stable 5.0 - 14.5 10e9/L Final     RBC Count 08/01/2018 4.79  3.7 - 5.3 10e12/L  Final     Hemoglobin 08/01/2018 13.1  10.5 - 14.0 g/dL Final     Hematocrit 08/01/2018 40.3  31.5 - 43.0 % Final     MCV 08/01/2018 84  70 - 100 fl Final     MCH 08/01/2018 27.3  26.5 - 33.0 pg Final     MCHC 08/01/2018 32.5  31.5 - 36.5 g/dL Final     RDW 08/01/2018 12.4  10.0 - 15.0 % Final     Platelet Count 08/01/2018 233  150 - 450 10e9/L Final     Diff Method 08/01/2018 Automated Method   Final     % Neutrophils 08/01/2018 34.8  % Final     % Lymphocytes 08/01/2018 53.2  % Final     % Monocytes 08/01/2018 7.4  % Final     % Eosinophils 08/01/2018 4.4  % Final     % Basophils 08/01/2018 0.2  % Final     % Immature Granulocytes 08/01/2018 0.0  % Final     Nucleated RBCs 08/01/2018 0  0 /100 Final     Absolute Neutrophil 08/01/2018 1.4  1.3 - 8.1 10e9/L Final     Absolute Lymphocytes 08/01/2018 2.2  1.1 - 8.6 10e9/L Final     Absolute Monocytes 08/01/2018 0.3  0.0 - 1.1 10e9/L Final     Absolute Eosinophils 08/01/2018 0.2  0.0 - 0.7 10e9/L Final     Absolute Basophils 08/01/2018 0.0  0.0 - 0.2 10e9/L Final     Abs Immature Granulocytes 08/01/2018 0.0  0 - 0.4 10e9/L Final     Absolute Nucleated RBC 08/01/2018 0.0   Final     Creatinine 08/01/2018 0.56  0.39 - 0.73 mg/dL Final     GFR Estimate 08/01/2018 GFR not calculated, patient <16 years old.  mL/min/1.7m2 Final     GFR Estimate If Black 08/01/2018 GFR not calculated, patient <16 years old.  mL/min/1.7m2 Final     These are notable for mildly total WBC with normal differentials.           Assessment:     Diana is a 9-year, 9-month-old female with:   1.  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  This is despite changing over to oral methotrexate 6 months ago and starting a taper of methotrexate.  She has gone from 15 mg of methotrexate weekly to 10 mg of methotrexate weekly.  She continues on mycophenolate mofetil.   2.  Polyarticular inflammatory arthritis associated with #1, continue clinically inactive disease, now 12 months in  duration on methotrexate and mycophenolate mofetil.  Has perhaps a left knee baseline contracture versus subtle active inflammation and a very mild leg length discrepancy.  I will continue to watch this very closely as we taper medications.      At this point, we talked about whether or not to continue this slow methotrexate taper or leave medications as they are.  At the end of the discussion today we decided to continue her slow methotrexate wean.  I reminded both Diana and her mother that we are in the time period where the rash or arthritis can sneak back in or be delayed with the taper of methotrexate, so they are to let me know if they notice any joint swelling, stiffness or loss of range of motion, and/or if the rash is changing or adding new spots.      Given the continued wean, I would like to see her back relatively soon in followup, by 3 months.                Plan:     1. Labs today, as above.  2. No imaging today.  3. Continue mycophenolate mofetil.  4. Continue slow methotrexate wean, going down by 2.5 mg monthly as tolerated.    5. While on methotrexate take folic acid 1 mg by mouth daily; may stop if not on methotrexate.  6. Continue follow up with dermatology; may need to be sooner than 1 year if any concerns about change in scleroderma lesion.  7. Follow up with me in 3 months, sooner if concerns.    Thank you for continuing to involve me in Diana's medical care.  Please do not hesitate to contact me with any questions or concerns.    Sincerely,    Tita Granados M.D.   of Pediatrics  Pediatric Rheumatology  Direct clinic number [phone]  Pager : [phone]    CC  Patient Care Team:  [facility] as PCP - General  Tita Granados MD as MD (Pediatric Rheumatology)  Farooq Mac PA-C as Specialty Provider (Dermatology)    Copy to patient    Parent(s) of Diana Green  [address] [place] [phone]

## 2024-11-11 NOTE — TELEPHONE ENCOUNTER
"Relay     \"  Vit D level slightly low. Start Vit D supplement 5,000mg daily  A1C is in pre-DM range at 5.70  CMP is good  LDL chol could be lower. Would she do a statin?  CBC, Lipase and amylase are normal \"                "

## 2024-11-11 NOTE — TELEPHONE ENCOUNTER
"HUB PROVIDED THE RELAY MESSAGE FROM THE OFFICE   PATIENT NEEDS ADDITIONAL INFORMATION    HUB WARM TRANSFERRED TO OFFICE        Serenity Fernandez MA EB    11/11/24  8:24 AM  Note      Relay      \"  Vit D level slightly low. Start Vit D supplement 5,000mg daily  A1C is in pre-DM range at 5.70  CMP is good  LDL chol could be lower. Would she do a statin?  CBC, Lipase and amylase are normal \"           "

## 2024-11-12 NOTE — TELEPHONE ENCOUNTER
HUB TO RELAY:  CALLED PATIENT MAILBOX IS FULL, COULD NOT LVM  PER DR. NUNN MEDICATION WAS SENT INTO HER PHARMACY.

## 2024-11-30 ENCOUNTER — HOSPITAL ENCOUNTER (EMERGENCY)
Facility: HOSPITAL | Age: 89
Discharge: HOME | End: 2024-11-30
Attending: EMERGENCY MEDICINE
Payer: MEDICARE

## 2024-11-30 ENCOUNTER — APPOINTMENT (OUTPATIENT)
Dept: GENERAL RADIOLOGY | Facility: HOSPITAL | Age: 89
End: 2024-11-30
Payer: MEDICARE

## 2024-11-30 VITALS
SYSTOLIC BLOOD PRESSURE: 149 MMHG | BODY MASS INDEX: 28.48 KG/M2 | TEMPERATURE: 98.2 F | HEIGHT: 63 IN | OXYGEN SATURATION: 93 % | HEART RATE: 77 BPM | DIASTOLIC BLOOD PRESSURE: 75 MMHG | WEIGHT: 160.72 LBS | RESPIRATION RATE: 21 BRPM

## 2024-11-30 DIAGNOSIS — J20.9 ACUTE BRONCHITIS, UNSPECIFIED ORGANISM: Primary | ICD-10-CM

## 2024-11-30 LAB
ALBUMIN SERPL-MCNC: 4.3 G/DL (ref 3.5–5.2)
ALBUMIN/GLOB SERPL: 1.2 G/DL
ALP SERPL-CCNC: 76 U/L (ref 39–117)
ALT SERPL W P-5'-P-CCNC: 5 U/L (ref 1–33)
ANION GAP SERPL CALCULATED.3IONS-SCNC: 11.1 MMOL/L (ref 5–15)
AST SERPL-CCNC: 14 U/L (ref 1–32)
BASOPHILS # BLD AUTO: 0.07 10*3/MM3 (ref 0–0.2)
BASOPHILS NFR BLD AUTO: 0.8 % (ref 0–1.5)
BILIRUB SERPL-MCNC: 0.4 MG/DL (ref 0–1.2)
BUN SERPL-MCNC: 12 MG/DL (ref 8–23)
BUN/CREAT SERPL: 11.8 (ref 7–25)
CALCIUM SPEC-SCNC: 9.2 MG/DL (ref 8.6–10.5)
CHLORIDE SERPL-SCNC: 98 MMOL/L (ref 98–107)
CO2 SERPL-SCNC: 25.9 MMOL/L (ref 22–29)
CREAT SERPL-MCNC: 1.02 MG/DL (ref 0.57–1)
DEPRECATED RDW RBC AUTO: 49 FL (ref 37–54)
EGFRCR SERPLBLD CKD-EPI 2021: 52.7 ML/MIN/1.73
EOSINOPHIL # BLD AUTO: 0.35 10*3/MM3 (ref 0–0.4)
EOSINOPHIL NFR BLD AUTO: 3.9 % (ref 0.3–6.2)
ERYTHROCYTE [DISTWIDTH] IN BLOOD BY AUTOMATED COUNT: 14.2 % (ref 12.3–15.4)
FLUAV SUBTYP SPEC NAA+PROBE: NOT DETECTED
FLUBV RNA ISLT QL NAA+PROBE: NOT DETECTED
GLOBULIN UR ELPH-MCNC: 3.5 GM/DL
GLUCOSE SERPL-MCNC: 153 MG/DL (ref 65–99)
HCT VFR BLD AUTO: 37.8 % (ref 34–46.6)
HGB BLD-MCNC: 12.4 G/DL (ref 12–15.9)
HOLD SPECIMEN: NORMAL
HOLD SPECIMEN: NORMAL
IMM GRANULOCYTES # BLD AUTO: 0.02 10*3/MM3 (ref 0–0.05)
IMM GRANULOCYTES NFR BLD AUTO: 0.2 % (ref 0–0.5)
LYMPHOCYTES # BLD AUTO: 1.69 10*3/MM3 (ref 0.7–3.1)
LYMPHOCYTES NFR BLD AUTO: 18.6 % (ref 19.6–45.3)
MCH RBC QN AUTO: 31 PG (ref 26.6–33)
MCHC RBC AUTO-ENTMCNC: 32.8 G/DL (ref 31.5–35.7)
MCV RBC AUTO: 94.5 FL (ref 79–97)
MONOCYTES # BLD AUTO: 0.41 10*3/MM3 (ref 0.1–0.9)
MONOCYTES NFR BLD AUTO: 4.5 % (ref 5–12)
NEUTROPHILS NFR BLD AUTO: 6.53 10*3/MM3 (ref 1.7–7)
NEUTROPHILS NFR BLD AUTO: 72 % (ref 42.7–76)
NRBC BLD AUTO-RTO: 0 /100 WBC (ref 0–0.2)
NT-PROBNP SERPL-MCNC: 969.3 PG/ML (ref 0–1800)
PLATELET # BLD AUTO: 246 10*3/MM3 (ref 140–450)
PMV BLD AUTO: 8.9 FL (ref 6–12)
POTASSIUM SERPL-SCNC: 4 MMOL/L (ref 3.5–5.2)
PROT SERPL-MCNC: 7.8 G/DL (ref 6–8.5)
QT INTERVAL: 392 MS
QTC INTERVAL: 437 MS
RBC # BLD AUTO: 4 10*6/MM3 (ref 3.77–5.28)
RSV RNA NPH QL NAA+NON-PROBE: NOT DETECTED
SARS-COV-2 RNA RESP QL NAA+PROBE: NOT DETECTED
SODIUM SERPL-SCNC: 135 MMOL/L (ref 136–145)
TROPONIN T SERPL HS-MCNC: 10 NG/L
WBC NRBC COR # BLD AUTO: 9.07 10*3/MM3 (ref 3.4–10.8)
WHOLE BLOOD HOLD COAG: NORMAL
WHOLE BLOOD HOLD SPECIMEN: NORMAL

## 2024-11-30 PROCEDURE — 63710000001 ONDANSETRON ODT 4 MG TABLET DISPERSIBLE: Performed by: EMERGENCY MEDICINE

## 2024-11-30 PROCEDURE — 25010000002 METHYLPREDNISOLONE PER 125 MG: Performed by: EMERGENCY MEDICINE

## 2024-11-30 PROCEDURE — 87637 SARSCOV2&INF A&B&RSV AMP PRB: CPT | Performed by: EMERGENCY MEDICINE

## 2024-11-30 PROCEDURE — 93005 ELECTROCARDIOGRAM TRACING: CPT | Performed by: EMERGENCY MEDICINE

## 2024-11-30 PROCEDURE — 80053 COMPREHEN METABOLIC PANEL: CPT | Performed by: EMERGENCY MEDICINE

## 2024-11-30 PROCEDURE — 85025 COMPLETE CBC W/AUTO DIFF WBC: CPT | Performed by: EMERGENCY MEDICINE

## 2024-11-30 PROCEDURE — 84484 ASSAY OF TROPONIN QUANT: CPT | Performed by: EMERGENCY MEDICINE

## 2024-11-30 PROCEDURE — 96374 THER/PROPH/DIAG INJ IV PUSH: CPT

## 2024-11-30 PROCEDURE — 99284 EMERGENCY DEPT VISIT MOD MDM: CPT

## 2024-11-30 PROCEDURE — 83880 ASSAY OF NATRIURETIC PEPTIDE: CPT | Performed by: EMERGENCY MEDICINE

## 2024-11-30 PROCEDURE — 36415 COLL VENOUS BLD VENIPUNCTURE: CPT

## 2024-11-30 PROCEDURE — 71045 X-RAY EXAM CHEST 1 VIEW: CPT

## 2024-11-30 RX ORDER — AZITHROMYCIN 250 MG/1
500 TABLET, FILM COATED ORAL ONCE
Status: COMPLETED | OUTPATIENT
Start: 2024-11-30 | End: 2024-11-30

## 2024-11-30 RX ORDER — ONDANSETRON 4 MG/1
4 TABLET, ORALLY DISINTEGRATING ORAL EVERY 8 HOURS PRN
Qty: 15 TABLET | Refills: 0 | Status: SHIPPED | OUTPATIENT
Start: 2024-11-30

## 2024-11-30 RX ORDER — PREDNISONE 50 MG/1
50 TABLET ORAL DAILY
Qty: 5 TABLET | Refills: 0 | Status: SHIPPED | OUTPATIENT
Start: 2024-11-30

## 2024-11-30 RX ORDER — ONDANSETRON 4 MG/1
4 TABLET, ORALLY DISINTEGRATING ORAL ONCE
Status: COMPLETED | OUTPATIENT
Start: 2024-11-30 | End: 2024-11-30

## 2024-11-30 RX ORDER — AZITHROMYCIN 250 MG/1
TABLET, FILM COATED ORAL
Qty: 6 TABLET | Refills: 0 | Status: SHIPPED | OUTPATIENT
Start: 2024-11-30

## 2024-11-30 RX ORDER — METHYLPREDNISOLONE 4 MG/1
TABLET ORAL
Qty: 21 TABLET | Refills: 0 | Status: SHIPPED | OUTPATIENT
Start: 2024-11-30 | End: 2024-12-05

## 2024-11-30 RX ORDER — SODIUM CHLORIDE 0.9 % (FLUSH) 0.9 %
10 SYRINGE (ML) INJECTION AS NEEDED
Status: DISCONTINUED | OUTPATIENT
Start: 2024-11-30 | End: 2024-11-30 | Stop reason: HOSPADM

## 2024-11-30 RX ORDER — METHYLPREDNISOLONE SODIUM SUCCINATE 125 MG/2ML
125 INJECTION, POWDER, LYOPHILIZED, FOR SOLUTION INTRAMUSCULAR; INTRAVENOUS ONCE
Status: COMPLETED | OUTPATIENT
Start: 2024-11-30 | End: 2024-11-30

## 2024-11-30 RX ORDER — BENZONATATE 200 MG/1
200 CAPSULE ORAL 3 TIMES DAILY PRN
Qty: 20 CAPSULE | Refills: 0 | Status: SHIPPED | OUTPATIENT
Start: 2024-11-30

## 2024-11-30 RX ORDER — CYCLOBENZAPRINE HCL 10 MG
10 TABLET ORAL 3 TIMES DAILY
Qty: 20 TABLET | Refills: 0 | Status: SHIPPED | OUTPATIENT
Start: 2024-11-30

## 2024-11-30 RX ORDER — METHYLPREDNISOLONE SODIUM SUCCINATE 125 MG/2ML
125 INJECTION, POWDER, LYOPHILIZED, FOR SOLUTION INTRAMUSCULAR; INTRAVENOUS ONCE
Status: DISCONTINUED | OUTPATIENT
Start: 2024-11-30 | End: 2024-11-30 | Stop reason: HOSPADM

## 2024-11-30 RX ADMIN — AZITHROMYCIN DIHYDRATE 500 MG: 250 TABLET ORAL at 18:47

## 2024-11-30 RX ADMIN — METHYLPREDNISOLONE SODIUM SUCCINATE 125 MG: 125 INJECTION, POWDER, FOR SOLUTION INTRAMUSCULAR; INTRAVENOUS at 18:48

## 2024-11-30 RX ADMIN — ONDANSETRON 4 MG: 4 TABLET, ORALLY DISINTEGRATING ORAL at 18:48

## 2024-11-30 NOTE — ED PROVIDER NOTES
Time: 6:26 PM EST  Date of encounter:  11/30/2024  Independent Historian/Clinical History and Information was obtained by:   Patient    History is limited by: N/A    Chief Complaint: Cough      History of Present Illness:  Patient is a 89 y.o. year old female who presents to the emergency department for evaluation of cough and nasal congestion that started on Wednesday and has gotten worse.  Patient denies fever and chills.  Patient denies nausea and vomiting.  Patient reports that her shortness of breath got better with a breathing treatment.  Patient denies leg pain and swelling.  Patient has no dysuria or urinary frequency.      Patient Care Team  Primary Care Provider: Pee Mon DO    Past Medical History:     Allergies   Allergen Reactions    Bactrim [Sulfamethoxazole-Trimethoprim] Unknown - Low Severity    Griseofulvin Ultramicrosize [Griseofulvin] Unknown - Low Severity    Prednisone GI Intolerance    Levocetirizine Rash     hives     Past Medical History:   Diagnosis Date    Anxiety disorder 11/12/2018    Arthritis     COPD (chronic obstructive pulmonary disease)     Dependent edema 11/28/2017    Gout     Osteoporosis     Primary osteoarthritis of left knee 05/24/2018    Primary osteoarthritis of one hip, right 12/12/2017    Primary osteoarthritis of right hip 12/12/2017     Past Surgical History:   Procedure Laterality Date    BLADDER REPAIR      COLONOSCOPY  2016    EYE SURGERY      implant- yes    HYSTERECTOMY      INTRAOCULAR LENS INSERTION      yes    JOINT REPLACEMENT      Surgery    KNEE SURGERY Right     knee repair    KNEE SURGERY Right     repair    OTHER SURGICAL HISTORY      Artificial joints/limbs    OTHER SURGICAL HISTORY      Artificial Joints/Limbs    OTHER SURGICAL HISTORY      Joint surgery    TOTAL KNEE ARTHROPLASTY Right      Family History   Problem Relation Age of Onset    Colon cancer Father        Home Medications:  Prior to Admission medications    Medication Sig Start  Date End Date Taking? Authorizing Provider   albuterol (PROVENTIL) (2.5 MG/3ML) 0.083% nebulizer solution Take 2.5 mg by nebulization Every 4 (Four) Hours As Needed for Wheezing. 10/5/23  Yes Pee Mon DO   alendronate (FOSAMAX) 70 MG tablet TAKE 1 TABLET BY MOUTH EVERY 7 DAYS 10/7/24  Yes Pee Mon DO   allopurinol (ZYLOPRIM) 300 MG tablet TAKE 1 TABLET BY MOUTH DAILY 10/7/24  Yes Pee Mon DO   amLODIPine (NORVASC) 5 MG tablet TAKE 1 TABLET BY MOUTH EVERY DAY 10/7/24  Yes Pee Mon DO   atenolol (TENORMIN) 50 MG tablet TAKE 1 TABLET BY MOUTH 2 TIMES A DAY 10/7/24  Yes Pee Mon DO   Blood Glucose Monitoring Suppl (ONE TOUCH ULTRA 2) w/Device kit USE AS DIRECTED TO TEST BLOOD SUGAR EVERY MORNING FASTING AND RECORD 8/10/23  Yes Provider, MD Brittany Contrerastri Aerosphere 160-9-4.8 MCG/ACT aerosol inhaler Inhale 2 puffs 2 (Two) Times a Day. 10/5/23  Yes Pee Mon DO   glucose blood (Glucose Meter Test) test strip check blood sugar every AM fasting and record. (Dx: E11.9) 8/10/23  Yes Pee Mon DO   glucose monitor monitoring kit check blood sugar every AM fasting and record. (Dx: E11.9) 8/10/23  Yes Pee Mon DO   omeprazole (priLOSEC) 40 MG capsule TAKE 1 CAPSULE BY MOUTH EVERY DAY 9/9/24  Yes Pee Mon DO   Respiratory Therapy Supplies (Nebulizer Cup/Tubing) device Use 1 Device Every 6 (Six) Hours As Needed (Shortness of breath). 6/24/24  Yes Pee Mon DO   azithromycin (Zithromax Z-Erick) 250 MG tablet Take 2 tablets by mouth on day 1, then 1 tablet daily on days 2-5 11/30/24   Fer Johnson MD   benzonatate (TESSALON) 200 MG capsule Take 1 capsule by mouth 3 (Three) Times a Day As Needed for Cough. 11/30/24   Fer Johnson MD   cyclobenzaprine (FLEXERIL) 10 MG tablet Take 1 tablet by mouth 3 (Three) Times a Day. 11/30/24   Fer Johnson MD  "  ondansetron ODT (ZOFRAN-ODT) 4 MG disintegrating tablet Place 1 tablet on the tongue Every 8 (Eight) Hours As Needed for Nausea or Vomiting. 24   Fer Johnson MD   pravastatin (Pravachol) 20 MG tablet Take 1 tablet by mouth Every Night.  Patient not taking: Reported on 2024   Pee Mon,    predniSONE (DELTASONE) 50 MG tablet Take 1 tablet by mouth Daily. 24   Fer Johnson MD        Social History:   Social History     Tobacco Use    Smoking status: Former     Current packs/day: 0.00     Average packs/day: 0.3 packs/day for 19.0 years (4.8 ttl pk-yrs)     Types: Cigarettes     Start date:      Quit date:      Years since quittin.9    Smokeless tobacco: Never    Tobacco comments:     started at age 16- stopped at age 35   Vaping Use    Vaping status: Never Used   Substance Use Topics    Alcohol use: Never     Comment: 2019- 1/15/2019 does not drink     Drug use: Never         Review of Systems:  Review of Systems   Constitutional:  Negative for chills and fever.   HENT:  Negative for congestion, rhinorrhea and sore throat.    Eyes:  Negative for pain and visual disturbance.   Respiratory:  Positive for cough. Negative for apnea, chest tightness and shortness of breath.    Cardiovascular:  Negative for chest pain and palpitations.   Gastrointestinal:  Negative for abdominal pain, diarrhea, nausea and vomiting.   Genitourinary:  Negative for difficulty urinating and dysuria.   Musculoskeletal:  Negative for joint swelling and myalgias.   Skin:  Negative for color change.   Neurological:  Negative for seizures and headaches.   Psychiatric/Behavioral: Negative.     All other systems reviewed and are negative.       Physical Exam:  /75 (BP Location: Right arm, Patient Position: Lying)   Pulse 77   Temp 98.2 °F (36.8 °C) (Oral)   Resp 21   Ht 160 cm (63\")   Wt 72.9 kg (160 lb 11.5 oz)   SpO2 93%   BMI 28.47 kg/m²     Physical " Exam  Vitals and nursing note reviewed.   Constitutional:       General: She is not in acute distress.     Appearance: Normal appearance. She is not toxic-appearing.   HENT:      Head: Normocephalic and atraumatic.      Jaw: There is normal jaw occlusion.   Eyes:      General: Lids are normal.      Extraocular Movements: Extraocular movements intact.      Conjunctiva/sclera: Conjunctivae normal.      Pupils: Pupils are equal, round, and reactive to light.   Cardiovascular:      Rate and Rhythm: Normal rate and regular rhythm.      Pulses: Normal pulses.      Heart sounds: Normal heart sounds.   Pulmonary:      Effort: Pulmonary effort is normal. No respiratory distress.      Breath sounds: Normal breath sounds. No wheezing or rhonchi.   Abdominal:      General: Abdomen is flat.      Palpations: Abdomen is soft.      Tenderness: There is no abdominal tenderness. There is no guarding or rebound.   Musculoskeletal:         General: Normal range of motion.      Cervical back: Normal range of motion and neck supple.      Right lower leg: No edema.      Left lower leg: No edema.   Skin:     General: Skin is warm and dry.   Neurological:      Mental Status: She is alert and oriented to person, place, and time. Mental status is at baseline.   Psychiatric:         Mood and Affect: Mood normal.                  Procedures:  Procedures      Medical Decision Making:      Comorbidities that affect care:    COPD    External Notes reviewed:    Previous ED Note: Patient was last seen in the ED for a rash.      The following orders were placed and all results were independently analyzed by me:  Orders Placed This Encounter   Procedures    COVID PRE-OP / PRE-PROCEDURE SCREENING ORDER (NO ISOLATION) - Swab, Nasopharynx    COVID-19, FLU A/B, RSV PCR 1 HR TAT - Swab, Nasopharynx    XR Chest 1 View    [place] Draw    Comprehensive Metabolic Panel    BNP    Single High Sensitivity Troponin T    CBC Auto Differential    NPO Diet NPO Type:  Strict NPO    Undress & Gown    Continuous Pulse Oximetry    Vital Signs    Oxygen Therapy- Nasal Cannula; Titrate 1-6 LPM Per SpO2; 90 - 95%    ECG 12 Lead ED Triage Standing Order; SOA    Insert Peripheral IV    CBC & Differential    Green Top (Gel)    Lavender Top    Gold Top - SST    Light Blue Top       Medications Given in the Emergency Department:  Medications   sodium chloride 0.9 % flush 10 mL (has no administration in time range)        ED Course:         Labs:    Lab Results (last 24 hours)       Procedure Component Value Units Date/Time    CBC & Differential [[id number]]  (Abnormal) Collected: 11/30/24 1720    Specimen: Blood Updated: 11/30/24 1725    Narrative:      The following orders were created for panel order CBC & Differential.  Procedure                               Abnormality         Status                     ---------                               -----------         ------                     CBC Auto Differential[[id number]]        Abnormal            Final result                 Please view results for these tests on the individual orders.    Comprehensive Metabolic Panel [[id number]]  (Abnormal) Collected: 11/30/24 1720    Specimen: Blood Updated: 11/30/24 1756     Glucose 153 mg/dL      BUN 12 mg/dL      Creatinine 1.02 mg/dL      Sodium 135 mmol/L      Potassium 4.0 mmol/L      Chloride 98 mmol/L      CO2 25.9 mmol/L      Calcium 9.2 mg/dL      Total Protein 7.8 g/dL      Albumin 4.3 g/dL      ALT (SGPT) 5 U/L      AST (SGOT) 14 U/L      Alkaline Phosphatase 76 U/L      Total Bilirubin 0.4 mg/dL      Globulin 3.5 gm/dL      A/G Ratio 1.2 g/dL      BUN/Creatinine Ratio 11.8     Anion Gap 11.1 mmol/L      eGFR 52.7 mL/min/1.73     Narrative:      GFR Normal >60  Chronic Kidney Disease <60  Kidney Failure <15    The GFR formula is only valid for adults with stable renal function between ages 18 and 70.    BNP [[id number]]  (Normal) Collected: 11/30/24 1720    Specimen: Blood Updated:  11/30/24 1743     proBNP 969.3 pg/mL     Narrative:      This assay is used as an aid in the diagnosis of individuals suspected of having heart failure. It can be used as an aid in the diagnosis of acute decompensated heart failure (ADHF) in patients presenting with signs and symptoms of ADHF to the emergency department (ED). In addition, NT-proBNP of <300 pg/mL indicates ADHF is not likely.    Age Range Result Interpretation  NT-proBNP Concentration (pg/mL:      <50             Positive            >450                   Gray                 300-450                    Negative             <300    50-75           Positive            >900                  Gray                300-900                  Negative            <300      >75             Positive            >1800                  Gray                300-1800                  Negative            <300    Single High Sensitivity Troponin T [[id number]]  (Normal) Collected: 11/30/24 1720    Specimen: Blood Updated: 11/30/24 1745     HS Troponin T 10 ng/L     Narrative:      High Sensitive Troponin T Reference Range:  <14.0 ng/L- Negative Female for AMI  <22.0 ng/L- Negative Male for AMI  >=14 - Abnormal Female indicating possible myocardial injury.  >=22 - Abnormal Male indicating possible myocardial injury.   Clinicians would have to utilize clinical acumen, EKG, Troponin, and serial changes to determine if it is an Acute Myocardial Infarction or myocardial injury due to an underlying chronic condition.         CBC Auto Differential [[id number]]  (Abnormal) Collected: 11/30/24 1720    Specimen: Blood Updated: 11/30/24 1725     WBC 9.07 10*3/mm3      RBC 4.00 10*6/mm3      Hemoglobin 12.4 g/dL      Hematocrit 37.8 %      MCV 94.5 fL      MCH 31.0 pg      MCHC 32.8 g/dL      RDW 14.2 %      RDW-SD 49.0 fl      MPV 8.9 fL      Platelets 246 10*3/mm3      Neutrophil % 72.0 %      Lymphocyte % 18.6 %      Monocyte % 4.5 %      Eosinophil % 3.9 %      Basophil % 0.8 %       Immature Grans % 0.2 %      Neutrophils, Absolute 6.53 10*3/mm3      Lymphocytes, Absolute 1.69 10*3/mm3      Monocytes, Absolute 0.41 10*3/mm3      Eosinophils, Absolute 0.35 10*3/mm3      Basophils, Absolute 0.07 10*3/mm3      Immature Grans, Absolute 0.02 10*3/mm3      nRBC 0.0 /100 WBC     COVID PRE-OP / PRE-PROCEDURE SCREENING ORDER (NO ISOLATION) - Swab, Nasopharynx [[id number]]  (Normal) Collected: 11/30/24 1729    Specimen: Swab from Nasopharynx Updated: 11/30/24 1813    Narrative:      The following orders were created for panel order COVID PRE-OP / PRE-PROCEDURE SCREENING ORDER (NO ISOLATION) - Swab, Nasopharynx.  Procedure                               Abnormality         Status                     ---------                               -----------         ------                     COVID-19, FLU A/B, RSV P...[[id number]]  Normal              Final result                 Please view results for these tests on the individual orders.    COVID-19, FLU A/B, RSV PCR 1 HR TAT - Swab, Nasopharynx [[id number]]  (Normal) Collected: 11/30/24 1729    Specimen: Swab from Nasopharynx Updated: 11/30/24 1813     COVID19 Not Detected     Influenza A PCR Not Detected     Influenza B PCR Not Detected     RSV, PCR Not Detected    Narrative:      Fact sheet for providers: https://www.fda.gov/media/502217/download    Fact sheet for patients: https://www.fda.gov/media/174787/download    Test performed by PCR.             Imaging:    XR Chest 1 View    Result Date: 11/30/2024  XR CHEST 1 VW Date of Exam: 11/30/2024 5:50 PM EST Indication: SOA Triage Protocol Comparison: 5/11/2024, 11/6/2023 FINDINGS: Chronic changes are again noted. No new consolidations or pleural effusions are observed. The cardiac silhouette and mediastinum are stable. A hiatal hernia is again noted. No acute osseous abnormalities are identified.     Chronic changes are noted which appear stable. There is no definitive evidence for acute cardiopulmonary  process. Electronically Signed: Tejinder Mesa MD  11/30/2024 6:03 PM EST  Workstation ID: [id number]       Differential Diagnosis and Discussion:    Cough: Differential diagnosis includes but is not limited to pneumonia, acute bronchitis, upper respiratory infection, ACE inhibitor use, allergic reaction, epiglottitis, seasonal allergies, chemical irritants, exercise-induced asthma, viral syndrome.    All labs were reviewed and interpreted by me.  All X-rays impressions were independently interpreted by me.  EKG was interpreted by me.    MDM       The patient presented with a productive cough. The patient is well-appearing and in no respiratory distress.  The patient´s CBC that was reviewed and interpreted by me shows no abnormalities of critical concern. Of note, there is no anemia requiring a blood transfusion and the platelet count is acceptable.  The patient´s CMP that was reviewed and interpretted by me shows no abnormalities of critical concern. Of note, the patient´s sodium and potassium are acceptable. The patient´s liver enzymes are unremarkable. The patient´s renal function (creatinine) is preserved. The patient has a normal anion gap.  The patient has a normal mental status and is neurologically intact. The patient appears well and is able to tolerate food for fluid by mouth and there's no significant dehydration. There is no respiratory distress and no signs of systemic toxicity. The history, exam, diagnostic testing and current condition do not demonstrate an infectious process such as meningitis, severe pneumonia, retropharyngeal abscess, epiglottitis, sepsis or other serious bacterial infection requiring further testing, treatment, consultation, or admission at this time. The patient has no additional oxygen requirement nor are there any signs of respiratory distress. The patient has a chest x-ray interpreted by me that is negative for pneumonia. Asthma, URI, GERD are also considered. The vital signs  have been stable and the patient has had no hypoxia. The patient's condition is stable and appropriate for discharge. The patient will pursue further outpatient evaluation with the primary care physician, or designated physician. The patient will expressed a clear and thorough understanding and agreed to follow-up as instructed.              Patient Care Considerations:    PERC: I used the PERC score to risk stratify the patient for PE and a CT of the chest was considered but ultimately not indicated in today's visit.      Consultants/Shared Management Plan:    None    Social Determinants of Health:    Patient is independent, reliable, and has access to care.       Disposition and Care Coordination:    Discharged: I considered escalation of care by admitting this patient to the hospital, however patient has no respiratory distress or hypoxia.    I have explained the patient´s condition, diagnoses and treatment plan based on the information available to me at this time. I have answered questions and addressed any concerns. The patient has a good  understanding of the patient´s diagnosis, condition, and treatment plan as can be expected at this point. The vital signs have been stable. The patient´s condition is stable and appropriate for discharge from the emergency department.      The patient will pursue further outpatient evaluation with the primary care physician or other designated or consulting physician as outlined in the discharge instructions. They are agreeable to this plan of care and follow-up instructions have been explained in detail. The patient has received these instructions in written format and has expressed an understanding of the discharge instructions. The patient is aware that any significant change in condition or worsening of symptoms should prompt an immediate return to this or the closest emergency department or call to 911.  I have explained discharge medications and the need for follow up  with the patient/caretakers. This was also printed in the discharge instructions. Patient was discharged with the following medications and follow up:      Medication List        New Prescriptions      azithromycin 250 MG tablet  Commonly known as: Zithromax Z-Erick  Take 2 tablets by mouth on day 1, then 1 tablet daily on days 2-5     benzonatate 200 MG capsule  Commonly known as: TESSALON  Take 1 capsule by mouth 3 (Three) Times a Day As Needed for Cough.     cyclobenzaprine 10 MG tablet  Commonly known as: FLEXERIL  Take 1 tablet by mouth 3 (Three) Times a Day.     ondansetron ODT 4 MG disintegrating tablet  Commonly known as: ZOFRAN-ODT  Place 1 tablet on the tongue Every 8 (Eight) Hours As Needed for Nausea or Vomiting.     predniSONE 50 MG tablet  Commonly known as: DELTASONE  Take 1 tablet by mouth Daily.               Where to Get Your Medications        These medications were sent to [facility] - [place] [facility] - [address] - [phone]  - [phone]   [address] [place], [place] [address]      Phone: [phone]   azithromycin 250 MG tablet  benzonatate 200 MG capsule  cyclobenzaprine 10 MG tablet  ondansetron ODT 4 MG disintegrating tablet  predniSONE 50 MG tablet      Pee Mon, DO  [address] [place] [address] [phone]    In 2 days         Final diagnoses:   Acute bronchitis, unspecified organism        ED Disposition       ED Disposition   Discharge    Condition   Stable    Comment   --               This medical record created using voice recognition software.             Fer Johnson MD  11/30/24 6032

## 2024-12-02 DIAGNOSIS — J43.8 OTHER EMPHYSEMA: ICD-10-CM

## 2024-12-02 RX ORDER — ALBUTEROL SULFATE 0.83 MG/ML
SOLUTION RESPIRATORY (INHALATION)
Qty: 225 ML | Refills: 0 | Status: SHIPPED | OUTPATIENT
Start: 2024-12-02

## 2024-12-11 ENCOUNTER — APPOINTMENT (OUTPATIENT)
Dept: GENERAL RADIOLOGY | Facility: HOSPITAL | Age: 89
End: 2024-12-11
Payer: MEDICARE

## 2024-12-11 ENCOUNTER — HOSPITAL ENCOUNTER (EMERGENCY)
Facility: HOSPITAL | Age: 89
Discharge: HOME | End: 2024-12-11
Attending: EMERGENCY MEDICINE | Admitting: EMERGENCY MEDICINE
Payer: MEDICARE

## 2024-12-11 VITALS
HEART RATE: 76 BPM | SYSTOLIC BLOOD PRESSURE: 148 MMHG | HEIGHT: 63 IN | DIASTOLIC BLOOD PRESSURE: 74 MMHG | BODY MASS INDEX: 26.91 KG/M2 | WEIGHT: 151.9 LBS | RESPIRATION RATE: 20 BRPM | OXYGEN SATURATION: 93 % | TEMPERATURE: 97.9 F

## 2024-12-11 DIAGNOSIS — R07.81 RIB PAIN ON LEFT SIDE: Primary | ICD-10-CM

## 2024-12-11 PROCEDURE — 71045 X-RAY EXAM CHEST 1 VIEW: CPT

## 2024-12-11 PROCEDURE — 99283 EMERGENCY DEPT VISIT LOW MDM: CPT

## 2024-12-11 PROCEDURE — 71100 X-RAY EXAM RIBS UNI 2 VIEWS: CPT

## 2024-12-11 RX ORDER — ACETAMINOPHEN 325 MG/1
975 TABLET ORAL ONCE
Status: COMPLETED | OUTPATIENT
Start: 2024-12-11 | End: 2024-12-11

## 2024-12-11 RX ADMIN — ACETAMINOPHEN 975 MG: 325 TABLET ORAL at 18:20

## 2024-12-11 NOTE — ED NOTES
All follow up care discussed. Pt and family verbalized understandings. No other concerns voiced. Taken to lobby via wheelchair.

## 2024-12-11 NOTE — ED PROVIDER NOTES
Time: 5:52 PM EST  Date of encounter:  12/11/2024  Independent Historian/Clinical History and Information was obtained by:   Patient    History is limited by: N/A    Chief Complaint   Patient presents with    Rib Pain         History of Present Illness:  Patient is a 89 y.o. year old female who presents to the emergency department for evaluation of left rib pain due to a fall.  Patient was sitting on a stool next to the fireplace when she lost her balance and fell off hitting the left side of her ribs on a coffee table.  Denies hitting her head.    Patient Care Team  Primary Care Provider: Pee Mon DO    Past Medical History:     Allergies   Allergen Reactions    Prednisone Angioedema    Bactrim [Sulfamethoxazole-Trimethoprim] Unknown - Low Severity    Griseofulvin Ultramicrosize [Griseofulvin] Unknown - Low Severity    Levocetirizine Rash     hives     Past Medical History:   Diagnosis Date    Anxiety disorder 11/12/2018    Arthritis     COPD (chronic obstructive pulmonary disease)     Dependent edema 11/28/2017    Gout     Osteoporosis     Primary osteoarthritis of left knee 05/24/2018    Primary osteoarthritis of one hip, right 12/12/2017    Primary osteoarthritis of right hip 12/12/2017     Past Surgical History:   Procedure Laterality Date    BLADDER REPAIR      COLONOSCOPY  2016    EYE SURGERY      implant- yes    HYSTERECTOMY      INTRAOCULAR LENS INSERTION      yes    JOINT REPLACEMENT      Surgery    KNEE SURGERY Right     knee repair    KNEE SURGERY Right     repair    OTHER SURGICAL HISTORY      Artificial joints/limbs    OTHER SURGICAL HISTORY      Artificial Joints/Limbs    OTHER SURGICAL HISTORY      Joint surgery    TOTAL KNEE ARTHROPLASTY Right      Family History   Problem Relation Age of Onset    Colon cancer Father        Home Medications:  Prior to Admission medications    Medication Sig Start Date End Date Taking? Authorizing Provider   albuterol (PROVENTIL) (2.5 MG/3ML) 0.083%  nebulizer solution NEBULIZE 1 VIAL PER NEBULIZER EVERY 4 HOURS AS NEEDED FOR WHEEZING 12/2/24   Pee Mon DO   alendronate (FOSAMAX) 70 MG tablet TAKE 1 TABLET BY MOUTH EVERY 7 DAYS 10/7/24   Pee Mon DO   allopurinol (ZYLOPRIM) 300 MG tablet TAKE 1 TABLET BY MOUTH DAILY 10/7/24   Pee Mon DO   amLODIPine (NORVASC) 5 MG tablet TAKE 1 TABLET BY MOUTH EVERY DAY 10/7/24   Pee Mon DO   atenolol (TENORMIN) 50 MG tablet TAKE 1 TABLET BY MOUTH 2 TIMES A DAY 10/7/24   Pee Mon DO   azithromycin (Zithromax Z-Erick) 250 MG tablet Take 2 tablets by mouth on day 1, then 1 tablet daily on days 2-5 11/30/24   Fer Johnson MD   benzonatate (TESSALON) 200 MG capsule Take 1 capsule by mouth 3 (Three) Times a Day As Needed for Cough. 11/30/24   Fer Johnson MD   Blood Glucose Monitoring Suppl (ONE TOUCH ULTRA 2) w/Device kit USE AS DIRECTED TO TEST BLOOD SUGAR EVERY MORNING FASTING AND RECORD 8/10/23   ProviderDiane MD Breztri Aerosphere 160-9-4.8 MCG/ACT aerosol inhaler Inhale 2 puffs 2 (Two) Times a Day. 10/5/23   Pee Mon DO   cyclobenzaprine (FLEXERIL) 10 MG tablet Take 1 tablet by mouth 3 (Three) Times a Day. 11/30/24   Fer Johnson MD   glucose blood (Glucose Meter Test) test strip check blood sugar every AM fasting and record. (Dx: E11.9) 8/10/23   Pee Mon DO   glucose monitor monitoring kit check blood sugar every AM fasting and record. (Dx: E11.9) 8/10/23   Pee Mon DO   omeprazole (priLOSEC) 40 MG capsule TAKE 1 CAPSULE BY MOUTH EVERY DAY 9/9/24   Pee Mon DO   ondansetron ODT (ZOFRAN-ODT) 4 MG disintegrating tablet Place 1 tablet on the tongue Every 8 (Eight) Hours As Needed for Nausea or Vomiting. 11/30/24   Fer Johnson MD   pravastatin (Pravachol) 20 MG tablet Take 1 tablet by mouth Every Night.  Patient not taking: Reported on 11/30/2024  "24   Pee Mon DO   predniSONE (DELTASONE) 50 MG tablet Take 1 tablet by mouth Daily. 24   Fer Johnson MD   Respiratory Therapy Supplies (Nebulizer Cup/Tubing) device Use 1 Device Every 6 (Six) Hours As Needed (Shortness of breath). 24   Pee Mon DO        Social History:   Social History     Tobacco Use    Smoking status: Former     Current packs/day: 0.00     Average packs/day: 0.3 packs/day for 19.0 years (4.8 ttl pk-yrs)     Types: Cigarettes     Start date:      Quit date: 1970     Years since quittin.9    Smokeless tobacco: Never    Tobacco comments:     started at age 16- stopped at age 35   Vaping Use    Vaping status: Never Used   Substance Use Topics    Alcohol use: Never     Comment: 2019- 1/15/2019 does not drink     Drug use: Never         Review of Systems:  Review of Systems   Constitutional: Negative.    HENT: Negative.     Eyes: Negative.    Respiratory: Negative.     Cardiovascular: Negative.    Gastrointestinal: Negative.    Endocrine: Negative.    Genitourinary: Negative.    Musculoskeletal:  Positive for arthralgias.   Skin: Negative.    Allergic/Immunologic: Negative.    Neurological: Negative.    Hematological: Negative.    Psychiatric/Behavioral: Negative.          Physical Exam:  /66 (BP Location: Right arm, Patient Position: Lying)   Pulse 77   Temp 97.9 °F (36.6 °C) (Oral)   Resp 18   Ht 160 cm (63\")   Wt 68.9 kg (151 lb 14.4 oz)   SpO2 94%   BMI 26.91 kg/m²         Physical Exam  Vitals and nursing note reviewed.   Constitutional:       Appearance: Normal appearance.   HENT:      Head: Normocephalic and atraumatic.      Nose: Nose normal.      Mouth/Throat:      Mouth: Mucous membranes are moist.   Eyes:      Extraocular Movements: Extraocular movements intact.      Conjunctiva/sclera: Conjunctivae normal.      Pupils: Pupils are equal, round, and reactive to light.   Cardiovascular:      Rate and Rhythm: " Normal rate and regular rhythm.      Heart sounds: Normal heart sounds.   Pulmonary:      Effort: Pulmonary effort is normal.      Breath sounds: Normal breath sounds.   Abdominal:      General: Abdomen is flat.      Palpations: Abdomen is soft.   Musculoskeletal:         General: Normal range of motion.        Arms:       Cervical back: Normal range of motion and neck supple.   Skin:     General: Skin is warm and dry.      Findings: No bruising.   Neurological:      General: No focal deficit present.      Mental Status: She is alert and oriented to person, place, and time.   Psychiatric:         Mood and Affect: Mood normal.         Behavior: Behavior normal.                  Procedures:        Medical Decision Making:      Comorbidities that affect care:    Osteoporosis, COPD    External Notes reviewed:    Previous Clinic Note: Office visit with PCP 10/15/2024      The following orders were placed and all results were independently analyzed by me:  Orders Placed This Encounter   Procedures    XR Chest 1 View    XR Ribs 2 View Left       Medications Given in the Emergency Department:  Medications   acetaminophen (TYLENOL) tablet 975 mg (975 mg Oral Given 12/11/24 1820)        ED Course:    The patient was initially evaluated in the triage area where orders were placed. The patient was later dispositioned by Sharlene Cespdees PA-C.      The patient was advised to stay for completion of workup which includes but is not limited to communication of labs and radiological results, reassessment and plan. The patient was advised that leaving prior to disposition by a provider could result in critical findings that are not communicated to the patient.          Labs:    Lab Results (last 24 hours)       ** No results found for the last 24 hours. **             Imaging:    XR Ribs 2 View Left    Result Date: 12/11/2024  XR RIBS 2 VW LEFT Date of Exam: 12/11/2024 5:49 PM EST Indication: fall/L  lateral and posterior pain  Comparison: None available. Findings: No acute fracture or malalignment is identified. The bones appear diffusely osteopenic. No pneumothorax is seen. Severe left glenohumeral osteoarthritic change is noted with bone-on-bone articulation and osteophyte formation.     Impression: No acute fracture or malalignment. Electronically Signed: Sergio Finney MD  12/11/2024 6:09 PM EST  Workstation ID: [id number]    XR Chest 1 View    Result Date: 12/11/2024  XR CHEST 1 VW Date of Exam: 12/11/2024 5:21 PM EST Indication: fall on ribs Comparison: Chest radiograph dated 11/30/2024 Findings: There are senescent changes in the thoracic aorta and visualized skeletal structures particularly each shoulder. An acute bony abnormality is not demonstrated on this single view chest radiograph. There is underlying emphysema with areas of scarring and prior granulomatous disease. Linear density is present in the left lung base. There is suggestion of a at least moderate hiatal hernia, the appearance is unchanged radiographically. No acute infiltrates or new focal opacities. No pneumothorax or pleural effusion is identified.     Impression: No active cardiopulmonary disease. There is underlying emphysema and scarring. There is a moderate hiatal hernia. Electronically Signed: Yanick Casarez DO  12/11/2024 5:45 PM EST  Workstation ID: [id number]       Differential Diagnosis and Discussion:      Chest Pain:  Based on the patient's signs and symptoms, I considered aortic dissection, myocardial infaction, pulmonary embolism, cardiac tamponade, pericarditis, pneumothorax, musculoskeletal chest pain and other differential diagnosis as an etiology of the patient's chest pain.     PROCEDURES:    All X-rays impressions were independently interpreted by me.    No orders to display        Procedures    MDM                   Patient Care Considerations:    NARCOTICS: I considered prescribing opiate pain medication as an outpatient, however x-ray is negative  for fractures or dislocations      Consultants/Shared Management Plan:    None    Social Determinants of Health:    Patient has presented with family members who are responsible, reliable and will ensure follow up care.      Disposition and Care Coordination:    Discharged: The patient is suitable and stable for discharge with no need for consideration of admission.    I have explained the patient´s condition, diagnoses and treatment plan based on the information available to me at this time. I have answered questions and addressed any concerns. The patient has a good  understanding of the patient´s diagnosis, condition, and treatment plan as can be expected at this point. The vital signs have been stable. The patient´s condition is stable and appropriate for discharge from the emergency department.      The patient will pursue further outpatient evaluation with the primary care physician or other designated or consulting physician as outlined in the discharge instructions. They are agreeable to this plan of care and follow-up instructions have been explained in detail. The patient has received these instructions in written format and has expressed an understanding of the discharge instructions. The patient is aware that any significant change in condition or worsening of symptoms should prompt an immediate return to this or the closest emergency department or call to 911.    Final diagnoses:   Rib pain on left side        ED Disposition       ED Disposition   Discharge    Condition   Stable    Comment   --               This medical record created using voice recognition software.             Sharlene Cespedes PA-C  12/11/24 [id number]

## 2024-12-14 LAB
QT INTERVAL: 392 MS
QTC INTERVAL: 437 MS

## 2024-12-17 ENCOUNTER — HOSPITAL ENCOUNTER (INPATIENT)
Facility: HOSPITAL | Age: 89
LOS: 4 days | Discharge: HOME | End: 2024-12-21
Attending: EMERGENCY MEDICINE | Admitting: FAMILY MEDICINE
Payer: MEDICARE

## 2024-12-17 ENCOUNTER — APPOINTMENT (OUTPATIENT)
Dept: GENERAL RADIOLOGY | Facility: HOSPITAL | Age: 89
End: 2024-12-17
Payer: MEDICARE

## 2024-12-17 DIAGNOSIS — R26.2 DIFFICULTY WALKING: ICD-10-CM

## 2024-12-17 DIAGNOSIS — J96.01 ACUTE RESPIRATORY FAILURE WITH HYPOXIA: ICD-10-CM

## 2024-12-17 DIAGNOSIS — R53.1 WEAKNESS GENERALIZED: ICD-10-CM

## 2024-12-17 DIAGNOSIS — S22.32XA CLOSED FRACTURE OF ONE RIB OF LEFT SIDE, INITIAL ENCOUNTER: ICD-10-CM

## 2024-12-17 DIAGNOSIS — J18.9 PNEUMONIA DUE TO INFECTIOUS ORGANISM, UNSPECIFIED LATERALITY, UNSPECIFIED PART OF LUNG: Primary | ICD-10-CM

## 2024-12-17 PROBLEM — R06.03 ACUTE RESPIRATORY DISTRESS: Status: ACTIVE | Noted: 2024-12-17

## 2024-12-17 PROBLEM — J96.21 ACUTE ON CHRONIC RESPIRATORY FAILURE WITH HYPOXIA: Status: ACTIVE | Noted: 2024-12-17

## 2024-12-17 LAB
ALBUMIN SERPL-MCNC: 3.9 G/DL (ref 3.5–5.2)
ALBUMIN/GLOB SERPL: 1.2 G/DL
ALP SERPL-CCNC: 69 U/L (ref 39–117)
ALT SERPL W P-5'-P-CCNC: 6 U/L (ref 1–33)
ANION GAP SERPL CALCULATED.3IONS-SCNC: 9.9 MMOL/L (ref 5–15)
AST SERPL-CCNC: 14 U/L (ref 1–32)
BASOPHILS # BLD AUTO: 0.07 10*3/MM3 (ref 0–0.2)
BASOPHILS NFR BLD AUTO: 0.8 % (ref 0–1.5)
BILIRUB SERPL-MCNC: 0.7 MG/DL (ref 0–1.2)
BUN SERPL-MCNC: 15 MG/DL (ref 8–23)
BUN/CREAT SERPL: 15 (ref 7–25)
CALCIUM SPEC-SCNC: 9.5 MG/DL (ref 8.6–10.5)
CHLORIDE SERPL-SCNC: 94 MMOL/L (ref 98–107)
CO2 SERPL-SCNC: 26.1 MMOL/L (ref 22–29)
CREAT SERPL-MCNC: 1 MG/DL (ref 0.57–1)
D-LACTATE SERPL-SCNC: 1.2 MMOL/L (ref 0.5–2)
DEPRECATED RDW RBC AUTO: 46.9 FL (ref 37–54)
EGFRCR SERPLBLD CKD-EPI 2021: 54 ML/MIN/1.73
EOSINOPHIL # BLD AUTO: 0.2 10*3/MM3 (ref 0–0.4)
EOSINOPHIL NFR BLD AUTO: 2.2 % (ref 0.3–6.2)
ERYTHROCYTE [DISTWIDTH] IN BLOOD BY AUTOMATED COUNT: 13.8 % (ref 12.3–15.4)
FLUAV SUBTYP SPEC NAA+PROBE: NOT DETECTED
FLUBV RNA ISLT QL NAA+PROBE: NOT DETECTED
GLOBULIN UR ELPH-MCNC: 3.3 GM/DL
GLUCOSE SERPL-MCNC: 124 MG/DL (ref 65–99)
HCT VFR BLD AUTO: 37.2 % (ref 34–46.6)
HGB BLD-MCNC: 12.4 G/DL (ref 12–15.9)
HOLD SPECIMEN: NORMAL
HOLD SPECIMEN: NORMAL
IMM GRANULOCYTES # BLD AUTO: 0.03 10*3/MM3 (ref 0–0.05)
IMM GRANULOCYTES NFR BLD AUTO: 0.3 % (ref 0–0.5)
LYMPHOCYTES # BLD AUTO: 1.42 10*3/MM3 (ref 0.7–3.1)
LYMPHOCYTES NFR BLD AUTO: 15.3 % (ref 19.6–45.3)
MCH RBC QN AUTO: 30.9 PG (ref 26.6–33)
MCHC RBC AUTO-ENTMCNC: 33.3 G/DL (ref 31.5–35.7)
MCV RBC AUTO: 92.8 FL (ref 79–97)
MONOCYTES # BLD AUTO: 0.54 10*3/MM3 (ref 0.1–0.9)
MONOCYTES NFR BLD AUTO: 5.8 % (ref 5–12)
NEUTROPHILS NFR BLD AUTO: 7.03 10*3/MM3 (ref 1.7–7)
NEUTROPHILS NFR BLD AUTO: 75.6 % (ref 42.7–76)
NRBC BLD AUTO-RTO: 0 /100 WBC (ref 0–0.2)
NT-PROBNP SERPL-MCNC: 494.4 PG/ML (ref 0–1800)
PLATELET # BLD AUTO: 216 10*3/MM3 (ref 140–450)
PMV BLD AUTO: 9.3 FL (ref 6–12)
POTASSIUM SERPL-SCNC: 3.8 MMOL/L (ref 3.5–5.2)
PROT SERPL-MCNC: 7.2 G/DL (ref 6–8.5)
RBC # BLD AUTO: 4.01 10*6/MM3 (ref 3.77–5.28)
RSV RNA NPH QL NAA+NON-PROBE: NOT DETECTED
SARS-COV-2 RNA RESP QL NAA+PROBE: NOT DETECTED
SODIUM SERPL-SCNC: 130 MMOL/L (ref 136–145)
TROPONIN T SERPL HS-MCNC: 12 NG/L
WBC NRBC COR # BLD AUTO: 9.29 10*3/MM3 (ref 3.4–10.8)
WHOLE BLOOD HOLD COAG: NORMAL
WHOLE BLOOD HOLD SPECIMEN: NORMAL

## 2024-12-17 PROCEDURE — 94799 UNLISTED PULMONARY SVC/PX: CPT

## 2024-12-17 PROCEDURE — 93005 ELECTROCARDIOGRAM TRACING: CPT | Performed by: EMERGENCY MEDICINE

## 2024-12-17 PROCEDURE — 99222 1ST HOSP IP/OBS MODERATE 55: CPT | Performed by: FAMILY MEDICINE

## 2024-12-17 PROCEDURE — 87040 BLOOD CULTURE FOR BACTERIA: CPT | Performed by: EMERGENCY MEDICINE

## 2024-12-17 PROCEDURE — 93010 ELECTROCARDIOGRAM REPORT: CPT | Performed by: SPECIALIST

## 2024-12-17 PROCEDURE — 84484 ASSAY OF TROPONIN QUANT: CPT | Performed by: EMERGENCY MEDICINE

## 2024-12-17 PROCEDURE — 99285 EMERGENCY DEPT VISIT HI MDM: CPT

## 2024-12-17 PROCEDURE — 25010000002 MORPHINE PER 10 MG: Performed by: EMERGENCY MEDICINE

## 2024-12-17 PROCEDURE — 93005 ELECTROCARDIOGRAM TRACING: CPT

## 2024-12-17 PROCEDURE — 80053 COMPREHEN METABOLIC PANEL: CPT | Performed by: EMERGENCY MEDICINE

## 2024-12-17 PROCEDURE — 87637 SARSCOV2&INF A&B&RSV AMP PRB: CPT | Performed by: EMERGENCY MEDICINE

## 2024-12-17 PROCEDURE — 94640 AIRWAY INHALATION TREATMENT: CPT

## 2024-12-17 PROCEDURE — 25010000002 CEFTRIAXONE PER 250 MG: Performed by: EMERGENCY MEDICINE

## 2024-12-17 PROCEDURE — 85025 COMPLETE CBC W/AUTO DIFF WBC: CPT | Performed by: EMERGENCY MEDICINE

## 2024-12-17 PROCEDURE — 83605 ASSAY OF LACTIC ACID: CPT | Performed by: EMERGENCY MEDICINE

## 2024-12-17 PROCEDURE — 71045 X-RAY EXAM CHEST 1 VIEW: CPT

## 2024-12-17 PROCEDURE — 83880 ASSAY OF NATRIURETIC PEPTIDE: CPT | Performed by: EMERGENCY MEDICINE

## 2024-12-17 RX ORDER — ONDANSETRON 2 MG/ML
4 INJECTION INTRAMUSCULAR; INTRAVENOUS EVERY 6 HOURS PRN
Status: DISCONTINUED | OUTPATIENT
Start: 2024-12-17 | End: 2024-12-21 | Stop reason: HOSPADM

## 2024-12-17 RX ORDER — MORPHINE SULFATE 2 MG/ML
2 INJECTION, SOLUTION INTRAMUSCULAR; INTRAVENOUS ONCE
Status: COMPLETED | OUTPATIENT
Start: 2024-12-17 | End: 2024-12-17

## 2024-12-17 RX ORDER — SODIUM CHLORIDE 0.9 % (FLUSH) 0.9 %
10 SYRINGE (ML) INJECTION EVERY 12 HOURS SCHEDULED
Status: DISCONTINUED | OUTPATIENT
Start: 2024-12-18 | End: 2024-12-21 | Stop reason: HOSPADM

## 2024-12-17 RX ORDER — METHYLPREDNISOLONE SODIUM SUCCINATE 125 MG/2ML
62.5 INJECTION, POWDER, LYOPHILIZED, FOR SOLUTION INTRAMUSCULAR; INTRAVENOUS EVERY 8 HOURS
Status: DISCONTINUED | OUTPATIENT
Start: 2024-12-18 | End: 2024-12-18

## 2024-12-17 RX ORDER — IPRATROPIUM BROMIDE AND ALBUTEROL SULFATE 2.5; .5 MG/3ML; MG/3ML
3 SOLUTION RESPIRATORY (INHALATION)
Status: DISCONTINUED | OUTPATIENT
Start: 2024-12-18 | End: 2024-12-19

## 2024-12-17 RX ORDER — SODIUM CHLORIDE 0.9 % (FLUSH) 0.9 %
10 SYRINGE (ML) INJECTION AS NEEDED
Status: DISCONTINUED | OUTPATIENT
Start: 2024-12-17 | End: 2024-12-21 | Stop reason: HOSPADM

## 2024-12-17 RX ORDER — ACETAMINOPHEN 325 MG/1
975 TABLET ORAL ONCE
Status: COMPLETED | OUTPATIENT
Start: 2024-12-17 | End: 2024-12-17

## 2024-12-17 RX ORDER — AMOXICILLIN 250 MG
2 CAPSULE ORAL 2 TIMES DAILY PRN
Status: DISCONTINUED | OUTPATIENT
Start: 2024-12-17 | End: 2024-12-21 | Stop reason: HOSPADM

## 2024-12-17 RX ORDER — ENOXAPARIN SODIUM 100 MG/ML
40 INJECTION SUBCUTANEOUS DAILY
Status: DISCONTINUED | OUTPATIENT
Start: 2024-12-18 | End: 2024-12-21 | Stop reason: HOSPADM

## 2024-12-17 RX ORDER — SODIUM CHLORIDE 9 MG/ML
40 INJECTION, SOLUTION INTRAVENOUS AS NEEDED
Status: DISCONTINUED | OUTPATIENT
Start: 2024-12-17 | End: 2024-12-21 | Stop reason: HOSPADM

## 2024-12-17 RX ORDER — AZITHROMYCIN 250 MG/1
500 TABLET, FILM COATED ORAL ONCE
Status: COMPLETED | OUTPATIENT
Start: 2024-12-17 | End: 2024-12-17

## 2024-12-17 RX ORDER — BISACODYL 5 MG/1
5 TABLET, DELAYED RELEASE ORAL DAILY PRN
Status: DISCONTINUED | OUTPATIENT
Start: 2024-12-17 | End: 2024-12-19

## 2024-12-17 RX ORDER — IPRATROPIUM BROMIDE AND ALBUTEROL SULFATE 2.5; .5 MG/3ML; MG/3ML
3 SOLUTION RESPIRATORY (INHALATION) EVERY 6 HOURS PRN
Status: DISCONTINUED | OUTPATIENT
Start: 2024-12-17 | End: 2024-12-21 | Stop reason: HOSPADM

## 2024-12-17 RX ORDER — IPRATROPIUM BROMIDE AND ALBUTEROL SULFATE 2.5; .5 MG/3ML; MG/3ML
3 SOLUTION RESPIRATORY (INHALATION) ONCE
Status: COMPLETED | OUTPATIENT
Start: 2024-12-17 | End: 2024-12-17

## 2024-12-17 RX ORDER — BISACODYL 10 MG
10 SUPPOSITORY, RECTAL RECTAL DAILY PRN
Status: DISCONTINUED | OUTPATIENT
Start: 2024-12-17 | End: 2024-12-19

## 2024-12-17 RX ORDER — POLYETHYLENE GLYCOL 3350 17 G/17G
17 POWDER, FOR SOLUTION ORAL DAILY PRN
Status: DISCONTINUED | OUTPATIENT
Start: 2024-12-17 | End: 2024-12-19

## 2024-12-17 RX ADMIN — MORPHINE SULFATE 2 MG: 2 INJECTION, SOLUTION INTRAMUSCULAR; INTRAVENOUS at 22:55

## 2024-12-17 RX ADMIN — ACETAMINOPHEN 975 MG: 325 TABLET ORAL at 22:55

## 2024-12-17 RX ADMIN — SODIUM CHLORIDE 1000 MG: 9 INJECTION INTRAMUSCULAR; INTRAVENOUS; SUBCUTANEOUS at 22:55

## 2024-12-17 RX ADMIN — IPRATROPIUM BROMIDE AND ALBUTEROL SULFATE 3 ML: .5; 3 SOLUTION RESPIRATORY (INHALATION) at 22:27

## 2024-12-17 RX ADMIN — AZITHROMYCIN DIHYDRATE 500 MG: 250 TABLET ORAL at 22:55

## 2024-12-18 ENCOUNTER — APPOINTMENT (OUTPATIENT)
Dept: CT IMAGING | Facility: HOSPITAL | Age: 89
End: 2024-12-18
Payer: MEDICARE

## 2024-12-18 LAB
ALBUMIN SERPL-MCNC: 3.7 G/DL (ref 3.5–5.2)
ALBUMIN/GLOB SERPL: 1.2 G/DL
ALP SERPL-CCNC: 62 U/L (ref 39–117)
ALT SERPL W P-5'-P-CCNC: 6 U/L (ref 1–33)
ANION GAP SERPL CALCULATED.3IONS-SCNC: 15.6 MMOL/L (ref 5–15)
AST SERPL-CCNC: 13 U/L (ref 1–32)
BASOPHILS # BLD AUTO: 0.02 10*3/MM3 (ref 0–0.2)
BASOPHILS NFR BLD AUTO: 0.2 % (ref 0–1.5)
BILIRUB SERPL-MCNC: 0.5 MG/DL (ref 0–1.2)
BUN SERPL-MCNC: 15 MG/DL (ref 8–23)
BUN/CREAT SERPL: 19 (ref 7–25)
CALCIUM SPEC-SCNC: 9 MG/DL (ref 8.6–10.5)
CHLORIDE SERPL-SCNC: 92 MMOL/L (ref 98–107)
CO2 SERPL-SCNC: 21.4 MMOL/L (ref 22–29)
CREAT SERPL-MCNC: 0.79 MG/DL (ref 0.57–1)
DEPRECATED RDW RBC AUTO: 45.7 FL (ref 37–54)
EGFRCR SERPLBLD CKD-EPI 2021: 71.6 ML/MIN/1.73
EOSINOPHIL # BLD AUTO: 0 10*3/MM3 (ref 0–0.4)
EOSINOPHIL NFR BLD AUTO: 0 % (ref 0.3–6.2)
ERYTHROCYTE [DISTWIDTH] IN BLOOD BY AUTOMATED COUNT: 13.6 % (ref 12.3–15.4)
GEN 5 1HR TROPONIN T REFLEX: 12 NG/L
GLOBULIN UR ELPH-MCNC: 3.1 GM/DL
GLUCOSE SERPL-MCNC: 181 MG/DL (ref 65–99)
HCT VFR BLD AUTO: 34.2 % (ref 34–46.6)
HGB BLD-MCNC: 11.6 G/DL (ref 12–15.9)
IMM GRANULOCYTES # BLD AUTO: 0.05 10*3/MM3 (ref 0–0.05)
IMM GRANULOCYTES NFR BLD AUTO: 0.6 % (ref 0–0.5)
LYMPHOCYTES # BLD AUTO: 0.79 10*3/MM3 (ref 0.7–3.1)
LYMPHOCYTES NFR BLD AUTO: 8.8 % (ref 19.6–45.3)
MCH RBC QN AUTO: 31.1 PG (ref 26.6–33)
MCHC RBC AUTO-ENTMCNC: 33.9 G/DL (ref 31.5–35.7)
MCV RBC AUTO: 91.7 FL (ref 79–97)
MONOCYTES # BLD AUTO: 0.07 10*3/MM3 (ref 0.1–0.9)
MONOCYTES NFR BLD AUTO: 0.8 % (ref 5–12)
NEUTROPHILS NFR BLD AUTO: 8.02 10*3/MM3 (ref 1.7–7)
NEUTROPHILS NFR BLD AUTO: 89.6 % (ref 42.7–76)
NRBC BLD AUTO-RTO: 0 /100 WBC (ref 0–0.2)
PLATELET # BLD AUTO: 201 10*3/MM3 (ref 140–450)
PMV BLD AUTO: 9.8 FL (ref 6–12)
POTASSIUM SERPL-SCNC: 4.1 MMOL/L (ref 3.5–5.2)
PROCALCITONIN SERPL-MCNC: 0.06 NG/ML (ref 0–0.25)
PROT SERPL-MCNC: 6.8 G/DL (ref 6–8.5)
QT INTERVAL: 333 MS
QTC INTERVAL: 404 MS
RBC # BLD AUTO: 3.73 10*6/MM3 (ref 3.77–5.28)
SODIUM SERPL-SCNC: 129 MMOL/L (ref 136–145)
TROPONIN T NUMERIC DELTA: 0 NG/L
WBC NRBC COR # BLD AUTO: 8.95 10*3/MM3 (ref 3.4–10.8)

## 2024-12-18 PROCEDURE — 94799 UNLISTED PULMONARY SVC/PX: CPT

## 2024-12-18 PROCEDURE — 71250 CT THORAX DX C-: CPT

## 2024-12-18 PROCEDURE — 25010000002 METHYLPREDNISOLONE PER 125 MG: Performed by: FAMILY MEDICINE

## 2024-12-18 PROCEDURE — 94760 N-INVAS EAR/PLS OXIMETRY 1: CPT

## 2024-12-18 PROCEDURE — 99232 SBSQ HOSP IP/OBS MODERATE 35: CPT | Performed by: STUDENT IN AN ORGANIZED HEALTH CARE EDUCATION/TRAINING PROGRAM

## 2024-12-18 PROCEDURE — 25010000002 AZITHROMYCIN PER 500 MG: Performed by: FAMILY MEDICINE

## 2024-12-18 PROCEDURE — 25810000003 SODIUM CHLORIDE 0.9 % SOLUTION 250 ML FLEX CONT: Performed by: FAMILY MEDICINE

## 2024-12-18 PROCEDURE — 87205 SMEAR GRAM STAIN: CPT | Performed by: STUDENT IN AN ORGANIZED HEALTH CARE EDUCATION/TRAINING PROGRAM

## 2024-12-18 PROCEDURE — 94664 DEMO&/EVAL PT USE INHALER: CPT

## 2024-12-18 PROCEDURE — 97161 PT EVAL LOW COMPLEX 20 MIN: CPT

## 2024-12-18 PROCEDURE — 36415 COLL VENOUS BLD VENIPUNCTURE: CPT | Performed by: FAMILY MEDICINE

## 2024-12-18 PROCEDURE — 85025 COMPLETE CBC W/AUTO DIFF WBC: CPT | Performed by: FAMILY MEDICINE

## 2024-12-18 PROCEDURE — 84484 ASSAY OF TROPONIN QUANT: CPT | Performed by: EMERGENCY MEDICINE

## 2024-12-18 PROCEDURE — 80053 COMPREHEN METABOLIC PANEL: CPT | Performed by: FAMILY MEDICINE

## 2024-12-18 PROCEDURE — 87070 CULTURE OTHR SPECIMN AEROBIC: CPT | Performed by: STUDENT IN AN ORGANIZED HEALTH CARE EDUCATION/TRAINING PROGRAM

## 2024-12-18 PROCEDURE — 84145 PROCALCITONIN (PCT): CPT | Performed by: FAMILY MEDICINE

## 2024-12-18 PROCEDURE — 25010000002 CEFTRIAXONE PER 250 MG: Performed by: FAMILY MEDICINE

## 2024-12-18 PROCEDURE — 25010000002 ENOXAPARIN PER 10 MG: Performed by: FAMILY MEDICINE

## 2024-12-18 RX ORDER — METHYLPREDNISOLONE SODIUM SUCCINATE 40 MG/ML
40 INJECTION, POWDER, LYOPHILIZED, FOR SOLUTION INTRAMUSCULAR; INTRAVENOUS EVERY 24 HOURS
Status: DISCONTINUED | OUTPATIENT
Start: 2024-12-18 | End: 2024-12-18

## 2024-12-18 RX ORDER — METHYLPREDNISOLONE SODIUM SUCCINATE 125 MG/2ML
62.5 INJECTION, POWDER, LYOPHILIZED, FOR SOLUTION INTRAMUSCULAR; INTRAVENOUS EVERY 12 HOURS
Status: DISCONTINUED | OUTPATIENT
Start: 2024-12-18 | End: 2024-12-18

## 2024-12-18 RX ORDER — METHYLPREDNISOLONE SODIUM SUCCINATE 40 MG/ML
40 INJECTION, POWDER, LYOPHILIZED, FOR SOLUTION INTRAMUSCULAR; INTRAVENOUS EVERY 24 HOURS
Status: DISCONTINUED | OUTPATIENT
Start: 2024-12-19 | End: 2024-12-21 | Stop reason: HOSPADM

## 2024-12-18 RX ORDER — GUAIFENESIN 600 MG/1
600 TABLET, EXTENDED RELEASE ORAL EVERY 12 HOURS SCHEDULED
Status: DISCONTINUED | OUTPATIENT
Start: 2024-12-18 | End: 2024-12-20

## 2024-12-18 RX ORDER — KETOROLAC TROMETHAMINE 30 MG/ML
15 INJECTION, SOLUTION INTRAMUSCULAR; INTRAVENOUS EVERY 6 HOURS PRN
Status: DISCONTINUED | OUTPATIENT
Start: 2024-12-18 | End: 2024-12-21 | Stop reason: HOSPADM

## 2024-12-18 RX ADMIN — SODIUM CHLORIDE 500 MG: 9 INJECTION, SOLUTION INTRAVENOUS at 20:52

## 2024-12-18 RX ADMIN — ENOXAPARIN SODIUM 40 MG: 100 INJECTION SUBCUTANEOUS at 09:03

## 2024-12-18 RX ADMIN — IPRATROPIUM BROMIDE AND ALBUTEROL SULFATE 3 ML: .5; 3 SOLUTION RESPIRATORY (INHALATION) at 03:37

## 2024-12-18 RX ADMIN — Medication 10 ML: at 21:44

## 2024-12-18 RX ADMIN — Medication 10 ML: at 09:03

## 2024-12-18 RX ADMIN — SODIUM CHLORIDE 1000 MG: 9 INJECTION INTRAMUSCULAR; INTRAVENOUS; SUBCUTANEOUS at 20:50

## 2024-12-18 RX ADMIN — GUAIFENESIN 600 MG: 600 TABLET ORAL at 11:00

## 2024-12-18 RX ADMIN — IPRATROPIUM BROMIDE AND ALBUTEROL SULFATE 3 ML: .5; 3 SOLUTION RESPIRATORY (INHALATION) at 11:49

## 2024-12-18 RX ADMIN — GUAIFENESIN 600 MG: 600 TABLET ORAL at 20:54

## 2024-12-18 RX ADMIN — IPRATROPIUM BROMIDE AND ALBUTEROL SULFATE 3 ML: .5; 3 SOLUTION RESPIRATORY (INHALATION) at 20:26

## 2024-12-18 RX ADMIN — IPRATROPIUM BROMIDE AND ALBUTEROL SULFATE 3 ML: .5; 3 SOLUTION RESPIRATORY (INHALATION) at 14:42

## 2024-12-18 RX ADMIN — IPRATROPIUM BROMIDE AND ALBUTEROL SULFATE 3 ML: .5; 3 SOLUTION RESPIRATORY (INHALATION) at 06:35

## 2024-12-18 RX ADMIN — Medication 10 ML: at 03:44

## 2024-12-18 RX ADMIN — METHYLPREDNISOLONE SODIUM SUCCINATE 62.5 MG: 125 INJECTION, POWDER, FOR SOLUTION INTRAMUSCULAR; INTRAVENOUS at 09:04

## 2024-12-18 RX ADMIN — METHYLPREDNISOLONE SODIUM SUCCINATE 62.5 MG: 125 INJECTION, POWDER, FOR SOLUTION INTRAMUSCULAR; INTRAVENOUS at 00:16

## 2024-12-18 NOTE — PLAN OF CARE
Goal Outcome Evaluation:  Plan of Care Reviewed With: patient           Outcome Evaluation: Pt presents with decreased strength, transfers and functional mobility. Will benefit from inpatient PT services and continued PT services upon discharge.    Anticipated Discharge Disposition (PT): inpatient rehabilitation facility                         Patient states she is no longer taking metoprolol, and Lisinopril.

## 2024-12-18 NOTE — H&P
[facility]   HISTORY AND PHYSICAL    Patient Name: Shayy Wasserman  : 1935  MRN: [id number]  Primary Care Physician:  Pee Mon DO  Date of admission: 2024    Subjective   Subjective     Chief Complaint: shortness of breath     History of Present Illness  Patient is a 89 y.o. year old female past medical history of COPD who presents to the emergency department for evaluation of Shortness of breath.  Patient complains of cough and worsening shortness of breath.  Patient states approximately 1 week ago she had a fall and injured her left side.  At that time her chest x-ray was negative for any fractures.  Today chest x-ray showed Large leftward directed hiatal hernia obscuring portion of left lower lobe. Retrocardiac atelectasis or airspace disease would be difficult to entirely exclude. Minimally displaced left posterolateral seventh rib fracture, likely related to known recent fall.  She denies any fevers, chills, diaphoresis, chest pain, nausea, vomiting, constipation or diarrhea.  In the ED she was started on Rocephin, azithromycin, neb and steroids.  She is admitted for further evaluation and management.    Shortness of Breath        Review of Systems   Respiratory:  Positive for shortness of breath.         Personal History     Past Medical History:   Diagnosis Date   • Anxiety disorder 2018   • Arthritis    • COPD (chronic obstructive pulmonary disease)    • Dependent edema 2017   • Gout    • Osteoporosis    • Primary osteoarthritis of left knee 2018   • Primary osteoarthritis of one hip, right 2017   • Primary osteoarthritis of right hip 2017       Past Surgical History:   Procedure Laterality Date   • BLADDER REPAIR     • COLONOSCOPY     • EYE SURGERY      implant- yes   • HYSTERECTOMY     • INTRAOCULAR LENS INSERTION      yes   • JOINT REPLACEMENT      Surgery   • KNEE SURGERY Right     knee repair   • KNEE SURGERY Right     repair   •  OTHER SURGICAL HISTORY      Artificial joints/limbs   • OTHER SURGICAL HISTORY      Artificial Joints/Limbs   • OTHER SURGICAL HISTORY      Joint surgery   • TOTAL KNEE ARTHROPLASTY Right        Family History: family history includes Colon cancer in her father. Otherwise pertinent FHx was reviewed and not pertinent to current issue.    Social History:  reports that she quit smoking about 54 years ago. Her smoking use included cigarettes. She started smoking about 74 years ago. She has a 4.8 pack-year smoking history. She has never used smokeless tobacco. She reports that she does not drink alcohol and does not use drugs.    Home Medications:  Budeson-Glycopyrrol-Formoterol, Nebulizer Cup/Tubing, ONE TOUCH ULTRA 2, albuterol, alendronate, allopurinol, amLODIPine, atenolol, azithromycin, benzonatate, cyclobenzaprine, glucose blood, glucose monitor, omeprazole, ondansetron ODT, pravastatin, and predniSONE    Allergies:  Allergies   Allergen Reactions   • Prednisone Angioedema   • Bactrim [Sulfamethoxazole-Trimethoprim] Unknown - Low Severity   • Griseofulvin Ultramicrosize [Griseofulvin] Unknown - Low Severity   • Levocetirizine Rash     hives       Objective    Objective     Vitals:   Temp:  [99.6 °F (37.6 °C)] 99.6 °F (37.6 °C)  Heart Rate:  [80-87] 82  Resp:  [12-22] 22  BP: (131-136)/(68-73) 136/68  Flow (L/min) (Oxygen Therapy):  [2] 2    Physical Exam  Constitutional:       General: She is not in acute distress.     Appearance: She is not ill-appearing.   HENT:      Head: Normocephalic and atraumatic.      Nose: No rhinorrhea.      Mouth/Throat:      Mouth: Mucous membranes are moist.      Pharynx: Oropharynx is clear.   Eyes:      Extraocular Movements: Extraocular movements intact.      Pupils: Pupils are equal, round, and reactive to light.   Cardiovascular:      Rate and Rhythm: Normal rate and regular rhythm.      Heart sounds: No murmur heard.  Pulmonary:      Effort: Pulmonary effort is normal. No  respiratory distress.      Breath sounds: Normal breath sounds. No wheezing.   Chest:      Chest wall: No tenderness.   Abdominal:      General: Abdomen is flat. Bowel sounds are normal. There is no distension.      Palpations: Abdomen is soft.      Tenderness: There is no abdominal tenderness. There is no guarding.   Musculoskeletal:         General: No swelling or tenderness.      Cervical back: Normal range of motion.   Skin:     General: Skin is warm and dry.   Neurological:      General: No focal deficit present.      Mental Status: She is alert and oriented to person, place, and time.   Psychiatric:         Mood and Affect: Mood normal.         Result Review    Result Review:  I have personally reviewed the results from the time of this admission to 12/17/2024 23:44 EST and agree with these findings:  []  Laboratory list / accordion  []  Microbiology  []  Radiology  []  EKG/Telemetry   []  Cardiology/Vascular   []  Pathology  []  Old records  []  Other:  Most notable findings include:       Assessment & Plan   Assessment / Plan     Brief Patient Summary:  Shayy Wasserman is a 89 y.o. female who presented to the ED with shortness of breath and rib pain.     Active Hospital Problems:  Active Hospital Problems    Diagnosis    • **Acute respiratory distress    • Acute on chronic respiratory failure with hypoxia      Plan:   COPD exacerbation vs Pneumonia:  Admit to inpatient  Cardiac diet  Continue with O2 support, keep O2 > 93%  Continue with Zithromax and Rocephin   Order IV Solu-Medrol 40 mg 3 times daily  Order DuoNebs every 4 hours and every 6 hours as needed  Consider consulting pulmonology if symptoms worsen  Daily CBC and CMP, will continue to monitor  Will continue to monitor vitals    Fall:  CXR showed Large leftward directed hiatal hernia obscuring portion of left lower lobe. Retrocardiac atelectasis or airspace disease would be difficult to entirely exclude. Minimally displaced left posterolateral  seventh rib fracture, likely related to known recent fall.   Continue with pain management       DVT prophylaxis: Lovenox  GI prophylaxis: Zofran, Protonix  CODE STATUS: CPR (full code)      VTE Prophylaxis:  Pharmacologic VTE prophylaxis orders are present.        CODE STATUS:    Level Of Support Discussed With: Patient  Code Status (Patient has no pulse and is not breathing): CPR (Attempt to Resuscitate)  Medical Interventions (Patient has pulse or is breathing): Full Support    Admission Status:  I believe this patient meets full code status.    Angelita Espana MD

## 2024-12-18 NOTE — PLAN OF CARE
Goal Outcome Evaluation:  Plan of Care Reviewed With: patient        Progress: improving  Outcome Evaluation: Patient is AO x4, able to make needs known. Patient is currently on 2L NC, no signs of respiratory distress noted. Sputum culture collected during shift. Patient recived IV solumedrol during shift. No complaints of pain or discomfort at this time, educated patient to hit call light if experiencing pain. No acute changes throughout shift. VSS. Continue with current plan of care.

## 2024-12-18 NOTE — PLAN OF CARE
Goal Outcome Evaluation:  Plan of Care Reviewed With: patient, child        Progress: no change  Outcome Evaluation: A&O x4, 2L NC, able to make needs known, no concerns, continue POC

## 2024-12-18 NOTE — ED PROVIDER NOTES
Time: 10:29 PM EST  Date of encounter:  12/17/2024  Independent Historian/Clinical History and Information was obtained by:   Patient    History is limited by: N/A    Chief Complaint: shortness of breath      History of Present Illness:  Patient is a 89 y.o. year old female who presents to the emergency department for evaluation of Shortness of breath.  Patient has had a ocugh.  She also complaining of left-sided chest pain.  Patient had a fall few days ago where she hit her left ribs on a coffee table.  She did not hit her head or lose consciousness..  Family reports fever.  She is also generalized weakness and some confusion.      Patient Care Team  Primary Care Provider: Pee Mon DO    Past Medical History:     Allergies   Allergen Reactions    Prednisone Angioedema    Bactrim [Sulfamethoxazole-Trimethoprim] Unknown - Low Severity    Griseofulvin Ultramicrosize [Griseofulvin] Unknown - Low Severity    Levocetirizine Rash     hives     Past Medical History:   Diagnosis Date    Anxiety disorder 11/12/2018    Arthritis     COPD (chronic obstructive pulmonary disease)     Dependent edema 11/28/2017    Gout     Osteoporosis     Primary osteoarthritis of left knee 05/24/2018    Primary osteoarthritis of one hip, right 12/12/2017    Primary osteoarthritis of right hip 12/12/2017     Past Surgical History:   Procedure Laterality Date    BLADDER REPAIR      COLONOSCOPY  2016    EYE SURGERY      implant- yes    HYSTERECTOMY      INTRAOCULAR LENS INSERTION      yes    JOINT REPLACEMENT      Surgery    KNEE SURGERY Right     knee repair    KNEE SURGERY Right     repair    OTHER SURGICAL HISTORY      Artificial joints/limbs    OTHER SURGICAL HISTORY      Artificial Joints/Limbs    OTHER SURGICAL HISTORY      Joint surgery    TOTAL KNEE ARTHROPLASTY Right      Family History   Problem Relation Age of Onset    Colon cancer Father        Home Medications:  Prior to Admission medications    Medication Sig Start  Date End Date Taking? Authorizing Provider   albuterol (PROVENTIL) (2.5 MG/3ML) 0.083% nebulizer solution NEBULIZE 1 VIAL PER NEBULIZER EVERY 4 HOURS AS NEEDED FOR WHEEZING 12/2/24   Pee Mon DO   alendronate (FOSAMAX) 70 MG tablet TAKE 1 TABLET BY MOUTH EVERY 7 DAYS 10/7/24   Pee Mon DO   allopurinol (ZYLOPRIM) 300 MG tablet TAKE 1 TABLET BY MOUTH DAILY 10/7/24   Pee Mon DO   amLODIPine (NORVASC) 5 MG tablet TAKE 1 TABLET BY MOUTH EVERY DAY 10/7/24   Pee Mno DO   atenolol (TENORMIN) 50 MG tablet TAKE 1 TABLET BY MOUTH 2 TIMES A DAY 10/7/24   Pee Mon DO   azithromycin (Zithromax Z-Erick) 250 MG tablet Take 2 tablets by mouth on day 1, then 1 tablet daily on days 2-5 11/30/24   Fer Johnson MD   benzonatate (TESSALON) 200 MG capsule Take 1 capsule by mouth 3 (Three) Times a Day As Needed for Cough. 11/30/24   Fer Johnson MD   Blood Glucose Monitoring Suppl (ONE TOUCH ULTRA 2) w/Device kit USE AS DIRECTED TO TEST BLOOD SUGAR EVERY MORNING FASTING AND RECORD 8/10/23   ProviderDiane MD Breztri Aerosphere 160-9-4.8 MCG/ACT aerosol inhaler Inhale 2 puffs 2 (Two) Times a Day. 10/5/23   Pee Mon DO   cyclobenzaprine (FLEXERIL) 10 MG tablet Take 1 tablet by mouth 3 (Three) Times a Day. 11/30/24   Fer Johnson MD   glucose blood (Glucose Meter Test) test strip check blood sugar every AM fasting and record. (Dx: E11.9) 8/10/23   Pee Mon DO   glucose monitor monitoring kit check blood sugar every AM fasting and record. (Dx: E11.9) 8/10/23   Pee Mon DO   omeprazole (priLOSEC) 40 MG capsule TAKE 1 CAPSULE BY MOUTH EVERY DAY 9/9/24   Pee Mon DO   ondansetron ODT (ZOFRAN-ODT) 4 MG disintegrating tablet Place 1 tablet on the tongue Every 8 (Eight) Hours As Needed for Nausea or Vomiting. 11/30/24   Fer Johnson MD   pravastatin (Pravachol) 20 MG  "tablet Take 1 tablet by mouth Every Night.  Patient not taking: Reported on 2024   Pee Mon DO   predniSONE (DELTASONE) 50 MG tablet Take 1 tablet by mouth Daily. 24   Fer Johnson MD   Respiratory Therapy Supplies (Nebulizer Cup/Tubing) device Use 1 Device Every 6 (Six) Hours As Needed (Shortness of breath). 24   Pee Mon DO        Social History:   Social History     Tobacco Use    Smoking status: Former     Current packs/day: 0.00     Average packs/day: 0.3 packs/day for 19.0 years (4.8 ttl pk-yrs)     Types: Cigarettes     Start date:      Quit date: 1970     Years since quittin.9    Smokeless tobacco: Never    Tobacco comments:     started at age 16- stopped at age 35   Vaping Use    Vaping status: Never Used   Substance Use Topics    Alcohol use: Never     Comment: 2019- 1/15/2019 does not drink     Drug use: Never         Review of Systems:  Review of Systems   Constitutional:  Positive for chills and fever.   HENT:  Negative for congestion, ear pain and sore throat.    Eyes:  Negative for pain.   Respiratory:  Positive for cough and shortness of breath. Negative for chest tightness.    Cardiovascular:  Negative for chest pain.   Gastrointestinal:  Negative for abdominal pain, diarrhea, nausea and vomiting.   Genitourinary:  Negative for flank pain and hematuria.   Musculoskeletal:  Negative for joint swelling.   Skin:  Negative for pallor.   Neurological:  Positive for weakness. Negative for seizures and headaches.   Psychiatric/Behavioral:  Positive for confusion.    All other systems reviewed and are negative.       Physical Exam:  /68   Pulse 82   Temp 99.6 °F (37.6 °C) (Oral)   Resp 22   Ht 160 cm (63\")   Wt 68 kg (149 lb 14.6 oz)   SpO2 92%   BMI 26.56 kg/m²     Physical Exam  Constitutional:       Appearance: Normal appearance.   HENT:      Head: Normocephalic and atraumatic.      Nose: Nose normal.      " Mouth/Throat:      Mouth: Mucous membranes are moist.   Eyes:      Extraocular Movements: Extraocular movements intact.      Conjunctiva/sclera: Conjunctivae normal.   Cardiovascular:      Rate and Rhythm: Normal rate and regular rhythm.      Pulses: Normal pulses.      Heart sounds: Normal heart sounds.   Pulmonary:      Effort: Pulmonary effort is normal.      Breath sounds: Wheezing and rhonchi present.   Abdominal:      General: There is no distension.      Palpations: Abdomen is soft.      Tenderness: There is no abdominal tenderness.   Musculoskeletal:         General: Normal range of motion.      Cervical back: Normal range of motion.   Skin:     General: Skin is warm and dry.      Capillary Refill: Capillary refill takes less than 2 seconds.   Neurological:      General: No focal deficit present.      Mental Status: She is alert and oriented to person, place, and time. Mental status is at baseline.   Psychiatric:         Mood and Affect: Mood normal.         Behavior: Behavior normal.                    Medical Decision Making:      Comorbidities that affect care:    COPD    External Notes reviewed:    Previous Clinic Note: Patient was seen by her PCP on 10/15/2024 for prediabetes, chronic kidney disease, hyperlipidemia, hypertension, osteoporosis, COPD.      The following orders were placed and all results were independently analyzed by me:  Orders Placed This Encounter   Procedures    Blood Culture - Blood,    Blood Culture - Blood,    COVID-19, FLU A/B, RSV PCR 1 HR TAT - Swab, Nasopharynx    XR Chest 1 View    [place] Draw    Comprehensive Metabolic Panel    BNP    High Sensitivity Troponin T    CBC Auto Differential    Lactic Acid, Plasma    High Sensitivity Troponin T 1Hr    NPO Diet NPO Type: Strict NPO    Undress & Gown    Continuous Pulse Oximetry    Vital Signs    Inpatient Hospitalist Consult    Oxygen Therapy- Nasal Cannula; Titrate 1-6 LPM Per SpO2; 90 - 95%    ECG 12 Lead ED Triage Standing  Order; SOA    Insert Peripheral IV    CBC & Differential    Green Top (Gel)    Lavender Top    Gold Top - SST    Light Blue Top       Medications Given in the Emergency Department:  Medications   sodium chloride 0.9 % flush 10 mL (has no administration in time range)   cefTRIAXone (ROCEPHIN) in NS 1 gram/10ml IV PUSH syringe (1,000 mg Intravenous Given 12/17/24 2255)   azithromycin (ZITHROMAX) tablet 500 mg (500 mg Oral Given 12/17/24 2255)   morphine injection 2 mg (2 mg Intravenous Given 12/17/24 2255)   ipratropium-albuterol (DUO-NEB) nebulizer solution 3 mL (3 mL Nebulization Given 12/17/24 2227)   acetaminophen (TYLENOL) tablet 975 mg (975 mg Oral Given 12/17/24 2255)        ED Course:    ED Course as of 12/17/24 2336   Tue Dec 17, 2024   2232 ECG 12 Lead ED Triage Standing Order; SOA  Sinus rhythm rate of 88.  No acute ST elevation.  Nonspecific T wave abnormality.  Normal ID and QTc.  EKG interpreted by me [LD]      ED Course User Index  [LD] Jose Alston MD       Labs:    Lab Results (last 24 hours)       Procedure Component Value Units Date/Time    CBC & Differential [[id number]]  (Abnormal) Collected: 12/17/24 2141    Specimen: Blood Updated: 12/17/24 2152    Narrative:      The following orders were created for panel order CBC & Differential.  Procedure                               Abnormality         Status                     ---------                               -----------         ------                     CBC Auto Differential[[id number]]        Abnormal            Final result                 Please view results for these tests on the individual orders.    Comprehensive Metabolic Panel [[id number]]  (Abnormal) Collected: 12/17/24 2141    Specimen: Blood Updated: 12/17/24 2225     Glucose 124 mg/dL      BUN 15 mg/dL      Creatinine 1.00 mg/dL      Sodium 130 mmol/L      Potassium 3.8 mmol/L      Chloride 94 mmol/L      CO2 26.1 mmol/L      Calcium 9.5 mg/dL      Total Protein 7.2 g/dL       Albumin 3.9 g/dL      ALT (SGPT) 6 U/L      AST (SGOT) 14 U/L      Alkaline Phosphatase 69 U/L      Total Bilirubin 0.7 mg/dL      Globulin 3.3 gm/dL      A/G Ratio 1.2 g/dL      BUN/Creatinine Ratio 15.0     Anion Gap 9.9 mmol/L      eGFR 54.0 mL/min/1.73     Narrative:      GFR Categories in Chronic Kidney Disease (CKD)      GFR Category          GFR (mL/min/1.73)    Interpretation  G1                     90 or greater         Normal or high (1)  G2                      60-89                Mild decrease (1)  G3a                   45-59                Mild to moderate decrease  G3b                   30-44                Moderate to severe decrease  G4                    15-29                Severe decrease  G5                    14 or less           Kidney failure          (1)In the absence of evidence of kidney disease, neither GFR category G1 or G2 fulfill the criteria for CKD.    eGFR calculation 2021 CKD-EPI creatinine equation, which does not include race as a factor    BNP [[id number]]  (Normal) Collected: 12/17/24 2141    Specimen: Blood Updated: 12/17/24 2209     proBNP 494.4 pg/mL     Narrative:      This assay is used as an aid in the diagnosis of individuals suspected of having heart failure. It can be used as an aid in the diagnosis of acute decompensated heart failure (ADHF) in patients presenting with signs and symptoms of ADHF to the emergency department (ED). In addition, NT-proBNP of <300 pg/mL indicates ADHF is not likely.    Age Range Result Interpretation  NT-proBNP Concentration (pg/mL:      <50             Positive            >450                   Gray                 300-450                    Negative             <300    50-75           Positive            >900                  Gray                300-900                  Negative            <300      >75             Positive            >1800                  Gray                300-1800                  Negative            <300    High  Sensitivity Troponin T [[id number]]  (Normal) Collected: 12/17/24 2141    Specimen: Blood Updated: 12/17/24 2211     HS Troponin T 12 ng/L     Narrative:      High Sensitive Troponin T Reference Range:  <14.0 ng/L- Negative Female for AMI  <22.0 ng/L- Negative Male for AMI  >=14 - Abnormal Female indicating possible myocardial injury.  >=22 - Abnormal Male indicating possible myocardial injury.   Clinicians would have to utilize clinical acumen, EKG, Troponin, and serial changes to determine if it is an Acute Myocardial Infarction or myocardial injury due to an underlying chronic condition.         CBC Auto Differential [[id number]]  (Abnormal) Collected: 12/17/24 2141    Specimen: Blood Updated: 12/17/24 2152     WBC 9.29 10*3/mm3      RBC 4.01 10*6/mm3      Hemoglobin 12.4 g/dL      Hematocrit 37.2 %      MCV 92.8 fL      MCH 30.9 pg      MCHC 33.3 g/dL      RDW 13.8 %      RDW-SD 46.9 fl      MPV 9.3 fL      Platelets 216 10*3/mm3      Neutrophil % 75.6 %      Lymphocyte % 15.3 %      Monocyte % 5.8 %      Eosinophil % 2.2 %      Basophil % 0.8 %      Immature Grans % 0.3 %      Neutrophils, Absolute 7.03 10*3/mm3      Lymphocytes, Absolute 1.42 10*3/mm3      Monocytes, Absolute 0.54 10*3/mm3      Eosinophils, Absolute 0.20 10*3/mm3      Basophils, Absolute 0.07 10*3/mm3      Immature Grans, Absolute 0.03 10*3/mm3      nRBC 0.0 /100 WBC     Blood Culture - Blood, Arm, Right [[id number]] Collected: 12/17/24 2141    Specimen: Blood from Arm, Right Updated: 12/17/24 2148    Blood Culture - Blood, Arm, Left [[id number]] Collected: 12/17/24 2141    Specimen: Blood from Arm, Left Updated: 12/17/24 2148    Lactic Acid, Plasma [[id number]]  (Normal) Collected: 12/17/24 2141    Specimen: Blood Updated: 12/17/24 2209     Lactate 1.2 mmol/L     COVID-19, FLU A/B, RSV PCR 1 HR TAT - Swab, Nasopharynx [[id number]]  (Normal) Collected: 12/17/24 2141    Specimen: Swab from Nasopharynx Updated: 12/17/24 2226     COVID19 Not Detected      Influenza A PCR Not Detected     Influenza B PCR Not Detected     RSV, PCR Not Detected    Narrative:      Fact sheet for providers: https://www.fda.gov/media/920246/download    Fact sheet for patients: https://www.fda.gov/media/454567/download    Test performed by PCR.             Imaging:    XR Chest 1 View    Result Date: 12/17/2024  XR CHEST 1 VW Date of Exam: 12/17/2024 9:25 PM EST Indication: SOA Triage Protocol Comparison: Chest radiograph 12/11/2024 Findings: Large leftward directed hiatal hernia grossly similar to previous comparisons. This limits assessment of the left lower lobe. Patient slightly rotated towards the left. No convincing consolidation, large effusion, edema or pneumothorax. Degenerative related osseous change including severe bone-on-bone osteoarthritic change of both glenohumeral joints. Aortic atherosclerotic disease. There is a minimally displaced left posterior lateral seventh rib fracture.     Impression: No convincing acute airspace process. Large leftward directed hiatal hernia obscuring portion of left lower lobe. Retrocardiac atelectasis or airspace disease would be difficult to entirely exclude. Minimally displaced left posterolateral seventh rib fracture, likely related to known recent fall. Electronically Signed: Gael Vallejo MD  12/17/2024 9:36 PM EST  Workstation ID: [id number]       Differential Diagnosis and Discussion:    Dyspnea: Differential diagnosis includes but is not limited to metabolic acidosis, neurological disorders, psychogenic, asthma, pneumothorax, upper airway obstruction, COPD, pneumonia, noncardiogenic pulmonary edema, interstitial lung disease, anemia, congestive heart failure, and pulmonary embolism    PROCEDURES:    Labs were drawn in the emergency department and all labs were reviewed and interpreted by me.  X-ray were performed in the emergency department and all X-ray impressions were independently interpreted by me.  An EKG was performed and the  EKG was interpreted by me.    ECG 12 Lead ED Triage Standing Order; SOA   Preliminary Result   HEART RATE=88  bpm   RR Ynzwvjkq=457  ms   PA Biwbqgsf=617  ms   P Horizontal Axis=1  deg   P Front Axis=-8  deg   QRSD Interval=87  ms   QT Dgdaynxg=454  ms   MQdI=466  ms   QRS Axis=-26  deg   T Wave Axis=0  deg   - ABNORMAL ECG -   Sinus rhythm   Borderline left axis deviation   Low voltage, precordial leads   Abnormal R-wave progression, late transition   Nonspecific T abnormalities, diffuse leads   Date and Time of Study:2024-12-17 21:06:39          Procedures    MDM  Number of Diagnoses or Management Options  Acute respiratory failure with hypoxia  Closed fracture of one rib of left side, initial encounter  Pneumonia due to infectious organism, unspecified laterality, unspecified part of lung  Weakness generalized  Diagnosis management comments: On arrival O2 sat in the upper 80s.  Patient placed on supplemental oxygen.  Patient does have rhonchi on exam.  Presentation concerning for pneumonia.  Patient also has a displaced left posterior lateral seventh rib fracture.  Family reports generalized weakness and fevers.  Patient was started on antibiotics.  Discussed patient with hospitalist and will admit for further care.       Amount and/or Complexity of Data Reviewed  Clinical lab tests: reviewed  Tests in the radiology section of CPT®: reviewed  Review and summarize past medical records: yes  Independent visualization of images, tracings, or specimens: yes    Risk of Complications, Morbidity, and/or Mortality  Presenting problems: moderate  Management options: moderate                       Patient Care Considerations:    CONSULT: I considered consulting pulmonology, however pt stable for in patient consult      Consultants/Shared Management Plan:    Hospitalist: I have discussed the case with Dr Espana who agrees to accept the patient for admission.    Social Determinants of Health:    Patient has presented with  family members who are responsible, reliable and will ensure follow up care.      Disposition and Care Coordination:    Admit:   Through independent evaluation of the patient's history, physical, and imperical data, the patient meets criteria for inpatient admission to the hospital.        Final diagnoses:   Pneumonia due to infectious organism, unspecified laterality, unspecified part of lung   Closed fracture of one rib of left side, initial encounter   Acute respiratory failure with hypoxia   Weakness generalized        ED Disposition       ED Disposition   Decision to Admit    Condition   --    Comment   --               This medical record created using voice recognition software.             Jose Alston MD  12/17/24 [id number]

## 2024-12-18 NOTE — THERAPY EVALUATION
Acute Care - Physical Therapy Initial Evaluation  TATUM Magana     Patient Name: Shayy Wasserman  : 1935  MRN: [id number]  Today's Date: 2024      Visit Dx:     ICD-10-CM ICD-9-CM   1. Pneumonia due to infectious organism, unspecified laterality, unspecified part of lung  J18.9 486   2. Closed fracture of one rib of left side, initial encounter  S22.32XA 807.01   3. Acute respiratory failure with hypoxia  J96.01 518.81   4. Weakness generalized  R53.1 780.79   5. Difficulty walking  R26.2 719.7     Patient Active Problem List   Diagnosis    Pulmonary emphysema    Bronchitis    Essential (primary) hypertension    Gout    Hyperlipidemia    Osteoarthritis    Stage 3 chronic kidney disease    Osteoporosis    Localized edema    Prediabetes    Pain of left upper extremity    Anxiety with somatization    Hypoxia    Community acquired pneumonia of right lower lobe of lung    COPD with acute exacerbation    Pneumonia of right lower lobe due to Streptococcus pneumoniae    Hospital discharge follow-up    Left upper quadrant abdominal pain    COPD (chronic obstructive pulmonary disease)    Medicare annual wellness visit, subsequent    Acute respiratory distress    Acute on chronic respiratory failure with hypoxia     Past Medical History:   Diagnosis Date    Anxiety disorder 2018    Arthritis     COPD (chronic obstructive pulmonary disease)     Dependent edema 2017    Gout     Osteoporosis     Primary osteoarthritis of left knee 2018    Primary osteoarthritis of one hip, right 2017    Primary osteoarthritis of right hip 2017     Past Surgical History:   Procedure Laterality Date    BLADDER REPAIR      COLONOSCOPY  2016    EYE SURGERY      implant- yes    HYSTERECTOMY      INTRAOCULAR LENS INSERTION      yes    JOINT REPLACEMENT      Surgery    KNEE SURGERY Right     knee repair    KNEE SURGERY Right     repair    OTHER SURGICAL HISTORY      Artificial joints/limbs    OTHER SURGICAL  HISTORY      Artificial Joints/Limbs    OTHER SURGICAL HISTORY      Joint surgery    TOTAL KNEE ARTHROPLASTY Right      PT Assessment (Last 12 Hours)       PT Evaluation and Treatment       Row Name 12/18/24 1300          Physical Therapy Time and Intention    Subjective Information no complaints  -DP     Document Type evaluation  -DP     Mode of Treatment individual therapy;physical therapy  -DP     Patient Effort good  -DP       Row Name 12/18/24 1300          General Information    Patient Profile Reviewed yes  -DP     Patient Observations alert;cooperative  -DP     Prior Level of Function independent:;gait;transfer;bed mobility;ADL's  -DP     Equipment Currently Used at Home none  -DP     Existing Precautions/Restrictions fall  -DP     Barriers to Rehab none identified  -DP       Row Name 12/18/24 1300          Living Environment    Current Living Arrangements home  -DP     Home Accessibility stairs to enter home  -DP     People in Home child(dewey), adult  -DP       Row Name 12/18/24 1300          Range of Motion (ROM)    Range of Motion bilateral lower extremities;ROM is WFL  -DP       Row Name 12/18/24 1300          Strength Comprehensive (MMT)    General Manual Muscle Testing (MMT) Assessment lower extremity strength deficits identified  -DP     Comment, General Manual Muscle Testing (MMT) Assessment BLE: 4-/5  -DP       Row Name 12/18/24 1300          Bed Mobility    Bed Mobility supine-sit-supine  -DP     Supine-Sit-Supine [place] (Bed Mobility) minimum assist (75% patient effort)  -DP       Row Name 12/18/24 1300          Transfers    Transfers sit-stand transfer  -DP       Row Name 12/18/24 1300          Sit-Stand Transfer    Sit-Stand [place] (Transfers) minimum assist (75% patient effort)  -DP       Row Name 12/18/24 1300          Gait/Stairs (Locomotion)    Gait/Stairs Locomotion gait/ambulation assistive device  -DP     [place] Level (Gait) contact guard  -DP     Assistive Device (Gait)  walker, front-wheeled  -DP     Patient was able to Ambulate yes  -DP     Distance in Feet (Gait) 12  -DP       Row Name 12/18/24 1300          Balance    Balance Assessment standing dynamic balance  -DP     Dynamic Standing Balance contact guard  -DP     Position/Device Used, Standing Balance walker, front-wheeled  -DP       Row Name 12/18/24 1300          Plan of Care Review    Plan of Care Reviewed With patient  -DP     Outcome Evaluation Pt presents with decreased strength, transfers and functional mobility. Will benefit from inpatient PT services and continued PT services upon discharge.  -DP       Row Name 12/18/24 1300          Therapy Assessment/Plan (PT)    Criteria for Skilled Interventions Met (PT) yes;meets criteria  -DP     Therapy Frequency (PT) daily  -DP     Predicted Duration of Therapy Intervention (PT) 10 days  -DP     Problem List (PT) problems related to;mobility  -DP     Activity Limitations Related to Problem List (PT) unable to transfer safely;unable to ambulate safely  -DP       Row Name 12/18/24 1300          PT Evaluation Complexity    History, PT Evaluation Complexity no personal factors and/or comorbidities  -DP     Examination of Body Systems (PT Eval Complexity) total of 4 or more elements  -DP     Clinical Presentation (PT Evaluation Complexity) stable  -DP     Clinical Decision Making (PT Evaluation Complexity) low complexity  -DP     Overall Complexity (PT Evaluation Complexity) low complexity  -DP       Row Name 12/18/24 1300          Physical Therapy Goals    Transfer Goal Selection (PT) transfer, PT goal 1  -DP     Gait Training Goal Selection (PT) gait training, PT goal 1  -DP       Row Name 12/18/24 1300          Transfer Goal 1 (PT)    Activity/Assistive Device (Transfer Goal 1, PT) sit-to-stand/stand-to-sit  -DP     [place] Level/Cues Needed (Transfer Goal 1, PT) supervision required  -DP     Time Frame (Transfer Goal 1, PT) 10 days  -DP       Row Name 12/18/24 1300           Gait Training Goal 1 (PT)    Activity/Assistive Device (Gait Training Goal 1, PT) assistive device use;walker, rolling  -DP     [place] Level (Gait Training Goal 1, PT) supervision required  -DP     Distance (Gait Training Goal 1, PT) 200  -DP     Time Frame (Gait Training Goal 1, PT) 10 days  -DP               User Key  (r) = Recorded By, (t) = Taken By, (c) = Cosigned By      Initials Name Provider Type    Aldo Arshad, PT Physical Therapist                    Physical Therapy Education        No education to display                  PT Recommendation and Plan  Anticipated Discharge Disposition (PT): inpatient rehabilitation facility  Planned Therapy Interventions (PT): balance training, bed mobility training, gait training, strengthening, transfer training  Therapy Frequency (PT): daily  Plan of Care Reviewed With: patient  Outcome Evaluation: Pt presents with decreased strength, transfers and functional mobility. Will benefit from inpatient PT services and continued PT services upon discharge.   Outcome Measures       Row Name 12/18/24 1300             How much help from another person do you currently need...    Turning from your back to your side while in flat bed without using bedrails? 4  -DP      Moving from lying on back to sitting on the side of a flat bed without bedrails? 3  -DP      Moving to and from a bed to a chair (including a wheelchair)? 3  -DP      Standing up from a chair using your arms (e.g., wheelchair, bedside chair)? 3  -DP      Climbing 3-5 steps with a railing? 3  -DP      To walk in hospital room? 3  -DP      AM-PAC 6 Clicks Score (PT) 19  -DP         Functional Assessment    Outcome Measure Options AM-PAC 6 Clicks Basic Mobility (PT)  -DP                User Key  (r) = Recorded By, (t) = Taken By, (c) = Cosigned By      Initials Name Provider Type    Aldo Arshad, PT Physical Therapist                     Time Calculation:    PT Charges       Row Name 12/18/24 7210              Time Calculation    PT Received On 12/18/24  -DP      PT Goal Re-Cert Due Date 12/27/24  -DP         Untimed Charges    PT Eval/Re-eval Minutes 40  -DP         Total Minutes    Untimed Charges Total Minutes 40  -DP       Total Minutes 40  -DP                User Key  (r) = Recorded By, (t) = Taken By, (c) = Cosigned By      Initials Name Provider Type    Aldo Arshad, PT Physical Therapist                      PT G-Codes  Outcome Measure Options: AM-PAC 6 Clicks Basic Mobility (PT)  AM-PAC 6 Clicks Score (PT): 19    Aldo High, PT  12/18/2024

## 2024-12-18 NOTE — PROGRESS NOTES
[facility]   Hospitalist Progress Note  Date: 2024  Patient Name: Shayy Wasserman  : 1935  MRN: [id number]  Date of admission: 2024  Room/Bed: [facility]      Subjective   Subjective     Chief Complaint: Shortness of breath    Summary:Shayy Wasserman is a 89 y.o. female with COPD presents to the emergency department for evaluation of shortness of breath.  Patient says that 1 week ago she had a fall and injured her left side.  Chest x-ray showed seventh rib fracture.  Pneumonia could not be excluded.  CT scan showed possible pneumonia on left side.  Patient admitted for COPD exacerbation and treatment of pneumonia.    Interval Followup:     Patient says that her breathing is much improved since coming in.  Her daughter is at bedside and was updated.  Patient says that she has been coughing up sputum.    All systems reviewed and negative except for what is outlined above.      Objective   Objective     Vitals:   Temp:  [97.3 °F (36.3 °C)-99.6 °F (37.6 °C)] 97.3 °F (36.3 °C)  Heart Rate:  [65-91] 68  Resp:  [12-22] 18  BP: (126-148)/(64-83) 126/65  Flow (L/min) (Oxygen Therapy):  [2] 2    Physical Exam   General:  NAD  Cardiovascular: RRR, normal S1, S2  Pulmonary: Coarse breath sound  Gastrointestinal: Soft and nontender  Neuro: CN II through XII grossly intact; speech clear; no tremor  Psych: Mood and affect appropriate    Result Review    Result Review:  I have personally reviewed these results:  [x]  Laboratory      Lab 24  0552 24  0005 241   WBC 8.95  --  9.29   HEMOGLOBIN 11.6*  --  12.4   HEMATOCRIT 34.2  --  37.2   PLATELETS 201  --  216   NEUTROS ABS 8.02*  --  7.03*   IMMATURE GRANS (ABS) 0.05  --  0.03   LYMPHS ABS 0.79  --  1.42   MONOS ABS 0.07*  --  0.54   EOS ABS 0.00  --  0.20   MCV 91.7  --  92.8   PROCALCITONIN  --  0.06  --    LACTATE  --   --  1.2         Lab 24  0552 24  2141   SODIUM 129* 130*   POTASSIUM 4.1 3.8   CHLORIDE 92* 94*   CO2  21.4* 26.1   ANION GAP 15.6* 9.9   BUN 15 15   CREATININE 0.79 1.00   EGFR 71.6 54.0*   GLUCOSE 181* 124*   CALCIUM 9.0 9.5         Lab 12/18/24  0552 12/17/24 2141   TOTAL PROTEIN 6.8 7.2   ALBUMIN 3.7 3.9   GLOBULIN 3.1 3.3   ALT (SGPT) 6 6   AST (SGOT) 13 14   BILIRUBIN 0.5 0.7   ALK PHOS 62 69         Lab 12/18/24  0005 12/17/24 2141   PROBNP  --  494.4   HSTROP T 12 12                 Brief Urine Lab Results  (Last result in the past 365 days)        Color   Clarity   Blood   Leuk Est   Nitrite   Protein   CREAT   Urine HCG        11/08/24 1512             86.3               [x]  Microbiology   Microbiology Results (last 10 days)       Procedure Component Value - Date/Time    Respiratory Culture - Sputum, Trachea [[id number]] Collected: 12/18/24 1131    Lab Status: Preliminary result Specimen: Sputum from Trachea Updated: 12/18/24 1205     Gram Stain Rare (1+) WBCs seen      Rare (1+) Gram positive bacilli resembling diphtheroids      Occasional Gram positive cocci    COVID-19, FLU A/B, RSV PCR 1 HR TAT - Swab, Nasopharynx [[id number]]  (Normal) Collected: 12/17/24 2141    Lab Status: Final result Specimen: Swab from Nasopharynx Updated: 12/17/24 2226     COVID19 Not Detected     Influenza A PCR Not Detected     Influenza B PCR Not Detected     RSV, PCR Not Detected    Narrative:      Fact sheet for providers: https://www.fda.gov/media/881862/download    Fact sheet for patients: https://www.fda.gov/media/359146/download    Test performed by PCR.          [x]  Radiology  CT Chest Without Contrast Diagnostic    Result Date: 12/18/2024   1. New or increased bibasilar pulmonary opacities are seen since the 2/1/2019 CT study. The findings are nonspecific. They may represent atelectasis alone. However, pulmonary contusion, pneumonia, and/or pulmonary aspiration cannot be excluded.  2. There are subacute nonsegmental left lateral/posterolateral eighth (8th) and ninth (9th) rib fractures, as discussed.  3. No definite  acute fracture is seen.  4. No pneumothorax.  5. Minimal, if any, pleural effusion is identified.  6. Atherosclerotic changes are noted.  7. Interval increase in size of the left-sided hiatal hernia since 2/1/2019. It contains a large portion of the stomach.  8. Please see above comments for further detail.    Portions of this note were completed with a voice recognition program.  Electronically Signed By-Matti Downey MD On:12/18/2024 3:16 AM      XR Chest 1 View    Result Date: 12/17/2024  Impression: No convincing acute airspace process. Large leftward directed hiatal hernia obscuring portion of left lower lobe. Retrocardiac atelectasis or airspace disease would be difficult to entirely exclude. Minimally displaced left posterolateral seventh rib fracture, likely related to known recent fall. Electronically Signed: Gael Vallejo MD  12/17/2024 9:36 PM EST  Workstation ID: [id number]    XR Ribs 2 View Left    Result Date: 12/11/2024  Impression: No acute fracture or malalignment. Electronically Signed: Sergio Finney MD  12/11/2024 6:09 PM EST  Workstation ID: [id number]    XR Chest 1 View    Result Date: 12/11/2024  Impression: No active cardiopulmonary disease. There is underlying emphysema and scarring. There is a moderate hiatal hernia. Electronically Signed: Yanick Casarez DO  12/11/2024 5:45 PM EST  Workstation ID: [id number]   []  EKG/Telemetry   []  Cardiology/Vascular   []  Pathology  []  Old records  []  Other:    Assessment & Plan        Assessment and Plan:    #Acute hypoxic respiratory failure  #COPD exacerbation  #Pneumonia  Continue ceftriaxone and azithromycin  Continue Solu-Medrol  Continue DuoNeb  Start Mucinex  Continue to monitor respiratory status  Sputum culture ordered     Discussed with RN.    VTE Prophylaxis:  Pharmacologic VTE prophylaxis orders are present.        CODE STATUS:   Level Of Support Discussed With: Patient  Code Status (Patient has no pulse and is not breathing): CPR (Attempt to  Resuscitate)  Medical Interventions (Patient has pulse or is breathing): Full Support      Electronically signed by Nicko Pulido MD, 12/18/2024, 13:05 EST.

## 2024-12-19 LAB
ALBUMIN SERPL-MCNC: 3.5 G/DL (ref 3.5–5.2)
ALBUMIN/GLOB SERPL: 1.1 G/DL
ALP SERPL-CCNC: 59 U/L (ref 39–117)
ALT SERPL W P-5'-P-CCNC: 6 U/L (ref 1–33)
ANION GAP SERPL CALCULATED.3IONS-SCNC: 14.3 MMOL/L (ref 5–15)
AST SERPL-CCNC: 11 U/L (ref 1–32)
BASOPHILS # BLD AUTO: 0.01 10*3/MM3 (ref 0–0.2)
BASOPHILS NFR BLD AUTO: 0.1 % (ref 0–1.5)
BILIRUB SERPL-MCNC: 0.3 MG/DL (ref 0–1.2)
BUN SERPL-MCNC: 15 MG/DL (ref 8–23)
BUN/CREAT SERPL: 21.4 (ref 7–25)
CALCIUM SPEC-SCNC: 8.9 MG/DL (ref 8.6–10.5)
CHLORIDE SERPL-SCNC: 94 MMOL/L (ref 98–107)
CO2 SERPL-SCNC: 22.7 MMOL/L (ref 22–29)
CREAT SERPL-MCNC: 0.7 MG/DL (ref 0.57–1)
DEPRECATED RDW RBC AUTO: 45.8 FL (ref 37–54)
EGFRCR SERPLBLD CKD-EPI 2021: 82.8 ML/MIN/1.73
EOSINOPHIL # BLD AUTO: 0 10*3/MM3 (ref 0–0.4)
EOSINOPHIL NFR BLD AUTO: 0 % (ref 0.3–6.2)
ERYTHROCYTE [DISTWIDTH] IN BLOOD BY AUTOMATED COUNT: 13.8 % (ref 12.3–15.4)
GLOBULIN UR ELPH-MCNC: 3.1 GM/DL
GLUCOSE SERPL-MCNC: 174 MG/DL (ref 65–99)
HCT VFR BLD AUTO: 31.4 % (ref 34–46.6)
HGB BLD-MCNC: 10.8 G/DL (ref 12–15.9)
IMM GRANULOCYTES # BLD AUTO: 0.05 10*3/MM3 (ref 0–0.05)
IMM GRANULOCYTES NFR BLD AUTO: 0.4 % (ref 0–0.5)
LYMPHOCYTES # BLD AUTO: 0.97 10*3/MM3 (ref 0.7–3.1)
LYMPHOCYTES NFR BLD AUTO: 8.3 % (ref 19.6–45.3)
MCH RBC QN AUTO: 31.2 PG (ref 26.6–33)
MCHC RBC AUTO-ENTMCNC: 34.4 G/DL (ref 31.5–35.7)
MCV RBC AUTO: 90.8 FL (ref 79–97)
MONOCYTES # BLD AUTO: 0.28 10*3/MM3 (ref 0.1–0.9)
MONOCYTES NFR BLD AUTO: 2.4 % (ref 5–12)
NEUTROPHILS NFR BLD AUTO: 10.42 10*3/MM3 (ref 1.7–7)
NEUTROPHILS NFR BLD AUTO: 88.8 % (ref 42.7–76)
NRBC BLD AUTO-RTO: 0 /100 WBC (ref 0–0.2)
PLATELET # BLD AUTO: 214 10*3/MM3 (ref 140–450)
PMV BLD AUTO: 9.5 FL (ref 6–12)
POTASSIUM SERPL-SCNC: 3.9 MMOL/L (ref 3.5–5.2)
PROT SERPL-MCNC: 6.6 G/DL (ref 6–8.5)
RBC # BLD AUTO: 3.46 10*6/MM3 (ref 3.77–5.28)
SODIUM SERPL-SCNC: 131 MMOL/L (ref 136–145)
WBC NRBC COR # BLD AUTO: 11.73 10*3/MM3 (ref 3.4–10.8)

## 2024-12-19 PROCEDURE — 85025 COMPLETE CBC W/AUTO DIFF WBC: CPT | Performed by: FAMILY MEDICINE

## 2024-12-19 PROCEDURE — 25010000002 ENOXAPARIN PER 10 MG: Performed by: FAMILY MEDICINE

## 2024-12-19 PROCEDURE — 94664 DEMO&/EVAL PT USE INHALER: CPT

## 2024-12-19 PROCEDURE — 36415 COLL VENOUS BLD VENIPUNCTURE: CPT | Performed by: FAMILY MEDICINE

## 2024-12-19 PROCEDURE — 94799 UNLISTED PULMONARY SVC/PX: CPT

## 2024-12-19 PROCEDURE — 94760 N-INVAS EAR/PLS OXIMETRY 1: CPT

## 2024-12-19 PROCEDURE — 99232 SBSQ HOSP IP/OBS MODERATE 35: CPT | Performed by: STUDENT IN AN ORGANIZED HEALTH CARE EDUCATION/TRAINING PROGRAM

## 2024-12-19 PROCEDURE — 25810000003 SODIUM CHLORIDE 0.9 % SOLUTION 250 ML FLEX CONT: Performed by: FAMILY MEDICINE

## 2024-12-19 PROCEDURE — 25010000002 AZITHROMYCIN PER 500 MG: Performed by: FAMILY MEDICINE

## 2024-12-19 PROCEDURE — 25010000002 CEFTRIAXONE PER 250 MG: Performed by: FAMILY MEDICINE

## 2024-12-19 PROCEDURE — 25010000002 METHYLPREDNISOLONE PER 40 MG: Performed by: STUDENT IN AN ORGANIZED HEALTH CARE EDUCATION/TRAINING PROGRAM

## 2024-12-19 PROCEDURE — 80053 COMPREHEN METABOLIC PANEL: CPT | Performed by: FAMILY MEDICINE

## 2024-12-19 RX ORDER — SENNOSIDES A AND B 8.6 MG/1
2 TABLET, FILM COATED ORAL 2 TIMES DAILY
Status: DISCONTINUED | OUTPATIENT
Start: 2024-12-19 | End: 2024-12-21 | Stop reason: HOSPADM

## 2024-12-19 RX ORDER — POLYETHYLENE GLYCOL 3350 17 G/17G
17 POWDER, FOR SOLUTION ORAL 2 TIMES DAILY
Status: DISCONTINUED | OUTPATIENT
Start: 2024-12-19 | End: 2024-12-21 | Stop reason: HOSPADM

## 2024-12-19 RX ORDER — IPRATROPIUM BROMIDE AND ALBUTEROL SULFATE 2.5; .5 MG/3ML; MG/3ML
3 SOLUTION RESPIRATORY (INHALATION)
Status: DISCONTINUED | OUTPATIENT
Start: 2024-12-19 | End: 2024-12-21 | Stop reason: HOSPADM

## 2024-12-19 RX ADMIN — POLYETHYLENE GLYCOL 3350 17 G: 17 POWDER, FOR SOLUTION ORAL at 20:38

## 2024-12-19 RX ADMIN — GUAIFENESIN 600 MG: 600 TABLET ORAL at 08:16

## 2024-12-19 RX ADMIN — SODIUM CHLORIDE 500 MG: 9 INJECTION, SOLUTION INTRAVENOUS at 20:38

## 2024-12-19 RX ADMIN — Medication 10 ML: at 22:04

## 2024-12-19 RX ADMIN — IPRATROPIUM BROMIDE AND ALBUTEROL SULFATE 3 ML: .5; 3 SOLUTION RESPIRATORY (INHALATION) at 00:09

## 2024-12-19 RX ADMIN — SODIUM CHLORIDE 1000 MG: 9 INJECTION INTRAMUSCULAR; INTRAVENOUS; SUBCUTANEOUS at 20:39

## 2024-12-19 RX ADMIN — Medication 10 ML: at 08:16

## 2024-12-19 RX ADMIN — IPRATROPIUM BROMIDE AND ALBUTEROL SULFATE 3 ML: .5; 3 SOLUTION RESPIRATORY (INHALATION) at 10:45

## 2024-12-19 RX ADMIN — METHYLPREDNISOLONE SODIUM SUCCINATE 40 MG: 40 INJECTION, POWDER, FOR SOLUTION INTRAMUSCULAR; INTRAVENOUS at 08:16

## 2024-12-19 RX ADMIN — SENNOSIDES 2 TABLET: 8.6 TABLET, FILM COATED ORAL at 12:02

## 2024-12-19 RX ADMIN — POLYETHYLENE GLYCOL 3350 17 G: 17 POWDER, FOR SOLUTION ORAL at 12:02

## 2024-12-19 RX ADMIN — ENOXAPARIN SODIUM 40 MG: 100 INJECTION SUBCUTANEOUS at 08:16

## 2024-12-19 RX ADMIN — GUAIFENESIN 600 MG: 600 TABLET ORAL at 20:39

## 2024-12-19 RX ADMIN — Medication 2.5 MG: at 20:38

## 2024-12-19 RX ADMIN — IPRATROPIUM BROMIDE AND ALBUTEROL SULFATE 3 ML: .5; 3 SOLUTION RESPIRATORY (INHALATION) at 07:11

## 2024-12-19 RX ADMIN — IPRATROPIUM BROMIDE AND ALBUTEROL SULFATE 3 ML: .5; 3 SOLUTION RESPIRATORY (INHALATION) at 20:49

## 2024-12-19 NOTE — PLAN OF CARE
Goal Outcome Evaluation:         PT's VSS On room air. AOx4. RA. Denied pain or discomfort. Ate 75% of meals. Call light and personal items within reach. All safety measures in place. Administered Meds per MAR and tolerated well. Continue POC.

## 2024-12-19 NOTE — PLAN OF CARE
Goal Outcome Evaluation:  Plan of Care Reviewed With: patient, family        Progress: no change  Outcome Evaluation: A&O x4, 2L NC, able to make needs known, no concerns, continue POC

## 2024-12-19 NOTE — PROGRESS NOTES
[facility]   Hospitalist Progress Note  Date: 2024  Patient Name: Shayy Wasserman  : 1935  MRN: [id number]  Date of admission: 2024  Room/Bed: [facility]      Subjective   Subjective     Chief Complaint: Shortness of breath    Summary:    Shayy Wasserman is a 89 y.o. female with COPD presents to the emergency department for evaluation of shortness of breath.  Patient says that 1 week ago she had a fall and injured her left side.  Chest x-ray showed seventh rib fracture.  Pneumonia could not be excluded.  CT scan showed possible pneumonia on left side.  Patient admitted for COPD exacerbation and treatment of pneumonia.  Patient's respiratory status is improving.  She is now on room air.    Interval Followup:     Patient feels much better.  Daughter is at bedside.  We discussed that we wanted to follow-up the sputum culture.    All systems reviewed and negative except for what is outlined above.      Objective   Objective     Vitals:   Temp:  [97.3 °F (36.3 °C)-97.9 °F (36.6 °C)] 97.9 °F (36.6 °C)  Heart Rate:  [83-98] 98  Resp:  [18] 18  BP: (118-153)/(60-77) 137/68  Flow (L/min) (Oxygen Therapy):  [2] 2    Physical Exam   General: NAD  Cardiovascular: RRR, no murmurs   Pulmonary: coarse breath sounds though improving   Musculoskeletal: No gross deformities  Neuro: CN II through XII grossly intact; speech clear; no tremor    Result Review    Result Review:  I have personally reviewed these results:  [x]  Laboratory      Lab 24  0618 24  0552 24  0005 24  2141   WBC 11.73* 8.95  --  9.29   HEMOGLOBIN 10.8* 11.6*  --  12.4   HEMATOCRIT 31.4* 34.2  --  37.2   PLATELETS 214 201  --  216   NEUTROS ABS 10.42* 8.02*  --  7.03*   IMMATURE GRANS (ABS) 0.05 0.05  --  0.03   LYMPHS ABS 0.97 0.79  --  1.42   MONOS ABS 0.28 0.07*  --  0.54   EOS ABS 0.00 0.00  --  0.20   MCV 90.8 91.7  --  92.8   PROCALCITONIN  --   --  0.06  --    LACTATE  --   --   --  1.2         Lab  12/19/24  0618 12/18/24  0552 12/17/24 2141   SODIUM 131* 129* 130*   POTASSIUM 3.9 4.1 3.8   CHLORIDE 94* 92* 94*   CO2 22.7 21.4* 26.1   ANION GAP 14.3 15.6* 9.9   BUN 15 15 15   CREATININE 0.70 0.79 1.00   EGFR 82.8 71.6 54.0*   GLUCOSE 174* 181* 124*   CALCIUM 8.9 9.0 9.5         Lab 12/19/24  0618 12/18/24  0552 12/17/24 2141   TOTAL PROTEIN 6.6 6.8 7.2   ALBUMIN 3.5 3.7 3.9   GLOBULIN 3.1 3.1 3.3   ALT (SGPT) 6 6 6   AST (SGOT) 11 13 14   BILIRUBIN 0.3 0.5 0.7   ALK PHOS 59 62 69         Lab 12/18/24  0005 12/17/24 2141   PROBNP  --  494.4   HSTROP T 12 12                 Brief Urine Lab Results  (Last result in the past 365 days)        Color   Clarity   Blood   Leuk Est   Nitrite   Protein   CREAT   Urine HCG        11/08/24 1512             86.3               [x]  Microbiology   Microbiology Results (last 10 days)       Procedure Component Value - Date/Time    Respiratory Culture - Sputum, Trachea [[id number]] Collected: 12/18/24 1131    Lab Status: Preliminary result Specimen: Sputum from Trachea Updated: 12/19/24 1153     Respiratory Culture Moderate growth (3+) The culture consists of normal respiratory opal. This is a preliminary report; final report to follow.     Gram Stain Rare (1+) WBCs seen      Rare (1+) Gram positive bacilli resembling diphtheroids      Occasional Gram positive cocci    Blood Culture - Blood, Arm, Right [[id number]]  (Normal) Collected: 12/17/24 2141    Lab Status: Preliminary result Specimen: Blood from Arm, Right Updated: 12/18/24 2200     Blood Culture No growth at 24 hours    Narrative:      Less than seven (7) mL's of blood was collected.  Insufficient quantity may yield false negative results.    Blood Culture - Blood, Arm, Left [[id number]]  (Normal) Collected: 12/17/24 2141    Lab Status: Preliminary result Specimen: Blood from Arm, Left Updated: 12/18/24 2200     Blood Culture No growth at 24 hours    Narrative:      Less than seven (7) mL's of blood was collected.   Insufficient quantity may yield false negative results.    COVID-19, FLU A/B, RSV PCR 1 HR TAT - Swab, Nasopharynx [[id number]]  (Normal) Collected: 12/17/24 2141    Lab Status: Final result Specimen: Swab from Nasopharynx Updated: 12/17/24 2226     COVID19 Not Detected     Influenza A PCR Not Detected     Influenza B PCR Not Detected     RSV, PCR Not Detected    Narrative:      Fact sheet for providers: https://www.fda.gov/media/477968/download    Fact sheet for patients: https://www.fda.gov/media/883206/download    Test performed by PCR.          [x]  Radiology  CT Chest Without Contrast Diagnostic    Result Date: 12/18/2024   1. New or increased bibasilar pulmonary opacities are seen since the 2/1/2019 CT study. The findings are nonspecific. They may represent atelectasis alone. However, pulmonary contusion, pneumonia, and/or pulmonary aspiration cannot be excluded.  2. There are subacute nonsegmental left lateral/posterolateral eighth (8th) and ninth (9th) rib fractures, as discussed.  3. No definite acute fracture is seen.  4. No pneumothorax.  5. Minimal, if any, pleural effusion is identified.  6. Atherosclerotic changes are noted.  7. Interval increase in size of the left-sided hiatal hernia since 2/1/2019. It contains a large portion of the stomach.  8. Please see above comments for further detail.    Portions of this note were completed with a voice recognition program.  Electronically Signed By-Matti Downey MD On:12/18/2024 3:16 AM      XR Chest 1 View    Result Date: 12/17/2024  Impression: No convincing acute airspace process. Large leftward directed hiatal hernia obscuring portion of left lower lobe. Retrocardiac atelectasis or airspace disease would be difficult to entirely exclude. Minimally displaced left posterolateral seventh rib fracture, likely related to known recent fall. Electronically Signed: Gael Vallejo MD  12/17/2024 9:36 PM EST  Workstation ID: [id number]    XR Ribs 2 View  Left    Result Date: 12/11/2024  Impression: No acute fracture or malalignment. Electronically Signed: Sergio Finney MD  12/11/2024 6:09 PM EST  Workstation ID: [id number]    XR Chest 1 View    Result Date: 12/11/2024  Impression: No active cardiopulmonary disease. There is underlying emphysema and scarring. There is a moderate hiatal hernia. Electronically Signed: Yanick Casarez DO  12/11/2024 5:45 PM EST  Workstation ID: [id number]   []  EKG/Telemetry   []  Cardiology/Vascular   []  Pathology  []  Old records  []  Other:    Assessment & Plan        Assessment and Plan:    #Acute hypoxic respiratory failure  #COPD exacerbation  #Pneumonia  Continue ceftriaxone and azithromycin  Continue Solu-Medrol  Continue DuoNeb  Continue Mucinex  Sputum culture ordered, follow-up speciation and sensitivities       Discussed with RN.    VTE Prophylaxis:  Pharmacologic VTE prophylaxis orders are present.        CODE STATUS:   Level Of Support Discussed With: Patient  Code Status (Patient has no pulse and is not breathing): CPR (Attempt to Resuscitate)  Medical Interventions (Patient has pulse or is breathing): Full Support      Electronically signed by Nicko Pulido MD, 12/19/2024, 12:16 EST.

## 2024-12-20 LAB
ANION GAP SERPL CALCULATED.3IONS-SCNC: 14.8 MMOL/L (ref 5–15)
BACTERIA SPEC RESP CULT: NORMAL
BUN SERPL-MCNC: 15 MG/DL (ref 8–23)
BUN/CREAT SERPL: 18.5 (ref 7–25)
CALCIUM SPEC-SCNC: 8.7 MG/DL (ref 8.6–10.5)
CHLORIDE SERPL-SCNC: 95 MMOL/L (ref 98–107)
CO2 SERPL-SCNC: 22.2 MMOL/L (ref 22–29)
CREAT SERPL-MCNC: 0.81 MG/DL (ref 0.57–1)
DEPRECATED RDW RBC AUTO: 46.3 FL (ref 37–54)
EGFRCR SERPLBLD CKD-EPI 2021: 69.5 ML/MIN/1.73
ERYTHROCYTE [DISTWIDTH] IN BLOOD BY AUTOMATED COUNT: 13.6 % (ref 12.3–15.4)
GLUCOSE SERPL-MCNC: 199 MG/DL (ref 65–99)
GRAM STN SPEC: NORMAL
HCT VFR BLD AUTO: 34.4 % (ref 34–46.6)
HGB BLD-MCNC: 11.6 G/DL (ref 12–15.9)
MCH RBC QN AUTO: 31 PG (ref 26.6–33)
MCHC RBC AUTO-ENTMCNC: 33.7 G/DL (ref 31.5–35.7)
MCV RBC AUTO: 92 FL (ref 79–97)
PLATELET # BLD AUTO: 244 10*3/MM3 (ref 140–450)
PMV BLD AUTO: 9.7 FL (ref 6–12)
POTASSIUM SERPL-SCNC: 3.7 MMOL/L (ref 3.5–5.2)
RBC # BLD AUTO: 3.74 10*6/MM3 (ref 3.77–5.28)
SODIUM SERPL-SCNC: 132 MMOL/L (ref 136–145)
WBC NRBC COR # BLD AUTO: 13.32 10*3/MM3 (ref 3.4–10.8)

## 2024-12-20 PROCEDURE — 94664 DEMO&/EVAL PT USE INHALER: CPT

## 2024-12-20 PROCEDURE — 25010000002 ENOXAPARIN PER 10 MG: Performed by: FAMILY MEDICINE

## 2024-12-20 PROCEDURE — 25010000002 METHYLPREDNISOLONE PER 40 MG: Performed by: STUDENT IN AN ORGANIZED HEALTH CARE EDUCATION/TRAINING PROGRAM

## 2024-12-20 PROCEDURE — 97530 THERAPEUTIC ACTIVITIES: CPT

## 2024-12-20 PROCEDURE — 94799 UNLISTED PULMONARY SVC/PX: CPT

## 2024-12-20 PROCEDURE — 25010000002 CEFTRIAXONE PER 250 MG: Performed by: FAMILY MEDICINE

## 2024-12-20 PROCEDURE — 99232 SBSQ HOSP IP/OBS MODERATE 35: CPT | Performed by: STUDENT IN AN ORGANIZED HEALTH CARE EDUCATION/TRAINING PROGRAM

## 2024-12-20 PROCEDURE — 97116 GAIT TRAINING THERAPY: CPT

## 2024-12-20 PROCEDURE — 80048 BASIC METABOLIC PNL TOTAL CA: CPT | Performed by: STUDENT IN AN ORGANIZED HEALTH CARE EDUCATION/TRAINING PROGRAM

## 2024-12-20 PROCEDURE — 85027 COMPLETE CBC AUTOMATED: CPT | Performed by: STUDENT IN AN ORGANIZED HEALTH CARE EDUCATION/TRAINING PROGRAM

## 2024-12-20 PROCEDURE — 94760 N-INVAS EAR/PLS OXIMETRY 1: CPT

## 2024-12-20 RX ORDER — AZITHROMYCIN 250 MG/1
500 TABLET, FILM COATED ORAL ONCE
Status: COMPLETED | OUTPATIENT
Start: 2024-12-20 | End: 2024-12-20

## 2024-12-20 RX ORDER — OLANZAPINE 5 MG/1
5 TABLET, ORALLY DISINTEGRATING ORAL ONCE
Status: COMPLETED | OUTPATIENT
Start: 2024-12-20 | End: 2024-12-20

## 2024-12-20 RX ORDER — FLUCONAZOLE 200 MG/1
200 TABLET ORAL ONCE
Status: COMPLETED | OUTPATIENT
Start: 2024-12-20 | End: 2024-12-20

## 2024-12-20 RX ADMIN — GUAIFENESIN 600 MG: 600 TABLET ORAL at 08:26

## 2024-12-20 RX ADMIN — FLUCONAZOLE 200 MG: 200 TABLET ORAL at 12:34

## 2024-12-20 RX ADMIN — ENOXAPARIN SODIUM 40 MG: 100 INJECTION SUBCUTANEOUS at 08:26

## 2024-12-20 RX ADMIN — METHYLPREDNISOLONE SODIUM SUCCINATE 40 MG: 40 INJECTION, POWDER, FOR SOLUTION INTRAMUSCULAR; INTRAVENOUS at 08:26

## 2024-12-20 RX ADMIN — SENNOSIDES 2 TABLET: 8.6 TABLET, FILM COATED ORAL at 08:26

## 2024-12-20 RX ADMIN — Medication 5 MG: at 20:42

## 2024-12-20 RX ADMIN — POLYETHYLENE GLYCOL 3350 17 G: 17 POWDER, FOR SOLUTION ORAL at 08:26

## 2024-12-20 RX ADMIN — Medication 10 ML: at 20:43

## 2024-12-20 RX ADMIN — IPRATROPIUM BROMIDE AND ALBUTEROL SULFATE 3 ML: .5; 3 SOLUTION RESPIRATORY (INHALATION) at 07:27

## 2024-12-20 RX ADMIN — SODIUM CHLORIDE 1000 MG: 9 INJECTION INTRAMUSCULAR; INTRAVENOUS; SUBCUTANEOUS at 20:42

## 2024-12-20 RX ADMIN — OLANZAPINE 5 MG: 5 TABLET, ORALLY DISINTEGRATING ORAL at 17:55

## 2024-12-20 RX ADMIN — Medication 10 ML: at 08:25

## 2024-12-20 RX ADMIN — AZITHROMYCIN DIHYDRATE 500 MG: 250 TABLET ORAL at 20:42

## 2024-12-20 RX ADMIN — IPRATROPIUM BROMIDE AND ALBUTEROL SULFATE 3 ML: .5; 3 SOLUTION RESPIRATORY (INHALATION) at 13:25

## 2024-12-20 NOTE — THERAPY TREATMENT NOTE
Acute Care - Physical Therapy Progress Note  [facility]     Patient Name: Shayy Wasserman  : 1935  MRN: [id number]  Today's Date: 2024      Visit Dx:     ICD-10-CM ICD-9-CM   1. Pneumonia due to infectious organism, unspecified laterality, unspecified part of lung  J18.9 486   2. Closed fracture of one rib of left side, initial encounter  S22.32XA 807.01   3. Acute respiratory failure with hypoxia  J96.01 518.81   4. Weakness generalized  R53.1 780.79   5. Difficulty walking  R26.2 719.7     Patient Active Problem List   Diagnosis    Pulmonary emphysema    Bronchitis    Essential (primary) hypertension    Gout    Hyperlipidemia    Osteoarthritis    Stage 3 chronic kidney disease    Osteoporosis    Localized edema    Prediabetes    Pain of left upper extremity    Anxiety with somatization    Hypoxia    Community acquired pneumonia of right lower lobe of lung    COPD with acute exacerbation    Pneumonia of right lower lobe due to Streptococcus pneumoniae    Hospital discharge follow-up    Left upper quadrant abdominal pain    COPD (chronic obstructive pulmonary disease)    Medicare annual wellness visit, subsequent    Acute respiratory distress    Acute on chronic respiratory failure with hypoxia     Past Medical History:   Diagnosis Date    Anxiety disorder 2018    Arthritis     COPD (chronic obstructive pulmonary disease)     Dependent edema 2017    Gout     Osteoporosis     Primary osteoarthritis of left knee 2018    Primary osteoarthritis of one hip, right 2017    Primary osteoarthritis of right hip 2017     Past Surgical History:   Procedure Laterality Date    BLADDER REPAIR      COLONOSCOPY  2016    EYE SURGERY      implant- yes    HYSTERECTOMY      INTRAOCULAR LENS INSERTION      yes    JOINT REPLACEMENT      Surgery    KNEE SURGERY Right     knee repair    KNEE SURGERY Right     repair    OTHER SURGICAL HISTORY      Artificial joints/limbs    OTHER SURGICAL HISTORY       Artificial Joints/Limbs    OTHER SURGICAL HISTORY      Joint surgery    TOTAL KNEE ARTHROPLASTY Right      PT Assessment (Last 12 Hours)       PT Evaluation and Treatment       Row Name 12/20/24 1300          Physical Therapy Time and Intention    Subjective Information complains of;weakness;fatigue  -CS     Document Type therapy note (daily note)  -CS     Mode of Treatment individual therapy;physical therapy  -CS     Patient Effort good  -CS     Symptoms Noted During/After Treatment fatigue  -CS       Row Name 12/20/24 1300          Bed Mobility    Supine-Sit-Supine [place] (Bed Mobility) verbal cues;minimum assist (75% patient effort);1 person assist  -CS     Assistive Device (Bed Mobility) bed rails  -CS       Row Name 12/20/24 1300          Transfers    Transfers sit-stand transfer;stand-sit transfer  -CS       Row Name 12/20/24 1300          Sit-Stand Transfer    Sit-Stand [place] (Transfers) verbal cues;minimum assist (75% patient effort);1 person assist  -CS     Assistive Device (Sit-Stand Transfers) walker, front-wheeled  -CS       Row Name 12/20/24 1300          Stand-Sit Transfer    Stand-Sit [place] (Transfers) verbal cues;minimum assist (75% patient effort);1 person assist  -CS     Assistive Device (Stand-Sit Transfers) walker, front-wheeled  -CS       Row Name 12/20/24 1300          Gait/Stairs (Locomotion)    [place] Level (Gait) verbal cues;contact guard;1 person assist  -CS     Assistive Device (Gait) walker, front-wheeled  -CS     Distance in Feet (Gait) 250  -CS     Pattern (Gait) 4-point;step-through  -CS     Deviations/Abnormal Patterns (Gait) gait speed decreased;stride length decreased  -CS     Bilateral Gait Deviations forward flexed posture  -CS       Row Name 12/20/24 1300          Positioning and Restraints    Pre-Treatment Position in bed  -CS     Post Treatment Position bed  -CS     In Bed fowlers;call light within reach;encouraged to call for assist;exit alarm  on;with family/caregiver;legs elevated;heels elevated  -CS       Row Name 12/20/24 1300          Progress Summary (PT)    Progress Toward Functional Goals (PT) progress toward functional goals is good  -CS               User Key  (r) = Recorded By, (t) = Taken By, (c) = Cosigned By      Initials Name Provider Type    CS Beto Canas PTA Physical Therapist Assistant                    Physical Therapy Education        No education to display                  PT Recommendation and Plan     Progress Summary (PT)  Progress Toward Functional Goals (PT): progress toward functional goals is good   Outcome Measures       Row Name 12/20/24 1300 12/18/24 1300          How much help from another person do you currently need...    Turning from your back to your side while in flat bed without using bedrails? 4  -CS 4  -DP     Moving from lying on back to sitting on the side of a flat bed without bedrails? 4  -CS 3  -DP     Moving to and from a bed to a chair (including a wheelchair)? 3  -CS 3  -DP     Standing up from a chair using your arms (e.g., wheelchair, bedside chair)? 3  -CS 3  -DP     Climbing 3-5 steps with a railing? 3  -CS 3  -DP     To walk in hospital room? 3  -CS 3  -DP     AM-PAC 6 Clicks Score (PT) 20  -CS 19  -DP        Functional Assessment    Outcome Measure Options AM-PAC 6 Clicks Basic Mobility (PT)  -CS AM-PAC 6 Clicks Basic Mobility (PT)  -DP               User Key  (r) = Recorded By, (t) = Taken By, (c) = Cosigned By      Initials Name Provider Type    DP Aldo High, PT Physical Therapist     Beto Canas PTA Physical Therapist Assistant                     Time Calculation:    PT Charges       Row Name 12/20/24 1302             Time Calculation    Start Time 1125  -CS      PT Received On 12/20/24  -CS         Timed Charges    97011 - Gait Training Minutes  15  -CS      61138 - PT Therapeutic Activity Minutes 10  -CS         Total Minutes    Timed Charges Total Minutes 25  -CS        Total Minutes 25  -CS                User Key  (r) = Recorded By, (t) = Taken By, (c) = Cosigned By      Initials Name Provider Type    CS Beto Canas PTA Physical Therapist Assistant                  Therapy Charges for Today       Code Description Service Date Service Provider Modifiers Qty    76717345628 HC GAIT TRAINING EA 15 MIN 12/20/2024 Beto Canas PTA GP 1    [id number]  PT THERAPEUTIC ACT EA 15 MIN 12/20/2024 Beto Canas PTA GP 1            PT G-Codes  Outcome Measure Options: AM-PAC 6 Clicks Basic Mobility (PT)  AM-PAC 6 Clicks Score (PT): 20    Beto Canas PTA  12/20/2024

## 2024-12-20 NOTE — PROGRESS NOTES
[facility]   Hospitalist Progress Note  Date: 2024  Patient Name: Shayy Wasserman  : 1935  MRN: [id number]  Date of admission: 2024  Room/Bed: [facility]      Subjective   Subjective     Chief Complaint: Shortness of breath    Summary:    Shayy Wasserman is a 89 y.o. female with COPD presents to the emergency department for evaluation of shortness of breath.  Patient says that 1 week ago she had a fall and injured her left side.  Chest x-ray showed seventh rib fracture.  Pneumonia could not be excluded.  CT scan showed possible pneumonia on left side.  Patient admitted for COPD exacerbation and treatment of pneumonia.  Patient's respiratory status is improving.  She is now on room air.  Patient is still wheezing however.  She will likely be ready for discharge tomorrow morning    Interval Followup:     Patient is breathing easier.  No new complaints.     All systems reviewed and negative except for what is outlined above.      Objective   Objective     Vitals:   Temp:  [97.4 °F (36.3 °C)-98.8 °F (37.1 °C)] 98.8 °F (37.1 °C)  Heart Rate:  [] 96  Resp:  [16-20] 16  BP: (109-151)/(61-83) 151/83    Physical Exam   General: Awake, alert, NAD  Cardiovascular: RRR, no murmurs   Pulmonary: Bilateral posterior wheezes  Neuro: CN II through XII grossly intact; speech clear; no tremor  Psych: Mood and affect appropriate    Result Review    Result Review:  I have personally reviewed these results:  [x]  Laboratory      Lab 24  0536 24  0618 24  0552 24  0005 24  2141   WBC 13.32* 11.73* 8.95  --  9.29   HEMOGLOBIN 11.6* 10.8* 11.6*  --  12.4   HEMATOCRIT 34.4 31.4* 34.2  --  37.2   PLATELETS 244 214 201  --  216   NEUTROS ABS  --  10.42* 8.02*  --  7.03*   IMMATURE GRANS (ABS)  --  0.05 0.05  --  0.03   LYMPHS ABS  --  0.97 0.79  --  1.42   MONOS ABS  --  0.28 0.07*  --  0.54   EOS ABS  --  0.00 0.00  --  0.20   MCV 92.0 90.8 91.7  --  92.8   PROCALCITONIN  --   --    --  0.06  --    LACTATE  --   --   --   --  1.2         Lab 12/20/24  0536 12/19/24  0618 12/18/24  0552   SODIUM 132* 131* 129*   POTASSIUM 3.7 3.9 4.1   CHLORIDE 95* 94* 92*   CO2 22.2 22.7 21.4*   ANION GAP 14.8 14.3 15.6*   BUN 15 15 15   CREATININE 0.81 0.70 0.79   EGFR 69.5 82.8 71.6   GLUCOSE 199* 174* 181*   CALCIUM 8.7 8.9 9.0         Lab 12/19/24  0618 12/18/24  0552 12/17/24 2141   TOTAL PROTEIN 6.6 6.8 7.2   ALBUMIN 3.5 3.7 3.9   GLOBULIN 3.1 3.1 3.3   ALT (SGPT) 6 6 6   AST (SGOT) 11 13 14   BILIRUBIN 0.3 0.5 0.7   ALK PHOS 59 62 69         Lab 12/18/24  0005 12/17/24 2141   PROBNP  --  494.4   HSTROP T 12 12                 Brief Urine Lab Results  (Last result in the past 365 days)        Color   Clarity   Blood   Leuk Est   Nitrite   Protein   CREAT   Urine HCG        11/08/24 1512             86.3               [x]  Microbiology   Microbiology Results (last 10 days)       Procedure Component Value - Date/Time    Respiratory Culture - Sputum, Trachea [[id number]] Collected: 12/18/24 1131    Lab Status: Final result Specimen: Sputum from Trachea Updated: 12/20/24 1218     Respiratory Culture Moderate growth (3+) Normal respiratory opal. No S. aureus or Pseudomonas aeruginosa detected. Final report.     Gram Stain Rare (1+) WBCs seen      Rare (1+) Gram positive bacilli resembling diphtheroids      Occasional Gram positive cocci    Blood Culture - Blood, Arm, Right [[id number]]  (Normal) Collected: 12/17/24 2141    Lab Status: Preliminary result Specimen: Blood from Arm, Right Updated: 12/19/24 2200     Blood Culture No growth at 2 days    Narrative:      Less than seven (7) mL's of blood was collected.  Insufficient quantity may yield false negative results.    Blood Culture - Blood, Arm, Left [[id number]]  (Normal) Collected: 12/17/24 2141    Lab Status: Preliminary result Specimen: Blood from Arm, Left Updated: 12/19/24 2200     Blood Culture No growth at 2 days    Narrative:      Less than seven  (7) mL's of blood was collected.  Insufficient quantity may yield false negative results.    COVID-19, FLU A/B, RSV PCR 1 HR TAT - Swab, Nasopharynx [[id number]]  (Normal) Collected: 12/17/24 2141    Lab Status: Final result Specimen: Swab from Nasopharynx Updated: 12/17/24 2226     COVID19 Not Detected     Influenza A PCR Not Detected     Influenza B PCR Not Detected     RSV, PCR Not Detected    Narrative:      Fact sheet for providers: https://www.fda.gov/media/212063/download    Fact sheet for patients: https://www.fda.gov/media/673253/download    Test performed by PCR.          [x]  Radiology  CT Chest Without Contrast Diagnostic    Result Date: 12/18/2024   1. New or increased bibasilar pulmonary opacities are seen since the 2/1/2019 CT study. The findings are nonspecific. They may represent atelectasis alone. However, pulmonary contusion, pneumonia, and/or pulmonary aspiration cannot be excluded.  2. There are subacute nonsegmental left lateral/posterolateral eighth (8th) and ninth (9th) rib fractures, as discussed.  3. No definite acute fracture is seen.  4. No pneumothorax.  5. Minimal, if any, pleural effusion is identified.  6. Atherosclerotic changes are noted.  7. Interval increase in size of the left-sided hiatal hernia since 2/1/2019. It contains a large portion of the stomach.  8. Please see above comments for further detail.    Portions of this note were completed with a voice recognition program.  Electronically Signed By-Matti Downey MD On:12/18/2024 3:16 AM      XR Chest 1 View    Result Date: 12/17/2024  Impression: No convincing acute airspace process. Large leftward directed hiatal hernia obscuring portion of left lower lobe. Retrocardiac atelectasis or airspace disease would be difficult to entirely exclude. Minimally displaced left posterolateral seventh rib fracture, likely related to known recent fall. Electronically Signed: Gael Vallejo MD  12/17/2024 9:36 PM EST  Workstation ID:  [id number]   []  EKG/Telemetry   []  Cardiology/Vascular   []  Pathology  []  Old records  []  Other:    Assessment & Plan        Assessment and Plan:    #Pneumonia  Continue ceftriaxone and azithromycin  Continue Solu-Medrol, patient will need taper at discharge  Continue DuoNeb       Discussed with RN.    VTE Prophylaxis:  Pharmacologic VTE prophylaxis orders are present.        CODE STATUS:   Level Of Support Discussed With: Patient  Code Status (Patient has no pulse and is not breathing): CPR (Attempt to Resuscitate)  Medical Interventions (Patient has pulse or is breathing): Full Support      Electronically signed by Nicko Pulido MD, 12/20/2024, 13:28 EST.

## 2024-12-20 NOTE — PLAN OF CARE
Goal Outcome Evaluation:              Outcome Evaluation: Patient AxOx4 with intermittent agitation r/t feelings of feeling hot or cold. Temperature monitored. Room temperature shifted for comfort and layers changed accordingly. 94% on RA. No complaints of pain. Antibiotics given. Ambulated to and from bathroom with assistance.

## 2024-12-20 NOTE — PROGRESS NOTES
"Enter Query Response Below      Query Response: Acute hypoxic respiratory failure was supported with additional clinical indicators, patient was requiring oxygen when she does not use oxygen at baseline              If applicable, please update the problem list.     Patient: Shayy Wasserman        : 1935  Account: [id number]           Admit Date:         How to Respond to this query:       a. Click New Note     b. Answer query within the yellow box.                c. Update the Problem List, if applicable.      If you have any questions about this query contact me at: [email or web address]     Dr. Pulido,    Patient with history of COPD presented  for shortness of breath. ED note includes confusion and \"Positive for cough and shortness of breath.\" H&P includes \"Pulmonary effort is normal. No respiratory distress,\" as well as acute respiratory distress and acute on chronic hypoxic respiratory failure in the Active Hospital Problem list. Home oxygen use does not appear to be documented. Progress notes include \"coarse breath sounds,\" acute hypoxic respiratory failure, COPD exacerbation, and pneumonia. Patient treated with monitoring, supplemental oxygen, Solu-Medrol, Duo-Nebs, and IV/PO antibiotics.     ABG was not assessed.    Nursing flowsheet documentation includes:  : SpO2 92% on 2 L/min (initial reading of SpO2 88% on room air); RR 22  : SpO2 93 - 100% on 2 L/min  : SpO2 94 - 97% on room air - 2 L/min    After study, was acute hypoxic respiratory failure clinically supported during this admission?     - Acute hypoxic respiratory failure was supported with additional clinical indicators ___________________  - Acute hypoxic respiratory failure was not supported  - Other ___________________  - Unable to clinically determine    By submitting this query, we are merely seeking further clarification of documentation to accurately reflect all conditions that you are monitoring, evaluating, " treating or that extend the hospitalization or utilize additional resources of care. Please utilize your independent clinical judgment when addressing the question(s) above.     This query and your response, once completed, will be entered into the legal medical record.    Sincerely,  Rakesh Mercedes RN, CDS  Clinical Documentation Integrity Program

## 2024-12-20 NOTE — PLAN OF CARE
Goal Outcome Evaluation:      PT's VSS On room air. AOx4. RA. Denied pain or discomfort. Ate 75% of meals. Call light and personal items within reach. Complained of intermittent coldness and hotness in room. All safety measures in place. Administered Meds per MAR and tolerated well. Continue POC.

## 2024-12-21 ENCOUNTER — READMISSION MANAGEMENT (OUTPATIENT)
Dept: CALL CENTER | Facility: HOSPITAL | Age: 89
End: 2024-12-21
Payer: MEDICARE

## 2024-12-21 VITALS
HEIGHT: 63 IN | DIASTOLIC BLOOD PRESSURE: 88 MMHG | RESPIRATION RATE: 16 BRPM | SYSTOLIC BLOOD PRESSURE: 151 MMHG | TEMPERATURE: 98.1 F | WEIGHT: 149.91 LBS | OXYGEN SATURATION: 92 % | BODY MASS INDEX: 26.56 KG/M2 | HEART RATE: 87 BPM

## 2024-12-21 PROBLEM — J96.21 ACUTE ON CHRONIC RESPIRATORY FAILURE WITH HYPOXIA: Status: RESOLVED | Noted: 2024-12-17 | Resolved: 2024-12-21

## 2024-12-21 PROBLEM — R06.03 ACUTE RESPIRATORY DISTRESS: Status: RESOLVED | Noted: 2024-12-17 | Resolved: 2024-12-21

## 2024-12-21 LAB
ANION GAP SERPL CALCULATED.3IONS-SCNC: 11.4 MMOL/L (ref 5–15)
BUN SERPL-MCNC: 14 MG/DL (ref 8–23)
BUN/CREAT SERPL: 19.4 (ref 7–25)
CALCIUM SPEC-SCNC: 9.1 MG/DL (ref 8.6–10.5)
CHLORIDE SERPL-SCNC: 97 MMOL/L (ref 98–107)
CO2 SERPL-SCNC: 26.6 MMOL/L (ref 22–29)
CREAT SERPL-MCNC: 0.72 MG/DL (ref 0.57–1)
DEPRECATED RDW RBC AUTO: 47.2 FL (ref 37–54)
EGFRCR SERPLBLD CKD-EPI 2021: 80 ML/MIN/1.73
ERYTHROCYTE [DISTWIDTH] IN BLOOD BY AUTOMATED COUNT: 13.9 % (ref 12.3–15.4)
GLUCOSE SERPL-MCNC: 145 MG/DL (ref 65–99)
HCT VFR BLD AUTO: 37.7 % (ref 34–46.6)
HGB BLD-MCNC: 12.3 G/DL (ref 12–15.9)
MCH RBC QN AUTO: 30.4 PG (ref 26.6–33)
MCHC RBC AUTO-ENTMCNC: 32.6 G/DL (ref 31.5–35.7)
MCV RBC AUTO: 93.3 FL (ref 79–97)
PLATELET # BLD AUTO: 237 10*3/MM3 (ref 140–450)
PMV BLD AUTO: 9.4 FL (ref 6–12)
POTASSIUM SERPL-SCNC: 4 MMOL/L (ref 3.5–5.2)
RBC # BLD AUTO: 4.04 10*6/MM3 (ref 3.77–5.28)
SODIUM SERPL-SCNC: 135 MMOL/L (ref 136–145)
WBC NRBC COR # BLD AUTO: 7.79 10*3/MM3 (ref 3.4–10.8)

## 2024-12-21 PROCEDURE — 94799 UNLISTED PULMONARY SVC/PX: CPT

## 2024-12-21 PROCEDURE — 99238 HOSP IP/OBS DSCHRG MGMT 30/<: CPT | Performed by: STUDENT IN AN ORGANIZED HEALTH CARE EDUCATION/TRAINING PROGRAM

## 2024-12-21 PROCEDURE — 25010000002 CEFTRIAXONE PER 250 MG: Performed by: STUDENT IN AN ORGANIZED HEALTH CARE EDUCATION/TRAINING PROGRAM

## 2024-12-21 PROCEDURE — 85027 COMPLETE CBC AUTOMATED: CPT | Performed by: STUDENT IN AN ORGANIZED HEALTH CARE EDUCATION/TRAINING PROGRAM

## 2024-12-21 PROCEDURE — 25010000002 ENOXAPARIN PER 10 MG: Performed by: FAMILY MEDICINE

## 2024-12-21 PROCEDURE — 25010000002 METHYLPREDNISOLONE PER 40 MG: Performed by: STUDENT IN AN ORGANIZED HEALTH CARE EDUCATION/TRAINING PROGRAM

## 2024-12-21 PROCEDURE — 80048 BASIC METABOLIC PNL TOTAL CA: CPT | Performed by: STUDENT IN AN ORGANIZED HEALTH CARE EDUCATION/TRAINING PROGRAM

## 2024-12-21 PROCEDURE — 94664 DEMO&/EVAL PT USE INHALER: CPT

## 2024-12-21 RX ADMIN — POLYETHYLENE GLYCOL 3350 17 G: 17 POWDER, FOR SOLUTION ORAL at 08:04

## 2024-12-21 RX ADMIN — IPRATROPIUM BROMIDE AND ALBUTEROL SULFATE 3 ML: .5; 3 SOLUTION RESPIRATORY (INHALATION) at 07:04

## 2024-12-21 RX ADMIN — METHYLPREDNISOLONE SODIUM SUCCINATE 40 MG: 40 INJECTION, POWDER, FOR SOLUTION INTRAMUSCULAR; INTRAVENOUS at 08:03

## 2024-12-21 RX ADMIN — SENNOSIDES 2 TABLET: 8.6 TABLET, FILM COATED ORAL at 08:04

## 2024-12-21 RX ADMIN — ENOXAPARIN SODIUM 40 MG: 100 INJECTION SUBCUTANEOUS at 08:04

## 2024-12-21 RX ADMIN — SODIUM CHLORIDE 1000 MG: 9 INJECTION INTRAMUSCULAR; INTRAVENOUS; SUBCUTANEOUS at 09:13

## 2024-12-21 RX ADMIN — Medication 10 ML: at 08:05

## 2024-12-21 NOTE — OUTREACH NOTE
Prep Survey      Flowsheet Row Responses   [facility] patient discharged from? [facility]   Is LACE score < 7 ? No   Eligibility [facility]   Date of Admission 12/17/24   Date of Discharge 12/21/24   Discharge Disposition Home or Self Care   Discharge diagnosis Acute respiratory distress   Does the patient have one of the following disease processes/diagnoses(primary or secondary)? COPD   Does the patient have Home health ordered? No   Is there a DME ordered? No   Prep survey completed? Yes            Catalina ROBERTSON - Registered Nurse

## 2024-12-21 NOTE — PLAN OF CARE
Goal Outcome Evaluation:           Progress: improving  Outcome Evaluation: Patient alert and oriented x4 on room air. No complaints of pain. Family remained at bedside. Patient slept well between care.

## 2024-12-21 NOTE — DISCHARGE SUMMARY
[facility]         HOSPITALIST  DISCHARGE SUMMARY    Patient Name: Shayy Wasserman  : 1935  MRN: [id number]    Date of Admission: 2024  Date of Discharge:  24  Primary Care Physician: Pee Mon DO    Consults       Date and Time Order Name Status Description    2024 11:02 PM Inpatient Hospitalist Consult              Active and Resolved Hospital Problems:  Active Hospital Problems   No active problems to display.      Resolved Hospital Problems    Diagnosis POA   • **Acute respiratory distress [R06.03] Yes   • Acute on chronic respiratory failure with hypoxia [J96.21] Yes       Hospital Course     Hospital Course:  Shayy Wasserman is a 89 y.o. female with COPD presents to the emergency department for evaluation of shortness of breath.  Patient says that 1 week ago she had a fall and injured her left side.  Chest x-ray showed seventh rib fracture.  Pneumonia could not be excluded.  CT scan showed possible pneumonia on left side.  Patient admitted for COPD exacerbation and treatment of pneumonia.  Patient's respiratory status is improving.  She is now on room air.  Finished a course of antibiotics for CAP coverage and steroids for COPD exacerbation. Patient is at her baseline and medically stable for discharge.         DISCHARGE Follow Up  -follow up with PCP     Day of Discharge     Vital Signs:  Temp:  [98.1 °F (36.7 °C)-98.8 °F (37.1 °C)] 98.1 °F (36.7 °C)  Heart Rate:  [] 87  Resp:  [16-18] 16  BP: (136-151)/(76-88) 151/88  Physical Exam:   GEN: NAD  CV: normal S1, S2  Pulm: clear in superior air fields, faint bibasilar crackles, no wheezes  Neuro: alert and oriented       Discharge Details        Discharge Medications        Changes to Medications        Instructions Start Date   albuterol (2.5 MG/3ML) 0.083% nebulizer solution  Commonly known as: PROVENTIL  What changed: See the new instructions.   NEBULIZE 1 VIAL PER NEBULIZER EVERY 4 HOURS  AS NEEDED FOR WHEEZING             Continue These Medications        Instructions Start Date   alendronate 70 MG tablet  Commonly known as: FOSAMAX   70 mg, Oral, Every 7 Days      allopurinol 300 MG tablet  Commonly known as: ZYLOPRIM   300 mg, Oral, Daily      amLODIPine 5 MG tablet  Commonly known as: NORVASC   5 mg, Oral, Daily      atenolol 50 MG tablet  Commonly known as: TENORMIN   50 mg, Oral, 2 Times Daily      Breztri Aerosphere 160-9-4.8 MCG/ACT aerosol inhaler  Generic drug: Budeson-Glycopyrrol-Formoterol   2 puffs, Inhalation, 2 Times Daily      cyclobenzaprine 10 MG tablet  Commonly known as: FLEXERIL   10 mg, Oral, 3 Times Daily      omeprazole 40 MG capsule  Commonly known as: priLOSEC   40 mg, Oral, Daily               Allergies   Allergen Reactions   • Prednisone Angioedema   • Bactrim [Sulfamethoxazole-Trimethoprim] Unknown - Low Severity   • Griseofulvin Ultramicrosize [Griseofulvin] Unknown - Low Severity   • Levocetirizine Rash     hives       Discharge Disposition:  Home or Self Carehome    Diet:  Hospital:  Diet Order   Procedures   • Diet: Cardiac; Healthy Heart (2-3 Na+); Fluid Consistency: Thin (IDDSI 0)       Discharge Activity:   as tolerated    CODE STATUS:  Code Status and Medical Interventions: CPR (Attempt to Resuscitate); Full Support   Ordered at: 12/17/24 2342     Level Of Support Discussed With:    Patient     Code Status (Patient has no pulse and is not breathing):    CPR (Attempt to Resuscitate)     Medical Interventions (Patient has pulse or is breathing):    Full Support         Future Appointments   Date Time Provider Department Center   4/15/2025  2:15 PM Pee Mon DO [facility]       Additional Instructions for the Follow-ups that You Need to Schedule       Discharge Follow-up with PCP   As directed       Currently Documented PCP:    Pee Mon DO    PCP Phone Number:    [phone]     Follow Up Details: 1 week                Pertinent   and/or Most Recent Results       LAB RESULTS:      Lab 12/21/24  0548 12/20/24  0536 12/19/24  0618 12/18/24  0552 12/18/24  0005 12/17/24  2141   WBC 7.79 13.32* 11.73* 8.95  --  9.29   HEMOGLOBIN 12.3 11.6* 10.8* 11.6*  --  12.4   HEMATOCRIT 37.7 34.4 31.4* 34.2  --  37.2   PLATELETS 237 244 214 201  --  216   NEUTROS ABS  --   --  10.42* 8.02*  --  7.03*   IMMATURE GRANS (ABS)  --   --  0.05 0.05  --  0.03   LYMPHS ABS  --   --  0.97 0.79  --  1.42   MONOS ABS  --   --  0.28 0.07*  --  0.54   EOS ABS  --   --  0.00 0.00  --  0.20   MCV 93.3 92.0 90.8 91.7  --  92.8   PROCALCITONIN  --   --   --   --  0.06  --    LACTATE  --   --   --   --   --  1.2         Lab 12/21/24  0548 12/20/24  0536 12/19/24  0618 12/18/24  0552 12/17/24  2141   SODIUM 135* 132* 131* 129* 130*   POTASSIUM 4.0 3.7 3.9 4.1 3.8   CHLORIDE 97* 95* 94* 92* 94*   CO2 26.6 22.2 22.7 21.4* 26.1   ANION GAP 11.4 14.8 14.3 15.6* 9.9   BUN 14 15 15 15 15   CREATININE 0.72 0.81 0.70 0.79 1.00   EGFR 80.0 69.5 82.8 71.6 54.0*   GLUCOSE 145* 199* 174* 181* 124*   CALCIUM 9.1 8.7 8.9 9.0 9.5         Lab 12/19/24  0618 12/18/24  0552 12/17/24  2141   TOTAL PROTEIN 6.6 6.8 7.2   ALBUMIN 3.5 3.7 3.9   GLOBULIN 3.1 3.1 3.3   ALT (SGPT) 6 6 6   AST (SGOT) 11 13 14   BILIRUBIN 0.3 0.5 0.7   ALK PHOS 59 62 69         Lab 12/18/24  0005 12/17/24  2141   PROBNP  --  494.4   HSTROP T 12 12                 Brief Urine Lab Results  (Last result in the past 365 days)        Color   Clarity   Blood   Leuk Est   Nitrite   Protein   CREAT   Urine HCG        11/08/24 1512             86.3               Microbiology Results (last 10 days)       Procedure Component Value - Date/Time    Respiratory Culture - Sputum, Trachea [[id number]] Collected: 12/18/24 1131    Lab Status: Final result Specimen: Sputum from Trachea Updated: 12/20/24 1218     Respiratory Culture Moderate growth (3+) Normal respiratory opal. No S. aureus or Pseudomonas aeruginosa detected. Final report.      Gram Stain Rare (1+) WBCs seen      Rare (1+) Gram positive bacilli resembling diphtheroids      Occasional Gram positive cocci    Blood Culture - Blood, Arm, Right [[id number]]  (Normal) Collected: 12/17/24 2141    Lab Status: Preliminary result Specimen: Blood from Arm, Right Updated: 12/20/24 2200     Blood Culture No growth at 3 days    Narrative:      Less than seven (7) mL's of blood was collected.  Insufficient quantity may yield false negative results.    Blood Culture - Blood, Arm, Left [[id number]]  (Normal) Collected: 12/17/24 2141    Lab Status: Preliminary result Specimen: Blood from Arm, Left Updated: 12/20/24 2200     Blood Culture No growth at 3 days    Narrative:      Less than seven (7) mL's of blood was collected.  Insufficient quantity may yield false negative results.    COVID-19, FLU A/B, RSV PCR 1 HR TAT - Swab, Nasopharynx [[id number]]  (Normal) Collected: 12/17/24 2141    Lab Status: Final result Specimen: Swab from Nasopharynx Updated: 12/17/24 2226     COVID19 Not Detected     Influenza A PCR Not Detected     Influenza B PCR Not Detected     RSV, PCR Not Detected    Narrative:      Fact sheet for providers: https://www.fda.gov/media/944659/download    Fact sheet for patients: https://www.fda.gov/media/057319/download    Test performed by PCR.            CT Chest Without Contrast Diagnostic    Result Date: 12/18/2024   1. New or increased bibasilar pulmonary opacities are seen since the 2/1/2019 CT study. The findings are nonspecific. They may represent atelectasis alone. However, pulmonary contusion, pneumonia, and/or pulmonary aspiration cannot be excluded.  2. There are subacute nonsegmental left lateral/posterolateral eighth (8th) and ninth (9th) rib fractures, as discussed.  3. No definite acute fracture is seen.  4. No pneumothorax.  5. Minimal, if any, pleural effusion is identified.  6. Atherosclerotic changes are noted.  7. Interval increase in size of the left-sided hiatal hernia  since 2/1/2019. It contains a large portion of the stomach.  8. Please see above comments for further detail.    Portions of this note were completed with a voice recognition program.  Electronically Signed By-Matti Downey MD On:12/18/2024 3:16 AM      XR Chest 1 View    Result Date: 12/17/2024  Impression: No convincing acute airspace process. Large leftward directed hiatal hernia obscuring portion of left lower lobe. Retrocardiac atelectasis or airspace disease would be difficult to entirely exclude. Minimally displaced left posterolateral seventh rib fracture, likely related to known recent fall. Electronically Signed: Gael Vallejo MD  12/17/2024 9:36 PM EST  Workstation ID: [id number]                  Labs Pending at Discharge:  Pending Labs       Order Current Status    Blood Culture - Blood, Arm, Left Preliminary result    Blood Culture - Blood, Arm, Right Preliminary result              Time spent on Discharge including face to face service:  25 minutes    Electronically signed by Nicko Pulido MD, 12/21/24, 8:29 AM EST.

## 2024-12-21 NOTE — PLAN OF CARE
Goal Outcome Evaluation:  Plan of Care Reviewed With: patient, family, child        Progress: improving  Outcome Evaluation: Pt VSS. [employer] education complete with pt and daughters. All belongings sent home with family.

## 2024-12-22 LAB
BACTERIA SPEC AEROBE CULT: NORMAL
BACTERIA SPEC AEROBE CULT: NORMAL

## 2024-12-23 ENCOUNTER — TRANSITIONAL CARE MANAGEMENT TELEPHONE ENCOUNTER (OUTPATIENT)
Dept: CALL CENTER | Facility: HOSPITAL | Age: 89
End: 2024-12-23
Payer: MEDICARE

## 2024-12-23 NOTE — PROGRESS NOTES
"Enter Query Response Below      Query Response: unable to clinically determine               If applicable, please update the problem list.     Patient: Shayy Wasserman        : 1935  Account: [id number]           Admit Date: 2024        How to Respond to this query:       a. Click New Note     b. Answer query within the yellow box.                c. Update the Problem List, if applicable.      If you have any questions about this query contact me at: [email or web address]     Dr. Pulido,    Patient presented  for shortness of breath. Notes include pneumonia, COPD exacerbation, and previous query confirmed acute hypoxic respiratory failure. ED note includes \"Patient  had a fall few days ago where she hit her left ribs on a coffee table.\" H&P notes a history of osteoporosis, as well as \"Minimally displaced left posterolateral  seventh rib fracture, likely related to known recent fall.\" Discharge summary includes an order to resume Fosamax home medication. Patient treated with monitoring, supplemental oxygen, and IV antibiotics.     Please clarify the type of fracture treated/monitored:     - Traumatic fracture  - Pathologic osteoporotic fracture in the setting of trauma  - Other ___________________  - Unable to clinically determine    By submitting this query, we are merely seeking further clarification of documentation to accurately reflect all conditions that you are monitoring, evaluating, treating or that extend the hospitalization or utilize additional resources of care. Please utilize your independent clinical judgment when addressing the question(s) above.     This query and your response, once completed, will be entered into the legal medical record.    Sincerely,  Rakesh Mercedes RN, CDS  Clinical Documentation Integrity Program     "

## 2024-12-23 NOTE — PROGRESS NOTES
Enter Query Response Below      Query Response: unable to clinically determine          If applicable, please update the problem list.     Patient: Shayy Wasserman        : 1935  Account: [id number]           Admit Date: 2024        How to Respond to this query:       a. Click New Note     b. Answer query within the yellow box.                c. Update the Problem List, if applicable.      If you have any questions about this query contact me at: [email or web address]     Dr. Pulido,    Patient with history of COPD and CKD presented  for shortness of breath. Notes include pneumonia, COPD exacerbation, and previous query confirmed acute hypoxic respiratory failure. Patient treated with monitoring, supplemental oxygen, IV ceftriaxone  - , PO azithromycin  and , and IV azithromycin  and .     Please clarify the type of pneumonia the patient was initially treated/monitored for:     - Gram-negative pneumonia (excluding Haemophilus influenzae)  - Bacterial pneumonia unspecified  - Other ___________________  - Unable to clinically determine    By submitting this query, we are merely seeking further clarification of documentation to accurately reflect all conditions that you are monitoring, evaluating, treating or that extend the hospitalization or utilize additional resources of care. Please utilize your independent clinical judgment when addressing the question(s) above.     This query and your response, once completed, will be entered into the legal medical record.    Sincerely,  Rakesh Mercedes RN, CDS  Clinical Documentation Integrity Program

## 2024-12-23 NOTE — OUTREACH NOTE
Call Center TCM Note      Flowsheet Row Responses   [facility] patient discharged from? [facility]   Does the patient have one of the following disease processes/diagnoses(primary or secondary)? COPD   TCM attempt successful? Yes  [VR_ contact +]   Call start time 1156   Call end time 1159   Discharge diagnosis Acute respiratory distress   Person spoke with today (if not patient) and relationship [facility]   Meds reviewed with patient/caregiver? Yes   Does the patient have all medications ordered at discharge? N/A   Is the patient taking all medications as directed (includes completed medication regime)? Yes   Comments HOSP DC FU appt 1/3/25 1045 am   Does the patient have an appointment with their PCP within 7-14 days of discharge? Yes   Has home health visited the patient within 72 hours of discharge? N/A   Psychosocial issues? No   Did the patient receive a copy of their discharge instructions? Yes   Nursing interventions Reviewed instructions with patient   What is the patient's perception of their health status since discharge? Improving   Nursing Interventions Nurse provided patient education   Is the patient/caregiver able to teach back the hierarchy of who to call/visit for symptoms/problems? PCP, Specialist, Home health nurse, Urgent Care, ED, 911 Yes   TCM call completed? Yes   Wrap up additional comments Daughter reports pt is doing much better. No needs.   Call end time 1159            Sushma Zavala RN    12/23/2024, 11:59 EST

## 2024-12-25 NOTE — PROGRESS NOTES
"Enter Query Response Below      Query Response: COPD is chronic respiratory failure            If applicable, please update the problem list.     Patient: Shayy Wasserman        : 1935  Account: [id number]           Admit Date: 2024        How to Respond to this query:       a. Click New Note     b. Answer query within the yellow box.                c. Update the Problem List, if applicable.      If you have any questions about this query contact me at: [email or web address]     Dr. Pulido,    Patient with history of COPD presented  for shortness of breath. Notes include pneumonia and COPD exacerbation. Previous query response confirms acute hypoxic respiratory failure, and also notes \"patient was requiring oxygen when she does not use oxygen at baseline.\" H&P and discharge summary include acute on chronic hypoxic respiratory failure. Progress notes include acute hypoxic respiratory failure only. Patient treated with monitoring, supplemental oxygen, Solu-Medrol, Duo-Nebs, and IV/PO antibiotics.     After study was chronic respiratory failure clinically supported during this admission?    - Chronic respiratory failure was supported with additional clinical indicators: ____________  - Chronic respiratory failure was not supported   - Other ___________________  - Unable to clinically determine    By submitting this query, we are merely seeking further clarification of documentation to accurately reflect all conditions that you are monitoring, evaluating, treating or that extend the hospitalization or utilize additional resources of care. Please utilize your independent clinical judgment when addressing the question(s) above.     This query and your response, once completed, will be entered into the legal medical record.    Sincerely,  Rakesh Mercedes RN, CDS  Clinical Documentation Integrity Program     "

## 2025-01-03 ENCOUNTER — OFFICE VISIT (OUTPATIENT)
Dept: FAMILY MEDICINE CLINIC | Facility: CLINIC | Age: OVER 89
End: 2025-01-03
Payer: MEDICARE

## 2025-01-03 VITALS
WEIGHT: 157 LBS | BODY MASS INDEX: 27.82 KG/M2 | TEMPERATURE: 97.6 F | SYSTOLIC BLOOD PRESSURE: 120 MMHG | DIASTOLIC BLOOD PRESSURE: 79 MMHG | OXYGEN SATURATION: 93 % | HEART RATE: 80 BPM | HEIGHT: 63 IN

## 2025-01-03 DIAGNOSIS — J44.1 COPD WITH ACUTE EXACERBATION: Primary | ICD-10-CM

## 2025-01-03 DIAGNOSIS — Z09 HOSPITAL DISCHARGE FOLLOW-UP: ICD-10-CM

## 2025-01-03 DIAGNOSIS — K59.01 SLOW TRANSIT CONSTIPATION: ICD-10-CM

## 2025-01-03 PROCEDURE — 99495 TRANSJ CARE MGMT MOD F2F 14D: CPT | Performed by: FAMILY MEDICINE

## 2025-01-03 PROCEDURE — 1125F AMNT PAIN NOTED PAIN PRSNT: CPT | Performed by: FAMILY MEDICINE

## 2025-01-03 PROCEDURE — 1111F DSCHRG MED/CURRENT MED MERGE: CPT | Performed by: FAMILY MEDICINE

## 2025-01-03 NOTE — PROGRESS NOTES
Transitional Care Follow Up Visit  Subjective     Shayy Wasserman is a 89 y.o. female who presents for a transitional care management visit.    Within 48 business hours after discharge our office contacted her via telephone to coordinate her care and needs.      I reviewed and discussed the details of that call along with the discharge summary, hospital problems, inpatient lab results, inpatient diagnostic studies, and consultation reports with Shayy.     Current outpatient and discharge medications have been reconciled for the patient.  Reviewed by: Pee Mon DO          12/21/2024    11:14 AM   Date of TCM Phone Call   [facility]   Date of Admission 12/17/2024   Date of Discharge 12/21/2024   Discharge Disposition Home or Self Care     Risk for Readmission (LACE) Score: 14 (12/21/2024  6:00 AM)      History of Present Illness  Hospital [id number] was admitted at [facility] 12 17 through 12 24.  She had acute exacerbation of her COPD.  Her CT scan suggested that this may be more pneumonia versus atelectasis.  She was treated appropriately during her acute hospitalization with course of antibiotics steroids and routine pulmonary treatment.  She was improved and able to be discharged home.  She did get a call from the transition of care nurse within 48 hours of discharge to see how she was doing.  Since she has been home she denies any cough wheezing or shortness of breath.  She denies any fever or chills.  She is not currently needing to use her albuterol nebulizer treatments at all.     Course During Hospital Stay:  12/17 - 12/21/2024     The following portions of the patient's history were reviewed and updated as appropriate: allergies, current medications, past family history, past medical history, past social history, past surgical history, and problem list.    Review of Systems   Constitutional:  Negative for chills and fatigue.   HENT:  Negative for congestion,  "postnasal drip and rhinorrhea.    Respiratory:  Negative for cough, chest tightness, shortness of breath and wheezing.    Cardiovascular:  Negative for chest pain.   Gastrointestinal:  Positive for constipation.       Objective   /79 (BP Location: Left arm, Patient Position: Sitting)   Pulse 80   Temp 97.6 °F (36.4 °C)   Ht 160 cm (63\")   Wt 71.2 kg (157 lb)   SpO2 93%   BMI 27.81 kg/m²   Physical Exam  Vitals and nursing note reviewed.   Constitutional:       General: She is not in acute distress.     Appearance: Normal appearance. She is normal weight.   HENT:      Head: Normocephalic.      Right Ear: Tympanic membrane, ear canal and external ear normal.      Left Ear: Tympanic membrane, ear canal and external ear normal.      Nose: Nose normal.      Mouth/Throat:      Mouth: Mucous membranes are moist.      Dentition: Has dentures.      Pharynx: Oropharynx is clear.   Eyes:      General: No scleral icterus.     Conjunctiva/sclera: Conjunctivae normal.      Pupils: Pupils are equal, round, and reactive to light.   Cardiovascular:      Rate and Rhythm: Normal rate and regular rhythm.      Pulses: Normal pulses.      Heart sounds: Normal heart sounds. No murmur heard.  Pulmonary:      Effort: Pulmonary effort is normal.      Breath sounds: Normal breath sounds. No wheezing, rhonchi or rales.   Musculoskeletal:      Cervical back: Neck supple. No rigidity or tenderness.   Lymphadenopathy:      Cervical: No cervical adenopathy.   Skin:     General: Skin is warm and dry.      Coloration: Skin is not jaundiced.      Findings: No rash.   Neurological:      General: No focal deficit present.      Mental Status: She is alert and oriented to person, place, and time.   Psychiatric:         Mood and Affect: Mood normal.         Thought Content: Thought content normal.         Judgment: Judgment normal.         Assessment & Plan   Diagnoses and all orders for this visit:    1. COPD with acute exacerbation " (Primary)  Assessment & Plan:  She is improved since her acute hospitalization.  She still has a little bit of a cough but it is rather infrequent.  She continues to use her albuterol just on an as-needed basis.      2. Hospital discharge follow-up  Assessment & Plan:  Since her hospitalization she is improved.  She will continue her current pulmonary treatment with her Breztri inhaler daily and her albuterol on an as-needed basis.      3. Slow transit constipation  Assessment & Plan:  She needs to increase her fluid intake and add some MiraLAX on a daily basis.

## 2025-01-03 NOTE — ASSESSMENT & PLAN NOTE
Since her hospitalization she is improved.  She will continue her current pulmonary treatment with her Breztri inhaler daily and her albuterol on an as-needed basis.

## 2025-01-03 NOTE — PROGRESS NOTES
Chief Complaint   Patient presents with    Hospital Follow Up Visit        Subjective     Shayy Wasserman  has a past medical history of Anxiety disorder (11/12/2018), Arthritis, COPD (chronic obstructive pulmonary disease), Dependent edema (11/28/2017), Gout, Osteoporosis, Primary osteoarthritis of left knee (05/24/2018), Primary osteoarthritis of one hip, right (12/12/2017), and Primary osteoarthritis of right hip (12/12/2017).    HPI    PHQ-2 Depression Screening  Little interest or pleasure in doing things?     Feeling down, depressed, or hopeless?     PHQ-2 Total Score     PHQ-9 Depression Screening  Little interest or pleasure in doing things?     Feeling down, depressed, or hopeless?     Trouble falling or staying asleep, or sleeping too much?     Feeling tired or having little energy?     Poor appetite or overeating?     Feeling bad about yourself - or that you are a failure or have let yourself or your family down?     Trouble concentrating on things, such as reading the newspaper or watching television?     Moving or speaking so slowly that other people could have noticed? Or the opposite - being so fidgety or restless that you have been moving around a lot more than usual?     Thoughts that you would be better off dead, or of hurting yourself in some way?     PHQ-9 Total Score     If you checked off any problems, how difficult have these problems made it for you to do your work, take care of things at home, or get along with other people?       Allergies   Allergen Reactions    Prednisone Angioedema    Bactrim [Sulfamethoxazole-Trimethoprim] Unknown - Low Severity    Griseofulvin Ultramicrosize [Griseofulvin] Unknown - Low Severity    Levocetirizine Rash     hives       Prior to Admission medications    Medication Sig Start Date End Date Taking? Authorizing Provider   albuterol (PROVENTIL) (2.5 MG/3ML) 0.083% nebulizer solution NEBULIZE 1 VIAL PER NEBULIZER EVERY 4 HOURS AS NEEDED FOR WHEEZING  Patient  taking differently: Take 2.5 mg by nebulization Every 4 (Four) Hours As Needed for Wheezing. 12/2/24  Yes Pee Mon DO   alendronate (FOSAMAX) 70 MG tablet TAKE 1 TABLET BY MOUTH EVERY 7 DAYS 10/7/24  Yes Pee Mon DO   allopurinol (ZYLOPRIM) 300 MG tablet TAKE 1 TABLET BY MOUTH DAILY 10/7/24  Yes Pee Mon DO   amLODIPine (NORVASC) 5 MG tablet TAKE 1 TABLET BY MOUTH EVERY DAY 10/7/24  Yes Pee Mon DO   atenolol (TENORMIN) 50 MG tablet TAKE 1 TABLET BY MOUTH 2 TIMES A DAY 10/7/24  Yes Pee Mon DO   Breztri Aerosphere 160-9-4.8 MCG/ACT aerosol inhaler Inhale 2 puffs 2 (Two) Times a Day. 10/5/23  Yes Pee Mon DO   cyclobenzaprine (FLEXERIL) 10 MG tablet Take 1 tablet by mouth 3 (Three) Times a Day. 11/30/24  Yes Fer Johnson MD   omeprazole (priLOSEC) 40 MG capsule TAKE 1 CAPSULE BY MOUTH EVERY DAY 9/9/24  Yes Pee Mon DO        Patient Active Problem List   Diagnosis    Pulmonary emphysema    Bronchitis    Essential (primary) hypertension    Gout    Hyperlipidemia    Osteoarthritis    Stage 3 chronic kidney disease    Osteoporosis    Localized edema    Prediabetes    Pain of left upper extremity    Anxiety with somatization    Hypoxia    Community acquired pneumonia of right lower lobe of lung    COPD with acute exacerbation    Pneumonia of right lower lobe due to Streptococcus pneumoniae    Hospital discharge follow-up    Left upper quadrant abdominal pain    COPD (chronic obstructive pulmonary disease)    Medicare annual wellness visit, subsequent        Past Surgical History:   Procedure Laterality Date    BLADDER REPAIR      COLONOSCOPY  2016    EYE SURGERY      implant- yes    HYSTERECTOMY      INTRAOCULAR LENS INSERTION      yes    JOINT REPLACEMENT      Surgery    KNEE SURGERY Right     knee repair    KNEE SURGERY Right     repair    OTHER SURGICAL HISTORY      Artificial joints/limbs    OTHER  "SURGICAL HISTORY      Artificial Joints/Limbs    OTHER SURGICAL HISTORY      Joint surgery    TOTAL KNEE ARTHROPLASTY Right        Social History     Socioeconomic History    Marital status:    Tobacco Use    Smoking status: Former     Current packs/day: 0.00     Average packs/day: 0.3 packs/day for 19.0 years (4.8 ttl pk-yrs)     Types: Cigarettes     Start date:      Quit date: 1970     Years since quittin.0    Smokeless tobacco: Never    Tobacco comments:     started at age 16- stopped at age 35   Vaping Use    Vaping status: Never Used   Substance and Sexual Activity    Alcohol use: Never     Comment: 2019- 1/15/2019 does not drink     Drug use: Never    Sexual activity: Defer       Family History   Problem Relation Age of Onset    Colon cancer Father        Family history, surgical history, past medical history, Allergies and meds reviewed with patient today and updated in [employer] EMR.     ROS:  Review of Systems    OBJECTIVE:  Vitals:    25 1100   BP: 120/79   BP Location: Left arm   Patient Position: Sitting   Pulse: 80   Temp: 97.6 °F (36.4 °C)   SpO2: 93%   Weight: 71.2 kg (157 lb)   Height: 160 cm (63\")     No results found.   Body mass index is 27.81 kg/m².  No LMP recorded. Patient is postmenopausal.    The ASCVD Risk score (Adam DK, et al., 2019) failed to calculate for the following reasons:    The 2019 ASCVD risk score is only valid for ages 40 to 79     Physical Exam    Procedures    Admission on 2024, Discharged on 2024   Component Date Value Ref Range Status    QT Interval 2024 333  ms Final    QTC Interval 2024 404  ms Final    Glucose 2024 124 (H)  65 - 99 mg/dL Final    BUN 2024 15  8 - 23 mg/dL Final    Creatinine 2024 1.00  0.57 - 1.00 mg/dL Final    Sodium 2024 130 (L)  136 - 145 mmol/L Final    Potassium 2024 3.8  3.5 - 5.2 mmol/L Final    Chloride 2024 94 (L)  98 - 107 mmol/L Final    CO2 2024 26.1  " 22.0 - 29.0 mmol/L Final    Calcium 12/17/2024 9.5  8.6 - 10.5 mg/dL Final    Total Protein 12/17/2024 7.2  6.0 - 8.5 g/dL Final    Albumin 12/17/2024 3.9  3.5 - 5.2 g/dL Final    ALT (SGPT) 12/17/2024 6  1 - 33 U/L Final    AST (SGOT) 12/17/2024 14  1 - 32 U/L Final    Alkaline Phosphatase 12/17/2024 69  39 - 117 U/L Final    Total Bilirubin 12/17/2024 0.7  0.0 - 1.2 mg/dL Final    Globulin 12/17/2024 3.3  gm/dL Final    A/G Ratio 12/17/2024 1.2  g/dL Final    BUN/Creatinine Ratio 12/17/2024 15.0  7.0 - 25.0 Final    Anion Gap 12/17/2024 9.9  5.0 - 15.0 mmol/L Final    eGFR 12/17/2024 54.0 (L)  >60.0 mL/min/1.73 Final    proBNP 12/17/2024 494.4  0.0 - 1,800.0 pg/mL Final    HS Troponin T 12/17/2024 12  <14 ng/L Final    Extra Tube 12/17/2024 Hold for add-ons.   Final    Auto resulted.    Extra Tube 12/17/2024 hold for add-on   Final    Auto resulted    Extra Tube 12/17/2024 Hold for add-ons.   Final    Auto resulted.    Extra Tube 12/17/2024 Hold for add-ons.   Final    Auto resulted    WBC 12/17/2024 9.29  3.40 - 10.80 10*3/mm3 Final    RBC 12/17/2024 4.01  3.77 - 5.28 10*6/mm3 Final    Hemoglobin 12/17/2024 12.4  12.0 - 15.9 g/dL Final    Hematocrit 12/17/2024 37.2  34.0 - 46.6 % Final    MCV 12/17/2024 92.8  79.0 - 97.0 fL Final    MCH 12/17/2024 30.9  26.6 - 33.0 pg Final    MCHC 12/17/2024 33.3  31.5 - 35.7 g/dL Final    RDW 12/17/2024 13.8  12.3 - 15.4 % Final    RDW-SD 12/17/2024 46.9  37.0 - 54.0 fl Final    MPV 12/17/2024 9.3  6.0 - 12.0 fL Final    Platelets 12/17/2024 216  140 - 450 10*3/mm3 Final    Neutrophil % 12/17/2024 75.6  42.7 - 76.0 % Final    Lymphocyte % 12/17/2024 15.3 (L)  19.6 - 45.3 % Final    Monocyte % 12/17/2024 5.8  5.0 - 12.0 % Final    Eosinophil % 12/17/2024 2.2  0.3 - 6.2 % Final    Basophil % 12/17/2024 0.8  0.0 - 1.5 % Final    Immature Grans % 12/17/2024 0.3  0.0 - 0.5 % Final    Neutrophils, Absolute 12/17/2024 7.03 (H)  1.70 - 7.00 10*3/mm3 Final    Lymphocytes, Absolute  12/17/2024 1.42  0.70 - 3.10 10*3/mm3 Final    Monocytes, Absolute 12/17/2024 0.54  0.10 - 0.90 10*3/mm3 Final    Eosinophils, Absolute 12/17/2024 0.20  0.00 - 0.40 10*3/mm3 Final    Basophils, Absolute 12/17/2024 0.07  0.00 - 0.20 10*3/mm3 Final    Immature Grans, Absolute 12/17/2024 0.03  0.00 - 0.05 10*3/mm3 Final    nRBC 12/17/2024 0.0  0.0 - 0.2 /100 WBC Final    Blood Culture 12/17/2024 No growth at 5 days   Final    Blood Culture 12/17/2024 No growth at 5 days   Final    Lactate 12/17/2024 1.2  0.5 - 2.0 mmol/L Final    COVID19 12/17/2024 Not Detected  Not Detected - Ref. Range Final    Influenza A PCR 12/17/2024 Not Detected  Not Detected Final    Influenza B PCR 12/17/2024 Not Detected  Not Detected Final    RSV, PCR 12/17/2024 Not Detected  Not Detected Final    HS Troponin T 12/18/2024 12  <14 ng/L Final    Troponin T Numeric Delta 12/18/2024 0  Abnormal if >/=3 ng/L Final    Glucose 12/18/2024 181 (H)  65 - 99 mg/dL Final    BUN 12/18/2024 15  8 - 23 mg/dL Final    Creatinine 12/18/2024 0.79  0.57 - 1.00 mg/dL Final    Sodium 12/18/2024 129 (L)  136 - 145 mmol/L Final    Potassium 12/18/2024 4.1  3.5 - 5.2 mmol/L Final    Chloride 12/18/2024 92 (L)  98 - 107 mmol/L Final    CO2 12/18/2024 21.4 (L)  22.0 - 29.0 mmol/L Final    Calcium 12/18/2024 9.0  8.6 - 10.5 mg/dL Final    Total Protein 12/18/2024 6.8  6.0 - 8.5 g/dL Final    Albumin 12/18/2024 3.7  3.5 - 5.2 g/dL Final    ALT (SGPT) 12/18/2024 6  1 - 33 U/L Final    AST (SGOT) 12/18/2024 13  1 - 32 U/L Final    Alkaline Phosphatase 12/18/2024 62  39 - 117 U/L Final    Total Bilirubin 12/18/2024 0.5  0.0 - 1.2 mg/dL Final    Globulin 12/18/2024 3.1  gm/dL Final    A/G Ratio 12/18/2024 1.2  g/dL Final    BUN/Creatinine Ratio 12/18/2024 19.0  7.0 - 25.0 Final    Anion Gap 12/18/2024 15.6 (H)  5.0 - 15.0 mmol/L Final    eGFR 12/18/2024 71.6  >60.0 mL/min/1.73 Final    WBC 12/18/2024 8.95  3.40 - 10.80 10*3/mm3 Final    RBC 12/18/2024 3.73 (L)  3.77 - 5.28  10*6/mm3 Final    Hemoglobin 12/18/2024 11.6 (L)  12.0 - 15.9 g/dL Final    Hematocrit 12/18/2024 34.2  34.0 - 46.6 % Final    MCV 12/18/2024 91.7  79.0 - 97.0 fL Final    MCH 12/18/2024 31.1  26.6 - 33.0 pg Final    MCHC 12/18/2024 33.9  31.5 - 35.7 g/dL Final    RDW 12/18/2024 13.6  12.3 - 15.4 % Final    RDW-SD 12/18/2024 45.7  37.0 - 54.0 fl Final    MPV 12/18/2024 9.8  6.0 - 12.0 fL Final    Platelets 12/18/2024 201  140 - 450 10*3/mm3 Final    Neutrophil % 12/18/2024 89.6 (H)  42.7 - 76.0 % Final    Lymphocyte % 12/18/2024 8.8 (L)  19.6 - 45.3 % Final    Monocyte % 12/18/2024 0.8 (L)  5.0 - 12.0 % Final    Eosinophil % 12/18/2024 0.0 (L)  0.3 - 6.2 % Final    Basophil % 12/18/2024 0.2  0.0 - 1.5 % Final    Immature Grans % 12/18/2024 0.6 (H)  0.0 - 0.5 % Final    Neutrophils, Absolute 12/18/2024 8.02 (H)  1.70 - 7.00 10*3/mm3 Final    Lymphocytes, Absolute 12/18/2024 0.79  0.70 - 3.10 10*3/mm3 Final    Monocytes, Absolute 12/18/2024 0.07 (L)  0.10 - 0.90 10*3/mm3 Final    Eosinophils, Absolute 12/18/2024 0.00  0.00 - 0.40 10*3/mm3 Final    Basophils, Absolute 12/18/2024 0.02  0.00 - 0.20 10*3/mm3 Final    Immature Grans, Absolute 12/18/2024 0.05  0.00 - 0.05 10*3/mm3 Final    nRBC 12/18/2024 0.0  0.0 - 0.2 /100 WBC Final    Procalcitonin 12/18/2024 0.06  0.00 - 0.25 ng/mL Final    Respiratory Culture 12/18/2024 Moderate growth (3+) Normal respiratory opal. No S. aureus or Pseudomonas aeruginosa detected. Final report.   Final    Gram Stain 12/18/2024 Rare (1+) WBCs seen   Final    Gram Stain 12/18/2024 Rare (1+) Gram positive bacilli resembling diphtheroids   Final    Gram Stain 12/18/2024 Occasional Gram positive cocci   Final    Glucose 12/19/2024 174 (H)  65 - 99 mg/dL Final    BUN 12/19/2024 15  8 - 23 mg/dL Final    Creatinine 12/19/2024 0.70  0.57 - 1.00 mg/dL Final    Sodium 12/19/2024 131 (L)  136 - 145 mmol/L Final    Potassium 12/19/2024 3.9  3.5 - 5.2 mmol/L Final    Chloride 12/19/2024 94 (L)  98  - 107 mmol/L Final    CO2 12/19/2024 22.7  22.0 - 29.0 mmol/L Final    Calcium 12/19/2024 8.9  8.6 - 10.5 mg/dL Final    Total Protein 12/19/2024 6.6  6.0 - 8.5 g/dL Final    Albumin 12/19/2024 3.5  3.5 - 5.2 g/dL Final    ALT (SGPT) 12/19/2024 6  1 - 33 U/L Final    AST (SGOT) 12/19/2024 11  1 - 32 U/L Final    Alkaline Phosphatase 12/19/2024 59  39 - 117 U/L Final    Total Bilirubin 12/19/2024 0.3  0.0 - 1.2 mg/dL Final    Globulin 12/19/2024 3.1  gm/dL Final    A/G Ratio 12/19/2024 1.1  g/dL Final    BUN/Creatinine Ratio 12/19/2024 21.4  7.0 - 25.0 Final    Anion Gap 12/19/2024 14.3  5.0 - 15.0 mmol/L Final    eGFR 12/19/2024 82.8  >60.0 mL/min/1.73 Final    WBC 12/19/2024 11.73 (H)  3.40 - 10.80 10*3/mm3 Final    RBC 12/19/2024 3.46 (L)  3.77 - 5.28 10*6/mm3 Final    Hemoglobin 12/19/2024 10.8 (L)  12.0 - 15.9 g/dL Final    Hematocrit 12/19/2024 31.4 (L)  34.0 - 46.6 % Final    MCV 12/19/2024 90.8  79.0 - 97.0 fL Final    MCH 12/19/2024 31.2  26.6 - 33.0 pg Final    MCHC 12/19/2024 34.4  31.5 - 35.7 g/dL Final    RDW 12/19/2024 13.8  12.3 - 15.4 % Final    RDW-SD 12/19/2024 45.8  37.0 - 54.0 fl Final    MPV 12/19/2024 9.5  6.0 - 12.0 fL Final    Platelets 12/19/2024 214  140 - 450 10*3/mm3 Final    Neutrophil % 12/19/2024 88.8 (H)  42.7 - 76.0 % Final    Lymphocyte % 12/19/2024 8.3 (L)  19.6 - 45.3 % Final    Monocyte % 12/19/2024 2.4 (L)  5.0 - 12.0 % Final    Eosinophil % 12/19/2024 0.0 (L)  0.3 - 6.2 % Final    Basophil % 12/19/2024 0.1  0.0 - 1.5 % Final    Immature Grans % 12/19/2024 0.4  0.0 - 0.5 % Final    Neutrophils, Absolute 12/19/2024 10.42 (H)  1.70 - 7.00 10*3/mm3 Final    Lymphocytes, Absolute 12/19/2024 0.97  0.70 - 3.10 10*3/mm3 Final    Monocytes, Absolute 12/19/2024 0.28  0.10 - 0.90 10*3/mm3 Final    Eosinophils, Absolute 12/19/2024 0.00  0.00 - 0.40 10*3/mm3 Final    Basophils, Absolute 12/19/2024 0.01  0.00 - 0.20 10*3/mm3 Final    Immature Grans, Absolute 12/19/2024 0.05  0.00 - 0.05  10*3/mm3 Final    nRBC 12/19/2024 0.0  0.0 - 0.2 /100 WBC Final    Glucose 12/20/2024 199 (H)  65 - 99 mg/dL Final    BUN 12/20/2024 15  8 - 23 mg/dL Final    Creatinine 12/20/2024 0.81  0.57 - 1.00 mg/dL Final    Sodium 12/20/2024 132 (L)  136 - 145 mmol/L Final    Potassium 12/20/2024 3.7  3.5 - 5.2 mmol/L Final    Chloride 12/20/2024 95 (L)  98 - 107 mmol/L Final    CO2 12/20/2024 22.2  22.0 - 29.0 mmol/L Final    Calcium 12/20/2024 8.7  8.6 - 10.5 mg/dL Final    BUN/Creatinine Ratio 12/20/2024 18.5  7.0 - 25.0 Final    Anion Gap 12/20/2024 14.8  5.0 - 15.0 mmol/L Final    eGFR 12/20/2024 69.5  >60.0 mL/min/1.73 Final    WBC 12/20/2024 13.32 (H)  3.40 - 10.80 10*3/mm3 Final    RBC 12/20/2024 3.74 (L)  3.77 - 5.28 10*6/mm3 Final    Hemoglobin 12/20/2024 11.6 (L)  12.0 - 15.9 g/dL Final    Hematocrit 12/20/2024 34.4  34.0 - 46.6 % Final    MCV 12/20/2024 92.0  79.0 - 97.0 fL Final    MCH 12/20/2024 31.0  26.6 - 33.0 pg Final    MCHC 12/20/2024 33.7  31.5 - 35.7 g/dL Final    RDW 12/20/2024 13.6  12.3 - 15.4 % Final    RDW-SD 12/20/2024 46.3  37.0 - 54.0 fl Final    MPV 12/20/2024 9.7  6.0 - 12.0 fL Final    Platelets 12/20/2024 244  140 - 450 10*3/mm3 Final    Glucose 12/21/2024 145 (H)  65 - 99 mg/dL Final    BUN 12/21/2024 14  8 - 23 mg/dL Final    Creatinine 12/21/2024 0.72  0.57 - 1.00 mg/dL Final    Sodium 12/21/2024 135 (L)  136 - 145 mmol/L Final    Potassium 12/21/2024 4.0  3.5 - 5.2 mmol/L Final    Chloride 12/21/2024 97 (L)  98 - 107 mmol/L Final    CO2 12/21/2024 26.6  22.0 - 29.0 mmol/L Final    Calcium 12/21/2024 9.1  8.6 - 10.5 mg/dL Final    BUN/Creatinine Ratio 12/21/2024 19.4  7.0 - 25.0 Final    Anion Gap 12/21/2024 11.4  5.0 - 15.0 mmol/L Final    eGFR 12/21/2024 80.0  >60.0 mL/min/1.73 Final    WBC 12/21/2024 7.79  3.40 - 10.80 10*3/mm3 Final    RBC 12/21/2024 4.04  3.77 - 5.28 10*6/mm3 Final    Hemoglobin 12/21/2024 12.3  12.0 - 15.9 g/dL Final    Hematocrit 12/21/2024 37.7  34.0 - 46.6 % Final     MCV 12/21/2024 93.3  79.0 - 97.0 fL Final    MCH 12/21/2024 30.4  26.6 - 33.0 pg Final    MCHC 12/21/2024 32.6  31.5 - 35.7 g/dL Final    RDW 12/21/2024 13.9  12.3 - 15.4 % Final    RDW-SD 12/21/2024 47.2  37.0 - 54.0 fl Final    MPV 12/21/2024 9.4  6.0 - 12.0 fL Final    Platelets 12/21/2024 237  140 - 450 10*3/mm3 Final       ASSESSMENT/ PLAN:    There are no diagnoses linked to this encounter.           Orders Placed Today:     No orders of the defined types were placed in this encounter.       Management Plan:     An After Visit Summary was printed and given to the patient at discharge.    Follow-up: No follow-ups on file.    Pee Mon DO 1/3/2025 11:17 EST  This note was electronically signed.

## 2025-01-03 NOTE — ASSESSMENT & PLAN NOTE
She is improved since her acute hospitalization.  She still has a little bit of a cough but it is rather infrequent.  She continues to use her albuterol just on an as-needed basis.

## 2025-01-07 RX ORDER — OMEPRAZOLE 40 MG/1
40 CAPSULE, DELAYED RELEASE ORAL DAILY
Qty: 30 CAPSULE | Refills: 5 | Status: SHIPPED | OUTPATIENT
Start: 2025-01-07

## 2025-03-10 ENCOUNTER — APPOINTMENT (OUTPATIENT)
Dept: GENERAL RADIOLOGY | Facility: HOSPITAL | Age: OVER 89
DRG: 190 | End: 2025-03-10
Payer: MEDICARE

## 2025-03-10 ENCOUNTER — HOSPITAL ENCOUNTER (INPATIENT)
Facility: HOSPITAL | Age: OVER 89
LOS: 3 days | Discharge: HOME HEALTH | DRG: 190 | End: 2025-03-14
Attending: EMERGENCY MEDICINE | Admitting: STUDENT IN AN ORGANIZED HEALTH CARE EDUCATION/TRAINING PROGRAM
Payer: MEDICARE

## 2025-03-10 ENCOUNTER — TELEPHONE (OUTPATIENT)
Dept: FAMILY MEDICINE CLINIC | Facility: CLINIC | Age: OVER 89
End: 2025-03-10
Payer: MEDICARE

## 2025-03-10 DIAGNOSIS — I10 HYPERTENSION, UNSPECIFIED TYPE: ICD-10-CM

## 2025-03-10 DIAGNOSIS — I10 ESSENTIAL (PRIMARY) HYPERTENSION: ICD-10-CM

## 2025-03-10 DIAGNOSIS — R26.2 DIFFICULTY WALKING: ICD-10-CM

## 2025-03-10 DIAGNOSIS — J44.1 COPD EXACERBATION: Primary | ICD-10-CM

## 2025-03-10 LAB
ALBUMIN SERPL-MCNC: 3.9 G/DL (ref 3.5–5.2)
ALBUMIN/GLOB SERPL: 1.4 G/DL
ALP SERPL-CCNC: 55 U/L (ref 39–117)
ALT SERPL W P-5'-P-CCNC: 6 U/L (ref 1–33)
ANION GAP SERPL CALCULATED.3IONS-SCNC: 13.3 MMOL/L (ref 5–15)
AST SERPL-CCNC: 16 U/L (ref 1–32)
BASOPHILS # BLD AUTO: 0.02 10*3/MM3 (ref 0–0.2)
BASOPHILS NFR BLD AUTO: 0.6 % (ref 0–1.5)
BILIRUB SERPL-MCNC: 0.4 MG/DL (ref 0–1.2)
BUN SERPL-MCNC: 11 MG/DL (ref 8–23)
BUN/CREAT SERPL: 11.5 (ref 7–25)
CALCIUM SPEC-SCNC: 9.2 MG/DL (ref 8.6–10.5)
CHLORIDE SERPL-SCNC: 98 MMOL/L (ref 98–107)
CO2 SERPL-SCNC: 22.7 MMOL/L (ref 22–29)
CREAT SERPL-MCNC: 0.96 MG/DL (ref 0.57–1)
DEPRECATED RDW RBC AUTO: 47 FL (ref 37–54)
EGFRCR SERPLBLD CKD-EPI 2021: 56.7 ML/MIN/1.73
EOSINOPHIL # BLD AUTO: 0.1 10*3/MM3 (ref 0–0.4)
EOSINOPHIL NFR BLD AUTO: 2.8 % (ref 0.3–6.2)
ERYTHROCYTE [DISTWIDTH] IN BLOOD BY AUTOMATED COUNT: 13.9 % (ref 12.3–15.4)
FLUAV SUBTYP SPEC NAA+PROBE: NOT DETECTED
FLUBV RNA ISLT QL NAA+PROBE: NOT DETECTED
GEN 5 1HR TROPONIN T REFLEX: 13 NG/L
GLOBULIN UR ELPH-MCNC: 2.8 GM/DL
GLUCOSE SERPL-MCNC: 97 MG/DL (ref 65–99)
HCT VFR BLD AUTO: 36.3 % (ref 34–46.6)
HGB BLD-MCNC: 12.3 G/DL (ref 12–15.9)
HOLD SPECIMEN: NORMAL
HOLD SPECIMEN: NORMAL
IMM GRANULOCYTES # BLD AUTO: 0.01 10*3/MM3 (ref 0–0.05)
IMM GRANULOCYTES NFR BLD AUTO: 0.3 % (ref 0–0.5)
LIPASE SERPL-CCNC: 34 U/L (ref 13–60)
LYMPHOCYTES # BLD AUTO: 1.27 10*3/MM3 (ref 0.7–3.1)
LYMPHOCYTES NFR BLD AUTO: 36.1 % (ref 19.6–45.3)
MAGNESIUM SERPL-MCNC: 1.6 MG/DL (ref 1.6–2.4)
MCH RBC QN AUTO: 31.5 PG (ref 26.6–33)
MCHC RBC AUTO-ENTMCNC: 33.9 G/DL (ref 31.5–35.7)
MCV RBC AUTO: 92.8 FL (ref 79–97)
MONOCYTES # BLD AUTO: 0.44 10*3/MM3 (ref 0.1–0.9)
MONOCYTES NFR BLD AUTO: 12.5 % (ref 5–12)
NEUTROPHILS NFR BLD AUTO: 1.68 10*3/MM3 (ref 1.7–7)
NEUTROPHILS NFR BLD AUTO: 47.7 % (ref 42.7–76)
NRBC BLD AUTO-RTO: 0 /100 WBC (ref 0–0.2)
NT-PROBNP SERPL-MCNC: 986 PG/ML (ref 0–1800)
PLATELET # BLD AUTO: 154 10*3/MM3 (ref 140–450)
PMV BLD AUTO: 9.9 FL (ref 6–12)
POTASSIUM SERPL-SCNC: 4.1 MMOL/L (ref 3.5–5.2)
PROT SERPL-MCNC: 6.7 G/DL (ref 6–8.5)
RBC # BLD AUTO: 3.91 10*6/MM3 (ref 3.77–5.28)
RSV RNA NPH QL NAA+NON-PROBE: NOT DETECTED
SARS-COV-2 RNA RESP QL NAA+PROBE: NOT DETECTED
SODIUM SERPL-SCNC: 134 MMOL/L (ref 136–145)
TROPONIN T % DELTA: -13
TROPONIN T NUMERIC DELTA: -2 NG/L
TROPONIN T SERPL HS-MCNC: 15 NG/L
WBC NRBC COR # BLD AUTO: 3.52 10*3/MM3 (ref 3.4–10.8)
WHOLE BLOOD HOLD COAG: NORMAL
WHOLE BLOOD HOLD SPECIMEN: NORMAL

## 2025-03-10 PROCEDURE — 93010 ELECTROCARDIOGRAM REPORT: CPT | Performed by: INTERNAL MEDICINE

## 2025-03-10 PROCEDURE — 94799 UNLISTED PULMONARY SVC/PX: CPT

## 2025-03-10 PROCEDURE — 83735 ASSAY OF MAGNESIUM: CPT

## 2025-03-10 PROCEDURE — 99285 EMERGENCY DEPT VISIT HI MDM: CPT

## 2025-03-10 PROCEDURE — 84484 ASSAY OF TROPONIN QUANT: CPT

## 2025-03-10 PROCEDURE — 87637 SARSCOV2&INF A&B&RSV AMP PRB: CPT

## 2025-03-10 PROCEDURE — 93005 ELECTROCARDIOGRAM TRACING: CPT

## 2025-03-10 PROCEDURE — 83880 ASSAY OF NATRIURETIC PEPTIDE: CPT

## 2025-03-10 PROCEDURE — 71045 X-RAY EXAM CHEST 1 VIEW: CPT

## 2025-03-10 PROCEDURE — 80053 COMPREHEN METABOLIC PANEL: CPT | Performed by: EMERGENCY MEDICINE

## 2025-03-10 PROCEDURE — 93005 ELECTROCARDIOGRAM TRACING: CPT | Performed by: EMERGENCY MEDICINE

## 2025-03-10 PROCEDURE — 36415 COLL VENOUS BLD VENIPUNCTURE: CPT | Performed by: EMERGENCY MEDICINE

## 2025-03-10 PROCEDURE — 85025 COMPLETE CBC W/AUTO DIFF WBC: CPT

## 2025-03-10 PROCEDURE — 94640 AIRWAY INHALATION TREATMENT: CPT

## 2025-03-10 PROCEDURE — 83690 ASSAY OF LIPASE: CPT

## 2025-03-10 RX ORDER — IPRATROPIUM BROMIDE AND ALBUTEROL SULFATE 2.5; .5 MG/3ML; MG/3ML
3 SOLUTION RESPIRATORY (INHALATION) ONCE
Status: COMPLETED | OUTPATIENT
Start: 2025-03-10 | End: 2025-03-10

## 2025-03-10 RX ORDER — ASPIRIN 81 MG/1
324 TABLET, CHEWABLE ORAL ONCE
Status: DISCONTINUED | OUTPATIENT
Start: 2025-03-10 | End: 2025-03-14 | Stop reason: HOSPADM

## 2025-03-10 RX ORDER — SODIUM CHLORIDE 0.9 % (FLUSH) 0.9 %
10 SYRINGE (ML) INJECTION AS NEEDED
Status: DISCONTINUED | OUTPATIENT
Start: 2025-03-10 | End: 2025-03-14 | Stop reason: HOSPADM

## 2025-03-10 RX ADMIN — IPRATROPIUM BROMIDE AND ALBUTEROL SULFATE 3 ML: .5; 3 SOLUTION RESPIRATORY (INHALATION) at 23:15

## 2025-03-10 NOTE — TELEPHONE ENCOUNTER
Daughter called requesting to get appt for Shayy's cough. Only wanting to see Dr. Mon. Dr. Mon does not have any openings until the end of the month, advised to go to urgent care. Daughter stated she would take her there, but also requested appt for bp issues. Made appt for that.

## 2025-03-10 NOTE — ED PROVIDER NOTES
"Time: 6:03 PM EDT  Date of encounter:  3/10/2025  Independent Historian/Clinical History and Information was obtained by:   Patient and Family    History is limited by: N/A    Chief Complaint: Difficulty breathing      History of Present Illness:  Patient is a 89 y.o. year old female who presents to the emergency department for evaluation of arm pain, cough x 3 days.  Patient has a prior medical history consistent for COPD without nasal cannula O2 requirement.  Patient reports she developed acute cough few days ago and is ever since then been feeling worse.  Denies known fevers.  Denies nausea or vomiting.  Denies abdominal pain or chest pain.  Reports left shoulder pain started today.  Patient states \"I think it is just wore out\".  Pain is reproducible with certain movements and to touch. Reports at home blood pressure was high      Patient Care Team  Primary Care Provider: Pee Mon DO    Past Medical History:     Allergies   Allergen Reactions    Prednisone Angioedema    Bactrim [Sulfamethoxazole-Trimethoprim] Unknown - Low Severity    Griseofulvin Ultramicrosize [Griseofulvin] Unknown - Low Severity    Levocetirizine Rash     hives     Past Medical History:   Diagnosis Date    Anxiety disorder 11/12/2018    Arthritis     COPD (chronic obstructive pulmonary disease)     Dependent edema 11/28/2017    Gout     Osteoporosis     Primary osteoarthritis of left knee 05/24/2018    Primary osteoarthritis of one hip, right 12/12/2017    Primary osteoarthritis of right hip 12/12/2017     Past Surgical History:   Procedure Laterality Date    BLADDER REPAIR      COLONOSCOPY  2016    EYE SURGERY      implant- yes    HYSTERECTOMY      INTRAOCULAR LENS INSERTION      yes    JOINT REPLACEMENT      Surgery    KNEE SURGERY Right     knee repair    KNEE SURGERY Right     repair    OTHER SURGICAL HISTORY      Artificial joints/limbs    OTHER SURGICAL HISTORY      Artificial Joints/Limbs    OTHER SURGICAL HISTORY      " Joint surgery    TOTAL KNEE ARTHROPLASTY Right      Family History   Problem Relation Age of Onset    Colon cancer Father        Home Medications:  Prior to Admission medications    Medication Sig Start Date End Date Taking? Authorizing Provider   albuterol (PROVENTIL) (2.5 MG/3ML) 0.083% nebulizer solution NEBULIZE 1 VIAL PER NEBULIZER EVERY 4 HOURS AS NEEDED FOR WHEEZING  Patient taking differently: Take 2.5 mg by nebulization Every 4 (Four) Hours As Needed for Wheezing. 24   Pee Mon DO   alendronate (FOSAMAX) 70 MG tablet TAKE 1 TABLET BY MOUTH EVERY 7 DAYS 10/7/24   Pee Mon DO   allopurinol (ZYLOPRIM) 300 MG tablet TAKE 1 TABLET BY MOUTH DAILY 10/7/24   Pee Mon DO   amLODIPine (NORVASC) 5 MG tablet TAKE 1 TABLET BY MOUTH EVERY DAY 10/7/24   Pee Mon DO   atenolol (TENORMIN) 50 MG tablet TAKE 1 TABLET BY MOUTH 2 TIMES A DAY 10/7/24   Pee Mon DO   Breztri Aerosphere 160-9-4.8 MCG/ACT aerosol inhaler Inhale 2 puffs 2 (Two) Times a Day. 10/5/23   Pee Mon DO   cyclobenzaprine (FLEXERIL) 10 MG tablet Take 1 tablet by mouth 3 (Three) Times a Day. 24   Fer Johnson MD   omeprazole (priLOSEC) 40 MG capsule TAKE 1 CAPSULE BY MOUTH EVERY DAY 25   Pee Mon DO        Social History:   Social History     Tobacco Use    Smoking status: Former     Current packs/day: 0.00     Average packs/day: 0.3 packs/day for 19.0 years (4.8 ttl pk-yrs)     Types: Cigarettes     Start date:      Quit date: 1970     Years since quittin.2    Smokeless tobacco: Never    Tobacco comments:     started at age 16- stopped at age 35   Vaping Use    Vaping status: Never Used   Substance Use Topics    Alcohol use: Never     Comment: 2019- 1/15/2019 does not drink     Drug use: Never         Review of Systems:  Review of Systems   Constitutional:  Negative for chills and fever.   HENT:  Negative for  "congestion, rhinorrhea and sore throat.    Eyes:  Negative for photophobia.   Respiratory:  Positive for cough, shortness of breath and wheezing. Negative for apnea and chest tightness.    Cardiovascular:  Negative for chest pain and palpitations.   Gastrointestinal:  Negative for abdominal pain, diarrhea, nausea and vomiting.   Endocrine: Negative.    Genitourinary:  Negative for difficulty urinating and dysuria.   Musculoskeletal:  Positive for arthralgias. Negative for back pain, joint swelling and myalgias.   Skin:  Negative for color change and wound.   Allergic/Immunologic: Negative.    Neurological:  Negative for seizures and headaches.   Psychiatric/Behavioral: Negative.     All other systems reviewed and are negative.       Physical Exam:  /69   Pulse 84   Temp 98.6 °F (37 °C) (Oral)   Resp 19   Ht 165.1 cm (65\")   Wt 68.3 kg (150 lb 9.2 oz)   SpO2 (!) 89%   BMI 25.06 kg/m²     Physical Exam  Vitals and nursing note reviewed.   Constitutional:       General: She is awake.      Appearance: Normal appearance.   HENT:      Head: Normocephalic and atraumatic.      Nose: Nose normal.      Mouth/Throat:      Mouth: Mucous membranes are moist.   Eyes:      Extraocular Movements: Extraocular movements intact.      Pupils: Pupils are equal, round, and reactive to light.   Cardiovascular:      Rate and Rhythm: Normal rate and regular rhythm.      Heart sounds: Normal heart sounds.   Pulmonary:      Effort: Pulmonary effort is normal. No respiratory distress.      Breath sounds: Wheezing present. No rhonchi or rales.   Abdominal:      General: Bowel sounds are normal. There is no distension.      Palpations: Abdomen is soft.      Tenderness: There is no abdominal tenderness. There is no guarding or rebound.      Comments: No rigidity   Musculoskeletal:         General: No tenderness. Normal range of motion.      Cervical back: Normal range of motion and neck supple.      Right lower leg: No edema.      " Left lower leg: No edema.   Skin:     General: Skin is warm and dry.      Coloration: Skin is not jaundiced.   Neurological:      General: No focal deficit present.      Mental Status: She is alert and oriented to person, place, and time. Mental status is at baseline.   Psychiatric:         Mood and Affect: Mood normal.         Behavior: Behavior normal.                    Medical Decision Making:      Comorbidities that affect care:    COPD, anxiety    External Notes reviewed:    Hospital Discharge Summary:    SUMMARY     Patient Name: Shayy Wasserman  : 1935  MRN: [id number]     Date of Admission: 2024  Date of Discharge:  24  Primary Care Physician: Pee Mon DO     Consults         Date and Time Order Name Status Description     2024 11:02 PM Inpatient Hospitalist Consult                    Active and Resolved Hospital Problems:       Active Hospital Problems   No active problems to display.       Resolved Hospital Problems     Diagnosis POA    **Acute respiratory distress [R06.03] Yes    Acute on chronic respiratory failure with hypoxia [J96.21] Yes         Hospital Course      Hospital Course:  Shayy Wasserman is a 89 y.o. female with COPD presents to the emergency department for evaluation of shortness of breath.  Patient says that 1 week ago she had a fall and injured her left side.  Chest x-ray showed seventh rib fracture.  Pneumonia could not be excluded.  CT scan showed possible pneumonia on left side.  Patient admitted for COPD exacerbation and treatment of pneumonia.  Patient's respiratory status is improving.  She is now on room air.  Finished a course of antibiotics for CAP coverage and steroids for COPD exacerbation. Patient is at her baseline and medically stable for discharge.        The following orders were placed and all results were independently analyzed by me:  Orders Placed This Encounter   Procedures    COVID-19, FLU A/B, RSV PCR 1 HR TAT -  Swab, Nasopharynx    XR Chest 1 View    [place] Draw    High Sensitivity Troponin T    Comprehensive Metabolic Panel    Lipase    BNP    Magnesium    CBC Auto Differential    High Sensitivity Troponin T 1Hr    NPO Diet NPO Type: Strict NPO    Undress & Gown    Continuous Pulse Oximetry    Code Status and Medical Interventions: CPR (Attempt to Resuscitate); Full Support    Hospitalist (on-call MD unless specified)    Oxygen Therapy- Nasal Cannula; Titrate 1-6 LPM Per SpO2; 90 - 95%    ECG 12 Lead ED Triage Standing Order; Chest Pain    ECG 12 Lead ED Triage Standing Order; Chest Pain    Insert Peripheral IV    Inpatient Admission    CBC & Differential    Green Top (Gel)    Lavender Top    Gold Top - SST    Light Blue Top       Medications Given in the Emergency Department:  Medications   sodium chloride 0.9 % flush 10 mL (has no administration in time range)   aspirin chewable tablet 324 mg (324 mg Oral Not Given 3/10/25 2142)   acetaminophen (TYLENOL) tablet 650 mg (has no administration in time range)   ipratropium-albuterol (DUO-NEB) nebulizer solution 3 mL (3 mL Nebulization Given 3/10/25 2315)   methylPREDNISolone sodium succinate (SOLU-Medrol) 125 mg in sterile water (preservative free) 2 mL (125 mg Intravenous Given 3/11/25 0113)        ED Course:    ED Course as of 03/11/25 0241   Mon Mar 10, 2025   1802 --- PROVIDER IN TRIAGE NOTE ---    The patient was evaluated by Umair byers in triage. Orders were placed and the patient is currently awaiting disposition.  [CB]   5463 Patient Position: Sitting [RP]      ED Course User Index  [CB] Umair Mendoza, ALYSE  [RP] Carlos Haddad MD       Labs:    Lab Results (last 24 hours)       Procedure Component Value Units Date/Time    COVID-19, FLU A/B, RSV PCR 1 HR TAT - Swab, Nasopharynx [[id number]]  (Normal) Collected: 03/10/25 1740    Specimen: Swab from Nasopharynx Updated: 03/10/25 1909     COVID19 Not Detected     Influenza A PCR Not Detected      Influenza B PCR Not Detected     RSV, PCR Not Detected    Narrative:      Fact sheet for providers: https://www.fda.gov/media/170985/download    Fact sheet for patients: https://www.fda.gov/media/518571/download    Test performed by PCR.    High Sensitivity Troponin T [[id number]]  (Abnormal) Collected: 03/10/25 1746    Specimen: Blood from Hand, Left Updated: 03/10/25 1858     HS Troponin T 15 ng/L     Narrative:      High Sensitive Troponin T Reference Range:  <14.0 ng/L- Negative Female for AMI  <22.0 ng/L- Negative Male for AMI  >=14 - Abnormal Female indicating possible myocardial injury.  >=22 - Abnormal Male indicating possible myocardial injury.   Clinicians would have to utilize clinical acumen, EKG, Troponin, and serial changes to determine if it is an Acute Myocardial Infarction or myocardial injury due to an underlying chronic condition.         CBC & Differential [[id number]]  (Abnormal) Collected: 03/10/25 1746    Specimen: Blood from Hand, Left Updated: 03/10/25 3298    Narrative:      The following orders were created for panel order CBC & Differential.  Procedure                               Abnormality         Status                     ---------                               -----------         ------                     CBC Auto Differential[[id number]]        Abnormal            Final result                 Please view results for these tests on the individual orders.    Comprehensive Metabolic Panel [[id number]]  (Abnormal) Collected: 03/10/25 1746    Specimen: Blood from Hand, Left Updated: 03/10/25 1904     Glucose 97 mg/dL      BUN 11 mg/dL      Creatinine 0.96 mg/dL      Sodium 134 mmol/L      Potassium 4.1 mmol/L      Chloride 98 mmol/L      CO2 22.7 mmol/L      Calcium 9.2 mg/dL      Total Protein 6.7 g/dL      Albumin 3.9 g/dL      ALT (SGPT) 6 U/L      AST (SGOT) 16 U/L      Alkaline Phosphatase 55 U/L      Total Bilirubin 0.4 mg/dL      Globulin 2.8 gm/dL      A/G Ratio 1.4 g/dL       BUN/Creatinine Ratio 11.5     Anion Gap 13.3 mmol/L      eGFR 56.7 mL/min/1.73     Narrative:      GFR Categories in Chronic Kidney Disease (CKD)      GFR Category          GFR (mL/min/1.73)    Interpretation  G1                     90 or greater         Normal or high (1)  G2                      60-89                Mild decrease (1)  G3a                   45-59                Mild to moderate decrease  G3b                   30-44                Moderate to severe decrease  G4                    15-29                Severe decrease  G5                    14 or less           Kidney failure          (1)In the absence of evidence of kidney disease, neither GFR category G1 or G2 fulfill the criteria for CKD.    eGFR calculation 2021 CKD-EPI creatinine equation, which does not include race as a factor    Lipase [[id number]]  (Normal) Collected: 03/10/25 1746    Specimen: Blood from Hand, Left Updated: 03/10/25 1904     Lipase 34 U/L     BNP [[id number]]  (Normal) Collected: 03/10/25 1746    Specimen: Blood from Hand, Left Updated: 03/10/25 1854     proBNP 986.0 pg/mL     Narrative:      This assay is used as an aid in the diagnosis of individuals suspected of having heart failure. It can be used as an aid in the diagnosis of acute decompensated heart failure (ADHF) in patients presenting with signs and symptoms of ADHF to the emergency department (ED). In addition, NT-proBNP of <300 pg/mL indicates ADHF is not likely.    Age Range Result Interpretation  NT-proBNP Concentration (pg/mL:      <50             Positive            >450                   Gray                 300-450                    Negative             <300    50-75           Positive            >900                  Gray                300-900                  Negative            <300      >75             Positive            >1800                  Gray                300-1800                  Negative            <300    Magnesium [[id number]]  (Normal)  Collected: 03/10/25 1746    Specimen: Blood from Hand, Left Updated: 03/10/25 1904     Magnesium 1.6 mg/dL     CBC Auto Differential [[id number]]  (Abnormal) Collected: 03/10/25 1746    Specimen: Blood from Hand, Left Updated: 03/10/25 1827     WBC 3.52 10*3/mm3      RBC 3.91 10*6/mm3      Hemoglobin 12.3 g/dL      Hematocrit 36.3 %      MCV 92.8 fL      MCH 31.5 pg      MCHC 33.9 g/dL      RDW 13.9 %      RDW-SD 47.0 fl      MPV 9.9 fL      Platelets 154 10*3/mm3      Neutrophil % 47.7 %      Lymphocyte % 36.1 %      Monocyte % 12.5 %      Eosinophil % 2.8 %      Basophil % 0.6 %      Immature Grans % 0.3 %      Neutrophils, Absolute 1.68 10*3/mm3      Lymphocytes, Absolute 1.27 10*3/mm3      Monocytes, Absolute 0.44 10*3/mm3      Eosinophils, Absolute 0.10 10*3/mm3      Basophils, Absolute 0.02 10*3/mm3      Immature Grans, Absolute 0.01 10*3/mm3      nRBC 0.0 /100 WBC     High Sensitivity Troponin T 1Hr [[id number]] Collected: 03/10/25 2013    Specimen: Blood from Arm, Right Updated: 03/10/25 2053     HS Troponin T 13 ng/L      Troponin T Numeric Delta -2 ng/L      Troponin T % Delta -13    Narrative:      High Sensitive Troponin T Reference Range:  <14.0 ng/L- Negative Female for AMI  <22.0 ng/L- Negative Male for AMI  >=14 - Abnormal Female indicating possible myocardial injury.  >=22 - Abnormal Male indicating possible myocardial injury.   Clinicians would have to utilize clinical acumen, EKG, Troponin, and serial changes to determine if it is an Acute Myocardial Infarction or myocardial injury due to an underlying chronic condition.                  Imaging:    XR Chest 1 View  Result Date: 3/10/2025  XR CHEST 1 VW Date of Exam: 3/10/2025 6:45 PM EDT Indication: Chest Pain Triage Protocol Comparison: 12/17/2024 Findings: Heart size at the upper limits normal. Pulmonary vessels are normal. Calcified granuloma seen within the left upper lobe. There is minimal right basilar atelectasis. Lungs are otherwise clear.  Moderate-sized hiatal hernia is noted. No pleural effusion. No pneumothorax. There are severe degenerative changes of both shoulders.     Impression: 1. Minimal right basilar atelectasis. No other acute cardiopulmonary disease. Electronically Signed: Mayank Mata MD  3/10/2025 7:08 PM EDT  Workstation ID: [id number]        Differential Diagnosis and Discussion:    Cough: Differential diagnosis includes but is not limited to pneumonia, acute bronchitis, upper respiratory infection, ACE inhibitor use, allergic reaction, epiglottitis, seasonal allergies, chemical irritants, exercise-induced asthma, viral syndrome.    PROCEDURES:    Labs were collected in the emergency department and all labs were reviewed and interpreted by me.  X-ray were performed in the emergency department and all X-ray impressions were independently interpreted by me.  An EKG was performed and the EKG was interpreted by me.    ECG 12 Lead ED Triage Standing Order; Chest Pain   Preliminary Result   HEART RATE=58  bpm   RR Rlwxazet=1993  ms   AK Wqdqfsar=076  ms   P Horizontal Axis=10  deg   P Front Axis=35  deg   QRSD Interval=78  ms   QT Nleybszm=413  ms   OBtH=667  ms   QRS Axis=-16  deg   T Wave Axis=13  deg   - OTHERWISE NORMAL ECG -   Sinus rhythm   Borderline left axis deviation   Low voltage, precordial leads   Date and Time of Study:2025-03-10 18:08:20          Procedures    MDM     Amount and/or Complexity of Data Reviewed  Clinical lab tests: reviewed  Tests in the radiology section of CPT®: reviewed  Tests in the medicine section of CPT®: reviewed  Decide to obtain previous medical records or to obtain history from someone other than the patient: yes                       Patient Care Considerations:    CT CHEST: I considered ordering a CT scan of the chest, however clinically the patient has a COPD exacerbation.  No active chest pain.      Consultants/Shared Management Plan:    Hospitalist: I have discussed the case with Dr. Love  who agrees to accept the patient for admission.    Social Determinants of Health:    Patient is independent, reliable, and has access to care.       Disposition and Care Coordination:    Admit:   Through independent evaluation of the patient's history, physical, and imperical data, the patient meets criteria for inpatient admission to the hospital.        Final diagnoses:   COPD exacerbation        ED Disposition       ED Disposition   Decision to Admit    Condition   --    Comment   Level of Care: Telemetry [5]   Diagnosis: COPD (chronic obstructive pulmonary disease) [896837]   Certification: I Certify That Inpatient Hospital Services Are Medically Necessary For Greater Than 2 Midnights                 This medical record created using voice recognition software.             Carlos Haddad MD  03/11/25 [id number]

## 2025-03-11 PROBLEM — J96.01 ACUTE HYPOXIC RESPIRATORY FAILURE: Status: ACTIVE | Noted: 2024-12-17

## 2025-03-11 PROBLEM — J44.1 COPD EXACERBATION: Status: ACTIVE | Noted: 2025-03-11

## 2025-03-11 PROBLEM — M79.603 MUSCULOSKELETAL ARM PAIN: Status: ACTIVE | Noted: 2023-10-05

## 2025-03-11 PROCEDURE — 25010000002 HEPARIN (PORCINE) PER 1000 UNITS: Performed by: STUDENT IN AN ORGANIZED HEALTH CARE EDUCATION/TRAINING PROGRAM

## 2025-03-11 PROCEDURE — 94799 UNLISTED PULMONARY SVC/PX: CPT

## 2025-03-11 PROCEDURE — 25010000002 METHYLPREDNISOLONE PER 125 MG: Performed by: EMERGENCY MEDICINE

## 2025-03-11 PROCEDURE — 25010000002 METHYLPREDNISOLONE PER 40 MG: Performed by: STUDENT IN AN ORGANIZED HEALTH CARE EDUCATION/TRAINING PROGRAM

## 2025-03-11 PROCEDURE — 99223 1ST HOSP IP/OBS HIGH 75: CPT | Performed by: STUDENT IN AN ORGANIZED HEALTH CARE EDUCATION/TRAINING PROGRAM

## 2025-03-11 RX ORDER — BUDESONIDE 0.5 MG/2ML
0.5 INHALANT ORAL
Status: DISCONTINUED | OUTPATIENT
Start: 2025-03-11 | End: 2025-03-14 | Stop reason: HOSPADM

## 2025-03-11 RX ORDER — HEPARIN SODIUM 5000 [USP'U]/ML
5000 INJECTION, SOLUTION INTRAVENOUS; SUBCUTANEOUS EVERY 12 HOURS SCHEDULED
Status: DISCONTINUED | OUTPATIENT
Start: 2025-03-11 | End: 2025-03-14 | Stop reason: HOSPADM

## 2025-03-11 RX ORDER — AMOXICILLIN 250 MG
2 CAPSULE ORAL 2 TIMES DAILY PRN
Status: DISCONTINUED | OUTPATIENT
Start: 2025-03-11 | End: 2025-03-14 | Stop reason: HOSPADM

## 2025-03-11 RX ORDER — SODIUM CHLORIDE 9 MG/ML
40 INJECTION, SOLUTION INTRAVENOUS AS NEEDED
Status: DISCONTINUED | OUTPATIENT
Start: 2025-03-11 | End: 2025-03-14 | Stop reason: HOSPADM

## 2025-03-11 RX ORDER — ARFORMOTEROL TARTRATE 15 UG/2ML
15 SOLUTION RESPIRATORY (INHALATION)
Status: DISCONTINUED | OUTPATIENT
Start: 2025-03-11 | End: 2025-03-14 | Stop reason: HOSPADM

## 2025-03-11 RX ORDER — BISACODYL 10 MG
10 SUPPOSITORY, RECTAL RECTAL DAILY PRN
Status: DISCONTINUED | OUTPATIENT
Start: 2025-03-11 | End: 2025-03-14 | Stop reason: HOSPADM

## 2025-03-11 RX ORDER — IPRATROPIUM BROMIDE AND ALBUTEROL SULFATE 2.5; .5 MG/3ML; MG/3ML
3 SOLUTION RESPIRATORY (INHALATION) EVERY 6 HOURS PRN
Status: DISCONTINUED | OUTPATIENT
Start: 2025-03-11 | End: 2025-03-14 | Stop reason: HOSPADM

## 2025-03-11 RX ORDER — SODIUM CHLORIDE 0.9 % (FLUSH) 0.9 %
10 SYRINGE (ML) INJECTION AS NEEDED
Status: DISCONTINUED | OUTPATIENT
Start: 2025-03-11 | End: 2025-03-14 | Stop reason: HOSPADM

## 2025-03-11 RX ORDER — IPRATROPIUM BROMIDE AND ALBUTEROL SULFATE 2.5; .5 MG/3ML; MG/3ML
3 SOLUTION RESPIRATORY (INHALATION)
Status: DISCONTINUED | OUTPATIENT
Start: 2025-03-11 | End: 2025-03-11

## 2025-03-11 RX ORDER — ONDANSETRON 2 MG/ML
4 INJECTION INTRAMUSCULAR; INTRAVENOUS EVERY 6 HOURS PRN
Status: DISCONTINUED | OUTPATIENT
Start: 2025-03-11 | End: 2025-03-14 | Stop reason: HOSPADM

## 2025-03-11 RX ORDER — BISACODYL 5 MG/1
5 TABLET, DELAYED RELEASE ORAL DAILY PRN
Status: DISCONTINUED | OUTPATIENT
Start: 2025-03-11 | End: 2025-03-14 | Stop reason: HOSPADM

## 2025-03-11 RX ORDER — IPRATROPIUM BROMIDE AND ALBUTEROL SULFATE 2.5; .5 MG/3ML; MG/3ML
3 SOLUTION RESPIRATORY (INHALATION) EVERY 4 HOURS PRN
Status: DISCONTINUED | OUTPATIENT
Start: 2025-03-11 | End: 2025-03-11 | Stop reason: SDUPTHER

## 2025-03-11 RX ORDER — POLYETHYLENE GLYCOL 3350 17 G/17G
17 POWDER, FOR SOLUTION ORAL DAILY PRN
Status: DISCONTINUED | OUTPATIENT
Start: 2025-03-11 | End: 2025-03-14 | Stop reason: HOSPADM

## 2025-03-11 RX ORDER — SODIUM CHLORIDE/ALOE VERA
1 GEL (GRAM) NASAL AS NEEDED
Status: DISCONTINUED | OUTPATIENT
Start: 2025-03-11 | End: 2025-03-14 | Stop reason: HOSPADM

## 2025-03-11 RX ORDER — ACETAMINOPHEN 325 MG/1
650 TABLET ORAL EVERY 6 HOURS PRN
Status: DISCONTINUED | OUTPATIENT
Start: 2025-03-11 | End: 2025-03-14 | Stop reason: HOSPADM

## 2025-03-11 RX ORDER — ONDANSETRON 4 MG/1
4 TABLET, ORALLY DISINTEGRATING ORAL EVERY 6 HOURS PRN
Status: DISCONTINUED | OUTPATIENT
Start: 2025-03-11 | End: 2025-03-14 | Stop reason: HOSPADM

## 2025-03-11 RX ORDER — SODIUM CHLORIDE 0.9 % (FLUSH) 0.9 %
10 SYRINGE (ML) INJECTION EVERY 12 HOURS SCHEDULED
Status: DISCONTINUED | OUTPATIENT
Start: 2025-03-11 | End: 2025-03-14 | Stop reason: HOSPADM

## 2025-03-11 RX ADMIN — HEPARIN SODIUM 5000 UNITS: 5000 INJECTION INTRAVENOUS; SUBCUTANEOUS at 08:11

## 2025-03-11 RX ADMIN — Medication 10 ML: at 20:15

## 2025-03-11 RX ADMIN — WATER 125 MG: 1 INJECTION INTRAMUSCULAR; INTRAVENOUS; SUBCUTANEOUS at 01:13

## 2025-03-11 RX ADMIN — Medication 10 ML: at 08:11

## 2025-03-11 RX ADMIN — BUDESONIDE 0.5 MG: 0.5 INHALANT RESPIRATORY (INHALATION) at 07:28

## 2025-03-11 RX ADMIN — ARFORMOTEROL TARTRATE 15 MCG: 15 SOLUTION RESPIRATORY (INHALATION) at 07:28

## 2025-03-11 RX ADMIN — METHYLPREDNISOLONE SODIUM SUCCINATE 40 MG: 40 INJECTION, POWDER, LYOPHILIZED, FOR SOLUTION INTRAMUSCULAR; INTRAVENOUS at 11:05

## 2025-03-11 RX ADMIN — METHYLPREDNISOLONE SODIUM SUCCINATE 40 MG: 40 INJECTION, POWDER, LYOPHILIZED, FOR SOLUTION INTRAMUSCULAR; INTRAVENOUS at 17:35

## 2025-03-11 RX ADMIN — IPRATROPIUM BROMIDE AND ALBUTEROL SULFATE 3 ML: .5; 3 SOLUTION RESPIRATORY (INHALATION) at 07:28

## 2025-03-11 RX ADMIN — HEPARIN SODIUM 5000 UNITS: 5000 INJECTION INTRAVENOUS; SUBCUTANEOUS at 20:12

## 2025-03-11 RX ADMIN — ARFORMOTEROL TARTRATE 15 MCG: 15 SOLUTION RESPIRATORY (INHALATION) at 20:33

## 2025-03-11 RX ADMIN — BUDESONIDE 0.5 MG: 0.5 INHALANT RESPIRATORY (INHALATION) at 20:33

## 2025-03-11 NOTE — PLAN OF CARE
Goal Outcome Evaluation:  Plan of Care Reviewed With: patient        Progress: improving  Outcome Evaluation: Daughter at bedside. SR on tele monitor. 2L oxygen per NC. Denies ppain or shortness of breath. VSS. Bed alarm in place. Call light left with in reach.

## 2025-03-11 NOTE — PLAN OF CARE
Goal Outcome Evaluation:  Plan of Care Reviewed With: patient        Progress: improving  Outcome Evaluation: Patient is AO x4, able to make needs known. Patient is currently on 2L NC, no signs of respiratory distress. Patient given IV solumedrol during shift. Daughter at bedside, call light left within reach. No complaints of pain or discomfort at this time. No acute changes throughout shift. Continue with current plan of care.

## 2025-03-11 NOTE — NURSING NOTE
Patient Shayy Wasserman admitted to 4MT[facility] from the ED. Patient is alert and oriented to person, place, time, and situation. Patient oriented to unit, call light system and bed alarm use, teaching effective. Patient agreed to bed alarm use. Candida Auris screening revealed patient will NOT need tested, contact isolation and bleach cleaning due to no risk factors. Patient currently is not a concern for an active CDIFF infection.    4 eyes skin assessment completed with Amos Reddy RN. Per policy, the following skin interventions were put in place as a result of the skin assessment below: None - Philippe is greater than 18 and no skin issues noted    Skin Assessments (most recent)      Flowsheet Row Most Recent Value   Skin WDL .WDL except, moisture   Skin Moisture dry, flaky   Sensory Perception 4-->no impairment   Moisture 4-->rarely moist   Activity 3-->walks occasionally   Mobility 3-->slightly limited   Nutrition 3-->adequate   Friction and Shear 2-->potential problem   Philippe Score 19   Pressure Reduction Techniques frequent weight shift encouraged, heels elevated off bed            Fall assessment completed. Per policy, the patient was determined be a Low fall risk due to Casey Hector score 14 or less (only used within the first 24 hours of admission). Patient assessed to need the following mobility assistance: Standby Assist with the following assistive devices: None, and will be using a bedside commode for toileting. Per policy, the following fall interventions were put in place: Standard Fall Precautions - oriented to room and call light, bed low and locked, clutter free environment, adequate lighting, directed to call for assistance, non-skid footwear, hourly rounding and personal belongings within reach. and Bed Alarm.     Patient's belongings that were sent with family: None  Patient's belongings that were sent to security: None  Patient's belongings locked in safe box in room: None  Patient's belongings  that were sent to pharmacy: None  Patient's belongings kept at bedside per patient: Dentures and Clothing

## 2025-03-11 NOTE — PROGRESS NOTES
[facility]   Hospitalist Progress Note  Date: 3/11/2025  Patient Name: Shayy Wasserman  : 1935  MRN: [id number]  Date of admission: 3/10/2025  Room/Bed: [facility]/2      Subjective   Subjective     Chief Complaint: Shortness of breath    Summary:Shayy Wasserman is a 89 y.o. female with copd p/w soa, cough, wheeze.    Interval Followup: Satting mid 90s on 2 L, feels better, still wheezy        Objective   Objective     Vitals:   Temp:  [97.5 °F (36.4 °C)-98.4 °F (36.9 °C)] 97.5 °F (36.4 °C)  Heart Rate:  [65-85] 79  Resp:  [18-20] 20  BP: (110-160)/(61-91) 119/66  Flow (L/min) (Oxygen Therapy):  [2] 2    Physical Exam   General: Awake, alert, NAD  HENT: NCAT, MMM  Eyes: pupils equal, no scleral icterus  Cardiovascular: RRR, no murmurs   Pulmonary: still with some coarse bilateral wheezing  Gastrointestinal: S/ND/NT, +BS  Musculoskeletal: No gross deformities  Skin: No jaundice, no rash on exposed skin appreciated  Neuro: CN II through XII grossly intact; speech clear; no tremor  Psych: Mood and affect appropriate  : No Fraga catheter; no suprapubic tenderness    Result Review    Result Review:  I have personally reviewed these results:  [x]  Laboratory      Lab 03/10/25  174   WBC 3.52   HEMOGLOBIN 12.3   HEMATOCRIT 36.3   PLATELETS 154   NEUTROS ABS 1.68*   IMMATURE GRANS (ABS) 0.01   LYMPHS ABS 1.27   MONOS ABS 0.44   EOS ABS 0.10   MCV 92.8         Lab 03/10/25  1746   SODIUM 134*   POTASSIUM 4.1   CHLORIDE 98   CO2 22.7   ANION GAP 13.3   BUN 11   CREATININE 0.96   EGFR 56.7*   GLUCOSE 97   CALCIUM 9.2   MAGNESIUM 1.6         Lab 03/10/25  1746   TOTAL PROTEIN 6.7   ALBUMIN 3.9   GLOBULIN 2.8   ALT (SGPT) 6   AST (SGOT) 16   BILIRUBIN 0.4   ALK PHOS 55   LIPASE 34         Lab 03/10/25  2013 03/10/25  174   PROBNP  --  986.0   HSTROP T 13 15*                 Brief Urine Lab Results  (Last result in the past 365 days)        Color   Clarity   Blood   Leuk Est   Nitrite   Protein   CREAT   Urine HCG         11/08/24 1512             86.3               [x]  Microbiology   Microbiology Results (last 10 days)       Procedure Component Value - Date/Time    COVID-19, FLU A/B, RSV PCR 1 HR TAT - Swab, Nasopharynx [[id number]]  (Normal) Collected: 03/10/25 1740    Lab Status: Final result Specimen: Swab from Nasopharynx Updated: 03/10/25 1909     COVID19 Not Detected     Influenza A PCR Not Detected     Influenza B PCR Not Detected     RSV, PCR Not Detected    Narrative:      Fact sheet for providers: https://www.fda.gov/media/263680/download    Fact sheet for patients: https://www.fda.gov/media/925570/download    Test performed by PCR.          [x]  Radiology  XR Chest 1 View  Result Date: 3/10/2025  Impression: 1. Minimal right basilar atelectasis. No other acute cardiopulmonary disease. Electronically Signed: Mayank Mata MD  3/10/2025 7:08 PM EDT  Workstation ID: [id number]    []  EKG/Telemetry   []  Cardiology/Vascular   []  Pathology  []  Old records  []  Other:    Assessment & Plan   Assessment / Plan     Assessment:   COPD (chronic obstructive pulmonary disease)    Essential (primary) hypertension    Hyperlipidemia    Stage 3 chronic kidney disease    Musculoskeletal arm pain    Acute hypoxic respiratory failure    Plan:  Admitted to Medicine  Continue steroids  Continue nebulizers  Oxygen as needed       Discussed with RN.    VTE Prophylaxis:  Pharmacologic VTE prophylaxis orders are present.        CODE STATUS:   Code Status (Patient has no pulse and is not breathing): CPR (Attempt to Resuscitate)  Medical Interventions (Patient has pulse or is breathing): Full Support      Electronically signed by Jeremy Steward MD, 3/11/2025, 18:15 EDT.

## 2025-03-11 NOTE — H&P
Patient Care Team:  Pee Mon DO as PCP - General (Family Medicine)    Chief complaint shortness of breath    Subjective     Patient is a 89 y.o. female presents with shortness of breath cough and arm pain for the past 3 days.  She has a history of COPD not on home oxygen.  Patient states that she has not been feeling well for several days since she developed a new acute cough that has been nonproductive.  The left shoulder pain actually started throughout the day today.  Pain is a dull ache and sharp with certain movements and also when it is touched.  When she arrived to the emergency department, she was found to have an oxygen saturation of 89% on room air    And was tachypneic.  She was also having expiratory wheezes and was given Solu-Medrol and nebulizers    Of note, patient's chart says that she has a allergy to prednisone that includes angioedema however family stated to the ER physician that they do not believe that includes Solu-Medrol and she has tolerated it in the past    Review of Systems   Pertinent items are noted in HPI    History  Past Medical History:   Diagnosis Date    Anxiety disorder 11/12/2018    Arthritis     COPD (chronic obstructive pulmonary disease)     Dependent edema 11/28/2017    Gout     Osteoporosis     Primary osteoarthritis of left knee 05/24/2018    Primary osteoarthritis of one hip, right 12/12/2017    Primary osteoarthritis of right hip 12/12/2017     Past Surgical History:   Procedure Laterality Date    BLADDER REPAIR      COLONOSCOPY  2016    EYE SURGERY      implant- yes    HYSTERECTOMY      INTRAOCULAR LENS INSERTION      yes    JOINT REPLACEMENT      Surgery    KNEE SURGERY Right     knee repair    KNEE SURGERY Right     repair    OTHER SURGICAL HISTORY      Artificial joints/limbs    OTHER SURGICAL HISTORY      Artificial Joints/Limbs    OTHER SURGICAL HISTORY      Joint surgery    TOTAL KNEE ARTHROPLASTY Right      Family History   Problem Relation Age  of Onset    Colon cancer Father      Social History     Tobacco Use    Smoking status: Former     Current packs/day: 0.00     Average packs/day: 0.3 packs/day for 19.0 years (4.8 ttl pk-yrs)     Types: Cigarettes     Start date:      Quit date: 1970     Years since quittin.2    Smokeless tobacco: Never    Tobacco comments:     started at age 16- stopped at age 35   Vaping Use    Vaping status: Never Used   Substance Use Topics    Alcohol use: Never     Comment: 2019- 1/15/2019 does not drink     Drug use: Never     (Not in a hospital admission)   Allergies:  Prednisone, Bactrim [sulfamethoxazole-trimethoprim], Griseofulvin ultramicrosize [griseofulvin], and Levocetirizine    Objective     Vital Signs  Temp:  [98.6 °F (37 °C)] 98.6 °F (37 °C)  Heart Rate:  [67-84] 84  Resp:  [18-20] 19  BP: (135-160)/(63-91) 139/69    Physical Exam:      General Appearance:  Alert, cooperative, in no acute distress   Head:  Normocephalic, without obvious abnormality, atraumatic   Eyes:  Lids and lashes normal, conjunctivae and sclerae normal, no icterus, no pallor, corneas clear, PERRLA   Ears:  Ears appear intact with no abnormalities noted   Throat:  No oral lesions, no thrush, oral mucosa moist   Neck:  No adenopathy, supple, trachea midline, no thyromegaly, no carotid bruit, no JVD   Back:  No kyphosis present, no scoliosis present, no skin lesions, erythema or scars, no tenderness to percussion, or palpation, range of motion normal   Lungs:  Clear to auscultation,respirations regular, even and unlabored    Heart:  Regular rhythm and normal rate, normal S1 and S2, no murmur, no gallop, no rub, no click   Breast Exam:  Deferred   Abdomen:  Normal bowel sounds, no masses, no organomegaly, soft non-tender, non-distended, no guarding, no rebound tenderness   Genitalia:  Deferred   Extremities:  Moves all extremities well, no edema, no cyanosis, no redness   Pulses:  Pulses palpable and equal bilaterally   Skin:  No  bleeding, bruising or rash   Lymph nodes:  No palpable adenopathy   Neurologic:  Cranial nerves 2 - 12 grossly intact, sensation intact, DTR present and equal bilaterally       Results Review:    I reviewed the patient's new clinical results.  I reviewed the patient's new imaging results and agree with the interpretation.  I reviewed the patient's other test results and agree with the interpretation  I personally viewed and interpreted the patient's EKG/Telemetry data    Assessment & Plan       COPD (chronic obstructive pulmonary disease)    Essential (primary) hypertension    Hyperlipidemia    Stage 3 chronic kidney disease    Musculoskeletal arm pain    Acute hypoxic respiratory failure      Admit to hospitalist service  Abilio scheduled and as needed  IV steroids  Brovana and Pulmicort  Supplemental oxygen  Physical therapy  Chest PT  Restart home medications  Heart healthy diet  Full code        Jeremy Love MD  03/11/25  00:59 EDT

## 2025-03-12 LAB
ANION GAP SERPL CALCULATED.3IONS-SCNC: 12.4 MMOL/L (ref 5–15)
BASOPHILS # BLD AUTO: 0 10*3/MM3 (ref 0–0.2)
BASOPHILS NFR BLD AUTO: 0 % (ref 0–1.5)
BUN SERPL-MCNC: 24 MG/DL (ref 8–23)
BUN/CREAT SERPL: 25 (ref 7–25)
CALCIUM SPEC-SCNC: 8.4 MG/DL (ref 8.6–10.5)
CHLORIDE SERPL-SCNC: 97 MMOL/L (ref 98–107)
CO2 SERPL-SCNC: 22.6 MMOL/L (ref 22–29)
CREAT SERPL-MCNC: 0.96 MG/DL (ref 0.57–1)
DEPRECATED RDW RBC AUTO: 44.9 FL (ref 37–54)
EGFRCR SERPLBLD CKD-EPI 2021: 56.7 ML/MIN/1.73
EOSINOPHIL # BLD AUTO: 0 10*3/MM3 (ref 0–0.4)
EOSINOPHIL NFR BLD AUTO: 0 % (ref 0.3–6.2)
ERYTHROCYTE [DISTWIDTH] IN BLOOD BY AUTOMATED COUNT: 13.3 % (ref 12.3–15.4)
GLUCOSE SERPL-MCNC: 257 MG/DL (ref 65–99)
HCT VFR BLD AUTO: 35.7 % (ref 34–46.6)
HGB BLD-MCNC: 12.2 G/DL (ref 12–15.9)
IMM GRANULOCYTES # BLD AUTO: 0.02 10*3/MM3 (ref 0–0.05)
IMM GRANULOCYTES NFR BLD AUTO: 0.3 % (ref 0–0.5)
LYMPHOCYTES # BLD AUTO: 0.81 10*3/MM3 (ref 0.7–3.1)
LYMPHOCYTES NFR BLD AUTO: 11.8 % (ref 19.6–45.3)
MCH RBC QN AUTO: 31.4 PG (ref 26.6–33)
MCHC RBC AUTO-ENTMCNC: 34.2 G/DL (ref 31.5–35.7)
MCV RBC AUTO: 92 FL (ref 79–97)
MONOCYTES # BLD AUTO: 0.13 10*3/MM3 (ref 0.1–0.9)
MONOCYTES NFR BLD AUTO: 1.9 % (ref 5–12)
NEUTROPHILS NFR BLD AUTO: 5.89 10*3/MM3 (ref 1.7–7)
NEUTROPHILS NFR BLD AUTO: 86 % (ref 42.7–76)
NRBC BLD AUTO-RTO: 0 /100 WBC (ref 0–0.2)
PLATELET # BLD AUTO: 150 10*3/MM3 (ref 140–450)
PMV BLD AUTO: 9.6 FL (ref 6–12)
POTASSIUM SERPL-SCNC: 4 MMOL/L (ref 3.5–5.2)
PROCALCITONIN SERPL-MCNC: 0.04 NG/ML (ref 0–0.25)
RBC # BLD AUTO: 3.88 10*6/MM3 (ref 3.77–5.28)
SODIUM SERPL-SCNC: 132 MMOL/L (ref 136–145)
WBC NRBC COR # BLD AUTO: 6.85 10*3/MM3 (ref 3.4–10.8)

## 2025-03-12 PROCEDURE — 25010000002 HEPARIN (PORCINE) PER 1000 UNITS: Performed by: STUDENT IN AN ORGANIZED HEALTH CARE EDUCATION/TRAINING PROGRAM

## 2025-03-12 PROCEDURE — 80048 BASIC METABOLIC PNL TOTAL CA: CPT | Performed by: STUDENT IN AN ORGANIZED HEALTH CARE EDUCATION/TRAINING PROGRAM

## 2025-03-12 PROCEDURE — 94799 UNLISTED PULMONARY SVC/PX: CPT

## 2025-03-12 PROCEDURE — 94760 N-INVAS EAR/PLS OXIMETRY 1: CPT

## 2025-03-12 PROCEDURE — 94664 DEMO&/EVAL PT USE INHALER: CPT

## 2025-03-12 PROCEDURE — 85025 COMPLETE CBC W/AUTO DIFF WBC: CPT | Performed by: STUDENT IN AN ORGANIZED HEALTH CARE EDUCATION/TRAINING PROGRAM

## 2025-03-12 PROCEDURE — 99232 SBSQ HOSP IP/OBS MODERATE 35: CPT | Performed by: INTERNAL MEDICINE

## 2025-03-12 PROCEDURE — 25010000002 METHYLPREDNISOLONE PER 40 MG: Performed by: STUDENT IN AN ORGANIZED HEALTH CARE EDUCATION/TRAINING PROGRAM

## 2025-03-12 PROCEDURE — 84145 PROCALCITONIN (PCT): CPT | Performed by: PHYSICIAN ASSISTANT

## 2025-03-12 RX ADMIN — ARFORMOTEROL TARTRATE 15 MCG: 15 SOLUTION RESPIRATORY (INHALATION) at 07:23

## 2025-03-12 RX ADMIN — METHYLPREDNISOLONE SODIUM SUCCINATE 40 MG: 40 INJECTION, POWDER, LYOPHILIZED, FOR SOLUTION INTRAMUSCULAR; INTRAVENOUS at 17:09

## 2025-03-12 RX ADMIN — METHYLPREDNISOLONE SODIUM SUCCINATE 40 MG: 40 INJECTION, POWDER, LYOPHILIZED, FOR SOLUTION INTRAMUSCULAR; INTRAVENOUS at 00:34

## 2025-03-12 RX ADMIN — METHYLPREDNISOLONE SODIUM SUCCINATE 40 MG: 40 INJECTION, POWDER, LYOPHILIZED, FOR SOLUTION INTRAMUSCULAR; INTRAVENOUS at 05:48

## 2025-03-12 RX ADMIN — HEPARIN SODIUM 5000 UNITS: 5000 INJECTION INTRAVENOUS; SUBCUTANEOUS at 21:01

## 2025-03-12 RX ADMIN — METHYLPREDNISOLONE SODIUM SUCCINATE 40 MG: 40 INJECTION, POWDER, LYOPHILIZED, FOR SOLUTION INTRAMUSCULAR; INTRAVENOUS at 11:05

## 2025-03-12 RX ADMIN — HEPARIN SODIUM 5000 UNITS: 5000 INJECTION INTRAVENOUS; SUBCUTANEOUS at 08:41

## 2025-03-12 RX ADMIN — POLYETHYLENE GLYCOL 400 AND PROPYLENE GLYCOL 2 DROP: 4; 3 SOLUTION/ DROPS OPHTHALMIC at 00:34

## 2025-03-12 RX ADMIN — ARFORMOTEROL TARTRATE 15 MCG: 15 SOLUTION RESPIRATORY (INHALATION) at 19:57

## 2025-03-12 RX ADMIN — BUDESONIDE 0.5 MG: 0.5 INHALANT RESPIRATORY (INHALATION) at 07:23

## 2025-03-12 RX ADMIN — Medication 1 APPLICATION: at 00:35

## 2025-03-12 RX ADMIN — Medication 10 ML: at 21:01

## 2025-03-12 RX ADMIN — ACETAMINOPHEN 650 MG: 325 TABLET ORAL at 21:51

## 2025-03-12 RX ADMIN — BUDESONIDE 0.5 MG: 0.5 INHALANT RESPIRATORY (INHALATION) at 19:57

## 2025-03-12 RX ADMIN — Medication 10 ML: at 08:41

## 2025-03-12 NOTE — CASE MANAGEMENT/SOCIAL WORK
"COPD Education    Last Hospital stay for COPD? 24  COPD related ED visits in past year: 4    Age: 89 y.o.  Sex: female    BMI:Body mass index is 25.06 kg/m².     Past Medical Hx: COPD    Smoking Hx: Social History    Tobacco Use      Smoking status: Former        Packs/day: 0.00        Years: 0.3 packs/day for 19.0 years (4.8 ttl pk-yrs)        Types: Cigarettes        Start date:         Quit date: 1970        Years since quittin.2      Smokeless tobacco: Never      Tobacco comments: started at age 16- stopped at age 35    Social History     Substance and Sexual Activity   Drug Use Never      Home Equipment Used: O2 NEB DME: [employer]  Home O2:  2L/min (has only been using prn)    Daily symptoms: SOA  w/exertion, productive cough, wheeze    Airway Clearance methods utilized:  none    Have you ever attended Pulmonary Rehab? no    Last PFT: 10.28.2019  PARAMETER BEST % PREDICTED   FVC L 1.91 74   FEV1 L 1.41 74   FEV1/FVC % 74    TLC 4.68 89   RV 2.82 111   DLCO 12.64 63     Last walking oximetry/6 min walk test 05/15/24    Have you ever had a sleep study/PSG? None on file    Last Arterial Blood Gas: No results found for: \"PHART\", \"FHK1UFM\", \"PO2ART\", \"WYT2WTZ\", \"BASEEXCESS\", \"E0VXVWPY\"     Chest X-Ray findings:   l right basilar atelectasis. No other acute cardiopulmonary disease.     Chest CT findings:   \"Possible bronchiectasis\"    CAT Assessment: (COPD Assessment Test)   I never cough. 0[] 1[] 2[x] 3[] 4[] 5[] I cough all the time.  I have no phlegm (mucus) in my chest. 0[] 1[] 2[x] 3[] 4[] 5[] My chest is completely full of phlegm (mucus).  My chest does not feel tight at all. 0[] 1[x] 2[] 3[] 4[] 5[] My chest feels very tight.  When I walk up a hill or one flight of stairs I am not breathless. 0[] 1[] 2[] 3[] 4[] 5[x] When I walk up a hill or one flight of stairs I am very breathless.  I am not limited doing any activities at home 0[] 1[x] 2[] 3[] 4[] 5[] I am very limited doing activities at " home.  I am confident leaving my home despite my lung condition. 0[] 1[x] 2[] 3[] 4[] 5[] I am not at all confident leaving my home because of my lung condition.  I sleep soundly. 0[] 1[] 2[] 3[x] 4[] 5[] I don't sleep soundly because of my lung condition.  I have lots of energy. 0[] 1[x] 2[] 3[] 4[] 5[]  I have no energy at all.    CAT Score(COPD Assessment Test):   CAT Score Impact Level Management considerations   >30    >20 Very High    High In addition to guidance for low & medium impact scores consider  -Referral to specialist care  -Additional pharmacological treatments  -Referral to pulmonary rehabilitation   10-20 Medium In addition to guidance for low impact consider  -Maintenance therapy review  -Referral for pulmonary rehabilitation   <10 Low -Smoking cessation  -Annual vaccinations  -Reduced exposure to exacerbation risk factors      Home Medications:  Medications Prior to Admission   Medication Sig Dispense Refill Last Dose/Taking    albuterol (PROVENTIL) (2.5 MG/3ML) 0.083% nebulizer solution NEBULIZE 1 VIAL PER NEBULIZER EVERY 4 HOURS AS NEEDED FOR WHEEZING (Patient taking differently: Take 2.5 mg by nebulization Every 4 (Four) Hours As Needed for Wheezing.) 225 mL 0 Taking Differently    alendronate (FOSAMAX) 70 MG tablet TAKE 1 TABLET BY MOUTH EVERY 7 DAYS (Patient taking differently: Take 1 tablet by mouth Every 7 (Seven) Days. Thursday) 4 tablet 12 3/6/2025    allopurinol (ZYLOPRIM) 300 MG tablet TAKE 1 TABLET BY MOUTH DAILY 90 tablet 3 3/10/2025    amLODIPine (NORVASC) 5 MG tablet TAKE 1 TABLET BY MOUTH EVERY DAY 90 tablet 1 3/10/2025    atenolol (TENORMIN) 50 MG tablet TAKE 1 TABLET BY MOUTH 2 TIMES A  tablet 3 3/10/2025    Breztri Aerosphere 160-9-4.8 MCG/ACT aerosol inhaler Inhale 2 puffs 2 (Two) Times a Day. 10.7 g 5 3/10/2025    omeprazole (priLOSEC) 40 MG capsule TAKE 1 CAPSULE BY MOUTH EVERY DAY (Patient taking differently: Take 1 capsule by mouth As Needed.) 30 capsule 5 Taking  Differently     Do you use a Spacer/Holding Chamber with your MDI?: no; patient was instructed on spacer use    COPD disease process and management discussed with patient and family at bedside.  Patient uses Breztri twice daily and albuterol and home oxygen as needed.  Patient was encouraged to use home oxygen with all sleep due to reports of snoring and witnessed apneas.  Patient states she is getting better relief with nebulized maintenance than with Breztri and would like to continue Arformoterol, Budesonide and Yupelri at home on discharge. Order can be sent to [employer] for home delivery of these medications.    COPD clinic f/u requested   Complete PFT  Airway clearance with aerobika and IS  Patient would benefit from nebulized COPD maintenance medication  Encourage RSV, shingles, COVID, flu, pneumonia vaccines    Jeanine Wong, RRT   RT   03/12/25  08:14 EDT

## 2025-03-12 NOTE — PLAN OF CARE
Goal Outcome Evaluation:  Plan of Care Reviewed With: patient, child        Progress: improving  Outcome Evaluation: Patient is AO x4, able to make needs known. PAtient is currently on 2L NC, no signs of respiratory distress noted. IV Solu-Medrol given during shift. No complaints of pain or discomfort at this time. No acute changes throughout shift. Continue with current plan of care.

## 2025-03-12 NOTE — PROGRESS NOTES
[facility]   Hospitalist Progress Note  Date: 3/12/2025  Patient Name: Shayy Wasserman  : 1935  MRN: [id number]  Date of admission: 3/10/2025  Room/Bed: Count includes the [facility]      Subjective   Subjective     Chief Complaint: Shortness of breath    Summary:Shayy Wasserman is a 89 y.o. female past medical history significant for COPD, anxiety, gout, osteoarthritis that presents to the emergency department with chief complaint of worsening shortness of air over the past 3 days evaluated emergency department noted to have oxygen saturation 89% on room air remains symptomatic despite appropriate treatment has been admitted to the hospitalist service.  Started on nebulizer treatments intravenous steroids supplemental oxygen currently without indication for antibiotics    Interval Followup: Patient seen and examined today resting comfortably respiratory status improved remains on 2 L nasal oxygen with oxygen saturations low 90s continue with nebulizer treatments and steroids check procalcitonin May need to add antibiotics   Objective   Objective     Vitals:   Temp:  [97.2 °F (36.2 °C)-98.1 °F (36.7 °C)] 98.1 °F (36.7 °C)  Heart Rate:  [64-79] 67  Resp:  [18-20] 18  BP: (113-139)/(59-87) 139/87  Flow (L/min) (Oxygen Therapy):  [2] 2    Physical Exam   Constitutional: Awake alert oriented no acute distress  Respiratory: Clear  Cardiovascular: RRR  GI:  soft nontender    Result Review    Result Review:  I have personally reviewed these results:  [x]  Laboratory      Lab 25  0534 03/10/25  1746   WBC 6.85 3.52   HEMOGLOBIN 12.2 12.3   HEMATOCRIT 35.7 36.3   PLATELETS 150 154   NEUTROS ABS 5.89 1.68*   IMMATURE GRANS (ABS) 0.02 0.01   LYMPHS ABS 0.81 1.27   MONOS ABS 0.13 0.44   EOS ABS 0.00 0.10   MCV 92.0 92.8         Lab 25  0534 03/10/25  1746   SODIUM 132* 134*   POTASSIUM 4.0 4.1   CHLORIDE 97* 98   CO2 22.6 22.7   ANION GAP 12.4 13.3   BUN 24* 11   CREATININE 0.96 0.96   EGFR 56.7* 56.7*   GLUCOSE 257* 97    CALCIUM 8.4* 9.2   MAGNESIUM  --  1.6         Lab 03/10/25  1746   TOTAL PROTEIN 6.7   ALBUMIN 3.9   GLOBULIN 2.8   ALT (SGPT) 6   AST (SGOT) 16   BILIRUBIN 0.4   ALK PHOS 55   LIPASE 34         Lab 03/10/25  2013 03/10/25  1746   PROBNP  --  986.0   HSTROP T 13 15*                 Brief Urine Lab Results  (Last result in the past 365 days)        Color   Clarity   Blood   Leuk Est   Nitrite   Protein   CREAT   Urine HCG        11/08/24 1512             86.3               [x]  Microbiology   Microbiology Results (last 10 days)       Procedure Component Value - Date/Time    COVID-19, FLU A/B, RSV PCR 1 HR TAT - Swab, Nasopharynx [[id number]]  (Normal) Collected: 03/10/25 1740    Lab Status: Final result Specimen: Swab from Nasopharynx Updated: 03/10/25 1909     COVID19 Not Detected     Influenza A PCR Not Detected     Influenza B PCR Not Detected     RSV, PCR Not Detected    Narrative:      Fact sheet for providers: https://www.fda.gov/media/067381/download    Fact sheet for patients: https://www.fda.gov/media/067625/download    Test performed by PCR.          [x]  Radiology  XR Chest 1 View  Result Date: 3/10/2025  Impression: 1. Minimal right basilar atelectasis. No other acute cardiopulmonary disease. Electronically Signed: Mayank Mata MD  3/10/2025 7:08 PM EDT  Workstation ID: [id number]    []  EKG/Telemetry   []  Cardiology/Vascular   []  Pathology  []  Old records  []  Other:    Assessment & Plan   Assessment / Plan     Assessment:   COPD (chronic obstructive pulmonary disease) with acute exacerbation    Essential (primary) hypertension    Hyperlipidemia    Stage 3 chronic kidney disease    Musculoskeletal arm pain    Acute hypoxic respiratory failure    Plan:  Admitted to Medicine  Continue steroids  Continue nebulizers  Oxygen as needed  Will need 6-minute walk prior to discharge  Check procalcitonin  Recheck labs       Discussed with RN.    VTE Prophylaxis:  Pharmacologic VTE prophylaxis orders are  present.        CODE STATUS:   Code Status (Patient has no pulse and is not breathing): CPR (Attempt to Resuscitate)  Medical Interventions (Patient has pulse or is breathing): Full Support      Electronically signed by KARI Cox, 3/12/2025, 12:31 EDT.      Attending Documentation:  Patient independently seen and evaluated, above documentation reflects plan put forth during bedside rounds.  More than 51% of the time of this patient encounter was performed by me. I discussed the care plan with DAGOBERTO Albrecht PA-C, I agree with his findings and plan as documented, what I have added to the care plan and modified is as follows in my documentation and my medical decision making; 89-year-old female with COPD presenting with shortness of breath, diagnosed with COPD exacerbation.  Clinically improving on steroids.  Will continue her on nebulizers, Solu-Medrol.  Likely needs another 24 to 48 hours in the hospital.  Procalcitonin checked and was negative, no indication for antibiotics at this time.  Discussed with patient and family.  Electronically signed by Jeremy Steward MD, 03/12/25, 7:42 PM EDT.

## 2025-03-12 NOTE — PLAN OF CARE
Goal Outcome Evaluation:  Plan of Care Reviewed With: patient, child        Progress: improving  Outcome Evaluation: Pt is alert and oriented x 4, VSS. Pt remains on O2 at 2 lpm per NC with no signs or symptoms of respiratory distress. Pt received Solu-medrol IV q 6hrs during this shift. Pt's daughter remains at bedside, participating in pt's care. Pt has not c/o pain during this shift. Care plan continued.

## 2025-03-13 LAB
ALBUMIN SERPL-MCNC: 3.7 G/DL (ref 3.5–5.2)
ANION GAP SERPL CALCULATED.3IONS-SCNC: 13.3 MMOL/L (ref 5–15)
BASOPHILS # BLD AUTO: 0 10*3/MM3 (ref 0–0.2)
BASOPHILS NFR BLD AUTO: 0 % (ref 0–1.5)
BUN SERPL-MCNC: 19 MG/DL (ref 8–23)
BUN/CREAT SERPL: 26.4 (ref 7–25)
CALCIUM SPEC-SCNC: 8.4 MG/DL (ref 8.6–10.5)
CHLORIDE SERPL-SCNC: 96 MMOL/L (ref 98–107)
CO2 SERPL-SCNC: 24.7 MMOL/L (ref 22–29)
CREAT SERPL-MCNC: 0.72 MG/DL (ref 0.57–1)
DEPRECATED RDW RBC AUTO: 45.8 FL (ref 37–54)
EGFRCR SERPLBLD CKD-EPI 2021: 80 ML/MIN/1.73
EOSINOPHIL # BLD AUTO: 0 10*3/MM3 (ref 0–0.4)
EOSINOPHIL NFR BLD AUTO: 0 % (ref 0.3–6.2)
ERYTHROCYTE [DISTWIDTH] IN BLOOD BY AUTOMATED COUNT: 13.7 % (ref 12.3–15.4)
GLUCOSE SERPL-MCNC: 204 MG/DL (ref 65–99)
HCT VFR BLD AUTO: 35.9 % (ref 34–46.6)
HGB BLD-MCNC: 12.1 G/DL (ref 12–15.9)
IMM GRANULOCYTES # BLD AUTO: 0.05 10*3/MM3 (ref 0–0.05)
IMM GRANULOCYTES NFR BLD AUTO: 0.5 % (ref 0–0.5)
LYMPHOCYTES # BLD AUTO: 0.81 10*3/MM3 (ref 0.7–3.1)
LYMPHOCYTES NFR BLD AUTO: 7.6 % (ref 19.6–45.3)
MAGNESIUM SERPL-MCNC: 1.7 MG/DL (ref 1.6–2.4)
MCH RBC QN AUTO: 30.6 PG (ref 26.6–33)
MCHC RBC AUTO-ENTMCNC: 33.7 G/DL (ref 31.5–35.7)
MCV RBC AUTO: 90.9 FL (ref 79–97)
MONOCYTES # BLD AUTO: 0.18 10*3/MM3 (ref 0.1–0.9)
MONOCYTES NFR BLD AUTO: 1.7 % (ref 5–12)
NEUTROPHILS NFR BLD AUTO: 9.61 10*3/MM3 (ref 1.7–7)
NEUTROPHILS NFR BLD AUTO: 90.2 % (ref 42.7–76)
NRBC BLD AUTO-RTO: 0 /100 WBC (ref 0–0.2)
PHOSPHATE SERPL-MCNC: 2.5 MG/DL (ref 2.5–4.5)
PLATELET # BLD AUTO: 154 10*3/MM3 (ref 140–450)
PMV BLD AUTO: 9.9 FL (ref 6–12)
POTASSIUM SERPL-SCNC: 3.8 MMOL/L (ref 3.5–5.2)
RBC # BLD AUTO: 3.95 10*6/MM3 (ref 3.77–5.28)
SODIUM SERPL-SCNC: 134 MMOL/L (ref 136–145)
WBC NRBC COR # BLD AUTO: 10.65 10*3/MM3 (ref 3.4–10.8)

## 2025-03-13 PROCEDURE — 25010000002 HEPARIN (PORCINE) PER 1000 UNITS: Performed by: STUDENT IN AN ORGANIZED HEALTH CARE EDUCATION/TRAINING PROGRAM

## 2025-03-13 PROCEDURE — 80069 RENAL FUNCTION PANEL: CPT | Performed by: PHYSICIAN ASSISTANT

## 2025-03-13 PROCEDURE — 25010000002 METHYLPREDNISOLONE PER 40 MG: Performed by: STUDENT IN AN ORGANIZED HEALTH CARE EDUCATION/TRAINING PROGRAM

## 2025-03-13 PROCEDURE — 94664 DEMO&/EVAL PT USE INHALER: CPT

## 2025-03-13 PROCEDURE — 97161 PT EVAL LOW COMPLEX 20 MIN: CPT

## 2025-03-13 PROCEDURE — 99232 SBSQ HOSP IP/OBS MODERATE 35: CPT | Performed by: INTERNAL MEDICINE

## 2025-03-13 PROCEDURE — 94799 UNLISTED PULMONARY SVC/PX: CPT

## 2025-03-13 PROCEDURE — 25010000002 MAGNESIUM SULFATE IN D5W 1G/100ML (PREMIX) 1-5 GM/100ML-% SOLUTION: Performed by: PHYSICIAN ASSISTANT

## 2025-03-13 PROCEDURE — 25810000003 SODIUM CHLORIDE 0.9 % SOLUTION 500 ML FLEX CONT: Performed by: STUDENT IN AN ORGANIZED HEALTH CARE EDUCATION/TRAINING PROGRAM

## 2025-03-13 PROCEDURE — 85025 COMPLETE CBC W/AUTO DIFF WBC: CPT | Performed by: STUDENT IN AN ORGANIZED HEALTH CARE EDUCATION/TRAINING PROGRAM

## 2025-03-13 PROCEDURE — 83735 ASSAY OF MAGNESIUM: CPT | Performed by: PHYSICIAN ASSISTANT

## 2025-03-13 RX ORDER — ALLOPURINOL 300 MG/1
300 TABLET ORAL DAILY
Status: DISCONTINUED | OUTPATIENT
Start: 2025-03-13 | End: 2025-03-14 | Stop reason: HOSPADM

## 2025-03-13 RX ORDER — PANTOPRAZOLE SODIUM 40 MG/1
40 TABLET, DELAYED RELEASE ORAL
Status: DISCONTINUED | OUTPATIENT
Start: 2025-03-13 | End: 2025-03-14 | Stop reason: HOSPADM

## 2025-03-13 RX ORDER — AMLODIPINE BESYLATE 10 MG/1
5 TABLET ORAL DAILY
Status: DISCONTINUED | OUTPATIENT
Start: 2025-03-13 | End: 2025-03-14 | Stop reason: HOSPADM

## 2025-03-13 RX ORDER — MAGNESIUM SULFATE 1 G/100ML
1 INJECTION INTRAVENOUS ONCE
Status: COMPLETED | OUTPATIENT
Start: 2025-03-13 | End: 2025-03-13

## 2025-03-13 RX ADMIN — Medication 10 ML: at 12:25

## 2025-03-13 RX ADMIN — HEPARIN SODIUM 5000 UNITS: 5000 INJECTION INTRAVENOUS; SUBCUTANEOUS at 10:43

## 2025-03-13 RX ADMIN — SODIUM CHLORIDE 40 ML: 9 INJECTION, SOLUTION INTRAVENOUS at 12:25

## 2025-03-13 RX ADMIN — AMLODIPINE BESYLATE 5 MG: 10 TABLET ORAL at 12:25

## 2025-03-13 RX ADMIN — ALLOPURINOL 300 MG: 300 TABLET ORAL at 12:25

## 2025-03-13 RX ADMIN — Medication 1 APPLICATION: at 02:59

## 2025-03-13 RX ADMIN — BUDESONIDE 0.5 MG: 0.5 INHALANT RESPIRATORY (INHALATION) at 19:07

## 2025-03-13 RX ADMIN — ARFORMOTEROL TARTRATE 15 MCG: 15 SOLUTION RESPIRATORY (INHALATION) at 07:57

## 2025-03-13 RX ADMIN — BUDESONIDE 0.5 MG: 0.5 INHALANT RESPIRATORY (INHALATION) at 07:57

## 2025-03-13 RX ADMIN — PANTOPRAZOLE SODIUM 40 MG: 40 TABLET, DELAYED RELEASE ORAL at 12:25

## 2025-03-13 RX ADMIN — Medication 10 ML: at 10:44

## 2025-03-13 RX ADMIN — POLYETHYLENE GLYCOL 400 AND PROPYLENE GLYCOL 2 DROP: 4; 3 SOLUTION/ DROPS OPHTHALMIC at 01:01

## 2025-03-13 RX ADMIN — MAGNESIUM SULFATE HEPTAHYDRATE 1 G: 1 INJECTION, SOLUTION INTRAVENOUS at 12:24

## 2025-03-13 RX ADMIN — ACETAMINOPHEN 650 MG: 325 TABLET ORAL at 18:33

## 2025-03-13 RX ADMIN — Medication 10 ML: at 20:14

## 2025-03-13 RX ADMIN — ARFORMOTEROL TARTRATE 15 MCG: 15 SOLUTION RESPIRATORY (INHALATION) at 19:07

## 2025-03-13 RX ADMIN — HEPARIN SODIUM 5000 UNITS: 5000 INJECTION INTRAVENOUS; SUBCUTANEOUS at 20:13

## 2025-03-13 RX ADMIN — METHYLPREDNISOLONE SODIUM SUCCINATE 40 MG: 40 INJECTION, POWDER, LYOPHILIZED, FOR SOLUTION INTRAMUSCULAR; INTRAVENOUS at 06:08

## 2025-03-13 RX ADMIN — METHYLPREDNISOLONE SODIUM SUCCINATE 40 MG: 40 INJECTION, POWDER, LYOPHILIZED, FOR SOLUTION INTRAMUSCULAR; INTRAVENOUS at 01:00

## 2025-03-13 NOTE — CASE MANAGEMENT/SOCIAL WORK
"Ms. Wasserman has difficulty administering mdi, Breztri, with and without a spacer due to arthritis.   She will need to have nebulized Arformoterol, Budesonide and Yupelri for use at home for COPD maintenance.     COPD education was given to  via the booklet, \"Understanding and Living with COPD\". Discussion of the COPD zones (Green/Yellow/Red) was completed with emphasizes on what to do in the yellow and red zones. COPD action plan was reviewed with instruction on rescue and maintenance medications, the function of each and the importance of using them as prescribed. MDI administration with a spacer was instructed; patient was provided with spacer for discharge. Home oxygen prescription was also reviewed with patient.  [employer] participation encouraged. Pulmonary/COPD clinic follow up request ordered.         "

## 2025-03-13 NOTE — PLAN OF CARE
Goal Outcome Evaluation:   VSS. No acute distress noted. Denies chest pain or shortness of breath. No complaints of pain in left arm or pain in general. 02 at 2 LPM per nasal canula. Up with assist of 1 person. Patient lives at home with her son and daughter in law. Family remains at the bedside.

## 2025-03-13 NOTE — PROGRESS NOTES
Psychiatric   Hospitalist Progress Note  Date: 3/13/2025  Patient Name: Shayy Wasserman  : 1935  MRN: [id number]  Date of admission: 3/10/2025  Room/Bed: [facility]      Subjective   Subjective     Chief Complaint: Shortness of breath    Summary:Shayy Wasserman is a 89 y.o. female past medical history significant for COPD, anxiety, gout, osteoarthritis that presents to the emergency department with chief complaint of worsening shortness of air over the past 3 days evaluated emergency department noted to have oxygen saturation 89% on room air remains symptomatic despite appropriate treatment has been admitted to the hospitalist service.  Started on nebulizer treatments intravenous steroids supplemental oxygen currently without indication for antibiotics    Interval Followup: Patient seen and examined resting comfortably respiratory status improving evidently patient is allergic to prednisone will continue with IV Solu-Medrol but decrease dose to once daily.  Continue with nebulizer treatments will arrange for home nebulizer treatments Rachael Jones as patient is unable to effectively use inhalers.  Overall patient is doing much better likely discharge home in the next 24 to 48 hours if she continues to improve.      Objective   Objective     Vitals:   Temp:  [97.5 °F (36.4 °C)-97.9 °F (36.6 °C)] 97.7 °F (36.5 °C)  Heart Rate:  [56-79] 56  Resp:  [18-20] 20  BP: (130-160)/(73-87) 160/85  Flow (L/min) (Oxygen Therapy):  [2] 2    Physical Exam   Constitutional: Awake alert oriented no acute distress  Respiratory: Clear  Cardiovascular: RRR  GI:  soft nontender    Result Review    Result Review:  I have personally reviewed these results:  [x]  Laboratory      Lab 25  0515 25  0534 03/10/25  1746   WBC 10.65 6.85 3.52   HEMOGLOBIN 12.1 12.2 12.3   HEMATOCRIT 35.9 35.7 36.3   PLATELETS 154 150 154   NEUTROS ABS 9.61* 5.89 1.68*   IMMATURE GRANS (ABS) 0.05 0.02 0.01   LYMPHS ABS 0.81 0.81  1.27   MONOS ABS 0.18 0.13 0.44   EOS ABS 0.00 0.00 0.10   MCV 90.9 92.0 92.8   PROCALCITONIN  --  0.04  --          Lab 03/13/25  0515 03/12/25  0534 03/10/25  1746   SODIUM 134* 132* 134*   POTASSIUM 3.8 4.0 4.1   CHLORIDE 96* 97* 98   CO2 24.7 22.6 22.7   ANION GAP 13.3 12.4 13.3   BUN 19 24* 11   CREATININE 0.72 0.96 0.96   EGFR 80.0 56.7* 56.7*   GLUCOSE 204* 257* 97   CALCIUM 8.4* 8.4* 9.2   MAGNESIUM 1.7  --  1.6   PHOSPHORUS 2.5  --   --          Lab 03/13/25  0515 03/10/25  1746   TOTAL PROTEIN  --  6.7   ALBUMIN 3.7 3.9   GLOBULIN  --  2.8   ALT (SGPT)  --  6   AST (SGOT)  --  16   BILIRUBIN  --  0.4   ALK PHOS  --  55   LIPASE  --  34         Lab 03/10/25  2013 03/10/25  1746   PROBNP  --  986.0   HSTROP T 13 15*                 Brief Urine Lab Results  (Last result in the past 365 days)        Color   Clarity   Blood   Leuk Est   Nitrite   Protein   CREAT   Urine HCG        11/08/24 1512             86.3               [x]  Microbiology   Microbiology Results (last 10 days)       Procedure Component Value - Date/Time    COVID-19, FLU A/B, RSV PCR 1 HR TAT - Swab, Nasopharynx [[id number]]  (Normal) Collected: 03/10/25 1740    Lab Status: Final result Specimen: Swab from Nasopharynx Updated: 03/10/25 1909     COVID19 Not Detected     Influenza A PCR Not Detected     Influenza B PCR Not Detected     RSV, PCR Not Detected    Narrative:      Fact sheet for providers: https://www.fda.gov/media/554779/download    Fact sheet for patients: https://www.fda.gov/media/956932/download    Test performed by PCR.          [x]  Radiology  XR Chest 1 View  Result Date: 3/10/2025  Impression: 1. Minimal right basilar atelectasis. No other acute cardiopulmonary disease. Electronically Signed: Mayank Mata MD  3/10/2025 7:08 PM EDT  Workstation ID: [id number]    []  EKG/Telemetry   []  Cardiology/Vascular   []  Pathology  []  Old records  []  Other:    Assessment & Plan   Assessment / Plan     Assessment:   COPD (chronic  obstructive pulmonary disease) with acute exacerbation    Essential (primary) hypertension    Hyperlipidemia    Stage 3 chronic kidney disease    Musculoskeletal arm pain    Acute hypoxic respiratory failure    Plan:  Admitted to Medicine  Weaning IV steroids as patient is allergic to prednisone  Continue nebulizer treatments will arrange for home nebulizer treatments with Pulcheryl[facility] Brendenanamika Jones as she is unable to effectively do inhalers  6-minute walk prior to discharge  Resume Norvasc Protonix and allopurinol hold atenolol as patient is mildly bradycardic  Recheck labs in a.m.  PT OT  Likely home next 24 to 48 hours if she continues to improve from respiratory standpoint       Discussed with RN.    VTE Prophylaxis:  Pharmacologic VTE prophylaxis orders are present.        CODE STATUS:   Code Status (Patient has no pulse and is not breathing): CPR (Attempt to Resuscitate)  Medical Interventions (Patient has pulse or is breathing): Full Support      Electronically signed by KARI Cox, 3/13/2025, 11:44 EDT.      Attending Documentation:  Patient independently seen and evaluated, above documentation reflects plan put forth during bedside rounds.  More than 51% of the time of this patient encounter was performed by me. I discussed the care plan with DAGOBERTO Albrecht PA-C, I agree with his findings and plan as documented, what I have added to the care plan and modified is as follows in my documentation and my medical decision making; 89-year-old female with COPD presenting with shortness of breath, diagnosed with COPD exacerbation.  Clinically improving on steroids.  Will continue her on nebulizers, Solu-Medrol.  Doing better today.  Anticipate discharge home tomorrow if she continues to improve.  Electronically signed by Jeremy Steward MD, 03/13/25, 5:16 PM EDT.

## 2025-03-13 NOTE — PLAN OF CARE
Goal Outcome Evaluation:           Progress: no change  Outcome Evaluation: pt is alert and oriented, daughter at bedside. on 2L NC. pt c/o of headache this shift, pain relieved with prn tylenol. Continue with plan of care. Rona Balderas RN

## 2025-03-13 NOTE — THERAPY EVALUATION
Acute Care - Physical Therapy Initial Evaluation  [facility]     Patient Name: Sahyy Wasserman  : 1935  MRN: [id number]  Today's Date: 3/13/2025      Visit Dx:     ICD-10-CM ICD-9-CM   1. COPD exacerbation  J44.1 491.21   2. Difficulty walking  R26.2 719.7     Patient Active Problem List   Diagnosis    Pulmonary emphysema    Bronchitis    Essential (primary) hypertension    Gout    Hyperlipidemia    Osteoarthritis    Stage 3 chronic kidney disease    Osteoporosis    Localized edema    Prediabetes    Musculoskeletal arm pain    Anxiety with somatization    Hypoxia    Community acquired pneumonia of right lower lobe of lung    COPD with acute exacerbation    Pneumonia of right lower lobe due to Streptococcus pneumoniae    Hospital discharge follow-up    Left upper quadrant abdominal pain    COPD (chronic obstructive pulmonary disease)    Medicare annual wellness visit, subsequent    Acute hypoxic respiratory failure    Slow transit constipation    COPD exacerbation     Past Medical History:   Diagnosis Date    Anxiety disorder 2018    Arthritis     COPD (chronic obstructive pulmonary disease)     Dependent edema 2017    Gout     Osteoporosis     Primary osteoarthritis of left knee 2018    Primary osteoarthritis of one hip, right 2017    Primary osteoarthritis of right hip 2017     Past Surgical History:   Procedure Laterality Date    BLADDER REPAIR      COLONOSCOPY  2016    EYE SURGERY      implant- yes    HYSTERECTOMY      INTRAOCULAR LENS INSERTION      yes    JOINT REPLACEMENT      Surgery    KNEE SURGERY Right     knee repair    KNEE SURGERY Right     repair    OTHER SURGICAL HISTORY      Artificial joints/limbs    OTHER SURGICAL HISTORY      Artificial Joints/Limbs    OTHER SURGICAL HISTORY      Joint surgery    TOTAL KNEE ARTHROPLASTY Right      PT Assessment (Last 12 Hours)       PT Evaluation and Treatment       Row Name 25 1300          Physical Therapy Time and  Intention    Subjective Information no complaints  -DP     Document Type evaluation  -DP     Mode of Treatment individual therapy;physical therapy  -DP     Patient Effort good  -DP       Row Name 03/13/25 1300          General Information    Patient Profile Reviewed yes  -DP     Patient Observations alert;cooperative  -DP     Prior Level of Function independent:;gait;transfer;bed mobility;ADL's  -DP     Equipment Currently Used at Home none  -DP     Existing Precautions/Restrictions fall  -DP     Barriers to Rehab none identified  -DP       Row Name 03/13/25 1300          Living Environment    Current Living Arrangements home  -DP     People in Home other relative(s)  daughter in law  -DP     Primary Care Provided by self  -DP       Row Name 03/13/25 1300          Range of Motion (ROM)    Range of Motion bilateral lower extremities;ROM is WFL  -DP       Row Name 03/13/25 1300          Strength Comprehensive (MMT)    General Manual Muscle Testing (MMT) Assessment lower extremity strength deficits identified  -DP     Comment, General Manual Muscle Testing (MMT) Assessment BLE: 4/5  -DP       Row Name 03/13/25 1300          Bed Mobility    Bed Mobility supine-sit-supine  -DP     Supine-Sit-Supine Green (Bed Mobility) standby assist  -DP       Row Name 03/13/25 1300          Transfers    Transfers sit-stand transfer  -DP       Row Name 03/13/25 1300          Sit-Stand Transfer    Sit-Stand Green (Transfers) standby assist  -DP       Row Name 03/13/25 1300          Gait/Stairs (Locomotion)    Gait/Stairs Locomotion gait/ambulation assistive device  -DP     Green Level (Gait) standby assist  -DP     Assistive Device (Gait) --  none  -DP     Patient was able to Ambulate yes  -DP     Distance in Feet (Gait) 40  -DP       Row Name 03/13/25 1300          Balance    Balance Assessment standing dynamic balance  -DP     Dynamic Standing Balance standby assist  -DP       Row Name 03/13/25 1300          Plan  of Care Review    Plan of Care Reviewed With patient  -DP     Outcome Evaluation Pt presents with decreased strength, transfers and functional mobility. Will benefit from inpatient PT services and continued PT services upon discharge.  -DP       Row Name 03/13/25 1300          Therapy Assessment/Plan (PT)    Criteria for Skilled Interventions Met (PT) yes;meets criteria  -DP     Therapy Frequency (PT) daily  -DP     Predicted Duration of Therapy Intervention (PT) 10 days  -DP     Problem List (PT) problems related to;mobility  -DP     Activity Limitations Related to Problem List (PT) unable to transfer safely;unable to ambulate safely  -DP       Row Name 03/13/25 1300          PT Evaluation Complexity    History, PT Evaluation Complexity no personal factors and/or comorbidities  -DP     Examination of Body Systems (PT Eval Complexity) total of 4 or more elements  -DP     Clinical Presentation (PT Evaluation Complexity) stable  -DP     Clinical Decision Making (PT Evaluation Complexity) low complexity  -DP     Overall Complexity (PT Evaluation Complexity) low complexity  -DP       Row Name 03/13/25 1300          Physical Therapy Goals    Gait Training Goal Selection (PT) gait training, PT goal 1  -DP       Row Name 03/13/25 1300          Gait Training Goal 1 (PT)    Activity/Assistive Device (Gait Training Goal 1, PT) increase endurance/gait distance  -DP     [place] Level (Gait Training Goal 1, PT) supervision required  -DP     Distance (Gait Training Goal 1, PT) 200  -DP     Time Frame (Gait Training Goal 1, PT) 10 days  -DP               User Key  (r) = Recorded By, (t) = Taken By, (c) = Cosigned By      Initials Name Provider Type    DP Aldo High, PT Physical Therapist                      PT Recommendation and Plan  Anticipated Discharge Disposition (PT): home with home health  Planned Therapy Interventions (PT): balance training, bed mobility training, gait training, strengthening, transfer  training  Therapy Frequency (PT): daily  Plan of Care Reviewed With: patient  Outcome Evaluation: Pt presents with decreased strength, transfers and functional mobility. Will benefit from inpatient PT services and continued PT services upon discharge.   Outcome Measures       Row Name 03/13/25 1300             How much help from another person do you currently need...    Turning from your back to your side while in flat bed without using bedrails? 4  -DP      Moving from lying on back to sitting on the side of a flat bed without bedrails? 4  -DP      Moving to and from a bed to a chair (including a wheelchair)? 3  -DP      Standing up from a chair using your arms (e.g., wheelchair, bedside chair)? 3  -DP      Climbing 3-5 steps with a railing? 3  -DP      To walk in hospital room? 3  -DP      AM-PAC 6 Clicks Score (PT) 20  -DP         Functional Assessment    Outcome Measure Options AM-PAC 6 Clicks Basic Mobility (PT)  -DP                User Key  (r) = Recorded By, (t) = Taken By, (c) = Cosigned By      Initials Name Provider Type    Aldo Arshad, PT Physical Therapist                     Time Calculation:    PT Charges       Row Name 03/13/25 1342             Time Calculation    PT Received On 03/13/25  -DP         Untimed Charges    PT Eval/Re-eval Minutes 30  -DP         Total Minutes    Untimed Charges Total Minutes 30  -DP       Total Minutes 30  -DP                User Key  (r) = Recorded By, (t) = Taken By, (c) = Cosigned By      Initials Name Provider Type    Aldo Arshad, PT Physical Therapist                      PT G-Codes  Outcome Measure Options: AM-PAC 6 Clicks Basic Mobility (PT)  AM-PAC 6 Clicks Score (PT): 20    Aldo High, PT  3/13/2025

## 2025-03-14 ENCOUNTER — READMISSION MANAGEMENT (OUTPATIENT)
Dept: CALL CENTER | Facility: HOSPITAL | Age: OVER 89
End: 2025-03-14
Payer: MEDICARE

## 2025-03-14 VITALS
RESPIRATION RATE: 18 BRPM | DIASTOLIC BLOOD PRESSURE: 73 MMHG | HEART RATE: 76 BPM | SYSTOLIC BLOOD PRESSURE: 143 MMHG | WEIGHT: 150.57 LBS | BODY MASS INDEX: 25.09 KG/M2 | OXYGEN SATURATION: 95 % | TEMPERATURE: 98.4 F | HEIGHT: 65 IN

## 2025-03-14 LAB
ALBUMIN SERPL-MCNC: 3.4 G/DL (ref 3.5–5.2)
ANION GAP SERPL CALCULATED.3IONS-SCNC: 9.1 MMOL/L (ref 5–15)
BASOPHILS # BLD AUTO: 0 10*3/MM3 (ref 0–0.2)
BASOPHILS NFR BLD AUTO: 0 % (ref 0–1.5)
BUN SERPL-MCNC: 18 MG/DL (ref 8–23)
BUN/CREAT SERPL: 25.4 (ref 7–25)
CALCIUM SPEC-SCNC: 8.6 MG/DL (ref 8.6–10.5)
CHLORIDE SERPL-SCNC: 98 MMOL/L (ref 98–107)
CO2 SERPL-SCNC: 26.9 MMOL/L (ref 22–29)
CREAT SERPL-MCNC: 0.71 MG/DL (ref 0.57–1)
DEPRECATED RDW RBC AUTO: 46.5 FL (ref 37–54)
EGFRCR SERPLBLD CKD-EPI 2021: 81.4 ML/MIN/1.73
EOSINOPHIL # BLD AUTO: 0 10*3/MM3 (ref 0–0.4)
EOSINOPHIL NFR BLD AUTO: 0 % (ref 0.3–6.2)
ERYTHROCYTE [DISTWIDTH] IN BLOOD BY AUTOMATED COUNT: 13.9 % (ref 12.3–15.4)
GLUCOSE SERPL-MCNC: 143 MG/DL (ref 65–99)
HCT VFR BLD AUTO: 35.8 % (ref 34–46.6)
HGB BLD-MCNC: 11.9 G/DL (ref 12–15.9)
IMM GRANULOCYTES # BLD AUTO: 0.05 10*3/MM3 (ref 0–0.05)
IMM GRANULOCYTES NFR BLD AUTO: 0.6 % (ref 0–0.5)
LYMPHOCYTES # BLD AUTO: 1.37 10*3/MM3 (ref 0.7–3.1)
LYMPHOCYTES NFR BLD AUTO: 15.3 % (ref 19.6–45.3)
MAGNESIUM SERPL-MCNC: 2 MG/DL (ref 1.6–2.4)
MCH RBC QN AUTO: 30.4 PG (ref 26.6–33)
MCHC RBC AUTO-ENTMCNC: 33.2 G/DL (ref 31.5–35.7)
MCV RBC AUTO: 91.3 FL (ref 79–97)
MONOCYTES # BLD AUTO: 0.42 10*3/MM3 (ref 0.1–0.9)
MONOCYTES NFR BLD AUTO: 4.7 % (ref 5–12)
NEUTROPHILS NFR BLD AUTO: 7.11 10*3/MM3 (ref 1.7–7)
NEUTROPHILS NFR BLD AUTO: 79.4 % (ref 42.7–76)
NRBC BLD AUTO-RTO: 0 /100 WBC (ref 0–0.2)
PHOSPHATE SERPL-MCNC: 2 MG/DL (ref 2.5–4.5)
PLATELET # BLD AUTO: 152 10*3/MM3 (ref 140–450)
PMV BLD AUTO: 9.9 FL (ref 6–12)
POTASSIUM SERPL-SCNC: 3.5 MMOL/L (ref 3.5–5.2)
RBC # BLD AUTO: 3.92 10*6/MM3 (ref 3.77–5.28)
SODIUM SERPL-SCNC: 134 MMOL/L (ref 136–145)
WBC NRBC COR # BLD AUTO: 8.95 10*3/MM3 (ref 3.4–10.8)

## 2025-03-14 PROCEDURE — 83735 ASSAY OF MAGNESIUM: CPT | Performed by: PHYSICIAN ASSISTANT

## 2025-03-14 PROCEDURE — 80069 RENAL FUNCTION PANEL: CPT | Performed by: PHYSICIAN ASSISTANT

## 2025-03-14 PROCEDURE — 85025 COMPLETE CBC W/AUTO DIFF WBC: CPT | Performed by: STUDENT IN AN ORGANIZED HEALTH CARE EDUCATION/TRAINING PROGRAM

## 2025-03-14 PROCEDURE — 99239 HOSP IP/OBS DSCHRG MGMT >30: CPT | Performed by: INTERNAL MEDICINE

## 2025-03-14 PROCEDURE — 94799 UNLISTED PULMONARY SVC/PX: CPT

## 2025-03-14 PROCEDURE — 94618 PULMONARY STRESS TESTING: CPT

## 2025-03-14 PROCEDURE — 94760 N-INVAS EAR/PLS OXIMETRY 1: CPT

## 2025-03-14 PROCEDURE — 25010000002 HEPARIN (PORCINE) PER 1000 UNITS: Performed by: STUDENT IN AN ORGANIZED HEALTH CARE EDUCATION/TRAINING PROGRAM

## 2025-03-14 PROCEDURE — 25010000002 METHYLPREDNISOLONE PER 40 MG: Performed by: PHYSICIAN ASSISTANT

## 2025-03-14 PROCEDURE — 94664 DEMO&/EVAL PT USE INHALER: CPT

## 2025-03-14 RX ORDER — ARFORMOTEROL TARTRATE 15 UG/2ML
15 SOLUTION RESPIRATORY (INHALATION)
Qty: 120 ML | Refills: 0 | Status: SHIPPED | OUTPATIENT
Start: 2025-03-14 | End: 2025-04-13

## 2025-03-14 RX ORDER — BUDESONIDE 0.5 MG/2ML
0.5 INHALANT ORAL
Qty: 120 ML | Refills: 0 | Status: SHIPPED | OUTPATIENT
Start: 2025-03-14 | End: 2025-04-13

## 2025-03-14 RX ORDER — REVEFENACIN 175 UG/3ML
175 SOLUTION RESPIRATORY (INHALATION)
Qty: 90 ML | Refills: 0 | Status: SHIPPED | OUTPATIENT
Start: 2025-03-14 | End: 2025-04-13

## 2025-03-14 RX ORDER — METHYLPREDNISOLONE 4 MG/1
TABLET ORAL
Qty: 21 TABLET | Refills: 0 | Status: SHIPPED | OUTPATIENT
Start: 2025-03-14

## 2025-03-14 RX ORDER — ATENOLOL 50 MG/1
25 TABLET ORAL 2 TIMES DAILY
Qty: 180 TABLET | Refills: 3 | Status: SHIPPED | OUTPATIENT
Start: 2025-03-14

## 2025-03-14 RX ADMIN — BUDESONIDE 0.5 MG: 0.5 INHALANT RESPIRATORY (INHALATION) at 07:42

## 2025-03-14 RX ADMIN — HEPARIN SODIUM 5000 UNITS: 5000 INJECTION INTRAVENOUS; SUBCUTANEOUS at 08:16

## 2025-03-14 RX ADMIN — METHYLPREDNISOLONE SODIUM SUCCINATE 40 MG: 40 INJECTION, POWDER, LYOPHILIZED, FOR SOLUTION INTRAMUSCULAR; INTRAVENOUS at 08:16

## 2025-03-14 RX ADMIN — AMLODIPINE BESYLATE 5 MG: 10 TABLET ORAL at 08:16

## 2025-03-14 RX ADMIN — PANTOPRAZOLE SODIUM 40 MG: 40 TABLET, DELAYED RELEASE ORAL at 08:16

## 2025-03-14 RX ADMIN — ARFORMOTEROL TARTRATE 15 MCG: 15 SOLUTION RESPIRATORY (INHALATION) at 07:42

## 2025-03-14 RX ADMIN — ALLOPURINOL 300 MG: 300 TABLET ORAL at 08:16

## 2025-03-14 RX ADMIN — Medication 10 ML: at 08:17

## 2025-03-14 NOTE — PLAN OF CARE
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 Family remained at bedside. Up with family assist to bedside commode. No complaints of pain. Patient noted to be up late into the night. Rest promoted.

## 2025-03-14 NOTE — DISCHARGE SUMMARY
[facility]         HOSPITALIST  DISCHARGE SUMMARY    Patient Name: Shayy Wasserman  : 1935  MRN: [id number]    Date of Admission: 3/10/2025  Date of Discharge: 3/14/2025  Primary Care Physician: Pee Mon DO  Reason for admission:  Shortness of air    Final diagnosis:   COPD (chronic obstructive pulmonary disease) with acute exacerbation    Essential (primary) hypertension    Hyperlipidemia    Stage 3 chronic kidney disease    Musculoskeletal arm pain    Acute hypoxic respiratory failure    Consults       Date and Time Order Name Status Description    3/11/2025 12:54 AM Hospitalist (on-call MD unless specified)              Active and Resolved Hospital Problems:  Active Hospital Problems    Diagnosis POA    **COPD (chronic obstructive pulmonary disease) [J44.9] Yes    COPD exacerbation [J44.1] Yes    Acute hypoxic respiratory failure [J96.01] Yes    Musculoskeletal arm pain [M79.603] Yes    Essential (primary) hypertension [I10] Yes    Hyperlipidemia [E78.5] Yes    Stage 3 chronic kidney disease [N18.30] Yes      Resolved Hospital Problems   No resolved problems to display.       Hospital Course     Hospital Course:  Shayy Wasserman is a 89 y.o. female past medical history significant for COPD, anxiety, gout, osteoarthritis, the presents emergency department with chief complaint of worsening shortness of air over the past 3 days evaluated emergency department is noted to have oxygen saturations of 89% on room air remained symptomatic despite appropriate treatment admitted to the hospitalist service.  Patient was started on empiric antibiotics steroids and nebulizer treatments additionally provided supplemental oxygen respiratory status has improved and is currently at baseline will do 6-minute walk additionally have arranged for nebulizer treatments at time of discharge patient seen and examined 3/14/2025 hemodynamically clinically stable for discharge patient will be  asked to follow-up with her primary care provider 1 week's time return to ED for worsening symptoms        DISCHARGE Follow Up Recommendations for labs and diagnostics: As above      Day of Discharge     Vital Signs:  Temp:  [97.5 °F (36.4 °C)-98.4 °F (36.9 °C)] 98.4 °F (36.9 °C)  Heart Rate:  [61-80] 76  Resp:  [18-20] 18  BP: (129-153)/(73-86) 143/73  Flow (L/min) (Oxygen Therapy):  [2] 2  Physical Exam:   Constitutional: Awake alert oriented no acute distress  Respiratory: Clear  Cardiovascular RRR  GI: Abdomen soft nontender  Extremities without edema      Discharge Details        Discharge Medications        ASK your doctor about these medications        Instructions Start Date   albuterol (2.5 MG/3ML) 0.083% nebulizer solution  Commonly known as: PROVENTIL   NEBULIZE 1 VIAL PER NEBULIZER EVERY 4 HOURS AS NEEDED FOR WHEEZING      alendronate 70 MG tablet  Commonly known as: FOSAMAX   70 mg, Oral, Every 7 Days      allopurinol 300 MG tablet  Commonly known as: ZYLOPRIM   300 mg, Oral, Daily      amLODIPine 5 MG tablet  Commonly known as: NORVASC   5 mg, Oral, Daily      atenolol 50 MG tablet  Commonly known as: TENORMIN   50 mg, Oral, 2 Times Daily      Breztri Aerosphere 160-9-4.8 MCG/ACT aerosol inhaler  Generic drug: Budeson-Glycopyrrol-Formoterol   2 puffs, Inhalation, 2 Times Daily      omeprazole 40 MG capsule  Commonly known as: priLOSEC   40 mg, Oral, Daily               Allergies   Allergen Reactions    Prednisone Angioedema    Bactrim [Sulfamethoxazole-Trimethoprim] Unknown - Low Severity    Griseofulvin Ultramicrosize [Griseofulvin] Unknown - Low Severity    Levocetirizine Rash     hives       Discharge Disposition:      Diet:  Hospital:  Diet Order   Procedures    Diet: Cardiac; Healthy Heart (2-3 Na+); Fluid Consistency: Thin (IDDSI 0)       Discharge Activity:       CODE STATUS:  Code Status and Medical Interventions: CPR (Attempt to Resuscitate); Full Support   Ordered at: 03/11/25 0059     Code  Status (Patient has no pulse and is not breathing):    CPR (Attempt to Resuscitate)     Medical Interventions (Patient has pulse or is breathing):    Full Support         Future Appointments   Date Time Provider Department Center   3/24/2025 11:30 AM PULM COPD CLIN [facility] PULM None   3/27/2025 12:00 PM Pee Mon, DO [facility]   4/15/2025  2:15 PM Pee Mon, DO [facility]           Pertinent  and/or Most Recent Results   Procedures  None    LAB RESULTS:      Lab 03/14/25 0534 03/13/25  0515 03/12/25  0534 03/10/25  1746   WBC 8.95 10.65 6.85 3.52   HEMOGLOBIN 11.9* 12.1 12.2 12.3   HEMATOCRIT 35.8 35.9 35.7 36.3   PLATELETS 152 154 150 154   NEUTROS ABS 7.11* 9.61* 5.89 1.68*   IMMATURE GRANS (ABS) 0.05 0.05 0.02 0.01   LYMPHS ABS 1.37 0.81 0.81 1.27   MONOS ABS 0.42 0.18 0.13 0.44   EOS ABS 0.00 0.00 0.00 0.10   MCV 91.3 90.9 92.0 92.8   PROCALCITONIN  --   --  0.04  --          Lab 03/14/25 0534 03/13/25  0515 03/12/25  0534 03/10/25  1746   SODIUM 134* 134* 132* 134*   POTASSIUM 3.5 3.8 4.0 4.1   CHLORIDE 98 96* 97* 98   CO2 26.9 24.7 22.6 22.7   ANION GAP 9.1 13.3 12.4 13.3   BUN 18 19 24* 11   CREATININE 0.71 0.72 0.96 0.96   EGFR 81.4 80.0 56.7* 56.7*   GLUCOSE 143* 204* 257* 97   CALCIUM 8.6 8.4* 8.4* 9.2   MAGNESIUM 2.0 1.7  --  1.6   PHOSPHORUS 2.0* 2.5  --   --          Lab 03/14/25 0534 03/13/25  0515 03/10/25  1746   TOTAL PROTEIN  --   --  6.7   ALBUMIN 3.4* 3.7 3.9   GLOBULIN  --   --  2.8   ALT (SGPT)  --   --  6   AST (SGOT)  --   --  16   BILIRUBIN  --   --  0.4   ALK PHOS  --   --  55   LIPASE  --   --  34         Lab 03/10/25  2013 03/10/25  1746   PROBNP  --  986.0   HSTROP T 13 15*                 Brief Urine Lab Results  (Last result in the past 365 days)        Color   Clarity   Blood   Leuk Est   Nitrite   Protein   CREAT   Urine HCG        11/08/24 1512             86.3               Microbiology Results (last 10 days)       Procedure Component  Value - Date/Time    COVID-19, FLU A/B, RSV PCR 1 HR TAT - Swab, Nasopharynx [[id number]]  (Normal) Collected: 03/10/25 1740    Lab Status: Final result Specimen: Swab from Nasopharynx Updated: 03/10/25 1909     COVID19 Not Detected     Influenza A PCR Not Detected     Influenza B PCR Not Detected     RSV, PCR Not Detected    Narrative:      Fact sheet for providers: https://www.fda.gov/media/630310/download    Fact sheet for patients: https://www.fda.gov/media/094791/download    Test performed by PCR.            XR Chest 1 View  Result Date: 3/10/2025  Impression: Impression: 1. Minimal right basilar atelectasis. No other acute cardiopulmonary disease. Electronically Signed: Mayank Mata MD  3/10/2025 7:08 PM EDT  Workstation ID: [id number]                  Labs Pending at Discharge:        Time spent on Discharge including face to face service: Greater than 35 minutes    Electronically signed by KARI Cox, 03/14/25, 11:21 AM EDT.

## 2025-03-14 NOTE — PROGRESS NOTES
Walking Oximetry Progress Note      Patient Name:  Shayy Wasserman  YOB: 1935  Date of Procedure: 03/14/25              ROOM AIR BASELINE   SpO2% 92   Heart Rate 76     EXERCISE ON ROOM AIR SpO2% EXERCISE ON O2 LPM SpO2%   1 MINUTE 90 1 MINUTE 1 88%   2 MINUTES 88 2 MINUTES 1 92%   3 MINUTES  3 MINUTES 1 93%   4 MINUTES  4 MINUTES 1 91%   5 MINUTES  5 MINUTES 1 94%   6 MINUTES  6 MINUTES 1 91%              Time to Recovery  5 seconds   SpO2% Post Exercise  91% on 1 liter.    HR Post Exercise  102     Comments: pt required one liter for duration of walk, desat to 88 once and had a quick rebound time. Pt states she currently has home o2 but only wears as needed.          Electronically signed by Montserrat Srivastava CRT, 03/14/25, 1:23 PM EDT.   92

## 2025-03-14 NOTE — OUTREACH NOTE
Prep Survey      Flowsheet Row Responses   [facility] facility patient discharged from? [facility]   Is LACE score < 7 ? No   Eligibility [facility]   Date of Admission 03/10/25   Date of Discharge 03/14/25   Discharge Disposition Home-Health Care [facility]   Discharge diagnosis COPD   Does the patient have one of the following disease processes/diagnoses(primary or secondary)? COPD   Does the patient have Home health ordered? No   Is there a DME ordered? No   Prep survey completed? Yes            TRISTEN A - Registered Nurse

## 2025-03-15 LAB
QT INTERVAL: 425 MS
QTC INTERVAL: 418 MS

## 2025-03-17 ENCOUNTER — TRANSITIONAL CARE MANAGEMENT TELEPHONE ENCOUNTER (OUTPATIENT)
Dept: CALL CENTER | Facility: HOSPITAL | Age: OVER 89
End: 2025-03-17
Payer: MEDICARE

## 2025-03-17 NOTE — OUTREACH NOTE
"Call Center TCM Note      Flowsheet Row Responses   [facility] patient discharged from? [facility]   Does the patient have one of the following disease processes/diagnoses(primary or secondary)? COPD   TCM attempt successful? Yes   Call start time 1609   Call end time 1622   Discharge diagnosis COPD   Person spoke with today (if not patient) and relationship Mable, dtr   Meds reviewed with patient/caregiver? Yes   Is the patient having any side effects they believe may be caused by any medication additions or changes? Yes   Side effects comments  dtr feels like all the new neb treatments added at discharge make pt worse,  feels as it coughing \"alot\" and couldn't sleep.  Reports aware that nebs will produce coughing but this was different from what she previously experienced.  Dtr has stopped this new nebs and returned to her plain albuterol and reports pt is doing well.  She plans on discussing at f/u with pulmo   Does the patient have all medications ordered at discharge? Yes   Is the patient taking all medications as directed (includes completed medication regime)? Yes   Comments Hospital f/u 3/25/25@1045am   Does the patient have an appointment with their PCP within 7-14 days of discharge? Yes   Has home health visited the patient within 72 hours of discharge? N/A   Pulse Ox monitoring Intermittent   Pulse Ox device source Patient   O2 Sat comments Pt is using O2 at HS and prn during the day,  reports sats 90-94%   O2 Sat: education provided Sat levels   Psychosocial issues? No   Did the patient receive a copy of their discharge instructions? Yes   Nursing interventions Reviewed instructions with patient   What is the patient's perception of their health status since discharge? Improving  [Dtr reports mother is much better today as not longer on treatments prescribed at discharge,  dtr feels pt did not tolerate as noted.  Aware to monitor for any worsening resp type s/s and seek care if present.]   Nursing " Interventions Nurse provided patient education   Is the patient/caregiver able to teach back the hierarchy of who to call/visit for symptoms/problems? PCP, Specialist, Home health nurse, Urgent Care, ED, 911 Yes   TCM call completed? Yes   Call end time [id number]            Christi Ivan RN    3/17/2025, 16:27 EDT

## 2025-03-17 NOTE — OUTREACH NOTE
Call Center TCM Note      Flowsheet Row Responses   [facility] patient discharged from? [facility]   Does the patient have one of the following disease processes/diagnoses(primary or secondary)? COPD   TCM attempt successful? No  [VR for Mable Gooden as well as Brenda Wasserman]   Unsuccessful attempts Attempt 1   Call Status Left message            Christi Ivan, RN    3/17/2025, 12:13 EDT

## 2025-03-19 ENCOUNTER — TELEPHONE (OUTPATIENT)
Dept: FAMILY MEDICINE CLINIC | Facility: CLINIC | Age: OVER 89
End: 2025-03-19
Payer: MEDICARE

## 2025-03-19 NOTE — TELEPHONE ENCOUNTER
Caller:SARAI JOSUE  Relationship:DAUGHTER  Best call back number:[phone]   Who are you requesting to speak with (clinical staff, provider,  specific staff member):MA  What was the call regarding:DAUGHTER CALLED REGARDING MOM HAVING A COUGH AND WANTING TO SPEAK WITH MA FOR MEDICATIONS. PT WAS SEEN AT ED AND DAUGHTER THINKS SHE GOT SICK FROM ED VISIT. PT HAS APPT ON 3/25/25. DAUGHTER WANTS A CALL BACK IF POSSIBLE.

## 2025-03-19 NOTE — TELEPHONE ENCOUNTER
Caller: SARAI JOSUE    Relationship: Emergency Contact    Best call back number: [phone]    What medication are you requesting: UNKNOWN    What are your current symptoms: COUGH, SINUS DRAINAGE     How long have you been experiencing symptoms: 6 DAYS     Have you had these symptoms before:    [x] Yes  [] No    Have you been treated for these symptoms before:   [x] Yes  [] No    If a prescription is needed, what is your preferred pharmacy and phone number:    [facility] - [place]  [phone] [facility] [phone]      Additional notes:CALLER STATED THAT SHE WANTED TO DISCUSS THIS TODAY IF POSSIBLE, AND THAT DUE TO PATIENTS HIGH BLOOD PRESSURE SHE IS UNSURE OF ANY OVER THE COUNTER MEDICATION TO PROVIDE.

## 2025-03-21 NOTE — PROGRESS NOTES
Primary Care Provider  Pee Mon DO   Referring Provider  Jeremy Steward MD    Patient Complaint  No chief complaint on file.      Subjective       History of Presenting Illness  Shayy Wasserman is a pleasant 89 y.o. female  who presents to [facility] here for as a new patient for hospital follow-up.  Patient was at [facility] 3/10 through 3/14/2024 for acute exacerbation of COPD.  Hospital course includes the following:  Shayy Wasserman is a 89 y.o. female past medical history significant for COPD, anxiety, gout, osteoarthritis, the presents emergency department with chief complaint of worsening shortness of air over the past 3 days evaluated emergency department is noted to have oxygen saturations of 89% on room air remained symptomatic despite appropriate treatment admitted to the hospitalist service.  Patient was started on empiric antibiotics steroids and nebulizer treatments additionally provided supplemental oxygen respiratory status has improved and is currently at baseline will do 6-minute walk additionally have arranged for nebulizer treatments at time of discharge patient seen and examined 3/14/2025 hemodynamically clinically stable for discharge patient will be asked to follow-up with her primary care provider 1 week's time return to ED for worsening symptoms     At present time patient denies dyspnea, coughing, wheezing, headaches, chest pain, weight loss or hemoptysis. Patient denies fevers, chills and night sweats. Shayy Wasserman is able to perform ADLs.      I have personally reviewed the review of systems, past family, social, medical and surgical histories; and agree with their findings.      Review of Systems   Constitutional: Negative.    HENT: Negative.     Respiratory: Negative.     Cardiovascular: Negative.    Musculoskeletal: Negative.    Neurological: Negative.    Psychiatric/Behavioral: Negative.           Family History    Problem Relation Age of Onset    Colon cancer Father         Social History     Socioeconomic History    Marital status:    Tobacco Use    Smoking status: Former     Current packs/day: 0.00     Average packs/day: 0.3 packs/day for 19.0 years (4.8 ttl pk-yrs)     Types: Cigarettes     Start date:      Quit date: 1970     Years since quittin.2    Smokeless tobacco: Never    Tobacco comments:     started at age 16- stopped at age 35   Vaping Use    Vaping status: Never Used   Substance and Sexual Activity    Alcohol use: Never     Comment: 2019- 1/15/2019 does not drink     Drug use: Never    Sexual activity: Defer        Past Medical History:   Diagnosis Date    Anxiety disorder 2018    Arthritis     COPD (chronic obstructive pulmonary disease)     Dependent edema 2017    Gout     Osteoporosis     Primary osteoarthritis of left knee 2018    Primary osteoarthritis of one hip, right 2017    Primary osteoarthritis of right hip 2017        Immunization History   Administered Date(s) Administered    Fluzone  >6mos 2012, 2013, 10/22/2014, 2015    Influenza, Unspecified 10/06/2020, 10/06/2020, 10/06/2020    Pneumococcal Conjugate 13-Valent (PCV13) 2017, 2017, 2017       Allergies   Allergen Reactions    Prednisone Angioedema    Bactrim [Sulfamethoxazole-Trimethoprim] Unknown - Low Severity    Griseofulvin Ultramicrosize [Griseofulvin] Unknown - Low Severity    Levocetirizine Rash     hives          Current Outpatient Medications:     albuterol (PROVENTIL) (2.5 MG/3ML) 0.083% nebulizer solution, NEBULIZE 1 VIAL PER NEBULIZER EVERY 4 HOURS AS NEEDED FOR WHEEZING (Patient taking differently: Take 2.5 mg by nebulization Every 4 (Four) Hours As Needed for Wheezing.), Disp: 225 mL, Rfl: 0    alendronate (FOSAMAX) 70 MG tablet, TAKE 1 TABLET BY MOUTH EVERY 7 DAYS (Patient taking differently: Take 1 tablet by mouth Every 7 (Seven) Days.  Thursday), Disp: 4 tablet, Rfl: 12    allopurinol (ZYLOPRIM) 300 MG tablet, TAKE 1 TABLET BY MOUTH DAILY, Disp: 90 tablet, Rfl: 3    amLODIPine (NORVASC) 5 MG tablet, TAKE 1 TABLET BY MOUTH EVERY DAY, Disp: 90 tablet, Rfl: 1    arformoterol (BROVANA) 15 MCG/2ML nebulizer solution, Take 2 mL by nebulization 2 (Two) Times a Day for 30 days., Disp: 120 mL, Rfl: 0    atenolol (TENORMIN) 50 MG tablet, Take 0.5 tablets by mouth 2 (Two) Times a Day., Disp: 180 tablet, Rfl: 3    budesonide (PULMICORT) 0.5 MG/2ML nebulizer solution, Take 2 mL by nebulization 2 (Two) Times a Day for 30 days., Disp: 120 mL, Rfl: 0    methylPREDNISolone (MEDROL) 4 MG dose pack, Take as directed on package instructions., Disp: 21 tablet, Rfl: 0    omeprazole (priLOSEC) 40 MG capsule, TAKE 1 CAPSULE BY MOUTH EVERY DAY (Patient taking differently: Take 1 capsule by mouth As Needed.), Disp: 30 capsule, Rfl: 5    revefenacin (Yupelri) 175 MCG/3ML nebulizer solution, Take 3 mL by nebulization Daily for 30 days., Disp: 90 mL, Rfl: 0         Vital Signs   There were no vitals taken for this visit.      Objective     Physical Exam  Vitals reviewed.   Constitutional:       Appearance: Normal appearance.   HENT:      Head: Normocephalic and atraumatic.      Nose: Nose normal.      Mouth/Throat:      Mouth: Mucous membranes are moist.      Pharynx: Oropharynx is clear.   Eyes:      Extraocular Movements: Extraocular movements intact.      Conjunctiva/sclera: Conjunctivae normal.      Pupils: Pupils are equal, round, and reactive to light.   Cardiovascular:      Rate and Rhythm: Normal rate and regular rhythm.      Pulses: Normal pulses.      Heart sounds: Normal heart sounds.   Pulmonary:      Effort: Pulmonary effort is normal.      Breath sounds: Normal breath sounds.   Abdominal:      General: Bowel sounds are normal.   Musculoskeletal:         General: Normal range of motion.      Cervical back: Normal range of motion and neck supple.   Skin:      General: Skin is warm and dry.   Neurological:      Mental Status: She is alert and oriented to person, place, and time.   Psychiatric:         Behavior: Behavior normal.         Results Review  I have personally reviewed the prior office notes, hospital records, labs, and diagnostics.    XR Chest 1 View [[id number]] (Order [id number])  Order  Status: Final result     Study Notes     KlarissaRadha mendez on 3/10/2025  6:53 PM EDT   Cough     Appointment Information    PACS Images     Radiology Images  Study Result    Narrative & Impression   XR CHEST 1 VW     Date of Exam: 3/10/2025 6:45 PM EDT     Indication: Chest Pain Triage Protocol     Comparison: 12/17/2024     Findings:  Heart size at the upper limits normal. Pulmonary vessels are normal. Calcified granuloma seen within the left upper lobe. There is minimal right basilar atelectasis. Lungs are otherwise clear. Moderate-sized hiatal hernia is noted. No pleural effusion.   No pneumothorax. There are severe degenerative changes of both shoulders.     IMPRESSION:  Impression:     1. Minimal right basilar atelectasis. No other acute cardiopulmonary disease.        Electronically Signed: Mayank Mata MD    3/10/2025 7:08 PM EDT    Workstation ID: [id number]       Assessment         Patient Active Problem List   Diagnosis    Pulmonary emphysema    Bronchitis    Essential (primary) hypertension    Gout    Hyperlipidemia    Osteoarthritis    Stage 3 chronic kidney disease    Osteoporosis    Localized edema    Prediabetes    Musculoskeletal arm pain    Anxiety with somatization    Hypoxia    Community acquired pneumonia of right lower lobe of lung    COPD with acute exacerbation    Pneumonia of right lower lobe due to Streptococcus pneumoniae    Hospital discharge follow-up    Left upper quadrant abdominal pain    COPD (chronic obstructive pulmonary disease)    Medicare annual wellness visit, subsequent    Acute hypoxic respiratory failure    Slow transit constipation     COPD exacerbation        Plan     Diagnoses and all orders for this visit:    1. Chronic obstructive pulmonary disease with acute exacerbation (Primary)    2. Nicotine dependence, cigarettes, in remission    3. Dyspnea on exertion             Smoking status:  reports that she quit smoking about 55 years ago. Her smoking use included cigarettes. She started smoking about 74 years ago. She has a 4.8 pack-year smoking history. She has never used smokeless tobacco.    Vaccination status: Reviewed  Immunization History   Administered Date(s) Administered    Fluzone  >6mos 12/12/2012, 11/19/2013, 10/22/2014, 09/14/2015    Influenza, Unspecified 10/06/2020, 10/06/2020, 10/06/2020    Pneumococcal Conjugate 13-Valent (PCV13) 11/28/2017, 11/28/2017, 11/28/2017        Medications personally reviewed    Follow Up  No follow-ups on file.    Patient was given instructions and counseling regarding her condition or for health maintenance advice. Please see specific information pulled into the AVS if appropriate.     I spent  minutes caring for Shayy Wasserman on this date of service. This time includes time spent by me in the following activities:preparing for the visit, reviewing tests, obtaining and/or reviewing a separately obtained history, performing a medically appropriate examination and/or evaluation, counseling and educating the patient/family/caregiver, ordering medications, tests, or procedures, documenting information in the medical record, independently interpreting results and communicating that information with the patient/family/caregiver and answered questions family members, discuss medications.

## 2025-03-24 ENCOUNTER — TELEPHONE (OUTPATIENT)
Facility: HOSPITAL | Age: OVER 89
End: 2025-03-24
Payer: MEDICARE

## 2025-03-24 ENCOUNTER — HOSPITAL ENCOUNTER (OUTPATIENT)
Facility: HOSPITAL | Age: OVER 89
Discharge: HOME | End: 2025-03-24
Payer: MEDICARE

## 2025-03-24 DIAGNOSIS — F17.211 NICOTINE DEPENDENCE, CIGARETTES, IN REMISSION: ICD-10-CM

## 2025-03-24 DIAGNOSIS — R06.09 DYSPNEA ON EXERTION: ICD-10-CM

## 2025-03-24 DIAGNOSIS — J44.1 CHRONIC OBSTRUCTIVE PULMONARY DISEASE WITH ACUTE EXACERBATION: Primary | ICD-10-CM

## 2025-03-24 NOTE — TELEPHONE ENCOUNTER
Patients daughter called to cancel appointment , she stated that her mom just did not want to come. Daughter ( Mable Gooden) requested clinical staff to call her back, she had questions concerning mom's current treatment. Please return breonna to [phone].

## 2025-03-24 NOTE — TELEPHONE ENCOUNTER
Spoke with patient's daughter and she stated that patient was going to see her PCP first and then decide whether she wanted to come to our office or not.

## 2025-03-26 ENCOUNTER — READMISSION MANAGEMENT (OUTPATIENT)
Dept: CALL CENTER | Facility: HOSPITAL | Age: OVER 89
End: 2025-03-26
Payer: MEDICARE

## 2025-03-26 NOTE — OUTREACH NOTE
COPD/PN Week 1 Survey      Flowsheet Row Responses   [facility]? [facility]   Does the patient have one of the following disease processes/diagnoses(primary or secondary)? COPD   Discharge diagnosis COPD            ANNAMARIA H - Registered Nurse

## 2025-04-15 ENCOUNTER — OFFICE VISIT (OUTPATIENT)
Dept: FAMILY MEDICINE CLINIC | Facility: CLINIC | Age: OVER 89
End: 2025-04-15
Payer: MEDICARE

## 2025-04-15 VITALS
BODY MASS INDEX: 23.99 KG/M2 | OXYGEN SATURATION: 96 % | SYSTOLIC BLOOD PRESSURE: 145 MMHG | HEART RATE: 78 BPM | WEIGHT: 144 LBS | HEIGHT: 65 IN | DIASTOLIC BLOOD PRESSURE: 79 MMHG | TEMPERATURE: 97.2 F

## 2025-04-15 DIAGNOSIS — I10 ESSENTIAL (PRIMARY) HYPERTENSION: ICD-10-CM

## 2025-04-15 DIAGNOSIS — R73.03 PREDIABETES: ICD-10-CM

## 2025-04-15 DIAGNOSIS — M1A.00X0 IDIOPATHIC CHRONIC GOUT WITHOUT TOPHUS, UNSPECIFIED SITE: ICD-10-CM

## 2025-04-15 DIAGNOSIS — E78.00 PURE HYPERCHOLESTEROLEMIA: Primary | ICD-10-CM

## 2025-04-15 DIAGNOSIS — J44.1 CHRONIC OBSTRUCTIVE PULMONARY DISEASE WITH ACUTE EXACERBATION: ICD-10-CM

## 2025-04-15 DIAGNOSIS — N18.31 STAGE 3A CHRONIC KIDNEY DISEASE: ICD-10-CM

## 2025-04-15 DIAGNOSIS — M81.0 AGE-RELATED OSTEOPOROSIS WITHOUT CURRENT PATHOLOGICAL FRACTURE: ICD-10-CM

## 2025-04-15 LAB
25(OH)D3 SERPL-MCNC: 28.2 NG/ML (ref 30–100)
DEPRECATED RDW RBC AUTO: 45.8 FL (ref 37–54)
ERYTHROCYTE [DISTWIDTH] IN BLOOD BY AUTOMATED COUNT: 13.6 % (ref 12.3–15.4)
HCT VFR BLD AUTO: 36.7 % (ref 34–46.6)
HGB BLD-MCNC: 12.5 G/DL (ref 12–15.9)
MCH RBC QN AUTO: 31.6 PG (ref 26.6–33)
MCHC RBC AUTO-ENTMCNC: 34.1 G/DL (ref 31.5–35.7)
MCV RBC AUTO: 92.7 FL (ref 79–97)
PLATELET # BLD AUTO: 271 10*3/MM3 (ref 140–450)
PMV BLD AUTO: 10.3 FL (ref 6–12)
RBC # BLD AUTO: 3.96 10*6/MM3 (ref 3.77–5.28)
WBC NRBC COR # BLD AUTO: 9.75 10*3/MM3 (ref 3.4–10.8)

## 2025-04-15 PROCEDURE — 82306 VITAMIN D 25 HYDROXY: CPT | Performed by: FAMILY MEDICINE

## 2025-04-15 PROCEDURE — 85027 COMPLETE CBC AUTOMATED: CPT | Performed by: FAMILY MEDICINE

## 2025-04-15 PROCEDURE — 83036 HEMOGLOBIN GLYCOSYLATED A1C: CPT | Performed by: FAMILY MEDICINE

## 2025-04-15 PROCEDURE — 84550 ASSAY OF BLOOD/URIC ACID: CPT | Performed by: FAMILY MEDICINE

## 2025-04-15 PROCEDURE — 80061 LIPID PANEL: CPT | Performed by: FAMILY MEDICINE

## 2025-04-15 PROCEDURE — 80053 COMPREHEN METABOLIC PANEL: CPT | Performed by: FAMILY MEDICINE

## 2025-04-15 NOTE — PROGRESS NOTES
Chief Complaint   Patient presents with    Follow-up     6 month     Hypertension    Hyperlipidemia        Subjective     Shayy Wasserman  has a past medical history of Anxiety disorder (11/12/2018), Arthritis, COPD (chronic obstructive pulmonary disease), Dependent edema (11/28/2017), Gout, Osteoporosis, Primary osteoarthritis of left knee (05/24/2018), and Primary osteoarthritis of right hip (12/12/2017).    Hypertension-she does check her blood pressure at home.  They states her home blood pressure readings have been good.  It is a little borderline today at 145/79.    Hyperlipidemia-currently she is not on any statins.    Prediabetes-she does not check her blood sugars at this time.  She denies any blurred vision excessive thirst or excessive urination.  She is currently not on any medication.    COPD-she had a recent hospitalization for acute exacerbation of her COPD.  They did send her home on some additional nebulizer treatments.  Her daughter states and doing them though she just had persistent coughing.  They discontinued just back to the albuterol and the coughing is discontinued.  They have continue to monitor oxygen level at home and it is remain very good.  They also discontinued her oxygen during that period of time and they have stayed in the 90s.  Her O2 saturation here today is 96%.        PHQ-2 Depression Screening  Little interest or pleasure in doing things?     Feeling down, depressed, or hopeless?     PHQ-2 Total Score     PHQ-9 Depression Screening  Little interest or pleasure in doing things?     Feeling down, depressed, or hopeless?     Trouble falling or staying asleep, or sleeping too much?     Feeling tired or having little energy?     Poor appetite or overeating?     Feeling bad about yourself - or that you are a failure or have let yourself or your family down?     Trouble concentrating on things, such as reading the newspaper or watching television?     Moving or speaking so slowly  that other people could have noticed? Or the opposite - being so fidgety or restless that you have been moving around a lot more than usual?     Thoughts that you would be better off dead, or of hurting yourself in some way?     PHQ-9 Total Score     If you checked off any problems, how difficult have these problems made it for you to do your work, take care of things at home, or get along with other people?       Allergies   Allergen Reactions    Prednisone Angioedema    Bactrim [Sulfamethoxazole-Trimethoprim] Unknown - Low Severity    Griseofulvin Ultramicrosize [Griseofulvin] Unknown - Low Severity    Levocetirizine Rash     hives       Prior to Admission medications    Medication Sig Start Date End Date Taking? Authorizing Provider   albuterol (PROVENTIL) (2.5 MG/3ML) 0.083% nebulizer solution NEBULIZE 1 VIAL PER NEBULIZER EVERY 4 HOURS AS NEEDED FOR WHEEZING 12/2/24  Yes Pee Mon, DO   alendronate (FOSAMAX) 70 MG tablet TAKE 1 TABLET BY MOUTH EVERY 7 DAYS  Patient taking differently: Take 1 tablet by mouth Every 7 (Seven) Days. Thursday 10/7/24  Yes Pee Mon DO   allopurinol (ZYLOPRIM) 300 MG tablet TAKE 1 TABLET BY MOUTH DAILY 10/7/24  Yes Pee Mon DO   amLODIPine (NORVASC) 5 MG tablet TAKE 1 TABLET BY MOUTH EVERY DAY 10/7/24  Yes Pee Mon DO   atenolol (TENORMIN) 50 MG tablet Take 0.5 tablets by mouth 2 (Two) Times a Day. 3/14/25  Yes Stefano Albrecht PA   budesonide (PULMICORT) 0.5 MG/2ML nebulizer solution Take 2 mL by nebulization 2 (Two) Times a Day for 30 days. 3/14/25 4/15/25 Yes Stefano Albrecht PA   omeprazole (priLOSEC) 40 MG capsule TAKE 1 CAPSULE BY MOUTH EVERY DAY  Patient taking differently: Take 1 capsule by mouth As Needed. 1/7/25  Yes Pee Mon DO   methylPREDNISolone (MEDROL) 4 MG dose pack Take as directed on package instructions. 3/14/25 4/15/25 Yes Stefano Albrecht PA        Patient Active Problem List    Diagnosis    Bronchitis    Essential (primary) hypertension    Gout    Hyperlipidemia    Osteoarthritis    Stage 3 chronic kidney disease    Osteoporosis    Localized edema    Prediabetes    Musculoskeletal arm pain    Anxiety with somatization    Hypoxia    Community acquired pneumonia of right lower lobe of lung    COPD with acute exacerbation    Pneumonia of right lower lobe due to Streptococcus pneumoniae    Hospital discharge follow-up    Left upper quadrant abdominal pain    COPD (chronic obstructive pulmonary disease)    Medicare annual wellness visit, subsequent    Acute hypoxic respiratory failure    Slow transit constipation    COPD exacerbation        Past Surgical History:   Procedure Laterality Date    BLADDER REPAIR      COLONOSCOPY  2016    EYE SURGERY      implant- yes    HYSTERECTOMY      INTRAOCULAR LENS INSERTION      yes    JOINT REPLACEMENT      Surgery    KNEE SURGERY Right     knee repair    KNEE SURGERY Right     repair    OTHER SURGICAL HISTORY      Artificial joints/limbs    OTHER SURGICAL HISTORY      Artificial Joints/Limbs    OTHER SURGICAL HISTORY      Joint surgery    TOTAL KNEE ARTHROPLASTY Right        Social History     Socioeconomic History    Marital status:    Tobacco Use    Smoking status: Former     Current packs/day: 0.00     Average packs/day: 0.3 packs/day for 19.0 years (4.8 ttl pk-yrs)     Types: Cigarettes     Start date:      Quit date: 1970     Years since quittin.3    Smokeless tobacco: Never    Tobacco comments:     started at age 16- stopped at age 35   Vaping Use    Vaping status: Never Used   Substance and Sexual Activity    Alcohol use: Never     Comment: 2019- 1/15/2019 does not drink     Drug use: Never    Sexual activity: Defer       Family History   Problem Relation Age of Onset    Colon cancer Father        Family history, surgical history, past medical history, Allergies and meds reviewed with patient today and updated in [facility] EMR.  "    ROS:  Review of Systems   Constitutional:  Negative for fatigue.   HENT:  Negative for congestion.    Respiratory:  Negative for cough, chest tightness, shortness of breath and wheezing.    Cardiovascular:  Negative for chest pain and palpitations.   Neurological:  Negative for headache.   Psychiatric/Behavioral:  Negative for depressed mood. The patient is not nervous/anxious.        OBJECTIVE:  Vitals:    04/15/25 1415   BP: 145/79   BP Location: Left arm   Patient Position: Sitting   Pulse: 78   Temp: 97.2 °F (36.2 °C)   SpO2: 96%   Weight: 65.3 kg (144 lb)   Height: 165.1 cm (65\")     No results found.   Body mass index is 23.96 kg/m².  No LMP recorded. Patient is postmenopausal.    The ASCVD Risk score (Adam DK, et al., 2019) failed to calculate for the following reasons:    The 2019 ASCVD risk score is only valid for ages 40 to 79     Physical Exam  Vitals and nursing note reviewed.   Constitutional:       General: She is not in acute distress.     Appearance: Normal appearance. She is normal weight.   HENT:      Head: Normocephalic.      Right Ear: Tympanic membrane, ear canal and external ear normal.      Left Ear: Tympanic membrane, ear canal and external ear normal.      Nose: Nose normal.      Mouth/Throat:      Mouth: Mucous membranes are moist.      Dentition: Has dentures.      Pharynx: Oropharynx is clear.   Eyes:      General: No scleral icterus.     Conjunctiva/sclera: Conjunctivae normal.      Pupils: Pupils are equal, round, and reactive to light.   Cardiovascular:      Rate and Rhythm: Normal rate and regular rhythm.      Pulses: Normal pulses.      Heart sounds: Normal heart sounds. No murmur heard.  Pulmonary:      Effort: Pulmonary effort is normal.      Breath sounds: Normal breath sounds. No wheezing, rhonchi or rales.   Musculoskeletal:      Cervical back: Neck supple. No rigidity or tenderness.   Lymphadenopathy:      Cervical: No cervical adenopathy.   Skin:     General: Skin is " warm and dry.      Coloration: Skin is not jaundiced.      Findings: No rash.   Neurological:      General: No focal deficit present.      Mental Status: She is alert and oriented to person, place, and time.   Psychiatric:         Mood and Affect: Mood normal.         Thought Content: Thought content normal.         Judgment: Judgment normal.         Procedures    No visits with results within 30 Day(s) from this visit.   Latest known visit with results is:   Admission on 03/10/2025, Discharged on 03/14/2025   Component Date Value Ref Range Status    QT Interval 03/10/2025 425  ms Final    QTC Interval 03/10/2025 418  ms Final    HS Troponin T 03/10/2025 15 (H)  <14 ng/L Final    Glucose 03/10/2025 97  65 - 99 mg/dL Final    BUN 03/10/2025 11  8 - 23 mg/dL Final    Creatinine 03/10/2025 0.96  0.57 - 1.00 mg/dL Final    Sodium 03/10/2025 134 (L)  136 - 145 mmol/L Final    Potassium 03/10/2025 4.1  3.5 - 5.2 mmol/L Final    Chloride 03/10/2025 98  98 - 107 mmol/L Final    CO2 03/10/2025 22.7  22.0 - 29.0 mmol/L Final    Calcium 03/10/2025 9.2  8.6 - 10.5 mg/dL Final    Total Protein 03/10/2025 6.7  6.0 - 8.5 g/dL Final    Albumin 03/10/2025 3.9  3.5 - 5.2 g/dL Final    ALT (SGPT) 03/10/2025 6  1 - 33 U/L Final    AST (SGOT) 03/10/2025 16  1 - 32 U/L Final    Alkaline Phosphatase 03/10/2025 55  39 - 117 U/L Final    Total Bilirubin 03/10/2025 0.4  0.0 - 1.2 mg/dL Final    Globulin 03/10/2025 2.8  gm/dL Final    A/G Ratio 03/10/2025 1.4  g/dL Final    BUN/Creatinine Ratio 03/10/2025 11.5  7.0 - 25.0 Final    Anion Gap 03/10/2025 13.3  5.0 - 15.0 mmol/L Final    eGFR 03/10/2025 56.7 (L)  >60.0 mL/min/1.73 Final    Lipase 03/10/2025 34  13 - 60 U/L Final    proBNP 03/10/2025 986.0  0.0 - 1,800.0 pg/mL Final    Magnesium 03/10/2025 1.6  1.6 - 2.4 mg/dL Final    COVID19 03/10/2025 Not Detected  Not Detected - Ref. Range Final    Influenza A PCR 03/10/2025 Not Detected  Not Detected Final    Influenza B PCR 03/10/2025 Not  Detected  Not Detected Final    RSV, PCR 03/10/2025 Not Detected  Not Detected Final    Extra Tube 03/10/2025 Hold for add-ons.   Final    Auto resulted.    Extra Tube 03/10/2025 hold for add-on   Final    Auto resulted    Extra Tube 03/10/2025 Hold for add-ons.   Final    Auto resulted.    Extra Tube 03/10/2025 Hold for add-ons.   Final    Auto resulted    WBC 03/10/2025 3.52  3.40 - 10.80 10*3/mm3 Final    RBC 03/10/2025 3.91  3.77 - 5.28 10*6/mm3 Final    Hemoglobin 03/10/2025 12.3  12.0 - 15.9 g/dL Final    Hematocrit 03/10/2025 36.3  34.0 - 46.6 % Final    MCV 03/10/2025 92.8  79.0 - 97.0 fL Final    MCH 03/10/2025 31.5  26.6 - 33.0 pg Final    MCHC 03/10/2025 33.9  31.5 - 35.7 g/dL Final    RDW 03/10/2025 13.9  12.3 - 15.4 % Final    RDW-SD 03/10/2025 47.0  37.0 - 54.0 fl Final    MPV 03/10/2025 9.9  6.0 - 12.0 fL Final    Platelets 03/10/2025 154  140 - 450 10*3/mm3 Final    Neutrophil % 03/10/2025 47.7  42.7 - 76.0 % Final    Lymphocyte % 03/10/2025 36.1  19.6 - 45.3 % Final    Monocyte % 03/10/2025 12.5 (H)  5.0 - 12.0 % Final    Eosinophil % 03/10/2025 2.8  0.3 - 6.2 % Final    Basophil % 03/10/2025 0.6  0.0 - 1.5 % Final    Immature Grans % 03/10/2025 0.3  0.0 - 0.5 % Final    Neutrophils, Absolute 03/10/2025 1.68 (L)  1.70 - 7.00 10*3/mm3 Final    Lymphocytes, Absolute 03/10/2025 1.27  0.70 - 3.10 10*3/mm3 Final    Monocytes, Absolute 03/10/2025 0.44  0.10 - 0.90 10*3/mm3 Final    Eosinophils, Absolute 03/10/2025 0.10  0.00 - 0.40 10*3/mm3 Final    Basophils, Absolute 03/10/2025 0.02  0.00 - 0.20 10*3/mm3 Final    Immature Grans, Absolute 03/10/2025 0.01  0.00 - 0.05 10*3/mm3 Final    nRBC 03/10/2025 0.0  0.0 - 0.2 /100 WBC Final    HS Troponin T 03/10/2025 13  <14 ng/L Final    Troponin T Numeric Delta 03/10/2025 -2  ng/L Final    Troponin T % Delta 03/10/2025 -13  Abnormal if >/= 20% Final    Glucose 03/12/2025 257 (H)  65 - 99 mg/dL Final    BUN 03/12/2025 24 (H)  8 - 23 mg/dL Final    Creatinine  03/12/2025 0.96  0.57 - 1.00 mg/dL Final    Sodium 03/12/2025 132 (L)  136 - 145 mmol/L Final    Potassium 03/12/2025 4.0  3.5 - 5.2 mmol/L Final    Chloride 03/12/2025 97 (L)  98 - 107 mmol/L Final    CO2 03/12/2025 22.6  22.0 - 29.0 mmol/L Final    Calcium 03/12/2025 8.4 (L)  8.6 - 10.5 mg/dL Final    BUN/Creatinine Ratio 03/12/2025 25.0  7.0 - 25.0 Final    Anion Gap 03/12/2025 12.4  5.0 - 15.0 mmol/L Final    eGFR 03/12/2025 56.7 (L)  >60.0 mL/min/1.73 Final    WBC 03/12/2025 6.85  3.40 - 10.80 10*3/mm3 Final    RBC 03/12/2025 3.88  3.77 - 5.28 10*6/mm3 Final    Hemoglobin 03/12/2025 12.2  12.0 - 15.9 g/dL Final    Hematocrit 03/12/2025 35.7  34.0 - 46.6 % Final    MCV 03/12/2025 92.0  79.0 - 97.0 fL Final    MCH 03/12/2025 31.4  26.6 - 33.0 pg Final    MCHC 03/12/2025 34.2  31.5 - 35.7 g/dL Final    RDW 03/12/2025 13.3  12.3 - 15.4 % Final    RDW-SD 03/12/2025 44.9  37.0 - 54.0 fl Final    MPV 03/12/2025 9.6  6.0 - 12.0 fL Final    Platelets 03/12/2025 150  140 - 450 10*3/mm3 Final    Neutrophil % 03/12/2025 86.0 (H)  42.7 - 76.0 % Final    Lymphocyte % 03/12/2025 11.8 (L)  19.6 - 45.3 % Final    Monocyte % 03/12/2025 1.9 (L)  5.0 - 12.0 % Final    Eosinophil % 03/12/2025 0.0 (L)  0.3 - 6.2 % Final    Basophil % 03/12/2025 0.0  0.0 - 1.5 % Final    Immature Grans % 03/12/2025 0.3  0.0 - 0.5 % Final    Neutrophils, Absolute 03/12/2025 5.89  1.70 - 7.00 10*3/mm3 Final    Lymphocytes, Absolute 03/12/2025 0.81  0.70 - 3.10 10*3/mm3 Final    Monocytes, Absolute 03/12/2025 0.13  0.10 - 0.90 10*3/mm3 Final    Eosinophils, Absolute 03/12/2025 0.00  0.00 - 0.40 10*3/mm3 Final    Basophils, Absolute 03/12/2025 0.00  0.00 - 0.20 10*3/mm3 Final    Immature Grans, Absolute 03/12/2025 0.02  0.00 - 0.05 10*3/mm3 Final    nRBC 03/12/2025 0.0  0.0 - 0.2 /100 WBC Final    Procalcitonin 03/12/2025 0.04  0.00 - 0.25 ng/mL Final    Glucose 03/13/2025 204 (H)  65 - 99 mg/dL Final    BUN 03/13/2025 19  8 - 23 mg/dL Final     Creatinine 03/13/2025 0.72  0.57 - 1.00 mg/dL Final    Sodium 03/13/2025 134 (L)  136 - 145 mmol/L Final    Potassium 03/13/2025 3.8  3.5 - 5.2 mmol/L Final    Chloride 03/13/2025 96 (L)  98 - 107 mmol/L Final    CO2 03/13/2025 24.7  22.0 - 29.0 mmol/L Final    Calcium 03/13/2025 8.4 (L)  8.6 - 10.5 mg/dL Final    Albumin 03/13/2025 3.7  3.5 - 5.2 g/dL Final    Phosphorus 03/13/2025 2.5  2.5 - 4.5 mg/dL Final    Anion Gap 03/13/2025 13.3  5.0 - 15.0 mmol/L Final    BUN/Creatinine Ratio 03/13/2025 26.4 (H)  7.0 - 25.0 Final    eGFR 03/13/2025 80.0  >60.0 mL/min/1.73 Final    Magnesium 03/13/2025 1.7  1.6 - 2.4 mg/dL Final    WBC 03/13/2025 10.65  3.40 - 10.80 10*3/mm3 Final    RBC 03/13/2025 3.95  3.77 - 5.28 10*6/mm3 Final    Hemoglobin 03/13/2025 12.1  12.0 - 15.9 g/dL Final    Hematocrit 03/13/2025 35.9  34.0 - 46.6 % Final    MCV 03/13/2025 90.9  79.0 - 97.0 fL Final    MCH 03/13/2025 30.6  26.6 - 33.0 pg Final    MCHC 03/13/2025 33.7  31.5 - 35.7 g/dL Final    RDW 03/13/2025 13.7  12.3 - 15.4 % Final    RDW-SD 03/13/2025 45.8  37.0 - 54.0 fl Final    MPV 03/13/2025 9.9  6.0 - 12.0 fL Final    Platelets 03/13/2025 154  140 - 450 10*3/mm3 Final    Neutrophil % 03/13/2025 90.2 (H)  42.7 - 76.0 % Final    Lymphocyte % 03/13/2025 7.6 (L)  19.6 - 45.3 % Final    Monocyte % 03/13/2025 1.7 (L)  5.0 - 12.0 % Final    Eosinophil % 03/13/2025 0.0 (L)  0.3 - 6.2 % Final    Basophil % 03/13/2025 0.0  0.0 - 1.5 % Final    Immature Grans % 03/13/2025 0.5  0.0 - 0.5 % Final    Neutrophils, Absolute 03/13/2025 9.61 (H)  1.70 - 7.00 10*3/mm3 Final    Lymphocytes, Absolute 03/13/2025 0.81  0.70 - 3.10 10*3/mm3 Final    Monocytes, Absolute 03/13/2025 0.18  0.10 - 0.90 10*3/mm3 Final    Eosinophils, Absolute 03/13/2025 0.00  0.00 - 0.40 10*3/mm3 Final    Basophils, Absolute 03/13/2025 0.00  0.00 - 0.20 10*3/mm3 Final    Immature Grans, Absolute 03/13/2025 0.05  0.00 - 0.05 10*3/mm3 Final    nRBC 03/13/2025 0.0  0.0 - 0.2 /100 WBC  Final    Glucose 03/14/2025 143 (H)  65 - 99 mg/dL Final    BUN 03/14/2025 18  8 - 23 mg/dL Final    Creatinine 03/14/2025 0.71  0.57 - 1.00 mg/dL Final    Sodium 03/14/2025 134 (L)  136 - 145 mmol/L Final    Potassium 03/14/2025 3.5  3.5 - 5.2 mmol/L Final    Chloride 03/14/2025 98  98 - 107 mmol/L Final    CO2 03/14/2025 26.9  22.0 - 29.0 mmol/L Final    Calcium 03/14/2025 8.6  8.6 - 10.5 mg/dL Final    Albumin 03/14/2025 3.4 (L)  3.5 - 5.2 g/dL Final    Phosphorus 03/14/2025 2.0 (L)  2.5 - 4.5 mg/dL Final    Anion Gap 03/14/2025 9.1  5.0 - 15.0 mmol/L Final    BUN/Creatinine Ratio 03/14/2025 25.4 (H)  7.0 - 25.0 Final    eGFR 03/14/2025 81.4  >60.0 mL/min/1.73 Final    Magnesium 03/14/2025 2.0  1.6 - 2.4 mg/dL Final    WBC 03/14/2025 8.95  3.40 - 10.80 10*3/mm3 Final    RBC 03/14/2025 3.92  3.77 - 5.28 10*6/mm3 Final    Hemoglobin 03/14/2025 11.9 (L)  12.0 - 15.9 g/dL Final    Hematocrit 03/14/2025 35.8  34.0 - 46.6 % Final    MCV 03/14/2025 91.3  79.0 - 97.0 fL Final    MCH 03/14/2025 30.4  26.6 - 33.0 pg Final    MCHC 03/14/2025 33.2  31.5 - 35.7 g/dL Final    RDW 03/14/2025 13.9  12.3 - 15.4 % Final    RDW-SD 03/14/2025 46.5  37.0 - 54.0 fl Final    MPV 03/14/2025 9.9  6.0 - 12.0 fL Final    Platelets 03/14/2025 152  140 - 450 10*3/mm3 Final    Neutrophil % 03/14/2025 79.4 (H)  42.7 - 76.0 % Final    Lymphocyte % 03/14/2025 15.3 (L)  19.6 - 45.3 % Final    Monocyte % 03/14/2025 4.7 (L)  5.0 - 12.0 % Final    Eosinophil % 03/14/2025 0.0 (L)  0.3 - 6.2 % Final    Basophil % 03/14/2025 0.0  0.0 - 1.5 % Final    Immature Grans % 03/14/2025 0.6 (H)  0.0 - 0.5 % Final    Neutrophils, Absolute 03/14/2025 7.11 (H)  1.70 - 7.00 10*3/mm3 Final    Lymphocytes, Absolute 03/14/2025 1.37  0.70 - 3.10 10*3/mm3 Final    Monocytes, Absolute 03/14/2025 0.42  0.10 - 0.90 10*3/mm3 Final    Eosinophils, Absolute 03/14/2025 0.00  0.00 - 0.40 10*3/mm3 Final    Basophils, Absolute 03/14/2025 0.00  0.00 - 0.20 10*3/mm3 Final     Immature Grans, Absolute 03/14/2025 0.05  0.00 - 0.05 10*3/mm3 Final    nRBC 03/14/2025 0.0  0.0 - 0.2 /100 WBC Final       ASSESSMENT/ PLAN:    Diagnoses and all orders for this visit:    1. Pure hypercholesterolemia (Primary)  Assessment & Plan:   Will update her lipid profile with her routine labs.  As long as it is not horrible we will not treat.    Orders:  -     Comprehensive Metabolic Panel  -     Lipid Panel    2. Essential (primary) hypertension  Assessment & Plan:  Her blood pressure could be somewhat lower but 89 it still within range.    Orders:  -     Comprehensive Metabolic Panel  -     Lipid Panel    3. Prediabetes  Assessment & Plan:  Will update her A1c.  She has previously continued to be in the prediabetic range.    Orders:  -     Hemoglobin A1c    4. Stage 3a chronic kidney disease  -     CBC (No Diff)    5. Age-related osteoporosis without current pathological fracture  Assessment & Plan:  Will update her calcium and vitamin D level.    Orders:  -     Comprehensive Metabolic Panel  -     Vitamin D,25-Hydroxy    6. Chronic obstructive pulmonary disease with acute exacerbation  Assessment & Plan:  Overall she is doing fine at this time with just the albuterol.  Her oxygen levels are remaining in the 90s without any supplemental O2.      7. Idiopathic chronic gout without tophus, unspecified site  -     Uric Acid    Other orders  -     Tdap (ADACEL) 5-2-15.5 LF-MCG/0.5 injection; Inject 0.5 mL into the appropriate muscle as directed by prescriber 1 (One) Time for 1 dose.  Dispense: 0.5 mL; Refill: 0        BMI is within normal parameters. No other follow-up for BMI required.      Orders Placed Today:     New Medications Ordered This Visit   Medications    Tdap (ADACEL) 5-2-15.5 LF-MCG/0.5 injection     Sig: Inject 0.5 mL into the appropriate muscle as directed by prescriber 1 (One) Time for 1 dose.     Dispense:  0.5 mL     Refill:  0        Management Plan:     An After Visit Summary was printed  and given to the patient at discharge.    Follow-up: Return in about 6 months (around 10/15/2025).    Pee Mon,  4/15/2025 14:41 EDT  This note was electronically signed.

## 2025-04-15 NOTE — ASSESSMENT & PLAN NOTE
Will update her lipid profile with her routine labs.  As long as it is not horrible we will not treat.

## 2025-04-15 NOTE — ASSESSMENT & PLAN NOTE
Overall she is doing fine at this time with just the albuterol.  Her oxygen levels are remaining in the 90s without any supplemental O2.

## 2025-04-16 ENCOUNTER — RESULTS FOLLOW-UP (OUTPATIENT)
Dept: FAMILY MEDICINE CLINIC | Facility: CLINIC | Age: OVER 89
End: 2025-04-16
Payer: MEDICARE

## 2025-04-16 LAB
ALBUMIN SERPL-MCNC: 3.9 G/DL (ref 3.5–5.2)
ALBUMIN/GLOB SERPL: 1.1 G/DL
ALP SERPL-CCNC: 76 U/L (ref 39–117)
ALT SERPL W P-5'-P-CCNC: 5 U/L (ref 1–33)
ANION GAP SERPL CALCULATED.3IONS-SCNC: 11 MMOL/L (ref 5–15)
AST SERPL-CCNC: 17 U/L (ref 1–32)
BILIRUB SERPL-MCNC: 0.5 MG/DL (ref 0–1.2)
BUN SERPL-MCNC: 13 MG/DL (ref 8–23)
BUN/CREAT SERPL: 12 (ref 7–25)
CALCIUM SPEC-SCNC: 9.5 MG/DL (ref 8.6–10.5)
CHLORIDE SERPL-SCNC: 98 MMOL/L (ref 98–107)
CHOLEST SERPL-MCNC: 193 MG/DL (ref 0–200)
CO2 SERPL-SCNC: 23 MMOL/L (ref 22–29)
CREAT SERPL-MCNC: 1.08 MG/DL (ref 0.57–1)
EGFRCR SERPLBLD CKD-EPI 2021: 49.2 ML/MIN/1.73
GLOBULIN UR ELPH-MCNC: 3.7 GM/DL
GLUCOSE SERPL-MCNC: 104 MG/DL (ref 65–99)
HBA1C MFR BLD: 5.8 % (ref 4.8–5.6)
HDLC SERPL-MCNC: 53 MG/DL (ref 40–60)
LDLC SERPL CALC-MCNC: 120 MG/DL (ref 0–100)
LDLC/HDLC SERPL: 2.23 {RATIO}
POTASSIUM SERPL-SCNC: 4.3 MMOL/L (ref 3.5–5.2)
PROT SERPL-MCNC: 7.6 G/DL (ref 6–8.5)
SODIUM SERPL-SCNC: 132 MMOL/L (ref 136–145)
TRIGL SERPL-MCNC: 110 MG/DL (ref 0–150)
URATE SERPL-MCNC: 2.6 MG/DL (ref 2.4–5.7)
VLDLC SERPL-MCNC: 20 MG/DL (ref 5–40)

## 2025-04-17 NOTE — TELEPHONE ENCOUNTER
"Relay     \"CMP shows slightly elevated kidney function and slightly low Na+. Both are stable  - A1C is good at 5.89  - lipid profile is good  - Vit D level is slightly low. I would recc Vit D supplement daily  - CBC is normal\"                "

## 2025-04-24 PROCEDURE — 87186 SC STD MICRODIL/AGAR DIL: CPT | Performed by: NURSE PRACTITIONER

## 2025-04-24 PROCEDURE — 87088 URINE BACTERIA CULTURE: CPT | Performed by: NURSE PRACTITIONER

## 2025-04-24 PROCEDURE — 87086 URINE CULTURE/COLONY COUNT: CPT | Performed by: NURSE PRACTITIONER

## 2025-05-27 ENCOUNTER — OFFICE VISIT (OUTPATIENT)
Dept: FAMILY MEDICINE CLINIC | Facility: CLINIC | Age: OVER 89
End: 2025-05-27
Payer: MEDICARE

## 2025-05-27 VITALS
SYSTOLIC BLOOD PRESSURE: 113 MMHG | WEIGHT: 141 LBS | DIASTOLIC BLOOD PRESSURE: 71 MMHG | HEART RATE: 75 BPM | OXYGEN SATURATION: 96 % | HEIGHT: 65 IN | BODY MASS INDEX: 23.49 KG/M2 | RESPIRATION RATE: 18 BRPM

## 2025-05-27 DIAGNOSIS — N89.8 VAGINAL ITCHING: ICD-10-CM

## 2025-05-27 DIAGNOSIS — N39.0 ACUTE UTI: Primary | ICD-10-CM

## 2025-05-27 LAB
BACTERIAL VAGINOSIS VAG-IMP: NEGATIVE
BILIRUB BLD-MCNC: ABNORMAL MG/DL
CANDIDA DNA VAG QL NAA+PROBE: NOT DETECTED
CANDIDA DNA VAG QL NAA+PROBE: NOT DETECTED
CLARITY, POC: ABNORMAL
COLOR UR: ABNORMAL
EXPIRATION DATE: ABNORMAL
GLUCOSE UR STRIP-MCNC: NEGATIVE MG/DL
KETONES UR QL: ABNORMAL
LEUKOCYTE EST, POC: ABNORMAL
Lab: ABNORMAL
NITRITE UR-MCNC: NEGATIVE MG/ML
PH UR: 6 [PH] (ref 5–8)
PROT UR STRIP-MCNC: ABNORMAL MG/DL
RBC # UR STRIP: ABNORMAL /UL
SP GR UR: 1.5 (ref 1–1.03)
T VAGINALIS DNA VAG QL NAA+PROBE: NOT DETECTED
UROBILINOGEN UR QL: ABNORMAL

## 2025-05-27 PROCEDURE — 81003 URINALYSIS AUTO W/O SCOPE: CPT | Performed by: NURSE PRACTITIONER

## 2025-05-27 PROCEDURE — 87088 URINE BACTERIA CULTURE: CPT | Performed by: NURSE PRACTITIONER

## 2025-05-27 PROCEDURE — 99213 OFFICE O/P EST LOW 20 MIN: CPT | Performed by: NURSE PRACTITIONER

## 2025-05-27 PROCEDURE — 87186 SC STD MICRODIL/AGAR DIL: CPT | Performed by: NURSE PRACTITIONER

## 2025-05-27 PROCEDURE — 87086 URINE CULTURE/COLONY COUNT: CPT | Performed by: NURSE PRACTITIONER

## 2025-05-27 PROCEDURE — 81515 NFCT DS BV&VAGINITIS DNA ALG: CPT | Performed by: NURSE PRACTITIONER

## 2025-05-27 PROCEDURE — 1125F AMNT PAIN NOTED PAIN PRSNT: CPT | Performed by: NURSE PRACTITIONER

## 2025-05-27 RX ORDER — NITROFURANTOIN 25; 75 MG/1; MG/1
100 CAPSULE ORAL 2 TIMES DAILY
Qty: 10 CAPSULE | Refills: 0 | Status: SHIPPED | OUTPATIENT
Start: 2025-05-27 | End: 2025-06-01

## 2025-05-27 NOTE — PROGRESS NOTES
"Shayy Wasserman presents to [facility] with complaint of  Urinary Tract Infection (Patient states that it burns and itch's when she goes to the bathroom. Patient also states that her urine is very strong. )    SUBJECTIVE  Urinary Tract Infection  Chronicity:  New  Onset:  In the past 7 days  Frequency:  Constantly  Progression since onset:  Worsening  Pain quality:  Burning  Fever:  No fever  Associated symptoms: flank pain, frequency, hematuria, hesitancy and urgency    Associated symptoms: no chills, no discharge, no nausea and no sweats    Associated symptoms comment:  Vaginal itching   Treatments tried:  Nothing    Patient is present with her daughter.    OBJECTIVE  Vital Signs:   /71   Pulse 75   Resp 18   Ht 165.1 cm (65\")   Wt 64 kg (141 lb)   SpO2 96%   BMI 23.46 kg/m²       Physical Exam  Vitals reviewed.   Constitutional:       General: She is not in acute distress.     Appearance: Normal appearance. She is not ill-appearing.   HENT:      Head: Normocephalic and atraumatic.   Cardiovascular:      Rate and Rhythm: Normal rate and regular rhythm.      Pulses: Normal pulses.      Heart sounds: Normal heart sounds.   Pulmonary:      Effort: Pulmonary effort is normal.      Breath sounds: Normal breath sounds.   Genitourinary:     General: Normal vulva.      Exam position: Supine.      Labia:         Right: No rash or tenderness.         Left: No rash or tenderness.       Vagina: No vaginal discharge, erythema or tenderness.   Musculoskeletal:      Cervical back: Neck supple.   Skin:     General: Skin is warm and dry.   Neurological:      General: No focal deficit present.      Mental Status: She is alert and oriented to person, place, and time. Mental status is at baseline.   Psychiatric:         Mood and Affect: Mood normal.         Behavior: Behavior normal.         Judgment: Judgment normal.          Results Review:  The following data was reviewed by Anisa" ANDREI Valle [unfilled] 16:12 EDT.    Office Visit on 05/27/2025   Component Date Value Ref Range Status    Color 05/27/2025 Dark Yellow  Yellow, Straw, Dark Yellow, Heaven Final    Clarity, UA 05/27/2025 Cloudy (A)  Clear Final    Specific Gravity  05/27/2025 1.500 (A)  1.005 - 1.030 Final    pH, Urine 05/27/2025 6.0  5.0 - 8.0 Final    Leukocytes 05/27/2025 Large (3+) (A)  Negative Final    Nitrite, UA 05/27/2025 Negative (A)  Negative Final    Protein, POC 05/27/2025 2+ (A)  Negative mg/dL Final    Glucose, UA 05/27/2025 Negative  Negative mg/dL Final    Ketones, UA 05/27/2025 Trace (A)  Negative Final    Urobilinogen, UA 05/27/2025 0.2 E.U./dL  Normal, 0.2 E.U./dL Final    Bilirubin 05/27/2025 Small (1+) (A)  Negative Final    Blood, UA 05/27/2025 Large (A)  Negative Final    Lot Number 05/27/2025 [id number]   Final    Expiration Date 05/27/2025 11,302,025   Final    Urine Culture 05/27/2025 >100,000 CFU/mL Escherichia coli (A)   Preliminary    Bacterial vaginosis 05/27/2025 Negative  Negative Final    Candida glabrata/krusei 05/27/2025 Not Detected  Not Detected Final    BUBBA GROUP 05/27/2025 Not Detected  Not Detected Final    Candida group contains one or more of the following: C. albicans, C. tropicalis, C. parapsilosis or C. dubliniensis.    Trichomonas vaginalis 05/27/2025 Not Detected  Not Detected Final         ASSESSMENT AND PLAN:  Diagnoses and all orders for this visit:    1. Acute UTI (Primary)  -     POCT urinalysis dipstick, automated  -     nitrofurantoin, macrocrystal-monohydrate, (Macrobid) 100 MG capsule; Take 1 capsule by mouth 2 (Two) Times a Day for 5 days.  Dispense: 10 capsule; Refill: 0  -     Urine Culture - Urine, Urine, Clean Catch    2. Vaginal itching  -     MVP Vaginosis Panel - Swab, Vagina; Future  -     Urine Culture - Urine, Urine, Clean Catch  -     MVP Vaginosis Panel - Swab, Vagina      Treating with Macrobid.  Vaginitis swab negative.  Urine culture in process.  Increase rest and  fluids.  Decrease caffeinated beverages.  Of note, patient had UTI 1 month ago, urine culture positive for E. coli.  If UTIs become recurrent, may need to consider seeing urology.      Follow Up   No follow-ups on file. Patient to notify office with any acute concerns or issues.  Patient verbalizes understanding, agrees with plan of care and has no further questions upon discharge.     Patient was given instructions and counseling regarding her condition or for health maintenance advice. Please see specific information pulled into the AVS if appropriate.     Discussed the importance of following up with any needed screening tests/labs/specialist appointments and any requested follow-up recommended by me today. Importance of maintaining follow-up discussed and patient accepts that missed appointments can delay diagnosis and potentially lead to worsening of conditions.    Part of this note may be an electronic transcription/translation of spoken language to printed text using the Dragon Dictation System.

## 2025-05-30 LAB — BACTERIA SPEC AEROBE CULT: ABNORMAL

## 2025-06-10 DIAGNOSIS — I10 ESSENTIAL (PRIMARY) HYPERTENSION: ICD-10-CM

## 2025-06-10 RX ORDER — AMLODIPINE BESYLATE 5 MG/1
5 TABLET ORAL DAILY
Qty: 90 TABLET | Refills: 1 | Status: SHIPPED | OUTPATIENT
Start: 2025-06-10

## 2025-07-08 DIAGNOSIS — G89.29 CHRONIC RIGHT-SIDED THORACIC BACK PAIN: ICD-10-CM

## 2025-07-08 DIAGNOSIS — M54.6 CHRONIC RIGHT-SIDED THORACIC BACK PAIN: ICD-10-CM
